# Patient Record
Sex: MALE | Race: WHITE | NOT HISPANIC OR LATINO | Employment: OTHER | URBAN - METROPOLITAN AREA
[De-identification: names, ages, dates, MRNs, and addresses within clinical notes are randomized per-mention and may not be internally consistent; named-entity substitution may affect disease eponyms.]

---

## 2023-08-22 ENCOUNTER — TELEPHONE (OUTPATIENT)
Dept: FAMILY MEDICINE CLINIC | Facility: CLINIC | Age: 68
End: 2023-08-22
Payer: MEDICARE

## 2023-08-22 NOTE — TELEPHONE ENCOUNTER
Spoke to patient's wife. She was calling to see if this patient could be seen sooner. I did not see any New Patient availability sooner than their already scheduled appointment, 8-29-23. His wife said that they just moved here from [place]. He had recent heart surgery and has lots of fluid in his body building up and a wound, I believe on his leg that she said looks infected. I told her that he needs to go to the ER because he may need that fluid drained or even an IV antibiotic. She agreed but I heard patient in the background refuse the ER.

## 2023-08-25 ENCOUNTER — APPOINTMENT (OUTPATIENT)
Dept: GENERAL RADIOLOGY | Facility: HOSPITAL | Age: 68
End: 2023-08-25
Payer: MEDICARE

## 2023-08-25 ENCOUNTER — HOSPITAL ENCOUNTER (EMERGENCY)
Facility: HOSPITAL | Age: 68
Discharge: HOME | End: 2023-08-25
Attending: EMERGENCY MEDICINE
Payer: MEDICARE

## 2023-08-25 VITALS
SYSTOLIC BLOOD PRESSURE: 109 MMHG | RESPIRATION RATE: 24 BRPM | BODY MASS INDEX: 26.36 KG/M2 | WEIGHT: 198.85 LBS | HEART RATE: 92 BPM | OXYGEN SATURATION: 95 % | HEIGHT: 73 IN | TEMPERATURE: 97.8 F | DIASTOLIC BLOOD PRESSURE: 91 MMHG

## 2023-08-25 DIAGNOSIS — I50.9 ACUTE ON CHRONIC CONGESTIVE HEART FAILURE, UNSPECIFIED HEART FAILURE TYPE: Primary | ICD-10-CM

## 2023-08-25 DIAGNOSIS — S81.802A OPEN WOUND OF LEFT LOWER EXTREMITY, INITIAL ENCOUNTER: ICD-10-CM

## 2023-08-25 LAB
ALBUMIN SERPL-MCNC: 3.8 G/DL (ref 3.5–5.2)
ALBUMIN/GLOB SERPL: 1.5 G/DL
ALP SERPL-CCNC: 117 U/L (ref 39–117)
ALT SERPL W P-5'-P-CCNC: 8 U/L (ref 1–41)
ANION GAP SERPL CALCULATED.3IONS-SCNC: 13.9 MMOL/L (ref 5–15)
AST SERPL-CCNC: 22 U/L (ref 1–40)
BASOPHILS # BLD AUTO: 0.04 10*3/MM3 (ref 0–0.2)
BASOPHILS NFR BLD AUTO: 0.5 % (ref 0–1.5)
BILIRUB SERPL-MCNC: 1.6 MG/DL (ref 0–1.2)
BUN SERPL-MCNC: 10 MG/DL (ref 8–23)
BUN/CREAT SERPL: 16.4 (ref 7–25)
CALCIUM SPEC-SCNC: 9 MG/DL (ref 8.6–10.5)
CHLORIDE SERPL-SCNC: 93 MMOL/L (ref 98–107)
CO2 SERPL-SCNC: 23.1 MMOL/L (ref 22–29)
CREAT SERPL-MCNC: 0.61 MG/DL (ref 0.76–1.27)
DEPRECATED RDW RBC AUTO: 53.5 FL (ref 37–54)
EGFRCR SERPLBLD CKD-EPI 2021: 105.9 ML/MIN/1.73
EOSINOPHIL # BLD AUTO: 0.01 10*3/MM3 (ref 0–0.4)
EOSINOPHIL NFR BLD AUTO: 0.1 % (ref 0.3–6.2)
ERYTHROCYTE [DISTWIDTH] IN BLOOD BY AUTOMATED COUNT: 15.1 % (ref 12.3–15.4)
GLOBULIN UR ELPH-MCNC: 2.5 GM/DL
GLUCOSE SERPL-MCNC: 116 MG/DL (ref 65–99)
HCT VFR BLD AUTO: 33 % (ref 37.5–51)
HGB BLD-MCNC: 11.7 G/DL (ref 13–17.7)
HOLD SPECIMEN: 11
HOLD SPECIMEN: 11
IMM GRANULOCYTES # BLD AUTO: 0.04 10*3/MM3 (ref 0–0.05)
IMM GRANULOCYTES NFR BLD AUTO: 0.5 % (ref 0–0.5)
LYMPHOCYTES # BLD AUTO: 1.5 10*3/MM3 (ref 0.7–3.1)
LYMPHOCYTES NFR BLD AUTO: 18.9 % (ref 19.6–45.3)
MCH RBC QN AUTO: 34.5 PG (ref 26.6–33)
MCHC RBC AUTO-ENTMCNC: 35.5 G/DL (ref 31.5–35.7)
MCV RBC AUTO: 97.3 FL (ref 79–97)
MONOCYTES # BLD AUTO: 1.21 10*3/MM3 (ref 0.1–0.9)
MONOCYTES NFR BLD AUTO: 15.3 % (ref 5–12)
NEUTROPHILS NFR BLD AUTO: 5.12 10*3/MM3 (ref 1.7–7)
NEUTROPHILS NFR BLD AUTO: 64.7 % (ref 42.7–76)
NRBC BLD AUTO-RTO: 0 /100 WBC (ref 0–0.2)
NT-PROBNP SERPL-MCNC: ABNORMAL PG/ML (ref 0–900)
PLATELET # BLD AUTO: 242 10*3/MM3 (ref 140–450)
PMV BLD AUTO: 9.4 FL (ref 6–12)
POTASSIUM SERPL-SCNC: 3.6 MMOL/L (ref 3.5–5.2)
PROT SERPL-MCNC: 6.3 G/DL (ref 6–8.5)
QT INTERVAL: 400 MS
QTC INTERVAL: 487 MS
RBC # BLD AUTO: 3.39 10*6/MM3 (ref 4.14–5.8)
SODIUM SERPL-SCNC: 130 MMOL/L (ref 136–145)
TROPONIN T SERPL HS-MCNC: 44 NG/L
WBC NRBC COR # BLD: 7.92 10*3/MM3 (ref 3.4–10.8)
WHOLE BLOOD HOLD COAG: 11
WHOLE BLOOD HOLD SPECIMEN: 11

## 2023-08-25 PROCEDURE — 93005 ELECTROCARDIOGRAM TRACING: CPT | Performed by: EMERGENCY MEDICINE

## 2023-08-25 PROCEDURE — 96374 THER/PROPH/DIAG INJ IV PUSH: CPT

## 2023-08-25 PROCEDURE — 83880 ASSAY OF NATRIURETIC PEPTIDE: CPT | Performed by: EMERGENCY MEDICINE

## 2023-08-25 PROCEDURE — 80053 COMPREHEN METABOLIC PANEL: CPT | Performed by: EMERGENCY MEDICINE

## 2023-08-25 PROCEDURE — 99284 EMERGENCY DEPT VISIT MOD MDM: CPT

## 2023-08-25 PROCEDURE — 93010 ELECTROCARDIOGRAM REPORT: CPT | Performed by: INTERNAL MEDICINE

## 2023-08-25 PROCEDURE — 25010000002 FUROSEMIDE PER 20 MG: Performed by: EMERGENCY MEDICINE

## 2023-08-25 PROCEDURE — 85025 COMPLETE CBC W/AUTO DIFF WBC: CPT | Performed by: EMERGENCY MEDICINE

## 2023-08-25 PROCEDURE — 71045 X-RAY EXAM CHEST 1 VIEW: CPT

## 2023-08-25 PROCEDURE — 84484 ASSAY OF TROPONIN QUANT: CPT | Performed by: EMERGENCY MEDICINE

## 2023-08-25 RX ORDER — SODIUM CHLORIDE 0.9 % (FLUSH) 0.9 %
10 SYRINGE (ML) INJECTION AS NEEDED
Status: DISCONTINUED | OUTPATIENT
Start: 2023-08-25 | End: 2023-08-25 | Stop reason: HOSPADM

## 2023-08-25 RX ORDER — ASPIRIN 81 MG/1
1 TABLET, COATED ORAL DAILY
Status: ON HOLD | COMMUNITY
Start: 2023-08-07

## 2023-08-25 RX ORDER — CEPHALEXIN 500 MG/1
500 CAPSULE ORAL 3 TIMES DAILY
Qty: 15 CAPSULE | Refills: 0 | Status: ON HOLD | OUTPATIENT
Start: 2023-08-25

## 2023-08-25 RX ORDER — HYDROCODONE BITARTRATE AND ACETAMINOPHEN 5; 325 MG/1; MG/1
2 TABLET ORAL ONCE
Status: COMPLETED | OUTPATIENT
Start: 2023-08-25 | End: 2023-08-25

## 2023-08-25 RX ORDER — ONDANSETRON 4 MG/1
1 TABLET, FILM COATED ORAL EVERY 8 HOURS PRN
Status: ON HOLD | COMMUNITY
Start: 2023-06-17

## 2023-08-25 RX ORDER — FUROSEMIDE 10 MG/ML
80 INJECTION INTRAMUSCULAR; INTRAVENOUS ONCE
Status: COMPLETED | OUTPATIENT
Start: 2023-08-25 | End: 2023-08-25

## 2023-08-25 RX ORDER — POTASSIUM CHLORIDE 1500 MG/1
40 TABLET, EXTENDED RELEASE ORAL 2 TIMES DAILY
Status: ON HOLD | COMMUNITY
Start: 2023-08-24

## 2023-08-25 RX ORDER — FUROSEMIDE 40 MG/1
40 TABLET ORAL DAILY
Qty: 30 TABLET | Refills: 1 | Status: ON HOLD | OUTPATIENT
Start: 2023-08-25

## 2023-08-25 RX ORDER — CARVEDILOL 6.25 MG/1
1 TABLET ORAL EVERY 12 HOURS SCHEDULED
Status: ON HOLD | COMMUNITY
Start: 2023-08-05

## 2023-08-25 RX ORDER — HYDROCODONE BITARTRATE AND ACETAMINOPHEN 10; 325 MG/1; MG/1
1 TABLET ORAL EVERY 8 HOURS PRN
Status: ON HOLD | COMMUNITY
Start: 2023-07-28

## 2023-08-25 RX ORDER — ATORVASTATIN CALCIUM 20 MG/1
1 TABLET, FILM COATED ORAL DAILY
Status: ON HOLD | COMMUNITY
Start: 2023-08-05

## 2023-08-25 RX ORDER — FUROSEMIDE 40 MG/1
TABLET ORAL
COMMUNITY
Start: 2023-08-24 | End: 2023-08-25

## 2023-08-25 RX ADMIN — HYDROCODONE BITARTRATE AND ACETAMINOPHEN 2 TABLET: 5; 325 TABLET ORAL at 10:01

## 2023-08-25 RX ADMIN — FUROSEMIDE 80 MG: 10 INJECTION, SOLUTION INTRAMUSCULAR; INTRAVENOUS at 10:01

## 2023-08-25 NOTE — DISCHARGE INSTRUCTIONS
The low sodium and low salt diet for now to prevent swelling and make sure you are taking the Lasix (furosemide) water pill 40 mg twice daily for the first 3 days to get some excess fluid off, then switch over to just taking it once daily from then on.    We will also need to call the heart failure clinic today for an urgent follow-up appointment with Dr. Zuniga for sometime next week.

## 2023-08-25 NOTE — ED PROVIDER NOTES
Time: 9:39 AM EDT  Date of encounter:  8/25/2023  Independent Historian/Clinical History and Information was obtained by:   Patient and Family    History is limited by: N/A    Chief Complaint: Dyspnea on exertion and shortness of breath      History of Present Illness:  Patient is a 68 y.o. year old male with history of heart failure on Lasix and Coreg and status post pacemaker and defibrillator placement, who presents to the emergency department for evaluation of worsening shortness of breath with minimal exertion and also some shortness of breath trying to lay supine at night.    He denies any fevers or productive cough or sick contacts or congestion.    No chest pain.    He does have some lower extremity edema which is essentially chronic.    He just recently moved here from [place] and now seeking to get plugged into medical care and with a cardiologist.    He does have some lower extremity edema that is unchanged from baseline but also has an open ulcerative wound on the lateral aspect of the left lower leg that he has been bandaging.    HPI    Patient Care Team  Primary Care Provider: Provider, No Known    Past Medical History:     No Known Allergies  Past Medical History:   Diagnosis Date    CHF (congestive heart failure)     Hypertension      Past Surgical History:   Procedure Laterality Date    CARDIAC SURGERY       History reviewed. No pertinent family history.    Home Medications:  Prior to Admission medications    Medication Sig Start Date End Date Taking? Authorizing Provider   Aspirin Low Dose 81 MG EC tablet Take 1 tablet by mouth Daily. 8/7/23  Yes Joshua Connors MD   atorvastatin (LIPITOR) 20 MG tablet Take 1 tablet by mouth Daily. 8/5/23  Yes Joshua Connors MD   carvedilol (COREG) 6.25 MG tablet Take 1 tablet by mouth Every 12 (Twelve) Hours. 8/5/23  Yes Joshua Connors MD   HYDROcodone-acetaminophen (NORCO)  MG per tablet Take 1 tablet by mouth 3 (Three) Times a Day.  "7/28/23  Yes ProviderJoshua MD   ondansetron (ZOFRAN) 4 MG tablet Take 1 tablet by mouth 3 (Three) Times a Day. 6/17/23  Yes Joshua Connors MD   potassium chloride ER (K-TAB) 20 MEQ tablet controlled-release ER tablet  8/24/23  Yes Joshua Connors MD   furosemide (LASIX) 40 MG tablet  8/24/23 8/25/23 Yes Joshua Connors MD   cephalexin (KEFLEX) 500 MG capsule Take 1 capsule by mouth 3 (Three) Times a Day. 8/25/23   Jameel Branham MD   furosemide (LASIX) 40 MG tablet Take 1 tablet by mouth Daily. 8/25/23   Jameel Branham MD        Social History:   Social History     Tobacco Use    Smoking status: Every Day     Packs/day: 1.50     Types: Cigarettes         Review of Systems:  Review of Systems   I performed a 10 point review of systems which was all negative, except for the positives found in the HPI above.  Physical Exam:  /91 (BP Location: Right arm, Patient Position: Sitting)   Pulse 92   Temp 97.8 øF (36.6 øC) (Oral)   Resp 24   Ht 185.4 cm (73\")   Wt 90.2 kg (198 lb 13.7 oz)   SpO2 95%   BMI 26.24 kg/mý     Physical Exam   General: Awake alert and in no obvious distress    HEENT: Head normocephalic atraumatic, eyes PERRLA EOMI, nose normal, oropharynx normal.    Neck: Supple full range of motion, no meningismus, no lymphadenopathy    Heart: Regular rate and rhythm, no murmurs or rubs, 2+ radial pulses bilaterally    Lungs: Mostly clear to auscultation bilaterally without wheezes or crackles, but there is some diminished breath sounds over both lung bases posteriorly, no respiratory distress    Abdomen: Soft, nontender, nondistended, no rebound or guarding    Skin: Warm, dry, no rash    Musculoskeletal: Normal range of motion, 2+ bilateral lower extremity edema without calf tenderness, and there is a 3 x 4 cm ulcerative healing wound that is still open on the lateral aspect of the left lower extremity without any surrounding infection but some skin irritation from adhesive " tape usage.    Neurologic: Oriented x3, no motor deficits no sensory deficits    Psychiatric: Mood appears stable, no psychosis          Procedures:  Procedures      Medical Decision Making:      Comorbidities that affect care:    Congestive Heart Failure, Coronary Artery Disease, Smoking    External Notes reviewed:    None      The following orders were placed and all results were independently analyzed by me:  Orders Placed This Encounter   Procedures    XR Chest 1 View    [place] Draw    Comprehensive Metabolic Panel    BNP    Single High Sensitivity Troponin T    CBC Auto Differential    Ambulatory Referral to Heart Failure Clinic    Undress & Gown    Continuous Pulse Oximetry    Vital Signs    ECG 12 Lead ED Triage Standing Order; SOA    CBC & Differential    Green Top (Gel)    Lavender Top    Gold Top - SST    Light Blue Top       Medications Given in the Emergency Department:  Medications   furosemide (LASIX) injection 80 mg (80 mg Intravenous Given 8/25/23 1001)   HYDROcodone-acetaminophen (NORCO) 5-325 MG per tablet 2 tablet (2 tablets Oral Given 8/25/23 1001)        ED Course:    ED Course as of 08/25/23 1551   Fri Aug 25, 2023   0852 EKG: I interpreted his twelve-lead EKG as normal sinus rhythm at 89 bpm, normal P waves, low voltage noted, septal and inferior Q waves present, no ST elevations but some nonspecific T wave abnormalities throughout the lateral leads noted.    No old EKG available for comparison. [VS]      ED Course User Index  [VS] Jameel Branham MD       Labs:    Lab Results (last 24 hours)       Procedure Component Value Units Date/Time    CBC & Differential [[id number]]  (Abnormal) Collected: 08/25/23 0813    Specimen: Blood Updated: 08/25/23 0825    Narrative:      The following orders were created for panel order CBC & Differential.  Procedure                               Abnormality         Status                     ---------                               -----------         ------                      CBC Auto Differential[[id number]]        Abnormal            Final result                 Please view results for these tests on the individual orders.    Comprehensive Metabolic Panel [[id number]]  (Abnormal) Collected: 08/25/23 0813    Specimen: Blood Updated: 08/25/23 0844     Glucose 116 mg/dL      BUN 10 mg/dL      Creatinine 0.61 mg/dL      Sodium 130 mmol/L      Potassium 3.6 mmol/L      Chloride 93 mmol/L      CO2 23.1 mmol/L      Calcium 9.0 mg/dL      Total Protein 6.3 g/dL      Albumin 3.8 g/dL      ALT (SGPT) 8 U/L      AST (SGOT) 22 U/L      Alkaline Phosphatase 117 U/L      Total Bilirubin 1.6 mg/dL      Globulin 2.5 gm/dL      A/G Ratio 1.5 g/dL      BUN/Creatinine Ratio 16.4     Anion Gap 13.9 mmol/L      eGFR 105.9 mL/min/1.73     Narrative:      GFR Normal >60  Chronic Kidney Disease <60  Kidney Failure <15      BNP [[id number]]  (Abnormal) Collected: 08/25/23 0813    Specimen: Blood Updated: 08/25/23 0838     proBNP 17,167.0 pg/mL     Narrative:      Among patients with dyspnea, NT-proBNP is highly sensitive for the detection of acute congestive heart failure. In addition NT-proBNP of <300 pg/ml effectively rules out acute congestive heart failure with 99% negative predictive value.      Single High Sensitivity Troponin T [[id number]]  (Abnormal) Collected: 08/25/23 0813    Specimen: Blood Updated: 08/25/23 0844     HS Troponin T 44 ng/L     Narrative:      High Sensitive Troponin T Reference Range:  <10.0 ng/L- Negative Female for AMI  <15.0 ng/L- Negative Male for AMI  >=10 - Abnormal Female indicating possible myocardial injury.  >=15 - Abnormal Male indicating possible myocardial injury.   Clinicians would have to utilize clinical acumen, EKG, Troponin, and serial changes to determine if it is an Acute Myocardial Infarction or myocardial injury due to an underlying chronic condition.         CBC Auto Differential [[id number]]  (Abnormal) Collected: 08/25/23 0813    Specimen: Blood  Updated: 08/25/23 0825     WBC 7.92 10*3/mm3      RBC 3.39 10*6/mm3      Hemoglobin 11.7 g/dL      Hematocrit 33.0 %      MCV 97.3 fL      MCH 34.5 pg      MCHC 35.5 g/dL      RDW 15.1 %      RDW-SD 53.5 fl      MPV 9.4 fL      Platelets 242 10*3/mm3      Neutrophil % 64.7 %      Lymphocyte % 18.9 %      Monocyte % 15.3 %      Eosinophil % 0.1 %      Basophil % 0.5 %      Immature Grans % 0.5 %      Neutrophils, Absolute 5.12 10*3/mm3      Lymphocytes, Absolute 1.50 10*3/mm3      Monocytes, Absolute 1.21 10*3/mm3      Eosinophils, Absolute 0.01 10*3/mm3      Basophils, Absolute 0.04 10*3/mm3      Immature Grans, Absolute 0.04 10*3/mm3      nRBC 0.0 /100 WBC              Imaging:    XR Chest 1 View    Result Date: 8/25/2023  PROCEDURE: XR CHEST 1 VW  COMPARISON: None  INDICATIONS: SOA Triage Protocol/SHORTNESS OF BREATH AND DIZZINESS  FINDINGS:  Left subclavian transvenous pacemaker defibrillator is in place.  There is moderate cardiomegaly.  Upper lobe pulmonary vessels are prominent compatible with pulmonary vascular congestion.  There is bibasilar airspace disease with small bilateral pleural effusions.  Is no evidence pneumothorax.  Bony structures are unremarkable.        1. Cardiomegaly and pulmonary vascular congestion 2. Bibasilar airspace disease and small bilateral pleural effusions all of which is most likely related to congestive heart failure and pulmonary edema       RJ MONTAGUE MD       Electronically Signed and Approved By: RJ MONTAGUE MD on 8/25/2023 at 8:42                Differential Diagnosis and Discussion:    Dyspnea: Differential diagnosis includes but is not limited to metabolic acidosis, neurological disorders, psychogenic, asthma, pneumothorax, upper airway obstruction, COPD, pneumonia, noncardiogenic pulmonary edema, interstitial lung disease, anemia, congestive heart failure, and pulmonary embolism    All labs were reviewed and interpreted by me.  All X-rays impressions were  independently interpreted by me.  EKG was interpreted by me.    MDM     Amount and/or Complexity of Data Reviewed  Clinical lab tests: reviewed  Tests in the radiology section of CP[facility]: reviewed  Tests in the medicine section of [facility]: reviewed         This patient is a pleasant 68-year-old male who recently moved from [place] and has a history of heart failure now presenting with worsening dyspnea with minimal exertion and orthopnea and leg swelling.    He is oxygenating 97% on room air and in no respiratory distress at rest, and I do not think he warrants hospitalization.    Lab work notable for proBNP which is significantly elevated at 17,000 today, consistent with CHF exacerbation.    Chest x-ray also shows signs of volume overload or CHF.    I will start him on increased Lasix dosage of 40 mg twice a day for the first few days and then transition back to once daily.    Low sodium and low salt intake were discussed.    I will arrange for urgent outpatient ambulatory referral to see a cardiologist over at our heart clinic as an outpatient but do not think he warrants hospitalization at this time.              Patient Care Considerations:          Consultants/Shared Management Plan:        Social Determinants of Health:    Patient has presented with family members who are responsible, reliable and will ensure follow up care.      Disposition and Care Coordination:    Discharged: I considered escalation of care by admitting this patient for observation, however the patient has improved and is suitable and  stable for discharge.    I have explained the patient's condition, diagnoses and treatment plan based on the information available to me at this time. I have answered questions and addressed any concerns. The patient has a good  understanding of the patient's diagnosis, condition, and treatment plan as can be expected at this point. The vital signs have been stable. The patient's condition is stable and appropriate  for discharge from the emergency department.      The patient will pursue further outpatient evaluation with the primary care physician or other designated or consulting physician as outlined in the discharge instructions. They are agreeable to this plan of care and follow-up instructions have been explained in detail. The patient has received these instructions in written format and have expressed an understanding of the discharge instructions. The patient is aware that any significant change in condition or worsening of symptoms should prompt an immediate return to this or the closest emergency department or call to 911.  I have explained discharge medications and the need for follow up with the patient/caretakers. This was also printed in the discharge instructions. Patient was discharged with the following medications and follow up:      Medication List        New Prescriptions      cephalexin 500 MG capsule  Commonly known as: KEFLEX  Take 1 capsule by mouth 3 (Three) Times a Day.            Changed      furosemide 40 MG tablet  Commonly known as: LASIX  Take 1 tablet by mouth Daily.  What changed: See the new instructions.               Where to Get Your Medications        These medications were sent to [id number] - [place], [place] - [address]  AT [facility] [address] - [phone]  - [phone]   [address] [facility] [address]      Phone: [phone]   cephalexin 500 MG capsule  furosemide 40 MG tablet      Christy Zuniga MD  [address]  [facility] [id number] [phone]    Call today  for a follow-up appointment    [facility]  [address]  [facility] [address] [phone]           Final diagnoses:   Acute on chronic congestive heart failure, unspecified heart failure type   Open wound of left lower extremity, initial encounter        ED Disposition       ED Disposition   Discharge    Condition   --     Comment   --               This medical record created using voice recognition software.             Jameel Branham MD  08/25/23 5116

## 2023-08-29 ENCOUNTER — HOSPITAL ENCOUNTER (INPATIENT)
Facility: HOSPITAL | Age: 68
LOS: 8 days | Discharge: OTHER INSTITUTION | DRG: 286 | End: 2023-09-06
Attending: EMERGENCY MEDICINE | Admitting: HOSPITALIST
Payer: MEDICARE

## 2023-08-29 ENCOUNTER — OFFICE VISIT (OUTPATIENT)
Dept: FAMILY MEDICINE CLINIC | Facility: CLINIC | Age: 68
End: 2023-08-29
Payer: MEDICARE

## 2023-08-29 ENCOUNTER — APPOINTMENT (OUTPATIENT)
Dept: GENERAL RADIOLOGY | Facility: HOSPITAL | Age: 68
DRG: 286 | End: 2023-08-29
Payer: MEDICARE

## 2023-08-29 VITALS
HEIGHT: 73 IN | BODY MASS INDEX: 26 KG/M2 | HEART RATE: 92 BPM | WEIGHT: 196.2 LBS | TEMPERATURE: 98 F | OXYGEN SATURATION: 99 % | DIASTOLIC BLOOD PRESSURE: 82 MMHG | SYSTOLIC BLOOD PRESSURE: 117 MMHG

## 2023-08-29 DIAGNOSIS — R07.89 CHEST PAIN, ATYPICAL: ICD-10-CM

## 2023-08-29 DIAGNOSIS — Z78.9 DECREASED ACTIVITIES OF DAILY LIVING (ADL): ICD-10-CM

## 2023-08-29 DIAGNOSIS — Z78.9 FAILURE OF OUTPATIENT TREATMENT: ICD-10-CM

## 2023-08-29 DIAGNOSIS — R17 JAUNDICE: ICD-10-CM

## 2023-08-29 DIAGNOSIS — R26.2 DIFFICULTY WALKING: ICD-10-CM

## 2023-08-29 DIAGNOSIS — J81.0 ACUTE PULMONARY EDEMA: ICD-10-CM

## 2023-08-29 DIAGNOSIS — I50.21 ACUTE HFREF (HEART FAILURE WITH REDUCED EJECTION FRACTION): ICD-10-CM

## 2023-08-29 DIAGNOSIS — I50.9 ACUTE ON CHRONIC CONGESTIVE HEART FAILURE, UNSPECIFIED HEART FAILURE TYPE: Primary | ICD-10-CM

## 2023-08-29 LAB
ALBUMIN SERPL-MCNC: 4.4 G/DL (ref 3.5–5.2)
ALBUMIN/GLOB SERPL: 1.6 G/DL
ALP SERPL-CCNC: 137 U/L (ref 39–117)
ALT SERPL W P-5'-P-CCNC: 10 U/L (ref 1–41)
ANION GAP SERPL CALCULATED.3IONS-SCNC: 14.1 MMOL/L (ref 5–15)
AST SERPL-CCNC: 22 U/L (ref 1–40)
BASOPHILS # BLD AUTO: 0.05 10*3/MM3 (ref 0–0.2)
BASOPHILS NFR BLD AUTO: 0.7 % (ref 0–1.5)
BILIRUB SERPL-MCNC: 2.1 MG/DL (ref 0–1.2)
BUN SERPL-MCNC: 16 MG/DL (ref 8–23)
BUN/CREAT SERPL: 19 (ref 7–25)
CALCIUM SPEC-SCNC: 9.3 MG/DL (ref 8.6–10.5)
CHLORIDE SERPL-SCNC: 92 MMOL/L (ref 98–107)
CO2 SERPL-SCNC: 23.9 MMOL/L (ref 22–29)
CREAT SERPL-MCNC: 0.84 MG/DL (ref 0.76–1.27)
DEPRECATED RDW RBC AUTO: 52.1 FL (ref 37–54)
EGFRCR SERPLBLD CKD-EPI 2021: 96.2 ML/MIN/1.73
EOSINOPHIL # BLD AUTO: 0.02 10*3/MM3 (ref 0–0.4)
EOSINOPHIL NFR BLD AUTO: 0.3 % (ref 0.3–6.2)
ERYTHROCYTE [DISTWIDTH] IN BLOOD BY AUTOMATED COUNT: 14.7 % (ref 12.3–15.4)
FERRITIN SERPL-MCNC: 235.3 NG/ML (ref 30–400)
FOLATE SERPL-MCNC: 13.9 NG/ML (ref 4.78–24.2)
GEN 5 2HR TROPONIN T REFLEX: 37 NG/L
GLOBULIN UR ELPH-MCNC: 2.8 GM/DL
GLUCOSE SERPL-MCNC: 111 MG/DL (ref 65–99)
HCT VFR BLD AUTO: 33.7 % (ref 37.5–51)
HGB BLD-MCNC: 12.3 G/DL (ref 13–17.7)
HOLD SPECIMEN: NORMAL
HOLD SPECIMEN: NORMAL
IMM GRANULOCYTES # BLD AUTO: 0.02 10*3/MM3 (ref 0–0.05)
IMM GRANULOCYTES NFR BLD AUTO: 0.3 % (ref 0–0.5)
IRON 24H UR-MRATE: 59 MCG/DL (ref 59–158)
IRON SATN MFR SERPL: 24 % (ref 20–50)
LYMPHOCYTES # BLD AUTO: 1.38 10*3/MM3 (ref 0.7–3.1)
LYMPHOCYTES NFR BLD AUTO: 18.6 % (ref 19.6–45.3)
MAGNESIUM SERPL-MCNC: 1.2 MG/DL (ref 1.6–2.4)
MCH RBC QN AUTO: 35.7 PG (ref 26.6–33)
MCHC RBC AUTO-ENTMCNC: 36.5 G/DL (ref 31.5–35.7)
MCV RBC AUTO: 97.7 FL (ref 79–97)
MONOCYTES # BLD AUTO: 0.89 10*3/MM3 (ref 0.1–0.9)
MONOCYTES NFR BLD AUTO: 12 % (ref 5–12)
NEUTROPHILS NFR BLD AUTO: 5.06 10*3/MM3 (ref 1.7–7)
NEUTROPHILS NFR BLD AUTO: 68.1 % (ref 42.7–76)
NRBC BLD AUTO-RTO: 0 /100 WBC (ref 0–0.2)
NT-PROBNP SERPL-MCNC: ABNORMAL PG/ML (ref 0–900)
PHOSPHATE SERPL-MCNC: 2.2 MG/DL (ref 2.5–4.5)
PLATELET # BLD AUTO: 262 10*3/MM3 (ref 140–450)
PMV BLD AUTO: 9.1 FL (ref 6–12)
POTASSIUM SERPL-SCNC: 3.4 MMOL/L (ref 3.5–5.2)
PROT SERPL-MCNC: 7.2 G/DL (ref 6–8.5)
RBC # BLD AUTO: 3.45 10*6/MM3 (ref 4.14–5.8)
RETICS # AUTO: 0.06 10*6/MM3 (ref 0.02–0.13)
RETICS/RBC NFR AUTO: 1.83 % (ref 0.7–1.9)
SODIUM SERPL-SCNC: 130 MMOL/L (ref 136–145)
TIBC SERPL-MCNC: 241 MCG/DL (ref 298–536)
TRANSFERRIN SERPL-MCNC: 162 MG/DL (ref 200–360)
TROPONIN T DELTA: -17 NG/L
TROPONIN T SERPL HS-MCNC: 54 NG/L
TSH SERPL DL<=0.05 MIU/L-ACNC: 2.04 UIU/ML (ref 0.27–4.2)
VIT B12 BLD-MCNC: >2000 PG/ML (ref 211–946)
WBC NRBC COR # BLD: 7.42 10*3/MM3 (ref 3.4–10.8)
WHOLE BLOOD HOLD COAG: NORMAL
WHOLE BLOOD HOLD SPECIMEN: NORMAL

## 2023-08-29 PROCEDURE — 82746 ASSAY OF FOLIC ACID SERUM: CPT | Performed by: HOSPITALIST

## 2023-08-29 PROCEDURE — 99223 1ST HOSP IP/OBS HIGH 75: CPT | Performed by: HOSPITALIST

## 2023-08-29 PROCEDURE — 84466 ASSAY OF TRANSFERRIN: CPT | Performed by: HOSPITALIST

## 2023-08-29 PROCEDURE — 36415 COLL VENOUS BLD VENIPUNCTURE: CPT

## 2023-08-29 PROCEDURE — 85045 AUTOMATED RETICULOCYTE COUNT: CPT | Performed by: HOSPITALIST

## 2023-08-29 PROCEDURE — 25010000002 ACETAZOLAMIDE PER 500 MG: Performed by: HOSPITALIST

## 2023-08-29 PROCEDURE — 85025 COMPLETE CBC W/AUTO DIFF WBC: CPT

## 2023-08-29 PROCEDURE — 83540 ASSAY OF IRON: CPT | Performed by: HOSPITALIST

## 2023-08-29 PROCEDURE — 83880 ASSAY OF NATRIURETIC PEPTIDE: CPT

## 2023-08-29 PROCEDURE — 93005 ELECTROCARDIOGRAM TRACING: CPT

## 2023-08-29 PROCEDURE — 83735 ASSAY OF MAGNESIUM: CPT | Performed by: HOSPITALIST

## 2023-08-29 PROCEDURE — 84100 ASSAY OF PHOSPHORUS: CPT | Performed by: HOSPITALIST

## 2023-08-29 PROCEDURE — 84484 ASSAY OF TROPONIN QUANT: CPT

## 2023-08-29 PROCEDURE — 71045 X-RAY EXAM CHEST 1 VIEW: CPT

## 2023-08-29 PROCEDURE — 94640 AIRWAY INHALATION TREATMENT: CPT

## 2023-08-29 PROCEDURE — 84484 ASSAY OF TROPONIN QUANT: CPT | Performed by: EMERGENCY MEDICINE

## 2023-08-29 PROCEDURE — 80053 COMPREHEN METABOLIC PANEL: CPT

## 2023-08-29 PROCEDURE — 93005 ELECTROCARDIOGRAM TRACING: CPT | Performed by: EMERGENCY MEDICINE

## 2023-08-29 PROCEDURE — 99285 EMERGENCY DEPT VISIT HI MDM: CPT

## 2023-08-29 PROCEDURE — 94799 UNLISTED PULMONARY SVC/PX: CPT

## 2023-08-29 PROCEDURE — 82728 ASSAY OF FERRITIN: CPT | Performed by: HOSPITALIST

## 2023-08-29 PROCEDURE — 84443 ASSAY THYROID STIM HORMONE: CPT | Performed by: HOSPITALIST

## 2023-08-29 PROCEDURE — 82607 VITAMIN B-12: CPT | Performed by: HOSPITALIST

## 2023-08-29 PROCEDURE — 25010000002 MAGNESIUM SULFATE 2 GM/50ML SOLUTION: Performed by: HOSPITALIST

## 2023-08-29 PROCEDURE — 93010 ELECTROCARDIOGRAM REPORT: CPT | Performed by: INTERNAL MEDICINE

## 2023-08-29 PROCEDURE — 25010000002 ENOXAPARIN PER 10 MG: Performed by: HOSPITALIST

## 2023-08-29 RX ORDER — AMOXICILLIN 250 MG
2 CAPSULE ORAL 2 TIMES DAILY
Status: DISCONTINUED | OUTPATIENT
Start: 2023-08-29 | End: 2023-09-07 | Stop reason: HOSPADM

## 2023-08-29 RX ORDER — SODIUM CHLORIDE 0.9 % (FLUSH) 0.9 %
10 SYRINGE (ML) INJECTION EVERY 12 HOURS SCHEDULED
Status: DISCONTINUED | OUTPATIENT
Start: 2023-08-29 | End: 2023-09-07 | Stop reason: HOSPADM

## 2023-08-29 RX ORDER — NICOTINE 21 MG/24HR
1 PATCH, TRANSDERMAL 24 HOURS TRANSDERMAL
Status: DISCONTINUED | OUTPATIENT
Start: 2023-08-29 | End: 2023-09-07 | Stop reason: HOSPADM

## 2023-08-29 RX ORDER — SODIUM CHLORIDE 0.9 % (FLUSH) 0.9 %
10 SYRINGE (ML) INJECTION AS NEEDED
Status: DISCONTINUED | OUTPATIENT
Start: 2023-08-29 | End: 2023-08-30

## 2023-08-29 RX ORDER — HYDROCODONE BITARTRATE AND ACETAMINOPHEN 5; 325 MG/1; MG/1
1 TABLET ORAL EVERY 4 HOURS PRN
Status: DISCONTINUED | OUTPATIENT
Start: 2023-08-29 | End: 2023-08-30

## 2023-08-29 RX ORDER — POLYETHYLENE GLYCOL 3350 17 G/17G
17 POWDER, FOR SOLUTION ORAL DAILY PRN
Status: DISCONTINUED | OUTPATIENT
Start: 2023-08-29 | End: 2023-09-07 | Stop reason: HOSPADM

## 2023-08-29 RX ORDER — ACETAMINOPHEN 650 MG/1
650 SUPPOSITORY RECTAL EVERY 4 HOURS PRN
Status: DISCONTINUED | OUTPATIENT
Start: 2023-08-29 | End: 2023-09-07 | Stop reason: HOSPADM

## 2023-08-29 RX ORDER — BISACODYL 5 MG/1
5 TABLET, DELAYED RELEASE ORAL DAILY PRN
Status: DISCONTINUED | OUTPATIENT
Start: 2023-08-29 | End: 2023-09-07 | Stop reason: HOSPADM

## 2023-08-29 RX ORDER — HYDROCODONE BITARTRATE AND ACETAMINOPHEN 7.5; 325 MG/1; MG/1
2 TABLET ORAL EVERY 4 HOURS PRN
Status: DISCONTINUED | OUTPATIENT
Start: 2023-08-29 | End: 2023-08-30

## 2023-08-29 RX ORDER — SPIRONOLACTONE 25 MG/1
25 TABLET ORAL DAILY
Status: ON HOLD | COMMUNITY

## 2023-08-29 RX ORDER — ASPIRIN 81 MG/1
81 TABLET ORAL DAILY
Status: DISCONTINUED | OUTPATIENT
Start: 2023-08-29 | End: 2023-09-07 | Stop reason: HOSPADM

## 2023-08-29 RX ORDER — MAGNESIUM SULFATE HEPTAHYDRATE 40 MG/ML
2 INJECTION, SOLUTION INTRAVENOUS
Status: DISPENSED | OUTPATIENT
Start: 2023-08-29 | End: 2023-08-30

## 2023-08-29 RX ORDER — CHOLECALCIFEROL (VITAMIN D3) 125 MCG
5 CAPSULE ORAL NIGHTLY PRN
Status: DISCONTINUED | OUTPATIENT
Start: 2023-08-29 | End: 2023-08-29

## 2023-08-29 RX ORDER — ACETAMINOPHEN 325 MG/1
650 TABLET ORAL EVERY 4 HOURS PRN
Status: DISCONTINUED | OUTPATIENT
Start: 2023-08-29 | End: 2023-09-07 | Stop reason: HOSPADM

## 2023-08-29 RX ORDER — CHOLECALCIFEROL (VITAMIN D3) 125 MCG
10 CAPSULE ORAL NIGHTLY PRN
Status: DISCONTINUED | OUTPATIENT
Start: 2023-08-29 | End: 2023-09-07 | Stop reason: HOSPADM

## 2023-08-29 RX ORDER — MIDODRINE HYDROCHLORIDE 2.5 MG/1
1 TABLET ORAL 3 TIMES DAILY
COMMUNITY
Start: 2023-08-07

## 2023-08-29 RX ORDER — MIDODRINE HYDROCHLORIDE 5 MG/1
5 TABLET ORAL
Status: DISCONTINUED | OUTPATIENT
Start: 2023-08-29 | End: 2023-08-31

## 2023-08-29 RX ORDER — BISACODYL 10 MG
10 SUPPOSITORY, RECTAL RECTAL DAILY PRN
Status: DISCONTINUED | OUTPATIENT
Start: 2023-08-29 | End: 2023-09-07 | Stop reason: HOSPADM

## 2023-08-29 RX ORDER — BUMETANIDE 0.25 MG/ML
2 INJECTION INTRAMUSCULAR; INTRAVENOUS ONCE
Status: COMPLETED | OUTPATIENT
Start: 2023-08-29 | End: 2023-08-29

## 2023-08-29 RX ORDER — ACETAZOLAMIDE 500 MG/5ML
500 INJECTION, POWDER, LYOPHILIZED, FOR SOLUTION INTRAVENOUS ONCE
Status: COMPLETED | OUTPATIENT
Start: 2023-08-29 | End: 2023-08-29

## 2023-08-29 RX ORDER — ACETAMINOPHEN 160 MG/5ML
650 SOLUTION ORAL EVERY 4 HOURS PRN
Status: DISCONTINUED | OUTPATIENT
Start: 2023-08-29 | End: 2023-09-07 | Stop reason: HOSPADM

## 2023-08-29 RX ORDER — CARVEDILOL 6.25 MG/1
6.25 TABLET ORAL 2 TIMES DAILY WITH MEALS
Status: DISCONTINUED | OUTPATIENT
Start: 2023-08-29 | End: 2023-08-30

## 2023-08-29 RX ORDER — BUMETANIDE 0.25 MG/ML
1 INJECTION INTRAMUSCULAR; INTRAVENOUS EVERY 12 HOURS
Status: DISCONTINUED | OUTPATIENT
Start: 2023-08-30 | End: 2023-08-30

## 2023-08-29 RX ORDER — POTASSIUM CHLORIDE 1500 MG/1
TABLET, EXTENDED RELEASE ORAL EVERY 12 HOURS SCHEDULED
COMMUNITY
End: 2023-08-29

## 2023-08-29 RX ORDER — NALOXONE HCL 0.4 MG/ML
0.4 VIAL (ML) INJECTION
Status: DISCONTINUED | OUTPATIENT
Start: 2023-08-29 | End: 2023-09-07 | Stop reason: HOSPADM

## 2023-08-29 RX ORDER — HYDRALAZINE HYDROCHLORIDE 25 MG/1
25 TABLET, FILM COATED ORAL 3 TIMES DAILY
COMMUNITY
End: 2023-08-29

## 2023-08-29 RX ORDER — BUMETANIDE 0.25 MG/ML
1 INJECTION INTRAMUSCULAR; INTRAVENOUS EVERY 12 HOURS
Status: DISCONTINUED | OUTPATIENT
Start: 2023-08-29 | End: 2023-08-29

## 2023-08-29 RX ORDER — SODIUM CHLORIDE 9 MG/ML
40 INJECTION, SOLUTION INTRAVENOUS AS NEEDED
Status: DISCONTINUED | OUTPATIENT
Start: 2023-08-29 | End: 2023-08-30

## 2023-08-29 RX ORDER — PHENOL 1.4 %
10 AEROSOL, SPRAY (ML) MUCOUS MEMBRANE NIGHTLY PRN
Status: ON HOLD | COMMUNITY

## 2023-08-29 RX ORDER — ATORVASTATIN CALCIUM 20 MG/1
20 TABLET, FILM COATED ORAL NIGHTLY
Status: DISCONTINUED | OUTPATIENT
Start: 2023-08-29 | End: 2023-09-05

## 2023-08-29 RX ORDER — METOLAZONE 5 MG/1
5 TABLET ORAL DAILY
COMMUNITY
Start: 2023-08-04

## 2023-08-29 RX ORDER — SODIUM CHLORIDE 0.9 % (FLUSH) 0.9 %
10 SYRINGE (ML) INJECTION AS NEEDED
Status: DISCONTINUED | OUTPATIENT
Start: 2023-08-29 | End: 2023-09-07 | Stop reason: HOSPADM

## 2023-08-29 RX ORDER — ENOXAPARIN SODIUM 100 MG/ML
40 INJECTION SUBCUTANEOUS DAILY
Status: DISCONTINUED | OUTPATIENT
Start: 2023-08-29 | End: 2023-08-30

## 2023-08-29 RX ORDER — FENTANYL/ROPIVACAINE/NS/PF 2-625MCG/1
15 PLASTIC BAG, INJECTION (ML) EPIDURAL ONCE
Status: COMPLETED | OUTPATIENT
Start: 2023-08-29 | End: 2023-08-29

## 2023-08-29 RX ORDER — BUDESONIDE AND FORMOTEROL FUMARATE DIHYDRATE 160; 4.5 UG/1; UG/1
2 AEROSOL RESPIRATORY (INHALATION)
Status: DISCONTINUED | OUTPATIENT
Start: 2023-08-29 | End: 2023-08-31

## 2023-08-29 RX ADMIN — SENNOSIDES AND DOCUSATE SODIUM 2 TABLET: 50; 8.6 TABLET ORAL at 20:06

## 2023-08-29 RX ADMIN — TIOTROPIUM BROMIDE INHALATION SPRAY 2 PUFF: 3.12 SPRAY, METERED RESPIRATORY (INHALATION) at 19:04

## 2023-08-29 RX ADMIN — POTASSIUM PHOSPHATE, MONOBASIC POTASSIUM PHOSPHATE, DIBASIC 15 MMOL: 224; 236 INJECTION, SOLUTION, CONCENTRATE INTRAVENOUS at 20:06

## 2023-08-29 RX ADMIN — MIDODRINE HYDROCHLORIDE 5 MG: 5 TABLET ORAL at 18:16

## 2023-08-29 RX ADMIN — CARVEDILOL 6.25 MG: 6.25 TABLET, FILM COATED ORAL at 18:16

## 2023-08-29 RX ADMIN — HYDROCODONE BITARTRATE AND ACETAMINOPHEN 1 TABLET: 5; 325 TABLET ORAL at 18:16

## 2023-08-29 RX ADMIN — BUDESONIDE AND FORMOTEROL FUMARATE DIHYDRATE 2 PUFF: 160; 4.5 AEROSOL RESPIRATORY (INHALATION) at 19:02

## 2023-08-29 RX ADMIN — HYDROCODONE BITARTRATE AND ACETAMINOPHEN 2 TABLET: 7.5; 325 TABLET ORAL at 22:54

## 2023-08-29 RX ADMIN — Medication 10 MG: at 23:59

## 2023-08-29 RX ADMIN — BUMETANIDE 2 MG: 0.25 INJECTION, SOLUTION INTRAMUSCULAR; INTRAVENOUS at 14:42

## 2023-08-29 RX ADMIN — ACETAZOLAMIDE 500 MG: 500 INJECTION, POWDER, LYOPHILIZED, FOR SOLUTION INTRAVENOUS at 18:15

## 2023-08-29 RX ADMIN — MAGNESIUM SULFATE HEPTAHYDRATE 2 G: 40 INJECTION, SOLUTION INTRAVENOUS at 20:06

## 2023-08-29 RX ADMIN — NICOTINE 1 PATCH: 21 PATCH, EXTENDED RELEASE TRANSDERMAL at 18:16

## 2023-08-29 RX ADMIN — ATORVASTATIN CALCIUM 20 MG: 20 TABLET, FILM COATED ORAL at 20:06

## 2023-08-29 RX ADMIN — ASPIRIN 81 MG: 81 TABLET, COATED ORAL at 18:16

## 2023-08-29 RX ADMIN — Medication 10 ML: at 20:04

## 2023-08-29 NOTE — PLAN OF CARE
Problem: Adult Inpatient Plan of Care  Goal: Plan of Care Review  Outcome: Ongoing, Progressing  Flowsheets (Taken 8/29/2023 1724)  Progress: no change  Plan of Care Reviewed With: patient  Outcome Evaluation: patient admitted to unit.  Goal: Patient-Specific Goal (Individualized)  Outcome: Ongoing, Progressing  Goal: Absence of Hospital-Acquired Illness or Injury  Outcome: Ongoing, Progressing  Goal: Optimal Comfort and Wellbeing  Outcome: Ongoing, Progressing  Goal: Readiness for Transition of Care  Outcome: Ongoing, Progressing  Intervention: Mutually Develop Transition Plan  Recent Flowsheet Documentation  Taken 8/29/2023 1722 by Syl Swanson RN  Transportation Anticipated: family or friend will provide  Patient/Family Anticipated Services at Transition:   Patient/Family Anticipates Transition to: home with family  Taken 8/29/2023 1716 by Syl Swanson RN  Equipment Currently Used at Home: cane, straight     Problem: Adjustment to Illness (Heart Failure)  Goal: Optimal Coping  Outcome: Ongoing, Progressing     Problem: Cardiac Output Decreased (Heart Failure)  Goal: Optimal Cardiac Output  Outcome: Ongoing, Progressing     Problem: Dysrhythmia (Heart Failure)  Goal: Stable Heart Rate and Rhythm  Outcome: Ongoing, Progressing     Problem: Fluid Imbalance (Heart Failure)  Goal: Fluid Balance  Outcome: Ongoing, Progressing     Problem: Functional Ability Impaired (Heart Failure)  Goal: Optimal Functional Ability  Outcome: Ongoing, Progressing     Problem: Oral Intake Inadequate (Heart Failure)  Goal: Optimal Nutrition Intake  Outcome: Ongoing, Progressing     Problem: Respiratory Compromise (Heart Failure)  Goal: Effective Oxygenation and Ventilation  Outcome: Ongoing, Progressing     Problem: Sleep Disordered Breathing (Heart Failure)  Goal: Effective Breathing Pattern During Sleep  Outcome: Ongoing, Progressing     Problem: Fall Injury Risk  Goal: Absence of Fall and Fall-Related Injury  Outcome:  Ongoing, Progressing     Problem: Heart Failure Comorbidity  Goal: Maintenance of Heart Failure Symptom Control  Outcome: Ongoing, Progressing   Goal Outcome Evaluation:  Plan of Care Reviewed With: patient        Progress: no change  Outcome Evaluation: patient admitted to unit.

## 2023-08-29 NOTE — ED PROVIDER NOTES
Time: 2:34 PM EDT  Date of encounter:  8/29/2023  Independent Historian/Clinical History and Information was obtained by:   Patient and Family    History is limited by: N/A    Chief Complaint: Shortness of breath, CHF      History of Present Illness:  Patient is a 68 y.o. year old male who presents to the emergency department for evaluation of worsening swelling in the legs and also mostly in the abdomen, but also worsening dyspnea with minimal exertion and orthopnea.    He was just seen in our ED about a week ago for CHF exacerbation and started on some increased dose of Lasix but still not diuresing much at home and was sent back into the ED today by his PCP to be admitted.    He is oxygenating well at rest but gets very dyspneic with minimal exertion.    Family member has also some concerns about him possibly looking yellow or having liver problems but no diagnoses reported.    HPI    Patient Care Team  Primary Care Provider: Rosa Isela Kim APRN    Past Medical History:     No Known Allergies  Past Medical History:   Diagnosis Date    CHF (congestive heart failure)     Hypertension      Past Surgical History:   Procedure Laterality Date    CARDIAC SURGERY       History reviewed. No pertinent family history.    Home Medications:  Prior to Admission medications    Medication Sig Start Date End Date Taking? Authorizing Provider   Aspirin Low Dose 81 MG EC tablet Take 1 tablet by mouth Daily. 8/7/23   Joshua Connors MD   atorvastatin (LIPITOR) 20 MG tablet Take 1 tablet by mouth Daily. 8/5/23   Joshua Connors MD   carvedilol (COREG) 6.25 MG tablet Take 1 tablet by mouth Every 12 (Twelve) Hours. 8/5/23   Joshua Connors MD   cephalexin (KEFLEX) 500 MG capsule Take 1 capsule by mouth 3 (Three) Times a Day. 8/25/23   Jameel Branham MD   furosemide (LASIX) 40 MG tablet Take 1 tablet by mouth Daily. 8/25/23   Jameel Branham MD   hydrALAZINE (APRESOLINE) 25 MG tablet Take 1 tablet by mouth 3  "(Three) Times a Day.    Joshua Connors MD   HYDROcodone-acetaminophen (NORCO)  MG per tablet Take 1 tablet by mouth 3 (Three) Times a Day. 7/28/23   Joshua Connors MD   Melatonin 10 MG tablet Take  by mouth.    Joshua Connors MD   ondansetron (ZOFRAN) 4 MG tablet Take 1 tablet by mouth 3 (Three) Times a Day. 6/17/23   Joshua Connors MD   potassium chloride ER (K-TAB) 20 MEQ tablet controlled-release ER tablet  8/24/23   Joshua Connors MD   potassium chloride ER (K-TAB) 20 MEQ tablet controlled-release ER tablet Every 12 (Twelve) Hours.  9/23/23  Joshua Connors MD   sacubitril-valsartan (ENTRESTO) 24-26 MG tablet Take 1 tablet by mouth 2 (Two) Times a Day.    Joshua Connors MD   spironolactone (ALDACTONE) 25 MG tablet Take 1 tablet by mouth Daily.    Joshua Connors MD        Social History:   Social History     Tobacco Use    Smoking status: Every Day     Packs/day: 1.50     Types: Cigarettes   Vaping Use    Vaping Use: Never used   Substance Use Topics    Alcohol use: Yes     Alcohol/week: 3.0 standard drinks     Types: 3 Cans of beer per week     Comment: 3-4 beer daily    Drug use: Never         Review of Systems:  Review of Systems   I performed a 10 point review of systems which was all negative, except for the positives found in the HPI above.        Physical Exam:  /86   Pulse 86   Temp 97.2 °F (36.2 °C)   Resp 13   Ht 185.4 cm (73\")   Wt 91.5 kg (201 lb 11.5 oz)   SpO2 100%   BMI 26.61 kg/m²       Physical Exam   General: Awake alert and in no obvious distress    HEENT: Head normocephalic atraumatic, eyes PERRLA EOMI, nose normal, oropharynx normal.    Neck: Supple full range of motion, no meningismus, no lymphadenopathy    Heart: Regular rate and rhythm, no murmurs or rubs, 2+ radial pulses bilaterally    Lungs: Crackles and diminished breath sounds at lung bases posteriorly but no wheezing or respiratory distress noted    Abdomen: " Soft, nontender, nondistended, no rebound or guarding    Skin: Warm, dry, no rash    Musculoskeletal: Normal range of motion, 2+ bilateral lower extremity edema without calf tenderness    Neurologic: Oriented x3, no motor deficits no sensory deficits    Psychiatric: Mood appears stable, no psychosis          Procedures:  Procedures      Medical Decision Making:      Comorbidities that affect care:    Congestive Heart Failure, Hypertension, Smoking    External Notes reviewed:    Previous Labs: I reviewed his previous lab work, comparing it to today's elevated proBNP and it looks like it is much worse today than last week.      The following orders were placed and all results were independently analyzed by me:  Orders Placed This Encounter   Procedures    XR Chest 1 View    [place] Draw    Comprehensive Metabolic Panel    BNP    Single High Sensitivity Troponin T    CBC Auto Differential    High Sensitivity Troponin T 2Hr    NPO Diet NPO Type: Strict NPO    Undress & Gown    Continuous Pulse Oximetry    Vital Signs    Hospitalist (on-call MD unless specified)    Oxygen Therapy- Nasal Cannula; Titrate 1-6 LPM Per SpO2; 90 - 95%    ECG 12 Lead ED Triage Standing Order; SOA    Insert Peripheral IV    Inpatient Admission    CBC & Differential    Green Top (Gel)    Lavender Top    Gold Top - SST    Light Blue Top       Medications Given in the Emergency Department:  Medications   sodium chloride 0.9 % flush 10 mL (has no administration in time range)   bumetanide (BUMEX) injection 2 mg (2 mg Intravenous Given 8/29/23 1442)        ED Course:         Labs:    Lab Results (last 24 hours)       Procedure Component Value Units Date/Time    CBC & Differential [[id number]]  (Abnormal) Collected: 08/29/23 1229    Specimen: Blood Updated: 08/29/23 1235    Narrative:      The following orders were created for panel order CBC & Differential.  Procedure                               Abnormality         Status                      ---------                               -----------         ------                     CBC Auto Differential[[id number]]        Abnormal            Final result                 Please view results for these tests on the individual orders.    Comprehensive Metabolic Panel [[id number]]  (Abnormal) Collected: 08/29/23 1229    Specimen: Blood Updated: 08/29/23 1258     Glucose 111 mg/dL      BUN 16 mg/dL      Creatinine 0.84 mg/dL      Sodium 130 mmol/L      Potassium 3.4 mmol/L      Chloride 92 mmol/L      CO2 23.9 mmol/L      Calcium 9.3 mg/dL      Total Protein 7.2 g/dL      Albumin 4.4 g/dL      ALT (SGPT) 10 U/L      AST (SGOT) 22 U/L      Alkaline Phosphatase 137 U/L      Total Bilirubin 2.1 mg/dL      Globulin 2.8 gm/dL      A/G Ratio 1.6 g/dL      BUN/Creatinine Ratio 19.0     Anion Gap 14.1 mmol/L      eGFR 96.2 mL/min/1.73     Narrative:      GFR Normal >60  Chronic Kidney Disease <60  Kidney Failure <15      BNP [[id number]]  (Abnormal) Collected: 08/29/23 1229    Specimen: Blood Updated: 08/29/23 1253     proBNP 26,219.0 pg/mL     Narrative:      Among patients with dyspnea, NT-proBNP is highly sensitive for the detection of acute congestive heart failure. In addition NT-proBNP of <300 pg/ml effectively rules out acute congestive heart failure with 99% negative predictive value.      Single High Sensitivity Troponin T [[id number]]  (Abnormal) Collected: 08/29/23 1229    Specimen: Blood Updated: 08/29/23 1306     HS Troponin T 54 ng/L     Narrative:      High Sensitive Troponin T Reference Range:  <10.0 ng/L- Negative Female for AMI  <15.0 ng/L- Negative Male for AMI  >=10 - Abnormal Female indicating possible myocardial injury.  >=15 - Abnormal Male indicating possible myocardial injury.   Clinicians would have to utilize clinical acumen, EKG, Troponin, and serial changes to determine if it is an Acute Myocardial Infarction or myocardial injury due to an underlying chronic condition.         CBC Auto  Differential [[id number]]  (Abnormal) Collected: 08/29/23 1229    Specimen: Blood Updated: 08/29/23 1235     WBC 7.42 10*3/mm3      RBC 3.45 10*6/mm3      Hemoglobin 12.3 g/dL      Hematocrit 33.7 %      MCV 97.7 fL      MCH 35.7 pg      MCHC 36.5 g/dL      RDW 14.7 %      RDW-SD 52.1 fl      MPV 9.1 fL      Platelets 262 10*3/mm3      Neutrophil % 68.1 %      Lymphocyte % 18.6 %      Monocyte % 12.0 %      Eosinophil % 0.3 %      Basophil % 0.7 %      Immature Grans % 0.3 %      Neutrophils, Absolute 5.06 10*3/mm3      Lymphocytes, Absolute 1.38 10*3/mm3      Monocytes, Absolute 0.89 10*3/mm3      Eosinophils, Absolute 0.02 10*3/mm3      Basophils, Absolute 0.05 10*3/mm3      Immature Grans, Absolute 0.02 10*3/mm3      nRBC 0.0 /100 WBC     High Sensitivity Troponin T 2Hr [[id number]] Collected: 08/29/23 1442    Specimen: Blood Updated: 08/29/23 1447             Imaging:    XR Chest 1 View    Result Date: 8/29/2023  PROCEDURE: XR CHEST 1 VW  COMPARISON: [facility], CR, XR CHEST 1 VW, 8/25/2023, 8:32.  INDICATIONS: SHORTNESS OF BREATH TODAY  FINDINGS:  The heart remains slightly enlarged.  The pulmonary vascularity is congested.  Bibasilar pulmonary infiltrate or edema is unchanged.  The costophrenic angles remain slightly blunted.  Dual lead AICD remains in place.        Findings consistent with moderate congestive heart failure, not significantly changed in comparison to 8/25/2023.  Dual lead AICD remains in place.       LORENA GARNICA MD       Electronically Signed and Approved By: LORENA GARNICA MD on 8/29/2023 at 13:02                Differential Diagnosis and Discussion:    Dyspnea: Differential diagnosis includes but is not limited to metabolic acidosis, neurological disorders, psychogenic, asthma, pneumothorax, upper airway obstruction, COPD, pneumonia, noncardiogenic pulmonary edema, interstitial lung disease, anemia, congestive heart failure, and pulmonary embolism    All labs were reviewed and  interpreted by me.  All X-rays impressions were independently interpreted by me.  EKG was interpreted by me.    MDM     Amount and/or Complexity of Data Reviewed  Tests in the medicine section of CPT®: reviewed  Decide to obtain previous medical records or to obtain history from someone other than the patient: yes               This patient is a 68-year-old male who recently relocated from [place] now presenting with worsening swelling and dyspnea on exertion and abdominal swelling.    I actually just saw him in the ED this past week and we doubled up his Lasix for treatment of CHF but as he was not hypoxic at the time we thought he can be managed as an outpatient.    However I guess he has been getting clinically worse over the past few days at home and unable to ambulate much at home due to dyspnea.    He was seen by his PCP today and sent directly to the ED for admission for CHF exacerbation.    I do note his proBNP is significantly more elevated than earlier this week now measuring 26,000, up from 17,000.      I am diuresing him with IV Bumex 2 mg in the ED and we will consider getting him admitted for failure of outpatient diuresis and worsening heart failure.            Patient Care Considerations:          Consultants/Shared Management Plan:    Hospitalist: I have discussed the case with the admitting hospitalist, who agrees to accept the patient for admission.    Social Determinants of Health:    Patient has presented with family members who are responsible, reliable and will ensure follow up care.      Disposition and Care Coordination:    Admit:   Through independent evaluation of the patient's history, physical, and imperical data, the patient meets criteria for observation/admission to the hospital.        Final diagnoses:   Acute on chronic congestive heart failure, unspecified heart failure type   Failure of outpatient treatment        ED Disposition       ED Disposition   Decision to Admit     Condition   --    Comment   Level of Care: Telemetry [5]   Diagnosis: CHF exacerbation [926078]   Admitting Physician: CLAU HANKINS [[id number]]   Attending Physician: CLAU HANKINS [[id number]]   Certification: I Certify That Inpatient Hospital Services Are Medically Necessary For Greater Than 2 Midnights                 This medical record created using voice recognition software.             Jameel Branham MD  08/29/23 1325

## 2023-08-29 NOTE — H&P
[facility]  Date: 2023   Patient Name: Bradley Crane  : 1955  MRN: [id number]  Primary Care Physician:  Rosa Isela Kim APRN  Date of admission: 2023    Subjective SOB  Subjective     Chief Complaint: SOB    HPI:  Bradley Crane is a 68 y.o. male who oresents with worsening lower extremity swelling and dyspnea despite increased dose of lasix.  Patient was to follow up with Dr. Zuniga at the CHF clinic however he was not able to make it to the appointment because he ended up back in the hospital.  Patient has come here from [place] and needs to establish care with a cardiologist. He was also seen in the ER last week.      On arrival to the ED, patient was dyspneic with minimal exertion.  His BP was 112/86.  Pulse was 86.      On labs, patient's sodium is 130, potassium is 3.4, total bilirubin was 2.1.  BNP is markedly elevated.  His delta troponin is -17.  Chest xray shows moderate CHF and ICD.    Personal History     Past Medical History:  Past Medical History:   Diagnosis Date    CHF (congestive heart failure)     Hypertension         Past Surgical History:  Past Surgical History:   Procedure Laterality Date    CARDIAC SURGERY          Family History:   History reviewed. No pertinent family history.     Social History:   Social History     Tobacco Use    Smoking status: Every Day     Packs/day: 1.50     Types: Cigarettes   Vaping Use    Vaping Use: Never used   Substance Use Topics    Alcohol use: Yes     Alcohol/week: 3.0 standard drinks     Types: 3 Cans of beer per week     Comment: 3-4 beer daily    Drug use: Never        Home Medications:  HYDROcodone-acetaminophen, Melatonin, aspirin, atorvastatin, carvedilol, cephalexin, furosemide, metOLazone, midodrine, ondansetron, potassium chloride ER, sacubitril-valsartan, and spironolactone    Allergies:  No Known Allergies    Review of Systems   All systems were reviewed and negative except for:  sob    Objective   Objective     Vitals:   Temp:  [97.2 °F (36.2 °C)-98 °F (36.7 °C)] 97.2 °F (36.2 °C)  Heart Rate:  [58-92] 86  Resp:  [13] 13  BP: (112-117)/(82-90) 112/86  Flow (L/min):  [2] 2    Physical Exam    Constitutional: Awake, alert, no acute distress   Eyes: Pupils equal, sclerae anicteric, no conjunctival injection   HENT: NCAT, mucous membranes moist   Neck: Supple, no thyromegaly, no lymphadenopathy, trachea midline   Respiratory: Crackles   Cardiovascular: RRR, no murmurs, rubs   Gastrointestinal: Positive bowel sounds, soft, nontender, nondistended   Musculoskeletal: edema   Psychiatric: Appropriate affect, cooperative   Neurologic: Oriented x 3, strength symmetric in all extremities, Cranial Nerves grossly intact to confrontation, speech clear   Skin: No rashes     Result Review    Result Review:  I have personally reviewed the results from the time of this admission to 8/29/2023 16:15 EDT and agree with these findings:  [x]  Laboratory  []  Microbiology  [x]  Radiology  []  EKG/Telemetry   []  Cardiology/Vascular   []  Pathology  [x]  Old records  []  Other:      Assessment & Plan   Assessment / Plan   Assessment/Plan:   #1 CHF exacerbation  #2 HTN  #3 hypokalemia  #4 anemia  #5 Drinks 3-4 beers a day  #6 Tobacco abuse  #7 CAD with HLP      Plan:  #1 CHF exacerbation:   -echo ordered  -bumex IV 1 mg BID, IV acetazolamide for decongestion. Continue coreg.  -will need to have entresto, spironolactone tomorrow if BP tolerates.  Will need to add jardiance.  For goal based care.  -consult cardiology. Will need to go to cardiac rehab and CHF clinic.    #2 HTN     #3 hypokalemia  -electrolyte replacement panel ordered    #4 anemia  -complete panel ordered.  If iron is low, may have improvement in functional capacity with iron boost.    #5 will monitor for any withdrawals    #6 tobacco abuse: Nicotine patch; symbicort and spirva.  Will need outpatient PFTs.      #7 continue statin and aspirin    #8  Hyponatremia-will monitor          DVT prophylaxis:  lovenox    CODE STATUS:    Level Of Support Discussed With: Patient  Code Status (Patient has no pulse and is not breathing): CPR (Attempt to Resuscitate)  Medical Interventions (Patient has pulse or is breathing): Full Support  Full code    Admission Status:  I believe this patient meets inpatient status.    Electronically signed by Guillermo Haas DO, 08/29/23, 3:42 PM EDT.

## 2023-08-29 NOTE — PAYOR COMM NOTE
"Jose Crane (68 y.o. Male)       Date of Birth   1955    Social Security Number       Address   [address] [address]    Home Phone   [phone]    MRN   [id number]       Episcopal   None    Marital Status                               Admission Date   23    Admission Type   Emergency    Admitting Provider   Guillermo Haas DO    Attending Provider   Guillermo Haas DO    Department, Room/Bed   [facility], 216/       Discharge Date       Discharge Disposition       Discharge Destination                                 Attending Provider: Guillermo Haas DO    Allergies: No Known Allergies    Isolation: None   Infection: None   Code Status: CPR    Ht: 185.4 cm (73\")   Wt: 91.5 kg (201 lb 11.5 oz)    Admission Cmt: None   Principal Problem: CHF exacerbation [I50.9]                   Active Insurance as of 2023       Primary Coverage       Payor Plan Insurance Group Employer/Plan Group    HUMANA MEDICARE REPLACEMENT HUMANA MEDICARE REPLACEMENT [id number]       Payor Plan Address Payor Plan Phone Number Payor Plan Fax Number Effective Dates    [address] [address] [phone]  2023 - None Entered    [facility] [id number]         Subscriber Name Subscriber Birth Date Member ID       JOSE CRANE 1955 [id number]                     Emergency Contacts        (Rel.) Home Phone Work Phone Mobile Phone    LOUIS CRANE (Daughter) -- -- [phone]    LOUIS CRANE (Spouse) [phone] -- --                 History & Physical        Guillermo Haas DO at 23 [address] [place], [place] [address]IST HISTORY AND PHYSICAL  Date: 2023   Patient Name: Jose Crane  : 1955  MRN: [id number]  Primary Care Physician:  Rosa Isela Kim APRN  Date of admission: 2023    Subjective SOB  Subjective     Chief Complaint: SOB    HPI:  Jose Crane is a 68 y.o. male who oresents with worsening lower " extremity swelling and dyspnea despite increased dose of lasix.  Patient was to follow up with Dr. Zuniga at the CHF clinic however he was not able to make it to the appointment because he ended up back in the hospital.  Patient has come here from [place] and needs to establish care with a cardiologist. He was also seen in the ER last week.      On arrival to the ED, patient was dyspneic with minimal exertion.  His BP was 112/86.  Pulse was 86.      On labs, patient's sodium is 130, potassium is 3.4, total bilirubin was 2.1.  BNP is markedly elevated.  His delta troponin is -17.  Chest xray shows moderate CHF and ICD.    Personal History     Past Medical History:  Past Medical History:   Diagnosis Date    CHF (congestive heart failure)     Hypertension         Past Surgical History:  Past Surgical History:   Procedure Laterality Date    CARDIAC SURGERY          Family History:   History reviewed. No pertinent family history.     Social History:   Social History     Tobacco Use    Smoking status: Every Day     Packs/day: 1.50     Types: Cigarettes   Vaping Use    Vaping Use: Never used   Substance Use Topics    Alcohol use: Yes     Alcohol/week: 3.0 standard drinks     Types: 3 Cans of beer per week     Comment: 3-4 beer daily    Drug use: Never        Home Medications:  HYDROcodone-acetaminophen, Melatonin, aspirin, atorvastatin, carvedilol, cephalexin, furosemide, metOLazone, midodrine, ondansetron, potassium chloride ER, sacubitril-valsartan, and spironolactone    Allergies:  No Known Allergies    Review of Systems   All systems were reviewed and negative except for: sob    Objective   Objective     Vitals:   Temp:  [97.2 °F (36.2 °C)-98 °F (36.7 °C)] 97.2 °F (36.2 °C)  Heart Rate:  [58-92] 86  Resp:  [13] 13  BP: (112-117)/(82-90) 112/86  Flow (L/min):  [2] 2    Physical Exam    Constitutional: Awake, alert, no acute distress   Eyes: Pupils equal, sclerae anicteric, no conjunctival injection   HENT: NCAT, mucous  membranes moist   Neck: Supple, no thyromegaly, no lymphadenopathy, trachea midline   Respiratory: Crackles   Cardiovascular: RRR, no murmurs, rubs   Gastrointestinal: Positive bowel sounds, soft, nontender, nondistended   Musculoskeletal: edema   Psychiatric: Appropriate affect, cooperative   Neurologic: Oriented x 3, strength symmetric in all extremities, Cranial Nerves grossly intact to confrontation, speech clear   Skin: No rashes     Result Review    Result Review:  I have personally reviewed the results from the time of this admission to 8/29/2023 16:15 EDT and agree with these findings:  [x]  Laboratory  []  Microbiology  [x]  Radiology  []  EKG/Telemetry   []  Cardiology/Vascular   []  Pathology  [x]  Old records  []  Other:      Assessment & Plan   Assessment / Plan   Assessment/Plan:   #1 CHF exacerbation  #2 HTN  #3 hypokalemia  #4 anemia  #5 Drinks 3-4 beers a day  #6 Tobacco abuse  #7 CAD with HLP      Plan:  #1 CHF exacerbation:   -echo ordered  -bumex IV 1 mg BID, IV acetazolamide for decongestion. Continue coreg.  -will need to have entresto, spironolactone tomorrow if BP tolerates.  Will need to add jardiance.  For goal based care.  -consult cardiology. Will need to go to cardiac rehab and CHF clinic.    #2 HTN     #3 hypokalemia  -electrolyte replacement panel ordered    #4 anemia  -complete panel ordered.  If iron is low, may have improvement in functional capacity with iron boost.    #5 will monitor for any withdrawals    #6 tobacco abuse: Nicotine patch; symbicort and spirva.  Will need outpatient PFTs.      #7 continue statin and aspirin    #8 Hyponatremia-will monitor          DVT prophylaxis:  lovenox    CODE STATUS:    Level Of Support Discussed With: Patient  Code Status (Patient has no pulse and is not breathing): CPR (Attempt to Resuscitate)  Medical Interventions (Patient has pulse or is breathing): Full Support  Full code    Admission Status:  I believe this patient meets inpatient  status.    Electronically signed by Guillermo Haas DO, 08/29/23, 3:42 PM EDT.             Electronically signed by Guillermo Haas DO at 08/29/23 1616          Emergency Department Notes        Jameel Branham MD at 08/29/23 1434          Time: 2:34 PM EDT  Date of encounter:  8/29/2023  Independent Historian/Clinical History and Information was obtained by:   Patient and Family    History is limited by: N/A    Chief Complaint: Shortness of breath, CHF      History of Present Illness:  Patient is a 68 y.o. year old male who presents to the emergency department for evaluation of worsening swelling in the legs and also mostly in the abdomen, but also worsening dyspnea with minimal exertion and orthopnea.    He was just seen in our ED about a week ago for CHF exacerbation and started on some increased dose of Lasix but still not diuresing much at home and was sent back into the ED today by his PCP to be admitted.    He is oxygenating well at rest but gets very dyspneic with minimal exertion.    Family member has also some concerns about him possibly looking yellow or having liver problems but no diagnoses reported.    HPI    Patient Care Team  Primary Care Provider: Rosa Isela Kim APRN    Past Medical History:     No Known Allergies  Past Medical History:   Diagnosis Date    CHF (congestive heart failure)     Hypertension      Past Surgical History:   Procedure Laterality Date    CARDIAC SURGERY       History reviewed. No pertinent family history.    Home Medications:  Prior to Admission medications    Medication Sig Start Date End Date Taking? Authorizing Provider   Aspirin Low Dose 81 MG EC tablet Take 1 tablet by mouth Daily. 8/7/23   Joshua Connors MD   atorvastatin (LIPITOR) 20 MG tablet Take 1 tablet by mouth Daily. 8/5/23   Joshua Connors MD   carvedilol (COREG) 6.25 MG tablet Take 1 tablet by mouth Every 12 (Twelve) Hours. 8/5/23   Joshua Connors MD   cephalexin (KEFLEX) 500 MG capsule  "Take 1 capsule by mouth 3 (Three) Times a Day. 8/25/23   Jameel Branham MD   furosemide (LASIX) 40 MG tablet Take 1 tablet by mouth Daily. 8/25/23   Jameel Branham MD   hydrALAZINE (APRESOLINE) 25 MG tablet Take 1 tablet by mouth 3 (Three) Times a Day.    Joshua Connors MD   HYDROcodone-acetaminophen (NORCO)  MG per tablet Take 1 tablet by mouth 3 (Three) Times a Day. 7/28/23   Joshua Connors MD   Melatonin 10 MG tablet Take  by mouth.    Joshua Connors MD   ondansetron (ZOFRAN) 4 MG tablet Take 1 tablet by mouth 3 (Three) Times a Day. 6/17/23   Joshua Connors MD   potassium chloride ER (K-TAB) 20 MEQ tablet controlled-release ER tablet  8/24/23   Joshua Connors MD   potassium chloride ER (K-TAB) 20 MEQ tablet controlled-release ER tablet Every 12 (Twelve) Hours.  9/23/23  Joshua Connors MD   sacubitril-valsartan (ENTRESTO) 24-26 MG tablet Take 1 tablet by mouth 2 (Two) Times a Day.    Joshua Connors MD   spironolactone (ALDACTONE) 25 MG tablet Take 1 tablet by mouth Daily.    Joshua Connors MD        Social History:   Social History     Tobacco Use    Smoking status: Every Day     Packs/day: 1.50     Types: Cigarettes   Vaping Use    Vaping Use: Never used   Substance Use Topics    Alcohol use: Yes     Alcohol/week: 3.0 standard drinks     Types: 3 Cans of beer per week     Comment: 3-4 beer daily    Drug use: Never         Review of Systems:  Review of Systems   I performed a 10 point review of systems which was all negative, except for the positives found in the HPI above.        Physical Exam:  /86   Pulse 86   Temp 97.2 °F (36.2 °C)   Resp 13   Ht 185.4 cm (73\")   Wt 91.5 kg (201 lb 11.5 oz)   SpO2 100%   BMI 26.61 kg/m²       Physical Exam   General: Awake alert and in no obvious distress    HEENT: Head normocephalic atraumatic, eyes PERRLA EOMI, nose normal, oropharynx normal.    Neck: Supple full range of motion, no meningismus, no " lymphadenopathy    Heart: Regular rate and rhythm, no murmurs or rubs, 2+ radial pulses bilaterally    Lungs: Crackles and diminished breath sounds at lung bases posteriorly but no wheezing or respiratory distress noted    Abdomen: Soft, nontender, nondistended, no rebound or guarding    Skin: Warm, dry, no rash    Musculoskeletal: Normal range of motion, 2+ bilateral lower extremity edema without calf tenderness    Neurologic: Oriented x3, no motor deficits no sensory deficits    Psychiatric: Mood appears stable, no psychosis          Procedures:  Procedures      Medical Decision Making:      Comorbidities that affect care:    Congestive Heart Failure, Hypertension, Smoking    External Notes reviewed:    Previous Labs: I reviewed his previous lab work, comparing it to today's elevated proBNP and it looks like it is much worse today than last week.      The following orders were placed and all results were independently analyzed by me:  Orders Placed This Encounter   Procedures    XR Chest 1 View    [place] Draw    Comprehensive Metabolic Panel    BNP    Single High Sensitivity Troponin T    CBC Auto Differential    High Sensitivity Troponin T 2Hr    NPO Diet NPO Type: Strict NPO    Undress & Gown    Continuous Pulse Oximetry    Vital Signs    Hospitalist (on-call MD unless specified)    Oxygen Therapy- Nasal Cannula; Titrate 1-6 LPM Per SpO2; 90 - 95%    ECG 12 Lead ED Triage Standing Order; SOA    Insert Peripheral IV    Inpatient Admission    CBC & Differential    Green Top (Gel)    Lavender Top    Gold Top - SST    Light Blue Top       Medications Given in the Emergency Department:  Medications   sodium chloride 0.9 % flush 10 mL (has no administration in time range)   bumetanide (BUMEX) injection 2 mg (2 mg Intravenous Given 8/29/23 1442)        ED Course:         Labs:    Lab Results (last 24 hours)       Procedure Component Value Units Date/Time    CBC & Differential [[id number]]  (Abnormal) Collected:  08/29/23 1229    Specimen: Blood Updated: 08/29/23 1235    Narrative:      The following orders were created for panel order CBC & Differential.  Procedure                               Abnormality         Status                     ---------                               -----------         ------                     CBC Auto Differential[[id number]]        Abnormal            Final result                 Please view results for these tests on the individual orders.    Comprehensive Metabolic Panel [[id number]]  (Abnormal) Collected: 08/29/23 1229    Specimen: Blood Updated: 08/29/23 1258     Glucose 111 mg/dL      BUN 16 mg/dL      Creatinine 0.84 mg/dL      Sodium 130 mmol/L      Potassium 3.4 mmol/L      Chloride 92 mmol/L      CO2 23.9 mmol/L      Calcium 9.3 mg/dL      Total Protein 7.2 g/dL      Albumin 4.4 g/dL      ALT (SGPT) 10 U/L      AST (SGOT) 22 U/L      Alkaline Phosphatase 137 U/L      Total Bilirubin 2.1 mg/dL      Globulin 2.8 gm/dL      A/G Ratio 1.6 g/dL      BUN/Creatinine Ratio 19.0     Anion Gap 14.1 mmol/L      eGFR 96.2 mL/min/1.73     Narrative:      GFR Normal >60  Chronic Kidney Disease <60  Kidney Failure <15      BNP [[id number]]  (Abnormal) Collected: 08/29/23 1229    Specimen: Blood Updated: 08/29/23 1253     proBNP 26,219.0 pg/mL     Narrative:      Among patients with dyspnea, NT-proBNP is highly sensitive for the detection of acute congestive heart failure. In addition NT-proBNP of <300 pg/ml effectively rules out acute congestive heart failure with 99% negative predictive value.      Single High Sensitivity Troponin T [[id number]]  (Abnormal) Collected: 08/29/23 1229    Specimen: Blood Updated: 08/29/23 1306     HS Troponin T 54 ng/L     Narrative:      High Sensitive Troponin T Reference Range:  <10.0 ng/L- Negative Female for AMI  <15.0 ng/L- Negative Male for AMI  >=10 - Abnormal Female indicating possible myocardial injury.  >=15 - Abnormal Male indicating possible myocardial  injury.   Clinicians would have to utilize clinical acumen, EKG, Troponin, and serial changes to determine if it is an Acute Myocardial Infarction or myocardial injury due to an underlying chronic condition.         CBC Auto Differential [[id number]]  (Abnormal) Collected: 08/29/23 1229    Specimen: Blood Updated: 08/29/23 1235     WBC 7.42 10*3/mm3      RBC 3.45 10*6/mm3      Hemoglobin 12.3 g/dL      Hematocrit 33.7 %      MCV 97.7 fL      MCH 35.7 pg      MCHC 36.5 g/dL      RDW 14.7 %      RDW-SD 52.1 fl      MPV 9.1 fL      Platelets 262 10*3/mm3      Neutrophil % 68.1 %      Lymphocyte % 18.6 %      Monocyte % 12.0 %      Eosinophil % 0.3 %      Basophil % 0.7 %      Immature Grans % 0.3 %      Neutrophils, Absolute 5.06 10*3/mm3      Lymphocytes, Absolute 1.38 10*3/mm3      Monocytes, Absolute 0.89 10*3/mm3      Eosinophils, Absolute 0.02 10*3/mm3      Basophils, Absolute 0.05 10*3/mm3      Immature Grans, Absolute 0.02 10*3/mm3      nRBC 0.0 /100 WBC     High Sensitivity Troponin T 2Hr [[id number]] Collected: 08/29/23 1442    Specimen: Blood Updated: 08/29/23 1447             Imaging:    XR Chest 1 View    Result Date: 8/29/2023  PROCEDURE: XR CHEST 1 VW  COMPARISON: [facility], CR, XR CHEST 1 VW, 8/25/2023, 8:32.  INDICATIONS: SHORTNESS OF BREATH TODAY  FINDINGS:  The heart remains slightly enlarged.  The pulmonary vascularity is congested.  Bibasilar pulmonary infiltrate or edema is unchanged.  The costophrenic angles remain slightly blunted.  Dual lead AICD remains in place.        Findings consistent with moderate congestive heart failure, not significantly changed in comparison to 8/25/2023.  Dual lead AICD remains in place.       LORENA GARNICA MD       Electronically Signed and Approved By: LORENA GARNICA MD on 8/29/2023 at 13:02                Differential Diagnosis and Discussion:    Dyspnea: Differential diagnosis includes but is not limited to metabolic acidosis, neurological  disorders, psychogenic, asthma, pneumothorax, upper airway obstruction, COPD, pneumonia, noncardiogenic pulmonary edema, interstitial lung disease, anemia, congestive heart failure, and pulmonary embolism    All labs were reviewed and interpreted by me.  All X-rays impressions were independently interpreted by me.  EKG was interpreted by me.    MDM     Amount and/or Complexity of Data Reviewed  Tests in the medicine section of CPT®: reviewed  Decide to obtain previous medical records or to obtain history from someone other than the patient: yes               This patient is a 68-year-old male who recently relocated from [place] now presenting with worsening swelling and dyspnea on exertion and abdominal swelling.    I actually just saw him in the ED this past week and we doubled up his Lasix for treatment of CHF but as he was not hypoxic at the time we thought he can be managed as an outpatient.    However I guess he has been getting clinically worse over the past few days at home and unable to ambulate much at home due to dyspnea.    He was seen by his PCP today and sent directly to the ED for admission for CHF exacerbation.    I do note his proBNP is significantly more elevated than earlier this week now measuring 26,000, up from 17,000.      I am diuresing him with IV Bumex 2 mg in the ED and we will consider getting him admitted for failure of outpatient diuresis and worsening heart failure.            Patient Care Considerations:          Consultants/Shared Management Plan:    Hospitalist: I have discussed the case with the admitting hospitalist, who agrees to accept the patient for admission.    Social Determinants of Health:    Patient has presented with family members who are responsible, reliable and will ensure follow up care.      Disposition and Care Coordination:    Admit:   Through independent evaluation of the patient's history, physical, and imperical data, the patient meets criteria for  observation/admission to the hospital.        Final diagnoses:   Acute on chronic congestive heart failure, unspecified heart failure type   Failure of outpatient treatment        ED Disposition       ED Disposition   Decision to Admit    Condition   --    Comment   Level of Care: Telemetry [5]   Diagnosis: CHF exacerbation [509638]   Admitting Physician: CLAU HANKINS [[id number]]   Attending Physician: CLAU HANKINS [[id number]]   Certification: I Certify That Inpatient Hospital Services Are Medically Necessary For Greater Than 2 Midnights                 This medical record created using voice recognition software.             Jameel Branham MD  08/29/23 1447      Electronically signed by Jameel Branham MD at 08/29/23 1447       Vital Signs (last day)       Date/Time Temp Temp src Pulse Resp BP Patient Position SpO2    08/29/23 1640 97.7 (36.5) Oral 87 16 103/73 Lying 100    08/29/23 1400 -- -- 86 -- 112/86 -- 100    08/29/23 1300 -- -- 88 -- 115/89 -- 99    08/29/23 1229 -- -- -- -- -- -- 99    08/29/23 1216 97.2 (36.2) -- -- -- -- -- --    08/29/23 1213 -- -- -- -- 117/90 -- --    08/29/23 1210 -- -- 58 13 -- -- --          Facility-Administered Medications as of 8/29/2023   Medication Dose Route Frequency Provider Last Rate Last Admin    acetaminophen (TYLENOL) tablet 650 mg  650 mg Oral Q4H PRN Hankins, Dimpi, DO        Or    acetaminophen (TYLENOL) 160 MG/5ML solution 650 mg  650 mg Oral Q4H PRN Hankins, Dimpi, DO        Or    acetaminophen (TYLENOL) suppository 650 mg  650 mg Rectal Q4H PRN Hankins, Dimpi, DO        [COMPLETED] acetaZOLAMIDE (DIAMOX) injection 500 mg  500 mg Intravenous Once Hankins, Dimpi, DO   500 mg at 08/29/23 1815    aspirin EC tablet 81 mg  81 mg Oral Daily Hankins, Dimpi, DO   81 mg at 08/29/23 1816    atorvastatin (LIPITOR) tablet 20 mg  20 mg Oral Nightly Hankins, Dimpi, DO        sennosides-docusate (PERICOLACE) 8.6-50 MG per tablet 2 tablet  2 tablet Oral BID Clau Hankins DO        And     polyethylene glycol (MIRALAX) packet 17 g  17 g Oral Daily PRN Haas, Dimpi, DO        And    bisacodyl (DULCOLAX) EC tablet 5 mg  5 mg Oral Daily PRN Haas, Dimpi, DO        And    bisacodyl (DULCOLAX) suppository 10 mg  10 mg Rectal Daily PRN Haas, Dimpi, DO        budesonide-formoterol (SYMBICORT) 160-4.5 MCG/ACT inhaler 2 puff  2 puff Inhalation BID - RT Haas, Dimpi, DO        [START ON 8/30/2023] bumetanide (BUMEX) injection 1 mg  1 mg Intravenous Q12H Haas, Dimpi, DO        [COMPLETED] bumetanide (BUMEX) injection 2 mg  2 mg Intravenous Once Jameel Branham MD   2 mg at 08/29/23 1442    Calcium Replacement - Follow Nurse / BPA Driven Protocol   Does not apply PRN Haas, Dimpi, DO        carvedilol (COREG) tablet 6.25 mg  6.25 mg Oral BID With Meals Haas, Dimpi, DO   6.25 mg at 08/29/23 1816    Enoxaparin Sodium (LOVENOX) syringe 40 mg  40 mg Subcutaneous Daily Haas, Dimpi, DO        HYDROcodone-acetaminophen (NORCO) 5-325 MG per tablet 1 tablet  1 tablet Oral Q4H PRN Haas, Dimpi, DO   1 tablet at 08/29/23 1816    HYDROcodone-acetaminophen (NORCO) 7.5-325 MG per tablet 2 tablet  2 tablet Oral Q4H PRN Haas, Dimpi, DO        HYDROmorphone (DILAUDID) injection 0.5 mg  0.5 mg Intravenous Q2H PRN Haas, Dimpi, DO        And    naloxone (NARCAN) injection 0.4 mg  0.4 mg Intravenous Q5 Min PRN Haas, Dimpi, DO        Magnesium Standard Dose Replacement - Follow Nurse / BPA Driven Protocol   Does not apply PRN Haas, Dimpi, DO        magnesium sulfate 2g/50 mL (PREMIX) infusion  2 g Intravenous Q2H Haas, Dimpi, DO        melatonin tablet 5 mg  5 mg Oral Nightly PRN Haas, Dimpi, DO        midodrine (PROAMATINE) tablet 5 mg  5 mg Oral TID AC Haas, Dimpi, DO   5 mg at 08/29/23 1816    nicotine (NICODERM CQ) 21 MG/24HR patch 1 patch  1 patch Transdermal Q24H Guillermo Hasa DO   1 patch at 08/29/23 1816    Phosphorus Replacement - Follow Nurse / BPA Driven Protocol   Does not apply PRN Guillermo Haas DO         potassium phosphate 15 mmol in 0.9% normal saline 250 mL IVPB  15 mmol Intravenous Once Haas, Dimpi, DO        Potassium Replacement - Follow Nurse / BPA Driven Protocol   Does not apply PRN Haas, Dimpi, DO        sodium chloride 0.9 % flush 10 mL  10 mL Intravenous PRN Jameel Branham MD        sodium chloride 0.9 % flush 10 mL  10 mL Intravenous Q12H Haas, Dimpi, DO        sodium chloride 0.9 % flush 10 mL  10 mL Intravenous PRN Haas, Dimpi, DO        sodium chloride 0.9 % infusion 40 mL  40 mL Intravenous PRN Haas, Dimpi, DO        tiotropium (SPIRIVA RESPIMAT) 2.5 mcg/act aerosol solution inhaler  2 puff Inhalation Daily - RT Haas, Dimpi, DO         Lab Results (last 24 hours)       Procedure Component Value Units Date/Time    Phosphorus [[id number]]  (Abnormal) Collected: 08/29/23 1442    Specimen: Blood Updated: 08/29/23 1819     Phosphorus 2.2 mg/dL     Magnesium [[id number]]  (Abnormal) Collected: 08/29/23 1442    Specimen: Blood Updated: 08/29/23 1819     Magnesium 1.2 mg/dL     Reticulocytes [[id number]]  (Normal) Collected: 08/29/23 1229    Specimen: Blood Updated: 08/29/23 1812     Reticulocyte % 1.83 %      Reticulocyte Absolute 0.0635 10*6/mm3     Vitamin B12 [[id number]] Collected: 08/29/23 1229    Specimen: Blood Updated: 08/29/23 1807    Folate [[id number]] Collected: 08/29/23 1229    Specimen: Blood Updated: 08/29/23 1807    TSH Rfx On Abnormal To Free T4 [[id number]]  (Normal) Collected: 08/29/23 1442    Specimen: Blood Updated: 08/29/23 1715     TSH 2.040 uIU/mL     Ferritin [[id number]]  (Normal) Collected: 08/29/23 1442    Specimen: Blood Updated: 08/29/23 1715     Ferritin 235.30 ng/mL     Narrative:      <12 ng/mL usually associated with Iron Deficiency Anemia. Above normal range levels may be due to Hepatic and/or Chronic Inflammatory Disease.  Results may be falsely decreased if patient taking Biotin.      Iron Profile [[id number]]  (Abnormal) Collected: 08/29/23 1444    Specimen: Blood  Updated: 08/29/23 1711     Iron 59 mcg/dL      Iron Saturation (TSAT) 24 %      Transferrin 162 mg/dL      TIBC 241 mcg/dL     High Sensitivity Troponin T 2Hr [[id number]]  (Abnormal) Collected: 08/29/23 1442    Specimen: Blood Updated: 08/29/23 1506     HS Troponin T 37 ng/L      Troponin T Delta -17 ng/L     Narrative:      High Sensitive Troponin T Reference Range:  <10.0 ng/L- Negative Female for AMI  <15.0 ng/L- Negative Male for AMI  >=10 - Abnormal Female indicating possible myocardial injury.  >=15 - Abnormal Male indicating possible myocardial injury.   Clinicians would have to utilize clinical acumen, EKG, Troponin, and serial changes to determine if it is an Acute Myocardial Infarction or myocardial injury due to an underlying chronic condition.         Single High Sensitivity Troponin T [[id number]]  (Abnormal) Collected: 08/29/23 1229    Specimen: Blood Updated: 08/29/23 1306     HS Troponin T 54 ng/L     Narrative:      High Sensitive Troponin T Reference Range:  <10.0 ng/L- Negative Female for AMI  <15.0 ng/L- Negative Male for AMI  >=10 - Abnormal Female indicating possible myocardial injury.  >=15 - Abnormal Male indicating possible myocardial injury.   Clinicians would have to utilize clinical acumen, EKG, Troponin, and serial changes to determine if it is an Acute Myocardial Infarction or myocardial injury due to an underlying chronic condition.         Comprehensive Metabolic Panel [[id number]]  (Abnormal) Collected: 08/29/23 1229    Specimen: Blood Updated: 08/29/23 1258     Glucose 111 mg/dL      BUN 16 mg/dL      Creatinine 0.84 mg/dL      Sodium 130 mmol/L      Potassium 3.4 mmol/L      Chloride 92 mmol/L      CO2 23.9 mmol/L      Calcium 9.3 mg/dL      Total Protein 7.2 g/dL      Albumin 4.4 g/dL      ALT (SGPT) 10 U/L      AST (SGOT) 22 U/L      Alkaline Phosphatase 137 U/L      Total Bilirubin 2.1 mg/dL      Globulin 2.8 gm/dL      A/G Ratio 1.6 g/dL      BUN/Creatinine Ratio 19.0     Anion  Gap 14.1 mmol/L      eGFR 96.2 mL/min/1.73     Narrative:      GFR Normal >60  Chronic Kidney Disease <60  Kidney Failure <15      BNP [[id number]]  (Abnormal) Collected: 08/29/23 1229    Specimen: Blood Updated: 08/29/23 1253     proBNP 26,219.0 pg/mL     Narrative:      Among patients with dyspnea, NT-proBNP is highly sensitive for the detection of acute congestive heart failure. In addition NT-proBNP of <300 pg/ml effectively rules out acute congestive heart failure with 99% negative predictive value.      [place] Draw [[id number]] Collected: 08/29/23 1229    Specimen: Blood Updated: 08/29/23 1238    Narrative:      The following orders were created for panel order [place] Draw.  Procedure                               Abnormality         Status                     ---------                               -----------         ------                     Green Top (Gel)[[id number]]                                  Final result               Lavender Top[[id number]]                                     Final result               Gold Top - SST[[id number]]                                   Final result               Light Blue Top[[id number]]                                   Final result                 Please view results for these tests on the individual orders.    Gold Top - SST [[id number]] Collected: 08/29/23 1229    Specimen: Blood Updated: 08/29/23 1238     Extra Tube Hold for add-ons.     Comment: Auto resulted.       Light Blue Top [[id number]] Collected: 08/29/23 1229    Specimen: Blood Updated: 08/29/23 1238     Extra Tube Hold for add-ons.     Comment: Auto resulted       Green Top (Gel) [[id number]] Collected: 08/29/23 1229    Specimen: Blood Updated: 08/29/23 1238     Extra Tube Hold for add-ons.     Comment: Auto resulted.       Lavender Top [[id number]] Collected: 08/29/23 1229    Specimen: Blood Updated: 08/29/23 1238     Extra Tube hold for add-on     Comment: Auto resulted       CBC & Differential [[id number]]   (Abnormal) Collected: 08/29/23 1229    Specimen: Blood Updated: 08/29/23 1235    Narrative:      The following orders were created for panel order CBC & Differential.  Procedure                               Abnormality         Status                     ---------                               -----------         ------                     CBC Auto Differential[[id number]]        Abnormal            Final result                 Please view results for these tests on the individual orders.    CBC Auto Differential [[id number]]  (Abnormal) Collected: 08/29/23 1229    Specimen: Blood Updated: 08/29/23 1235     WBC 7.42 10*3/mm3      RBC 3.45 10*6/mm3      Hemoglobin 12.3 g/dL      Hematocrit 33.7 %      MCV 97.7 fL      MCH 35.7 pg      MCHC 36.5 g/dL      RDW 14.7 %      RDW-SD 52.1 fl      MPV 9.1 fL      Platelets 262 10*3/mm3      Neutrophil % 68.1 %      Lymphocyte % 18.6 %      Monocyte % 12.0 %      Eosinophil % 0.3 %      Basophil % 0.7 %      Immature Grans % 0.3 %      Neutrophils, Absolute 5.06 10*3/mm3      Lymphocytes, Absolute 1.38 10*3/mm3      Monocytes, Absolute 0.89 10*3/mm3      Eosinophils, Absolute 0.02 10*3/mm3      Basophils, Absolute 0.05 10*3/mm3      Immature Grans, Absolute 0.02 10*3/mm3      nRBC 0.0 /100 WBC           Imaging Results (Last 24 Hours)       Procedure Component Value Units Date/Time    XR Chest 1 View [[id number]] Collected: 08/29/23 1302     Updated: 08/29/23 1305    Narrative:      PROCEDURE: XR CHEST 1 VW     COMPARISON: [facility], , XR CHEST 1 VW, 8/25/2023, 8:32.     INDICATIONS: SHORTNESS OF BREATH TODAY     FINDINGS:   The heart remains slightly enlarged.  The pulmonary vascularity is congested.     Bibasilar pulmonary infiltrate or edema is unchanged.  The costophrenic angles remain slightly   blunted.     Dual lead AICD remains in place.       Impression:         Findings consistent with moderate congestive heart failure, not significantly changed in  comparison   to 8/25/2023.     Dual lead AICD remains in place.                  LORENA GARNICA MD         Electronically Signed and Approved By: LORENA GARNICA MD on 8/29/2023 at 13:02                           Orders (active)        Start     Ordered    08/30/23 0600  Basic Metabolic Panel  Daily       08/29/23 1645    08/30/23 0600  CBC & Differential  Daily       08/29/23 1645 08/30/23 0600  Folate  Morning Draw         08/29/23 1645 08/30/23 0600  bumetanide (BUMEX) injection 1 mg  Every 12 Hours         08/29/23 1727    08/30/23 0441  Phosphorus  Timed         08/29/23 1840    08/30/23 0000  Magnesium  Morning Draw         08/29/23 1840 08/29/23 2130  budesonide-formoterol (SYMBICORT) 160-4.5 MCG/ACT inhaler 2 puff  2 Times Daily - RT         08/29/23 1645    08/29/23 2100  sodium chloride 0.9 % flush 10 mL  Every 12 Hours Scheduled         08/29/23 1645 08/29/23 2100  sennosides-docusate (PERICOLACE) 8.6-50 MG per tablet 2 tablet  2 Times Daily        See Hyperspace for full Linked Orders Report.    08/29/23 1645 08/29/23 2100  atorvastatin (LIPITOR) tablet 20 mg  Nightly         08/29/23 1645 08/29/23 2000  Vital Signs  Every 4 Hours       08/29/23 1645 08/29/23 1930  magnesium sulfate 2g/50 mL (PREMIX) infusion  Every 2 Hours         08/29/23 1840 08/29/23 1930  potassium phosphate 15 mmol in 0.9% normal saline 250 mL IVPB  Once         08/29/23 1840 08/29/23 1800  Oral Care  2 Times Daily       08/29/23 1645    08/29/23 1800  carvedilol (COREG) tablet 6.25 mg  2 Times Daily With Meals         08/29/23 1645 08/29/23 1745  Enoxaparin Sodium (LOVENOX) syringe 40 mg  Daily         08/29/23 1645 08/29/23 1745  aspirin EC tablet 81 mg  Daily         08/29/23 1645 08/29/23 1745  midodrine (PROAMATINE) tablet 5 mg  3 Times Daily Before Meals         08/29/23 1645    08/29/23 1747  tiotropium (SPIRIVA RESPIMAT) 2.5 mcg/act aerosol solution inhaler  Daily - RT         08/29/23  1645    08/29/23 1724  Consult to Wound / Ostomy Care  Once         08/29/23 1723    08/29/23 1646  Magnesium  Daily       08/29/23 1645    08/29/23 1646  Phosphorus  Daily       08/29/23 1645    08/29/23 1645  Intake & Output  Every Shift       08/29/23 1645    08/29/23 1645  Weigh Patient  Once         08/29/23 1645    08/29/23 1645  Insert Peripheral IV  Once         08/29/23 1645    08/29/23 1645  Saline Lock & Maintain IV Access  Continuous         08/29/23 1645    08/29/23 1645  OT Consult: Eval & Treat  Once         08/29/23 1645    08/29/23 1645  PT Consult: Eval & Treat As Tolerated; Discharge Placement Assessment  Once         08/29/23 1645    08/29/23 1645  Inpatient Case Management  Consult  Once        Provider:  (Not yet assigned)    08/29/23 1645    08/29/23 1645  Vitamin B12  Once         08/29/23 1645    08/29/23 1645  Folate  Once         08/29/23 1645    08/29/23 1645  Adult Transthoracic Echo Complete w/ Color, Spectral and Contrast if necessary per protocol  Once         08/29/23 1645    08/29/23 1645  Occult Blood, Fecal By Immunoassay - Stool, Per Rectum  Once         08/29/23 1645    08/29/23 1645  RT to Initiate Bronchopulmonary Hygiene Protocol  Once         08/29/23 1645    08/29/23 1645  Vitamin B1, Whole Blood  Once         08/29/23 1645    08/29/23 1645  nicotine (NICODERM CQ) 21 MG/24HR patch 1 patch  Every 24 Hours Scheduled         08/29/23 1617    08/29/23 1644  polyethylene glycol (MIRALAX) packet 17 g  Daily PRN        See Hyperspace for full Linked Orders Report.    08/29/23 1645    08/29/23 1644  bisacodyl (DULCOLAX) EC tablet 5 mg  Daily PRN        See Hyperspace for full Linked Orders Report.    08/29/23 1645    08/29/23 1644  bisacodyl (DULCOLAX) suppository 10 mg  Daily PRN        See Hyperspace for full Linked Orders Report.    08/29/23 1645    08/29/23 1644  acetaminophen (TYLENOL) tablet 650 mg  Every 4 Hours PRN        See Hyperspace for full Linked Orders  Report.    08/29/23 1645    08/29/23 1644  acetaminophen (TYLENOL) 160 MG/5ML solution 650 mg  Every 4 Hours PRN        See Hyperspace for full Linked Orders Report.    08/29/23 1645    08/29/23 1644  acetaminophen (TYLENOL) suppository 650 mg  Every 4 Hours PRN        See Hyperspace for full Linked Orders Report.    08/29/23 1645    08/29/23 1644  HYDROmorphone (DILAUDID) injection 0.5 mg  Every 2 Hours PRN        See Hyperspace for full Linked Orders Report.    08/29/23 1645    08/29/23 1644  naloxone (NARCAN) injection 0.4 mg  Every 5 Minutes PRN        See Hyperspace for full Linked Orders Report.    08/29/23 1645    08/29/23 1644  Potassium Replacement - Follow Nurse / BPA Driven Protocol  As Needed         08/29/23 1645    08/29/23 1644  Magnesium Standard Dose Replacement - Follow Nurse / BPA Driven Protocol  As Needed         08/29/23 1645    08/29/23 1644  Phosphorus Replacement - Follow Nurse / BPA Driven Protocol  As Needed         08/29/23 1645    08/29/23 1644  Calcium Replacement - Follow Nurse / BPA Driven Protocol  As Needed         08/29/23 1645    08/29/23 1644  HYDROcodone-acetaminophen (NORCO) 5-325 MG per tablet 1 tablet  Every 4 Hours PRN         08/29/23 1645    08/29/23 1644  melatonin tablet 5 mg  Nightly PRN         08/29/23 1645    08/29/23 1644  HYDROcodone-acetaminophen (NORCO) 7.5-325 MG per tablet 2 tablet  Every 4 Hours PRN         08/29/23 1645    08/29/23 1644  sodium chloride 0.9 % flush 10 mL  As Needed         08/29/23 1645    08/29/23 1644  sodium chloride 0.9 % infusion 40 mL  As Needed         08/29/23 1645    08/29/23 1619  Inpatient Cardiology Consult  Once        Specialty:  Cardiology  Provider:  Merly Ji MD    08/29/23 1619    08/29/23 1544  Code Status and Medical Interventions:  Continuous         08/29/23 1549    08/29/23 1448  Diet: Cardiac Diets; Healthy Heart (2-3 Na+); Texture: Regular Texture (IDDSI 7); Fluid Consistency: Thin (IDDSI 0)  Diet Effective  Now         08/29/23 1447    08/29/23 1435  Hospitalist (on-call MD unless specified)  Once        Specialty:  Hospitalist  Provider:  Guillerom Haas DO    08/29/23 1434    08/29/23 1156  Cardiac Monitoring  Continuous        Comments: Follow Standing Orders As Outlined in Process Instructions (Open Order Report to View Full Instructions)    08/29/23 1155    08/29/23 1156  Insert Peripheral IV  Once         08/29/23 1155    08/29/23 1155  sodium chloride 0.9 % flush 10 mL  As Needed         08/29/23 1155    Unscheduled  Oxygen Therapy- Nasal Cannula; Titrate 1-6 LPM Per SpO2; 90 - 95%  Continuous PRN       08/29/23 1155

## 2023-08-29 NOTE — PROGRESS NOTES
Chief Complaint  Establish Care (Establish Care with Physical Exam ), Shortness of Breath (Tires out after walking ), and Urinary Retention (Double lasix and still having issues )    Subjective          Bradley Crane is a 68 y.o. male who presents to [facility]    History of Present Illness    Complains of shortness of breath, went to ER on 8/25/23, Was placed on Lasix 40 mg but not really urinating. Legs are so swollen he's weeping, Abdomen so swollen he's unable to eat.  History of CHF.  Legs are weeping more.  2 months ago has pacemaker and defibrillator placement in [place].  Usual weight is 165-185,   Troponin 44 on 8/25/23   PNB 17,167 on 8/25/23  XR Chest 8/25/23  Cardiomegaly and pulmonary vascular congestion  Bibasilar airspace disease and small bilateral pleural effusions all of which is most likely related to congestive heart failure and pulmonary edema    PHQ-2 Total Score:     PHQ-9 Total Score:          Review of Systems   Constitutional:  Positive for fatigue. Negative for chills and fever.   Respiratory:  Positive for shortness of breath. Negative for cough.    Cardiovascular:  Positive for leg swelling. Negative for chest pain and palpitations.   Gastrointestinal:  Positive for abdominal distention. Negative for constipation, diarrhea, nausea and vomiting.   Musculoskeletal:  Positive for gait problem. Negative for back pain and neck pain.   Skin:  Negative for rash.   Neurological:  Negative for dizziness and headaches.        Medical History: has a past medical history of CHF (congestive heart failure) and Hypertension.     Surgical History: has a past surgical history that includes Cardiac surgery.     Family History: family history is not on file.     Social History: reports that he has been smoking cigarettes. He has been smoking an average of 1.5 packs per day. He does not have any smokeless tobacco history on file. He reports current alcohol use of about 3.0  standard drinks per week. He reports that he does not use drugs.    Allergies: Patient has no known allergies.      Health Maintenance Due   Topic Date Due    BMI FOLLOWUP  Never done    COLORECTAL CANCER SCREENING  Never done    COVID-19 Vaccine (1) Never done    Pneumococcal Vaccine 65+ (1 - PCV) Never done    TDAP/TD VACCINES (1 - Tdap) Never done    ZOSTER VACCINE (1 of 2) Never done    HEPATITIS C SCREENING  Never done    ANNUAL WELLNESS VISIT  Never done    AAA SCREEN (ONE-TIME)  Never done          No current facility-administered medications for this visit.    Current Outpatient Medications:     Aspirin Low Dose 81 MG EC tablet, Take 1 tablet by mouth Daily., Disp: , Rfl:     atorvastatin (LIPITOR) 20 MG tablet, Take 1 tablet by mouth Daily., Disp: , Rfl:     carvedilol (COREG) 6.25 MG tablet, Take 1 tablet by mouth Every 12 (Twelve) Hours., Disp: , Rfl:     cephalexin (KEFLEX) 500 MG capsule, Take 1 capsule by mouth 3 (Three) Times a Day., Disp: 15 capsule, Rfl: 0    furosemide (LASIX) 40 MG tablet, Take 1 tablet by mouth Daily., Disp: 30 tablet, Rfl: 1    hydrALAZINE (APRESOLINE) 25 MG tablet, Take 1 tablet by mouth 3 (Three) Times a Day., Disp: , Rfl:     HYDROcodone-acetaminophen (NORCO)  MG per tablet, Take 1 tablet by mouth 3 (Three) Times a Day., Disp: , Rfl:     Melatonin 10 MG tablet, Take  by mouth., Disp: , Rfl:     ondansetron (ZOFRAN) 4 MG tablet, Take 1 tablet by mouth 3 (Three) Times a Day., Disp: , Rfl:     potassium chloride ER (K-TAB) 20 MEQ tablet controlled-release ER tablet, , Disp: , Rfl:     sacubitril-valsartan (ENTRESTO) 24-26 MG tablet, Take 1 tablet by mouth 2 (Two) Times a Day., Disp: , Rfl:     spironolactone (ALDACTONE) 25 MG tablet, Take 1 tablet by mouth Daily., Disp: , Rfl:     potassium chloride ER (K-TAB) 20 MEQ tablet controlled-release ER tablet, Every 12 (Twelve) Hours., Disp: , Rfl:     Facility-Administered Medications Ordered in Other Visits:     sodium chloride  "0.9 % flush 10 mL, 10 mL, Intravenous, PRN, Emergency, Triage Protocol, MD        There is no immunization history on file for this patient.      Objective       Vitals:    23 0950   BP: 117/82   BP Location: Left arm   Patient Position: Sitting   Cuff Size: Adult   Pulse: 92   Temp: 98 øF (36.7 øC)   TempSrc: Temporal   SpO2: 99%   Weight: 89 kg (196 lb 3.2 oz)   Height: 185.4 cm (73\")   PainSc: 10-Worst pain ever   PainLoc: Generalized      Body mass index is 25.89 kg/mý.   Wt Readings from Last 3 Encounters:   23 91.5 kg (201 lb 11.5 oz)   23 89 kg (196 lb 3.2 oz)   23 90.2 kg (198 lb 13.7 oz)      BP Readings from Last 3 Encounters:   23 117/90   23 117/82   23 109/91        BMI is >= 25 and <30. (Overweight) The following options were offered after discussion;: exercise counseling/recommendations and nutrition counseling/recommendations       Physical Exam  Vitals reviewed.   Constitutional:       Appearance: Normal appearance.   HENT:      Head: Normocephalic and atraumatic.   Eyes:      General: Scleral icterus present.      Conjunctiva/sclera: Conjunctivae normal.      Pupils: Pupils are equal, round, and reactive to light.   Cardiovascular:      Rate and Rhythm: Normal rate and regular rhythm.      Pulses: Normal pulses.      Heart sounds: Normal heart sounds.   Pulmonary:      Effort: Pulmonary effort is normal.      Breath sounds: Rales present.   Abdominal:      General: Bowel sounds are decreased. There is distension.      Palpations: Abdomen is soft.      Tenderness: There is generalized abdominal tenderness.      Comments: Abdomen firm, pitting edema noted.   Musculoskeletal:      Right lower leg: 3+ Edema present.      Left lower le+   Skin:     General: Skin is warm and dry.      Coloration: Skin is jaundiced.   Neurological:      Mental Status: He is alert and oriented to person, place, and time.   Psychiatric:         Mood and Affect: Mood normal.    "      Behavior: Behavior normal.         Thought Content: Thought content normal.         Judgment: Judgment normal.           Result Review :       Common labs          8/25/2023    08:13 8/29/2023    12:29   Common Labs   Glucose 116     BUN 10     Creatinine 0.61     Sodium 130     Potassium 3.6     Chloride 93     Calcium 9.0     Albumin 3.8     Total Bilirubin 1.6     Alkaline Phosphatase 117     AST (SGOT) 22     ALT (SGPT) 8     WBC 7.92  7.42    Hemoglobin 11.7  12.3    Hematocrit 33.0  33.7    Platelets 242  262                       Assessment and Plan        Diagnoses and all orders for this visit:    1. Acute on chronic congestive heart failure, unspecified heart failure type (Primary)    2. Acute pulmonary edema    3. Jaundice      Sent patient to the ER to have a workup for CHF, pulmonary edema and jaundice.    Follow Up     Return if symptoms worsen or fail to improve.    Patient was given instructions and counseling regarding his condition or for health maintenance advice. Please see specific information pulled into the AVS if appropriate.     SRIKANTH Myers

## 2023-08-29 NOTE — Clinical Note
Manual pressure applied to vessel. Manual pressure was held by tejinder. Manual pressure was held for 15 min. Hemostasis achieved successfully.

## 2023-08-29 NOTE — Clinical Note
The DP pulses are detected w/ doppler bilaterally. The PT pulses are detected w/ doppler bilaterally. The right radial pulse is +2. The right femoral pulse is +2.

## 2023-08-30 ENCOUNTER — APPOINTMENT (OUTPATIENT)
Dept: CARDIOLOGY | Facility: HOSPITAL | Age: 68
DRG: 286 | End: 2023-08-30
Payer: MEDICARE

## 2023-08-30 ENCOUNTER — APPOINTMENT (OUTPATIENT)
Dept: INTERVENTIONAL RADIOLOGY/VASCULAR | Facility: HOSPITAL | Age: 68
DRG: 286 | End: 2023-08-30
Payer: MEDICARE

## 2023-08-30 LAB
ANION GAP SERPL CALCULATED.3IONS-SCNC: 10.9 MMOL/L (ref 5–15)
APTT PPP: 32.5 SECONDS (ref 24.2–34.2)
BASOPHILS # BLD AUTO: 0.06 10*3/MM3 (ref 0–0.2)
BASOPHILS NFR BLD AUTO: 0.9 % (ref 0–1.5)
BH CV ECHO MEAS - ACS: 1.2 CM
BH CV ECHO MEAS - AO MEAN PG: 11 MMHG
BH CV ECHO MEAS - AO ROOT DIAM: 3 CM
BH CV ECHO MEAS - AO V2 VTI: 43.3 CM
BH CV ECHO MEAS - AVA(I,D): 0.93 CM2
BH CV ECHO MEAS - EDV(CUBED): 175.6 ML
BH CV ECHO MEAS - EDV(MOD-SP2): 126 ML
BH CV ECHO MEAS - EDV(MOD-SP4): 148 ML
BH CV ECHO MEAS - EF(MOD-BP): 40.2 %
BH CV ECHO MEAS - EF(MOD-SP2): 29 %
BH CV ECHO MEAS - EF(MOD-SP4): 47.6 %
BH CV ECHO MEAS - ESV(CUBED): 74.1 ML
BH CV ECHO MEAS - ESV(MOD-SP2): 89.5 ML
BH CV ECHO MEAS - ESV(MOD-SP4): 77.6 ML
BH CV ECHO MEAS - FS: 25 %
BH CV ECHO MEAS - IVS/LVPW: 0.77 CM
BH CV ECHO MEAS - IVSD: 1 CM
BH CV ECHO MEAS - LA DIMENSION: 4.9 CM
BH CV ECHO MEAS - LAT PEAK E' VEL: 9.9 CM/SEC
BH CV ECHO MEAS - LV DIASTOLIC VOL/BSA (35-75): 68.6 CM2
BH CV ECHO MEAS - LV MASS(C)D: 264.7 GRAMS
BH CV ECHO MEAS - LV MAX PG: 1.84 MMHG
BH CV ECHO MEAS - LV MEAN PG: 1 MMHG
BH CV ECHO MEAS - LV SYSTOLIC VOL/BSA (12-30): 36 CM2
BH CV ECHO MEAS - LV V1 MAX: 67.8 CM/SEC
BH CV ECHO MEAS - LV V1 VTI: 12.8 CM
BH CV ECHO MEAS - LVIDD: 5.6 CM
BH CV ECHO MEAS - LVIDS: 4.2 CM
BH CV ECHO MEAS - LVOT AREA: 3.1 CM2
BH CV ECHO MEAS - LVOT DIAM: 2 CM
BH CV ECHO MEAS - LVPWD: 1.3 CM
BH CV ECHO MEAS - MED PEAK E' VEL: 3.4 CM/SEC
BH CV ECHO MEAS - MV A MAX VEL: 28.3 CM/SEC
BH CV ECHO MEAS - MV DEC TIME: 0.17 MSEC
BH CV ECHO MEAS - MV E MAX VEL: 86.9 CM/SEC
BH CV ECHO MEAS - MV E/A: 3.1
BH CV ECHO MEAS - RAP SYSTOLE: 3 MMHG
BH CV ECHO MEAS - RVDD: 3.1 CM
BH CV ECHO MEAS - RVSP: 23.4 MMHG
BH CV ECHO MEAS - SI(MOD-SP2): 16.9 ML/M2
BH CV ECHO MEAS - SI(MOD-SP4): 32.6 ML/M2
BH CV ECHO MEAS - SV(LVOT): 40.2 ML
BH CV ECHO MEAS - SV(MOD-SP2): 36.5 ML
BH CV ECHO MEAS - SV(MOD-SP4): 70.4 ML
BH CV ECHO MEAS - TAPSE (>1.6): 1.18 CM
BH CV ECHO MEAS - TR MAX PG: 20.4 MMHG
BH CV ECHO MEAS - TR MAX VEL: 226 CM/SEC
BH CV ECHO MEASUREMENTS AVERAGE E/E' RATIO: 13.07
BH CV XLRA - RV BASE: 5.8 CM
BH CV XLRA - RV MID: 5.2 CM
BUN SERPL-MCNC: 20 MG/DL (ref 8–23)
BUN/CREAT SERPL: 22.2 (ref 7–25)
CALCIUM SPEC-SCNC: 8.8 MG/DL (ref 8.6–10.5)
CHLORIDE SERPL-SCNC: 94 MMOL/L (ref 98–107)
CO2 SERPL-SCNC: 24.1 MMOL/L (ref 22–29)
CREAT SERPL-MCNC: 0.9 MG/DL (ref 0.76–1.27)
DEPRECATED RDW RBC AUTO: 52.1 FL (ref 37–54)
EGFRCR SERPLBLD CKD-EPI 2021: 94.2 ML/MIN/1.73
EOSINOPHIL # BLD AUTO: 0.06 10*3/MM3 (ref 0–0.4)
EOSINOPHIL NFR BLD AUTO: 0.9 % (ref 0.3–6.2)
ERYTHROCYTE [DISTWIDTH] IN BLOOD BY AUTOMATED COUNT: 14.6 % (ref 12.3–15.4)
FOLATE SERPL-MCNC: 11.5 NG/ML (ref 4.78–24.2)
GLUCOSE SERPL-MCNC: 124 MG/DL (ref 65–99)
HCT VFR BLD AUTO: 28.1 % (ref 37.5–51)
HGB BLD-MCNC: 9.9 G/DL (ref 13–17.7)
IMM GRANULOCYTES # BLD AUTO: 0.03 10*3/MM3 (ref 0–0.05)
IMM GRANULOCYTES NFR BLD AUTO: 0.4 % (ref 0–0.5)
INR PPP: 1.2 (ref 0.86–1.15)
LEFT ATRIUM VOLUME INDEX: 49.1 ML/M2
LYMPHOCYTES # BLD AUTO: 1.51 10*3/MM3 (ref 0.7–3.1)
LYMPHOCYTES NFR BLD AUTO: 21.9 % (ref 19.6–45.3)
MAGNESIUM SERPL-MCNC: 2.1 MG/DL (ref 1.6–2.4)
MCH RBC QN AUTO: 34.3 PG (ref 26.6–33)
MCHC RBC AUTO-ENTMCNC: 35.2 G/DL (ref 31.5–35.7)
MCV RBC AUTO: 97.2 FL (ref 79–97)
MONOCYTES # BLD AUTO: 0.94 10*3/MM3 (ref 0.1–0.9)
MONOCYTES NFR BLD AUTO: 13.6 % (ref 5–12)
NEUTROPHILS NFR BLD AUTO: 4.3 10*3/MM3 (ref 1.7–7)
NEUTROPHILS NFR BLD AUTO: 62.3 % (ref 42.7–76)
NRBC BLD AUTO-RTO: 0 /100 WBC (ref 0–0.2)
PHOSPHATE SERPL-MCNC: 4.5 MG/DL (ref 2.5–4.5)
PLATELET # BLD AUTO: 242 10*3/MM3 (ref 140–450)
PMV BLD AUTO: 9.8 FL (ref 6–12)
POTASSIUM SERPL-SCNC: 3.5 MMOL/L (ref 3.5–5.2)
PROTHROMBIN TIME: 15.2 SECONDS (ref 11.8–14.9)
RBC # BLD AUTO: 2.89 10*6/MM3 (ref 4.14–5.8)
SODIUM SERPL-SCNC: 129 MMOL/L (ref 136–145)
WBC NRBC COR # BLD: 6.9 10*3/MM3 (ref 3.4–10.8)

## 2023-08-30 PROCEDURE — 94799 UNLISTED PULMONARY SVC/PX: CPT

## 2023-08-30 PROCEDURE — 84425 ASSAY OF VITAMIN B-1: CPT | Performed by: HOSPITALIST

## 2023-08-30 PROCEDURE — 85610 PROTHROMBIN TIME: CPT | Performed by: INTERNAL MEDICINE

## 2023-08-30 PROCEDURE — 84100 ASSAY OF PHOSPHORUS: CPT | Performed by: HOSPITALIST

## 2023-08-30 PROCEDURE — 82746 ASSAY OF FOLIC ACID SERUM: CPT | Performed by: HOSPITALIST

## 2023-08-30 PROCEDURE — 85025 COMPLETE CBC W/AUTO DIFF WBC: CPT | Performed by: HOSPITALIST

## 2023-08-30 PROCEDURE — 99233 SBSQ HOSP IP/OBS HIGH 50: CPT | Performed by: INTERNAL MEDICINE

## 2023-08-30 PROCEDURE — 25010000002 MAGNESIUM SULFATE 2 GM/50ML SOLUTION: Performed by: HOSPITALIST

## 2023-08-30 PROCEDURE — 99222 1ST HOSP IP/OBS MODERATE 55: CPT | Performed by: INTERNAL MEDICINE

## 2023-08-30 PROCEDURE — 83735 ASSAY OF MAGNESIUM: CPT | Performed by: HOSPITALIST

## 2023-08-30 PROCEDURE — 93306 TTE W/DOPPLER COMPLETE: CPT | Performed by: INTERNAL MEDICINE

## 2023-08-30 PROCEDURE — 25010000002 FUROSEMIDE PER 20 MG: Performed by: INTERNAL MEDICINE

## 2023-08-30 PROCEDURE — 80048 BASIC METABOLIC PNL TOTAL CA: CPT | Performed by: HOSPITALIST

## 2023-08-30 PROCEDURE — 93306 TTE W/DOPPLER COMPLETE: CPT

## 2023-08-30 PROCEDURE — 97165 OT EVAL LOW COMPLEX 30 MIN: CPT

## 2023-08-30 PROCEDURE — 94664 DEMO&/EVAL PT USE INHALER: CPT

## 2023-08-30 PROCEDURE — 85730 THROMBOPLASTIN TIME PARTIAL: CPT | Performed by: INTERNAL MEDICINE

## 2023-08-30 RX ORDER — AMMONIUM LACTATE 12 G/100G
LOTION TOPICAL DAILY
Status: DISCONTINUED | OUTPATIENT
Start: 2023-08-31 | End: 2023-09-07 | Stop reason: HOSPADM

## 2023-08-30 RX ORDER — HYDROCODONE BITARTRATE AND ACETAMINOPHEN 10; 325 MG/1; MG/1
1 TABLET ORAL EVERY 8 HOURS PRN
Status: DISCONTINUED | OUTPATIENT
Start: 2023-08-30 | End: 2023-08-31

## 2023-08-30 RX ORDER — FUROSEMIDE 10 MG/ML
60 INJECTION INTRAMUSCULAR; INTRAVENOUS EVERY 12 HOURS
Status: DISCONTINUED | OUTPATIENT
Start: 2023-08-30 | End: 2023-09-01

## 2023-08-30 RX ORDER — LIDOCAINE HYDROCHLORIDE 20 MG/ML
10 INJECTION, SOLUTION INFILTRATION; PERINEURAL ONCE
Status: DISCONTINUED | OUTPATIENT
Start: 2023-08-30 | End: 2023-08-30

## 2023-08-30 RX ORDER — CHOLESTYRAMINE 4 G/9G
1 POWDER, FOR SUSPENSION ORAL 2 TIMES DAILY PRN
Status: DISCONTINUED | OUTPATIENT
Start: 2023-08-30 | End: 2023-09-07 | Stop reason: HOSPADM

## 2023-08-30 RX ORDER — POTASSIUM CHLORIDE 750 MG/1
40 CAPSULE, EXTENDED RELEASE ORAL 2 TIMES DAILY WITH MEALS
Status: COMPLETED | OUTPATIENT
Start: 2023-08-30 | End: 2023-08-30

## 2023-08-30 RX ORDER — MAGNESIUM SULFATE HEPTAHYDRATE 40 MG/ML
2 INJECTION, SOLUTION INTRAVENOUS
Status: COMPLETED | OUTPATIENT
Start: 2023-08-30 | End: 2023-08-30

## 2023-08-30 RX ADMIN — Medication 10 ML: at 09:18

## 2023-08-30 RX ADMIN — Medication 10 ML: at 20:09

## 2023-08-30 RX ADMIN — TIOTROPIUM BROMIDE INHALATION SPRAY 2 PUFF: 3.12 SPRAY, METERED RESPIRATORY (INHALATION) at 07:05

## 2023-08-30 RX ADMIN — MIDODRINE HYDROCHLORIDE 5 MG: 5 TABLET ORAL at 09:14

## 2023-08-30 RX ADMIN — FUROSEMIDE 60 MG: 10 INJECTION, SOLUTION INTRAMUSCULAR; INTRAVENOUS at 09:15

## 2023-08-30 RX ADMIN — MAGNESIUM SULFATE HEPTAHYDRATE 2 G: 40 INJECTION, SOLUTION INTRAVENOUS at 09:19

## 2023-08-30 RX ADMIN — ATORVASTATIN CALCIUM 20 MG: 20 TABLET, FILM COATED ORAL at 20:09

## 2023-08-30 RX ADMIN — POTASSIUM CHLORIDE 40 MEQ: 10 CAPSULE, COATED, EXTENDED RELEASE ORAL at 18:05

## 2023-08-30 RX ADMIN — NICOTINE 1 PATCH: 21 PATCH, EXTENDED RELEASE TRANSDERMAL at 09:18

## 2023-08-30 RX ADMIN — FUROSEMIDE 60 MG: 10 INJECTION, SOLUTION INTRAMUSCULAR; INTRAVENOUS at 20:09

## 2023-08-30 RX ADMIN — BUDESONIDE AND FORMOTEROL FUMARATE DIHYDRATE 2 PUFF: 160; 4.5 AEROSOL RESPIRATORY (INHALATION) at 18:29

## 2023-08-30 RX ADMIN — HYDROCODONE BITARTRATE AND ACETAMINOPHEN 1 TABLET: 10; 325 TABLET ORAL at 21:50

## 2023-08-30 RX ADMIN — BUDESONIDE AND FORMOTEROL FUMARATE DIHYDRATE 2 PUFF: 160; 4.5 AEROSOL RESPIRATORY (INHALATION) at 07:05

## 2023-08-30 RX ADMIN — HYDROCODONE BITARTRATE AND ACETAMINOPHEN 1 TABLET: 10; 325 TABLET ORAL at 09:51

## 2023-08-30 RX ADMIN — ASPIRIN 81 MG: 81 TABLET, COATED ORAL at 09:13

## 2023-08-30 RX ADMIN — POTASSIUM CHLORIDE 40 MEQ: 10 CAPSULE, COATED, EXTENDED RELEASE ORAL at 09:14

## 2023-08-30 RX ADMIN — MIDODRINE HYDROCHLORIDE 5 MG: 5 TABLET ORAL at 18:05

## 2023-08-30 RX ADMIN — MAGNESIUM SULFATE HEPTAHYDRATE 2 G: 40 INJECTION, SOLUTION INTRAVENOUS at 04:52

## 2023-08-30 RX ADMIN — SENNOSIDES AND DOCUSATE SODIUM 2 TABLET: 50; 8.6 TABLET ORAL at 20:09

## 2023-08-30 NOTE — PLAN OF CARE
Goal Outcome Evaluation:              Outcome Evaluation: pt up with assistance and cane. medicated for pain per prn orders. pt having a  paracentesis today.

## 2023-08-30 NOTE — SIGNIFICANT NOTE
08/30/23 1215   Coping/Psychosocial   Observed Emotional State calm;cooperative   Verbalized Emotional State relief;hopefulness   Trust Relationship/Rapport empathic listening provided   Involvement in Care interacting with patient   Additional Documentation Spiritual Care (Group)   Spiritual Care   Use of Spiritual Resources non-Zoroastrianism use of spiritual care   Spiritual Care Source  initiative   Spiritual Care Follow-Up follow-up, none required as presently assessed   Response to Spiritual Care receptive of support;engaged in conversation;thanks expressed   Spiritual Care Interventions supportive conversation provided   Spiritual Care Visit Type initial   Receptivity to Spiritual Care visit welcomed

## 2023-08-30 NOTE — PLAN OF CARE
Goal Outcome Evaluation:  Plan of Care Reviewed With: patient, other (see comments) (ex spouse)        Progress: no change  Outcome Evaluation: Patient has experienced decline in function from baseline status, presenting w/ deficits related to task tolerance, safety awareness, balance, transfers and mobility that impede patient independence with activities of daily living.  Patient would benefit from skilled Occupational Therapy intervention to maxamize patient safety, and promote return to baseline independence.      Anticipated Discharge Disposition (OT): inpatient rehabilitation facility

## 2023-08-30 NOTE — PLAN OF CARE
Goal Outcome Evaluation:           Progress: no change  Outcome Evaluation: A&Ox4. VSS. BPs soft. Patient refused placement of second IV. No acute changes to condition. Administered pain medication x1 and sleep aid x1. No current signs or symptoms of distress. Resting in bed with eyes closed and visible respirations.

## 2023-08-30 NOTE — CONSULTS
[facility]   Cardiology Consult Note    Patient Name: Bradley Crane  : 1955  MRN: [id number]  Primary Care Physician:  Rosa Isela Kim APRN  Referring Physician: No ref. provider found  Date of admission: 2023    Subjective   Subjective     Reason for Consult/ Chief Complaint: CHF    HPI:  Bradley Crane is a 68 y.o. male with past medical history as below, presented to hospital with progressive shortness of breath.  He reports dyspnea with mild effort.  He cannot walk more than a few steps without getting short winded.  He has bilateral lower extremity swelling.  He has no chest discomfort, palpitations, dizziness, presyncope or syncope.  He drinks heavily and has been doing so for many years.  Patient has no other complaints.  He denies previous cardiac history.  He denies history of CAD.    Review of Systems   All systems were reviewed and negative except as above    Personal History     Past Medical History:   Diagnosis Date    CHF (congestive heart failure)     Hypertension             Family History: family history is not on file. Otherwise pertinent FHx was reviewed and not pertinent to current issue.    Social History:  reports that he has been smoking cigarettes. He has been smoking an average of 1.5 packs per day. He does not have any smokeless tobacco history on file. He reports current alcohol use of about 3.0 standard drinks per week. He reports that he does not use drugs.    Home Medications:  HYDROcodone-acetaminophen, Melatonin, aspirin, atorvastatin, carvedilol, cephalexin, furosemide, metOLazone, midodrine, ondansetron, potassium chloride ER, sacubitril-valsartan, and spironolactone    Allergies:  No Known Allergies    Objective    Objective     Vitals:   Temp:  [97.2 °F (36.2 °C)-98.1 °F (36.7 °C)] 97.3 °F (36.3 °C)  Heart Rate:  [73-99] 84  Resp:  [16-20] 20  BP: ()/(54-86) 102/66      Physical Exam:   Constitutional: Awake, alert, No acute distress    Eyes: PERRLA,  sclerae anicteric, no conjunctival injection   HENT: NCAT, mucous membranes moist   Neck: Supple, no thyromegaly, no lymphadenopathy, trachea midline   Respiratory: Clear to auscultation bilaterally, nonlabored respirations    Cardiovascular: RRR, no murmurs, rubs, or gallops, palpable pedal pulses bilaterally   Gastrointestinal: Positive bowel sounds, soft, nontender, nondistended   Musculoskeletal: No bilateral ankle edema, no clubbing or cyanosis to extremities   Psychiatric: Appropriate affect, cooperative   Neurologic: Oriented x 3, strength symmetric in all extremities, Cranial Nerves grossly intact to confrontation, speech clear   Skin: No rashes     Result Review    Result Review:  I have personally reviewed the results from the time of this admission to 8/30/2023 13:54 EDT and agree with these findings:  [x]  Laboratory  []  Microbiology  [x]  Radiology  [x]  EKG/Telemetry   [x]  Cardiology/Vascular   []  Pathology  [x]  Old records  []  Other:  Most notable findings include:     CMP          8/25/2023    08:13 8/29/2023    12:29 8/30/2023    04:17   CMP   Glucose 116  111  124    BUN 10  16  20    Creatinine 0.61  0.84  0.90    EGFR 105.9  96.2  94.2    Sodium 130  130  129    Potassium 3.6  3.4  3.5    Chloride 93  92  94    Calcium 9.0  9.3  8.8    Total Protein 6.3  7.2     Albumin 3.8  4.4     Globulin 2.5  2.8     Total Bilirubin 1.6  2.1     Alkaline Phosphatase 117  137     AST (SGOT) 22  22     ALT (SGPT) 8  10     Albumin/Globulin Ratio 1.5  1.6     BUN/Creatinine Ratio 16.4  19.0  22.2    Anion Gap 13.9  14.1  10.9       CBC          8/25/2023    08:13 8/29/2023    12:29 8/30/2023    04:17   CBC   WBC 7.92  7.42  6.90    RBC 3.39  3.45  2.89    Hemoglobin 11.7  12.3  9.9    Hematocrit 33.0  33.7  28.1    MCV 97.3  97.7  97.2    MCH 34.5  35.7  34.3    MCHC 35.5  36.5  35.2    RDW 15.1  14.7  14.6    Platelets 242  262  242       Lab Results   Component Value Date    TROPONINT 37 (H) 08/29/2023          Assessment & Plan   Assessment / Plan     Brief Patient Summary:  Bradley Crane is a 68 y.o. male with:    Acute systolic congestive heart failure exacerbation, LVEF 25 to 30%, on IV Lasix  hyponatremia  Hypertension  Mixed dyslipidemia  Anemia  Alcohol abuse  Active smoking    Plan:   Continue IV diuresis  IV Lasix dose will be adjusted to aim for -2 to 3 L/day  Maintain potassium around 4 magnesium around 2  Monitor renal function  Blood pressure currently soft and does not allow to initiate guideline directed medical therapy for HFrEF.  Continue to evaluate that.  Evaluation for possible liver disease  He will need cardiac stress test down the line.  That can be done as an outpatient.  Other issues as per primary team.    Thank you for the consultation.  We will follow along with you.  Please call with any questions.    Electronically signed by Merly Ji MD, 08/30/23, 1:54 PM EDT.

## 2023-08-30 NOTE — CASE MANAGEMENT/SOCIAL WORK
Discharge Planning Assessment   Clif     Patient Name: Bradley Crane  MRN: [id number]  Today's Date: 8/30/2023    Admit Date: 8/29/2023    Plan: patient with joanna exacerbation. patient move [place] back in with his ex wife and children for his care. he is generally independent but family does help him. he is in the procress of trying to set up pain mangement here in the local area with pcp. patient has a cane at bedisde. denies additional needs. cm will follow, pt and ot following as well   Discharge Needs Assessment       Row Name 08/30/23 1611       Living Environment    People in Home other (see comments)    Current Living Arrangements home    Potentially Unsafe Housing Conditions none    Primary Care Provided by self    Family Caregiver if Needed other (see comments);child(anna), adult    Quality of Family Relationships helpful;involved;supportive    Able to Return to Prior Arrangements yes       Resource/Environmental Concerns    Resource/Environmental Concerns none    Transportation Concerns none       Transition Planning    Patient/Family Anticipates Transition to home with family    Patient/Family Anticipated Services at Transition none    Transportation Anticipated family or friend will provide       Discharge Needs Assessment    Readmission Within the Last 30 Days no previous admission in last 30 days    Equipment Currently Used at Home cane, straight    Concerns to be Addressed no discharge needs identified    Anticipated Changes Related to Illness none    Equipment Needed After Discharge none                   Discharge Plan       Row Name 08/30/23 1612       Plan    Plan patient with joanna exacerbation. [place] move [place] in with his ex wife and children for his care. he is generally independent but family does help him. he is in the procress of trying to set up pain mangement here in the local area with pcp. patient has a cane at bedisde. denies additional needs. cm will follow, pt and ot  following as well                  Continued Care and Services - Admitted Since 8/29/2023    Coordination has not been started for this encounter.          Demographic Summary       Row Name 08/30/23 155       General Information    Admission Type inpatient    Arrived From home;emergency department    Referral Source admission list    Reason for Consult discharge planning    Preferred Language English       Contact Information    Permission Granted to Share Info With family/designee    Contact Information Obtained for                    Functional Status       Row Name 08/30/23 1604       Functional Status    Usual Activity Tolerance fair    Current Activity Tolerance fair       Assessment of Health Literacy    How often do you have someone help you read hospital materials? Occasionally    How often do you have problems learning about your medical condition because of difficulty understanding written information? Never    How often do you have a problem understanding what is told to you about your medical condition? Never    How confident are you filling out medical forms by yourself? Quite a bit    Health Literacy Fair       Functional Status, IADL    Medications independent;assistive person    Meal Preparation independent;assistive person    Housekeeping assistive person;independent    Laundry assistive person;independent    Shopping completely dependent       Mental Status    General Appearance WDL WDL       Mental Status Summary    Recent Changes in Mental Status/Cognitive Functioning no changes                   Psychosocial    No documentation.                  Abuse/Neglect    No documentation.                  Legal    No documentation.                  Substance Abuse    No documentation.                  Patient Forms    No documentation.                     Miriam Lott RN

## 2023-08-30 NOTE — PROGRESS NOTES
" [facility]   Hospitalist Progress Note    Date of admission: 8/29/2023  Patient Name: Bradley Crane  1955  Date: 8/30/2023      Subjective     Chief Complaint   Patient presents with    Shortness of Breath    SENT BY PCP    RETAINING FLUID       Summary: 68 y.o. with history of systolic CHF/AICD (previous care in [place] recently moved here), alcohol abuse/dependence, tobacco abuse proximately 1.5 packs/day, who presented with shortness of air/dyspnea on exertion and signs of gross fluid overload.  No reported history of cirrhosis but apparently has had prior paracentesis/thoracentesis for fluid overload.    Interval Followup: Still short of air.  Notes some itching in his extremities.  Discussed possible paracentesis for his ascites states swelling but is extremely anxious IV needles and will need to be \"knocked out \"to have anything done.  Has not established with GI or cardiology yet.  Was pending outpatient heart failure clinic with Dr. Zuniga      Objective     Vitals:   Temp:  [97.2 °F (36.2 °C)-98.1 °F (36.7 °C)] 97.5 °F (36.4 °C)  Heart Rate:  [73-99] 79  Resp:  [16-20] 19  BP: ()/(54-78) 113/60    Physical Exam  Awake in bed appears older than stated age temporal wasting,    Result Review:  Vital signs, labs and recent relevant imaging reviewed.        acetaminophen **OR** acetaminophen **OR** acetaminophen    senna-docusate sodium **AND** polyethylene glycol **AND** bisacodyl **AND** bisacodyl    HYDROcodone-acetaminophen    melatonin    [DISCONTINUED] HYDROmorphone **AND** naloxone    sodium chloride    [START ON 8/31/2023] ammonium lactate, , Topical, Daily  aspirin, 81 mg, Oral, Daily  atorvastatin, 20 mg, Oral, Nightly  budesonide-formoterol, 2 puff, Inhalation, BID - RT  furosemide, 60 mg, Intravenous, Q12H  midodrine, 5 mg, Oral, TID AC  nicotine, 1 patch, Transdermal, Q24H  senna-docusate sodium, 2 tablet, Oral, BID  sodium chloride, 10 mL, Intravenous, Q12H  tiotropium bromide " monohydrate, 2 puff, Inhalation, Daily - RT        Assessment / Plan     Assessment/Plan (clinically significant if listed here)  Systolic CHF exacerbation  Hypervolemic hyponatremia  History of CAD and AICD placement  Small bilateral pleural effusion/pulmonary edema in setting of volume overload  Hypomagnesemia  Hypokalemia  Normocytic anemia/anemia of chronic disease in setting of alcohol abuse  Chronic tobacco abuse/1.5 pack/day smoking  History of hypertension  Alcohol abuse/dependence  pruritus, question secondary to hyperbilirubinemia    Increase IV diuresis, continue aggressive diuresis for volume overload, replace potassium  Hold Coreg for now given softer blood pressure likely can resume once blood pressure improving  Continue wound care to left lower extremity, suspect volume overload component, may also have PVD, check ABIs  Appreciate cardiology assistance and further medication changes/titration, follow-up 2D echo, needs to establish care here as well  Prior paracentesis, suspect has underlying cirrhosis, needs obtain additional records, check paracentesis diagnostic/therapeutic,.  Needs to establish with GI as outpatient as well  add as needed colestyramine for pruritus associated with his elevated bilirubin, repeat LFTs in a.m.  Continue current inhalers, does not appear to be on any outpatient no COPD history noted, chest x-ray with volume overload no acute pneumonia on my review  Cardiology assistance  Check a.m. CBC, BMP, magnesium, phosphorus  Continue hospital monitoring and treatment at current level of care - does not need upgraded at this time.  Discuss with IR     2 g sodium diet.  Outpatient stress test as per cardiology    Patient will be several days before stable for discharge still with gross volume overload      DVT prophylaxis:  No DVT prophylaxis order currently exists.    Level Of Support Discussed With: Patient  Code Status (Patient has no pulse and is not breathing): CPR (Attempt  to Resuscitate)  Medical Interventions (Patient has pulse or is breathing): Full Support        CBC          8/25/2023    08:13 8/29/2023    12:29 8/30/2023    04:17   CBC   WBC 7.92  7.42  6.90    RBC 3.39  3.45  2.89    Hemoglobin 11.7  12.3  9.9    Hematocrit 33.0  33.7  28.1    MCV 97.3  97.7  97.2    MCH 34.5  35.7  34.3    MCHC 35.5  36.5  35.2    RDW 15.1  14.7  14.6    Platelets 242  262  242        CMP          8/25/2023    08:13 8/29/2023    12:29 8/30/2023    04:17   CMP   Glucose 116  111  124    BUN 10  16  20    Creatinine 0.61  0.84  0.90    EGFR 105.9  96.2  94.2    Sodium 130  130  129    Potassium 3.6  3.4  3.5    Chloride 93  92  94    Calcium 9.0  9.3  8.8    Total Protein 6.3  7.2     Albumin 3.8  4.4     Globulin 2.5  2.8     Total Bilirubin 1.6  2.1     Alkaline Phosphatase 117  137     AST (SGOT) 22  22     ALT (SGPT) 8  10     Albumin/Globulin Ratio 1.5  1.6     BUN/Creatinine Ratio 16.4  19.0  22.2    Anion Gap 13.9  14.1  10.9

## 2023-08-30 NOTE — THERAPY EVALUATION
Respiratory Therapist Broncho-Pulmonary Hygiene Progress Note      Patient Name:  Bradley Crane  YOB: 1955    Bradley Crane meets the qualification for Level 1 of the Bronco-Pulmonary Hygiene Protocol. This was based on my daily patient assessment and includes review of chest x-ray results, cough ability and quality, oxygenation, secretions or risk for secretion development and patient mobility.     Broncho-Pulmonary Hygiene Assessment:    Level of Movement: Actively changing positions without assistance  Alert/ oriented/ cooperative    Breath Sounds: Clear to slightly diminished    Cough: Strong, effective    Chest X-Ray: No CXR available    Sputum Productions: None or small amount of thin or watery secretions with effective cough    History and Physical: None    SpO2 to Oxygen Need: greater than 92% on room air or  less than 3L nasal canula    Current SpO2 is: 95% on Room Air    Based on this information, I have completed the following interventions: Teach/Instruct patient on cough and deep breathe      Electronically signed by Tasha Rodriges RRT, 08/30/23, 7:54 AM EDT.

## 2023-08-30 NOTE — THERAPY EVALUATION
Patient Name: Bradley Crane  : 1955    MRN: [id number]                              Today's Date: 2023       Admit Date: 2023    Visit Dx:     ICD-10-CM ICD-9-CM   1. Acute on chronic congestive heart failure, unspecified heart failure type  I50.9 428.0   2. Failure of outpatient treatment  Z78.9 V49.89   3. Decreased activities of daily living (ADL)  Z78.9 V49.89     Patient Active Problem List   Diagnosis    CHF exacerbation     Past Medical History:   Diagnosis Date    CHF (congestive heart failure)     Hypertension      Past Surgical History:   Procedure Laterality Date    CARDIAC SURGERY        General Information       Row Name 23 1505          OT Time and Intention    Document Type evaluation  -ES     Mode of Treatment individual therapy;occupational therapy  -ES       Row Name 23 1505          General Information    Patient Profile Reviewed yes  -ES     Prior Level of Function independent:;ADL's;all household mobility;community mobility  Patient independent with ADLs at baseline. Cane for functional mobility. Tub/shower combo, standing shower completion. [place] in stance. No home O2. x3 falls in last three months.  -ES     Existing Precautions/Restrictions fall  -ES     Barriers to Rehab none identified  -ES       Row Name 23 1505          Occupational Profile    Reason for Services/Referral (Occupational Profile) Patient is 68 yr old male admitted to [facility] on 2023 with reports of lower extremity swelling and shortness of breath with ambulation. OT evaluation and treatment ordered d/t recent decline in ADLs/transfer ability and discharge planning recommendations. No previous OT services for current condition.  -ES       Row Name 23 1505          Living Environment    People in Home other (see comments)  ex wife  -ES       Row Name 23 1505          Cognition    Orientation Status (Cognition) oriented x 3  patient pleasant and cooperative,  agreeable to therapy evaluation. patient ex spouse present.  -ES       Row Name 08/30/23 1505          Safety Issues, Functional Mobility    Impairments Affecting Function (Mobility) balance;strength;endurance/activity tolerance;shortness of breath  -ES               User Key  (r) = Recorded By, (t) = Taken By, (c) = Cosigned By      Initials Name Provider Type    Jazmyn Jade, OTR/L, CSRS Occupational Therapist                     Mobility/ADL's       Row Name 08/30/23 1508          Bed Mobility    Bed Mobility supine-sit;sit-supine  -ES     Supine-Sit [place] (Bed Mobility) standby assist  -ES     Sit-Supine [place] (Bed Mobility) standby assist  -ES       Row Name 08/30/23 1508          Transfers    Transfers sit-stand transfer;stand-sit transfer  -ES       Row Name 08/30/23 1508          Sit-Stand Transfer    Sit-Stand [place] (Transfers) contact guard;1 person assist  -ES     Assistive Device (Sit-Stand Transfers) cane, straight  -ES       Row Name 08/30/23 1508          Stand-Sit Transfer    Stand-Sit [place] (Transfers) contact guard;1 person assist  -ES     Assistive Device (Stand-Sit Transfers) cane, straight  -ES       Row Name 08/30/23 1508          Functional Mobility    Functional Mobility- Ind. Level minimum assist (75% patient effort);1 person  -ES     Functional Mobility- Device cane, straight  -ES     Functional Mobility- Comment patient performs functional mobility to/from bathroom, min A with use of SPC. Patient is holding on to furniture with LUE, holding cane with RUE. Patient provided education and training on need for rolling walker to increase patient balance and safety with mobility, patient declined use of rolling walker.  -ES       Row Name 08/30/23 1508          Activities of Daily Living    BADL Assessment/Intervention bathing;upper body dressing;lower body dressing;feeding;grooming;toileting  -ES       Row Name 08/30/23 150          Bathing  Assessment/Intervention    [place] Level (Bathing) bathing skills;contact guard assist  -ES       Row Name 08/30/23 1508          Upper Body Dressing Assessment/Training    [place] Level (Upper Body Dressing) upper body dressing skills;set up  -ES       Row Name 08/30/23 1508          Lower Body Dressing Assessment/Training    [place] Level (Lower Body Dressing) lower body dressing skills;don;pants/bottoms;contact guard assist  -ES     Position (Lower Body Dressing) unsupported sitting;unsupported standing  -ES       Row Name 08/30/23 1508          Self-Feeding Assessment/Training    [place] Level (Feeding) feeding skills;set up  -ES       Row Name 08/30/23 1508          Grooming Assessment/Training    [place] Level (Grooming) grooming skills;set up  -ES       Row Name 08/30/23 1508          Toileting Assessment/Training    [place] Level (Toileting) toileting skills;contact guard assist  -ES               User Key  (r) = Recorded By, (t) = Taken By, (c) = Cosigned By      Initials Name Provider Type    ES Jazmyn Isbell, OTR/L, CSRS Occupational Therapist                   Obj/Interventions       Row Name 08/30/23 1512          Vision Assessment/Intervention    Visual Impairment/Limitations WFL  -ES       Row Name 08/30/23 1512          Range of Motion Comprehensive    General Range of Motion no range of motion deficits identified  -ES       Row Name 08/30/23 1512          Strength Comprehensive (MMT)    Comment, General Manual Muscle Testing (MMT) Assessment BUEs assessed 4/5  -ES       Row Name 08/30/23 1512          Motor Skills    Motor Skills functional endurance  -ES     Functional Endurance fair minus. Patient demonstrates shortness of breath with functional mobility, on RA.  -ES       Row Name 08/30/23 1512          Balance    Balance Assessment sitting dynamic balance;standing dynamic balance  -ES     Dynamic Sitting Balance standby assist  -ES     Position, Sitting Balance  "unsupported;sitting edge of bed  -ES     Dynamic Standing Balance minimal assist  -ES     Position/Device Used, Standing Balance supported;cane, straight  -ES     Comment, Balance patient demonstrates decreased balance with functional mobility with use of SPC. Patient with forward flexion posture, holding on to furniture with use of cane. Patient provided education and training on use of rolling walker with functional mobility, declined use of walker secondary to \"making him feel more unsteady\"  -ES               User Key  (r) = Recorded By, (t) = Taken By, (c) = Cosigned By      Initials Name Provider Type    Jazmyn Jade, OTR/L, CSRS Occupational Therapist                   Goals/Plan       Row Name 08/30/23 1518          Transfer Goal 1 (OT)    Activity/Assistive Device (Transfer Goal 1, OT) transfers, all;walker, rolling  -ES     [place] Level/Cues Needed (Transfer Goal 1, OT) modified independence  -ES     Time Frame (Transfer Goal 1, OT) long term goal (LTG);10 days  -ES       Row Name 08/30/23 1518          Bathing Goal 1 (OT)    Activity/Device (Bathing Goal 1, OT) bathing skills, all  -ES     [place] Level/Cues Needed (Bathing Goal 1, OT) modified independence  -ES     Time Frame (Bathing Goal 1, OT) long term goal (LTG);10 days  -ES       Row Name 08/30/23 1518          Dressing Goal 1 (OT)    Activity/Device (Dressing Goal 1, OT) dressing skills, all  -ES     [place]/Cues Needed (Dressing Goal 1, OT) modified independence  -ES     Time Frame (Dressing Goal 1, OT) long term goal (LTG);10 days  -ES       Row Name 08/30/23 1518          Toileting Goal 1 (OT)    Activity/Device (Toileting Goal 1, OT) toileting skills, all  -ES     [place] Level/Cues Needed (Toileting Goal 1, OT) modified independence  -ES     Time Frame (Toileting Goal 1, OT) long term goal (LTG);10 days  -ES       Row Name 08/30/23 1518          Grooming Goal 1 (OT)    Activity/Device (Grooming Goal 1, OT) grooming " skills, all  -ES     [place] (Grooming Goal 1, OT) modified independence  -ES     Time Frame (Grooming Goal 1, OT) long term goal (LTG);10 days  -ES       Row Name 08/30/23 1518          Problem Specific Goal 1 (OT)    Problem Specific Goal 1 (OT) Patient will demonstrate good graded activity tolerance in preperation for independent ADL routine completion at time of discharge  -ES     Time Frame (Problem Specific Goal 1, OT) long term goal (LTG);10 days  -ES       Row Name 08/30/23 1518          Therapy Assessment/Plan (OT)    Planned Therapy Interventions (OT) activity tolerance training;BADL retraining;functional balance retraining;occupation/activity based interventions;patient/caregiver education/training;strengthening exercise;transfer/mobility retraining  -ES               User Key  (r) = Recorded By, (t) = Taken By, (c) = Cosigned By      Initials Name Provider Type    ES Jazmyn Isbell, OTR/L, CSRS Occupational Therapist                   Clinical Impression       Row Name 08/30/23 1523 08/30/23 1516       Plan of Care Review    Plan of Care Reviewed With patient;other (see comments)  ex spouse  -ES patient;other (see comments)  ex wife  -ES    Progress no change  -ES no change  -ES    Outcome Evaluation Patient has experienced decline in function from baseline status, presenting w/ deficits related to task tolerance, safety awareness, balance, transfers and mobility that impede patient independence with activities of daily living.  Patient would benefit from skilled Occupational Therapy intervention to maxamize patient safety, and promote return to baseline independence.  -ES --      Row Name 08/30/23 1523 08/30/23 1516       Therapy Assessment/Plan (OT)    Rehab Potential (OT) good, to achieve stated therapy goals  -ES good, to achieve stated therapy goals  -ES    Criteria for Skilled Therapeutic Interventions Met (OT) meets criteria;yes;skilled treatment is necessary  -ES meets criteria;yes;skilled  treatment is necessary  -ES    Therapy Frequency (OT) 5 times/wk  -ES 5 times/wk  -ES      Row Name 08/30/23 1523 08/30/23 1516       Therapy Plan Review/Discharge Plan (OT)    Anticipated Discharge Disposition (OT) inpatient rehabilitation facility  -ES inpatient rehabilitation facility  -ES      Row Name 08/30/23 1516          Vital Signs    O2 Delivery Pre Treatment room air  -ES     O2 Delivery Intra Treatment room air  -ES     O2 Delivery Post Treatment room air  -ES               User Key  (r) = Recorded By, (t) = Taken By, (c) = Cosigned By      Initials Name Provider Type    ES Jazmyn Isbell, OTR/L, CSRS Occupational Therapist                   Outcome Measures       Row Name 08/30/23 1519          How much help from another is currently needed...    Putting on and taking off regular lower body clothing? 3  -ES     Bathing (including washing, rinsing, and drying) 3  -ES     Toileting (which includes using toilet bed pan or urinal) 3  -ES     Putting on and taking off regular upper body clothing 4  -ES     Taking care of personal grooming (such as brushing teeth) 4  -ES     Eating meals 4  -ES     AM-PAC 6 Clicks Score (OT) 21  -ES       Row Name 08/30/23 0755          How much help from another person do you currently need...    Turning from your back to your side while in flat bed without using bedrails? 4  -BA     Moving from lying on back to sitting on the side of a flat bed without bedrails? 4  -BA     Moving to and from a bed to a chair (including a wheelchair)? 3  -BA     Standing up from a chair using your arms (e.g., wheelchair, bedside chair)? 4  -BA     Climbing 3-5 steps with a railing? 3  -BA     To walk in hospital room? 3  -BA     AM-PAC 6 Clicks Score (PT) 21  -BA     Highest level of mobility 6 --> Walked 10 steps or more  -BA       Row Name 08/30/23 1519          Functional Assessment    Outcome Measure Options AM-PAC 6 Clicks Daily Activity (OT);Optimal Instrument  -ES       Row Name  08/30/23 1519          Optimal Instrument    Optimal Instrument Optimal - 3  -ES     Bending/Stooping 3  -ES     Balancing 2  -ES     Standing 2  -ES     From the list, choose the 3 activities you would most like to be able to do without any difficulty Bending/stooping;Standing;Balancing  -ES     Total Score Optimal - 3 7  -ES               User Key  (r) = Recorded By, (t) = Taken By, (c) = Cosigned By      Initials Name Provider Type    Rebecca Laws, RN Registered Nurse    Jazmyn Jade, OTR/L, CSRS Occupational Therapist                      OT Recommendation and Plan  Planned Therapy Interventions (OT): activity tolerance training, BADL retraining, functional balance retraining, occupation/activity based interventions, patient/caregiver education/training, strengthening exercise, transfer/mobility retraining  Therapy Frequency (OT): 5 times/wk  Plan of Care Review  Plan of Care Reviewed With: patient, other (see comments) (ex spouse)  Progress: no change  Outcome Evaluation: Patient has experienced decline in function from baseline status, presenting w/ deficits related to task tolerance, safety awareness, balance, transfers and mobility that impede patient independence with activities of daily living.  Patient would benefit from skilled Occupational Therapy intervention to maxamize patient safety, and promote return to baseline independence.     Time Calculation:   Evaluation Complexity (OT)  Review Occupational Profile/Medical/Therapy History Complexity: brief/low complexity  Assessment, Occupational Performance/Identification of Deficit Complexity: 3-5 performance deficits  Clinical Decision Making Complexity (OT): problem focused assessment/low complexity  Overall Complexity of Evaluation (OT): low complexity     Time Calculation- OT       Row Name 08/30/23 1524             Time Calculation- OT    OT Received On 08/30/23  -ES      OT Goal Re-Cert Due Date 09/08/23  -ES         Untimed Charges    OT  Eval/Re-eval Minutes 34  -ES         Total Minutes    Untimed Charges Total Minutes 34  -ES       Total Minutes 34  -ES                User Key  (r) = Recorded By, (t) = Taken By, (c) = Cosigned By      Initials Name Provider Type    Jazmyn Jade OTR/L, CSRS Occupational Therapist                  Therapy Charges for Today       Code Description Service Date Service Provider Modifiers Qty    29729794244 HC OT EVAL LOW COMPLEXITY 3 8/30/2023 Jazmyn Isbell OTR/L, CSRS GO 1                 LORI Tamayo/L, CSRS  8/30/2023

## 2023-08-30 NOTE — SIGNIFICANT NOTE
Wound Eval / Progress Noted    KEVIN Menezes     Patient Name: Bradley Crane  : 1955  MRN: [id number]  Today's Date: 2023                 Admit Date: 2023    Visit Dx:    ICD-10-CM ICD-9-CM   1. Acute on chronic congestive heart failure, unspecified heart failure type  I50.9 428.0   2. Failure of outpatient treatment  Z78.9 V49.89         CHF exacerbation        Past Medical History:   Diagnosis Date    CHF (congestive heart failure)     Hypertension         Past Surgical History:   Procedure Laterality Date    CARDIAC SURGERY           Physical Assessment:  Wound 23 Left lower leg Other (comment) (Active)   Wound Image   23   Dressing Appearance moist drainage;intact 23   Closure None 23   Base moist;red;slough;yellow 23   Periwound edematous;intact 23   Periwound Temperature warm 23   Periwound Skin Turgor soft 23   Edges open 23   Wound Length (cm) 3 cm 23   Wound Width (cm) 1.9 cm 23   Wound Depth (cm) 0.2 cm 23   Wound Surface Area (cm^2) 5.7 cm^2 23   Wound Volume (cm^3) 1.14 cm^3 23   Drainage Characteristics/Odor yellow;serous 23   Drainage Amount small 23   Care, Wound cleansed with;irrigated with;sterile normal saline 23   Dressing Care dressing applied;foam;silver impregnated;abdominal pad;gauze, dry 23   Periwound Care absorptive dressing applied 23      Wound Check / Follow-up:  Patient seen today for wound consult. Patient with ulceration to left anterior lower leg. He states ulceration has been there for a couple of months. Wound base is moist, minimally red, and sloughing tissue is present. Cleansed and irrigated with normal saline. Two small superficial open areas noted to distal lower leg and are draining serous fluid. Edema noted to left lower leg. Recommending daily dressing  changes with silver impregnated foam, ABD pad, and gauze roll. Patient denies any other wounds/skin issues. Dry skin noted to BLE as well. Recommending daily skin care with application of [place] Hydrin lotion.      Impression: Ulceration to left lower leg. Dry skin.      Short term goals:  Regain skin integrity, skin protection, daily dressing changes, topical treatment, skin care.      Eleni Schmidt RN    8/30/2023    11:29 EDT

## 2023-08-31 ENCOUNTER — APPOINTMENT (OUTPATIENT)
Dept: CARDIOLOGY | Facility: HOSPITAL | Age: 68
DRG: 286 | End: 2023-08-31
Payer: MEDICARE

## 2023-08-31 ENCOUNTER — APPOINTMENT (OUTPATIENT)
Dept: ULTRASOUND IMAGING | Facility: HOSPITAL | Age: 68
DRG: 286 | End: 2023-08-31
Payer: MEDICARE

## 2023-08-31 ENCOUNTER — APPOINTMENT (OUTPATIENT)
Dept: GENERAL RADIOLOGY | Facility: HOSPITAL | Age: 68
DRG: 286 | End: 2023-08-31
Payer: MEDICARE

## 2023-08-31 ENCOUNTER — APPOINTMENT (OUTPATIENT)
Dept: CT IMAGING | Facility: HOSPITAL | Age: 68
DRG: 286 | End: 2023-08-31
Payer: MEDICARE

## 2023-08-31 ENCOUNTER — APPOINTMENT (OUTPATIENT)
Dept: INTERVENTIONAL RADIOLOGY/VASCULAR | Facility: HOSPITAL | Age: 68
DRG: 286 | End: 2023-08-31
Payer: MEDICARE

## 2023-08-31 LAB
ALBUMIN SERPL-MCNC: 3.8 G/DL (ref 3.5–5.2)
ALP SERPL-CCNC: 146 U/L (ref 39–117)
ALT SERPL W P-5'-P-CCNC: 8 U/L (ref 1–41)
ANION GAP SERPL CALCULATED.3IONS-SCNC: 13.7 MMOL/L (ref 5–15)
AST SERPL-CCNC: 19 U/L (ref 1–40)
BASOPHILS # BLD AUTO: 0.05 10*3/MM3 (ref 0–0.2)
BASOPHILS NFR BLD AUTO: 0.8 % (ref 0–1.5)
BH CV LOWER ARTERIAL LEFT ABI RATIO: 1.17
BH CV LOWER ARTERIAL LEFT CALF RATIO: 1.24
BH CV LOWER ARTERIAL LEFT DORSALIS PEDIS SYS MAX: 111
BH CV LOWER ARTERIAL LEFT GREAT TOE SYS MAX: 52
BH CV LOWER ARTERIAL LEFT LOW THIGH RATIO: 1.15
BH CV LOWER ARTERIAL LEFT LOW THIGH SYS MAX: 109
BH CV LOWER ARTERIAL LEFT POPLITEAL SYS MAX: 118
BH CV LOWER ARTERIAL LEFT POST TIBIAL SYS MAX: 105
BH CV LOWER ARTERIAL LEFT TBI RATIO: 0.55
BH CV LOWER ARTERIAL RIGHT ABI RATIO: 1.01
BH CV LOWER ARTERIAL RIGHT CALF RATIO: 1.26
BH CV LOWER ARTERIAL RIGHT DORSALIS PEDIS SYS MAX: 96
BH CV LOWER ARTERIAL RIGHT GREAT TOE SYS MAX: 45
BH CV LOWER ARTERIAL RIGHT LOW THIGH RATIO: 1.15
BH CV LOWER ARTERIAL RIGHT LOW THIGH SYS MAX: 109
BH CV LOWER ARTERIAL RIGHT POPLITEAL SYS MAX: 120
BH CV LOWER ARTERIAL RIGHT POST TIBIAL SYS MAX: 92
BH CV LOWER ARTERIAL RIGHT TBI RATIO: 0.47
BILIRUB CONJ SERPL-MCNC: 0.6 MG/DL (ref 0–0.3)
BILIRUB INDIRECT SERPL-MCNC: 0.5 MG/DL
BILIRUB SERPL-MCNC: 1.1 MG/DL (ref 0–1.2)
BUN SERPL-MCNC: 22 MG/DL (ref 8–23)
BUN/CREAT SERPL: 20.8 (ref 7–25)
CALCIUM SPEC-SCNC: 9.1 MG/DL (ref 8.6–10.5)
CHLORIDE SERPL-SCNC: 92 MMOL/L (ref 98–107)
CO2 SERPL-SCNC: 22.3 MMOL/L (ref 22–29)
CREAT SERPL-MCNC: 1.06 MG/DL (ref 0.76–1.27)
DEPRECATED RDW RBC AUTO: 51.4 FL (ref 37–54)
EGFRCR SERPLBLD CKD-EPI 2021: 77.4 ML/MIN/1.73
EOSINOPHIL # BLD AUTO: 0.06 10*3/MM3 (ref 0–0.4)
EOSINOPHIL NFR BLD AUTO: 0.9 % (ref 0.3–6.2)
ERYTHROCYTE [DISTWIDTH] IN BLOOD BY AUTOMATED COUNT: 14.6 % (ref 12.3–15.4)
GLUCOSE SERPL-MCNC: 122 MG/DL (ref 65–99)
HCT VFR BLD AUTO: 28.9 % (ref 37.5–51)
HGB BLD-MCNC: 10.4 G/DL (ref 13–17.7)
IMM GRANULOCYTES # BLD AUTO: 0.03 10*3/MM3 (ref 0–0.05)
IMM GRANULOCYTES NFR BLD AUTO: 0.5 % (ref 0–0.5)
LYMPHOCYTES # BLD AUTO: 1.41 10*3/MM3 (ref 0.7–3.1)
LYMPHOCYTES NFR BLD AUTO: 21.5 % (ref 19.6–45.3)
MAGNESIUM SERPL-MCNC: 2 MG/DL (ref 1.6–2.4)
MCH RBC QN AUTO: 34.9 PG (ref 26.6–33)
MCHC RBC AUTO-ENTMCNC: 36 G/DL (ref 31.5–35.7)
MCV RBC AUTO: 97 FL (ref 79–97)
MONOCYTES # BLD AUTO: 0.82 10*3/MM3 (ref 0.1–0.9)
MONOCYTES NFR BLD AUTO: 12.5 % (ref 5–12)
NEUTROPHILS NFR BLD AUTO: 4.18 10*3/MM3 (ref 1.7–7)
NEUTROPHILS NFR BLD AUTO: 63.8 % (ref 42.7–76)
NRBC BLD AUTO-RTO: 0 /100 WBC (ref 0–0.2)
PHOSPHATE SERPL-MCNC: 5.1 MG/DL (ref 2.5–4.5)
PLATELET # BLD AUTO: 243 10*3/MM3 (ref 140–450)
PMV BLD AUTO: 9.3 FL (ref 6–12)
POTASSIUM SERPL-SCNC: 3.8 MMOL/L (ref 3.5–5.2)
PROT SERPL-MCNC: 6.3 G/DL (ref 6–8.5)
QT INTERVAL: 414 MS
QTC INTERVAL: 495 MS
RBC # BLD AUTO: 2.98 10*6/MM3 (ref 4.14–5.8)
SODIUM SERPL-SCNC: 128 MMOL/L (ref 136–145)
UPPER ARTERIAL LEFT ARM BRACHIAL SYS MAX: 93 MMHG
UPPER ARTERIAL RIGHT ARM BRACHIAL SYS MAX: 95 MMHG
WBC NRBC COR # BLD: 6.55 10*3/MM3 (ref 3.4–10.8)

## 2023-08-31 PROCEDURE — 83735 ASSAY OF MAGNESIUM: CPT | Performed by: INTERNAL MEDICINE

## 2023-08-31 PROCEDURE — 70450 CT HEAD/BRAIN W/O DYE: CPT

## 2023-08-31 PROCEDURE — 73502 X-RAY EXAM HIP UNI 2-3 VIEWS: CPT

## 2023-08-31 PROCEDURE — 84100 ASSAY OF PHOSPHORUS: CPT | Performed by: INTERNAL MEDICINE

## 2023-08-31 PROCEDURE — 25010000002 THIAMINE PER 100 MG: Performed by: HOSPITALIST

## 2023-08-31 PROCEDURE — 80048 BASIC METABOLIC PNL TOTAL CA: CPT | Performed by: INTERNAL MEDICINE

## 2023-08-31 PROCEDURE — 99232 SBSQ HOSP IP/OBS MODERATE 35: CPT | Performed by: INTERNAL MEDICINE

## 2023-08-31 PROCEDURE — 85025 COMPLETE CBC W/AUTO DIFF WBC: CPT | Performed by: INTERNAL MEDICINE

## 2023-08-31 PROCEDURE — 76700 US EXAM ABDOM COMPLETE: CPT

## 2023-08-31 PROCEDURE — 80076 HEPATIC FUNCTION PANEL: CPT | Performed by: INTERNAL MEDICINE

## 2023-08-31 PROCEDURE — 25010000002 FUROSEMIDE PER 20 MG: Performed by: INTERNAL MEDICINE

## 2023-08-31 PROCEDURE — 94761 N-INVAS EAR/PLS OXIMETRY MLT: CPT

## 2023-08-31 PROCEDURE — 99233 SBSQ HOSP IP/OBS HIGH 50: CPT | Performed by: HOSPITALIST

## 2023-08-31 PROCEDURE — 93923 UPR/LXTR ART STDY 3+ LVLS: CPT

## 2023-08-31 PROCEDURE — 94799 UNLISTED PULMONARY SVC/PX: CPT

## 2023-08-31 PROCEDURE — 93923 UPR/LXTR ART STDY 3+ LVLS: CPT | Performed by: SURGERY

## 2023-08-31 PROCEDURE — 97161 PT EVAL LOW COMPLEX 20 MIN: CPT

## 2023-08-31 RX ORDER — LORAZEPAM 0.5 MG/1
1 TABLET ORAL EVERY 6 HOURS
Status: DISPENSED | OUTPATIENT
Start: 2023-09-01 | End: 2023-09-02

## 2023-08-31 RX ORDER — THIAMINE HYDROCHLORIDE 100 MG/ML
200 INJECTION, SOLUTION INTRAMUSCULAR; INTRAVENOUS EVERY 8 HOURS SCHEDULED
Status: DISPENSED | OUTPATIENT
Start: 2023-08-31 | End: 2023-09-05

## 2023-08-31 RX ORDER — HYDROCODONE BITARTRATE AND ACETAMINOPHEN 10; 325 MG/1; MG/1
1 TABLET ORAL EVERY 6 HOURS PRN
Status: DISCONTINUED | OUTPATIENT
Start: 2023-08-31 | End: 2023-09-07 | Stop reason: HOSPADM

## 2023-08-31 RX ORDER — LORAZEPAM 2 MG/ML
1 INJECTION INTRAMUSCULAR ONCE AS NEEDED
Status: COMPLETED | OUTPATIENT
Start: 2023-08-31 | End: 2023-09-01

## 2023-08-31 RX ORDER — METHION/INOS/CHOL BT/B COM/LIV 110MG-86MG
100 CAPSULE ORAL DAILY
Status: DISCONTINUED | OUTPATIENT
Start: 2023-09-05 | End: 2023-09-07 | Stop reason: HOSPADM

## 2023-08-31 RX ORDER — LORAZEPAM 2 MG/ML
2 INJECTION INTRAMUSCULAR
Status: DISCONTINUED | OUTPATIENT
Start: 2023-08-31 | End: 2023-09-06

## 2023-08-31 RX ORDER — LUBIPROSTONE 24 UG/1
24 CAPSULE ORAL 2 TIMES DAILY WITH MEALS
Status: DISCONTINUED | OUTPATIENT
Start: 2023-08-31 | End: 2023-09-07 | Stop reason: HOSPADM

## 2023-08-31 RX ORDER — FERROUS SULFATE 325(65) MG
325 TABLET ORAL
Status: DISCONTINUED | OUTPATIENT
Start: 2023-08-31 | End: 2023-09-07 | Stop reason: HOSPADM

## 2023-08-31 RX ORDER — POTASSIUM CHLORIDE 750 MG/1
40 CAPSULE, EXTENDED RELEASE ORAL ONCE
Status: COMPLETED | OUTPATIENT
Start: 2023-08-31 | End: 2023-08-31

## 2023-08-31 RX ORDER — MIDODRINE HYDROCHLORIDE 10 MG/1
10 TABLET ORAL
Status: DISCONTINUED | OUTPATIENT
Start: 2023-08-31 | End: 2023-09-03

## 2023-08-31 RX ORDER — LORAZEPAM 2 MG/1
2 TABLET ORAL EVERY 6 HOURS
Status: COMPLETED | OUTPATIENT
Start: 2023-08-31 | End: 2023-09-01

## 2023-08-31 RX ORDER — FOLIC ACID 1 MG/1
1 TABLET ORAL DAILY
Status: DISCONTINUED | OUTPATIENT
Start: 2023-08-31 | End: 2023-09-07 | Stop reason: HOSPADM

## 2023-08-31 RX ORDER — LORAZEPAM 2 MG/1
2 TABLET ORAL
Status: DISCONTINUED | OUTPATIENT
Start: 2023-08-31 | End: 2023-09-06

## 2023-08-31 RX ORDER — LORAZEPAM 0.5 MG/1
1 TABLET ORAL
Status: DISCONTINUED | OUTPATIENT
Start: 2023-08-31 | End: 2023-09-06

## 2023-08-31 RX ORDER — DIPHENHYDRAMINE HYDROCHLORIDE 50 MG/ML
25 INJECTION INTRAMUSCULAR; INTRAVENOUS EVERY 6 HOURS PRN
Status: DISCONTINUED | OUTPATIENT
Start: 2023-08-31 | End: 2023-09-07 | Stop reason: HOSPADM

## 2023-08-31 RX ORDER — ENOXAPARIN SODIUM 100 MG/ML
40 INJECTION SUBCUTANEOUS
Status: DISCONTINUED | OUTPATIENT
Start: 2023-08-31 | End: 2023-09-07 | Stop reason: HOSPADM

## 2023-08-31 RX ORDER — LORAZEPAM 2 MG/ML
1 INJECTION INTRAMUSCULAR
Status: DISCONTINUED | OUTPATIENT
Start: 2023-08-31 | End: 2023-09-06

## 2023-08-31 RX ADMIN — ASPIRIN 81 MG: 81 TABLET, COATED ORAL at 08:12

## 2023-08-31 RX ADMIN — MIDODRINE HYDROCHLORIDE 5 MG: 5 TABLET ORAL at 08:12

## 2023-08-31 RX ADMIN — FUROSEMIDE 60 MG: 10 INJECTION, SOLUTION INTRAMUSCULAR; INTRAVENOUS at 21:32

## 2023-08-31 RX ADMIN — LORAZEPAM 2 MG: 2 TABLET ORAL at 17:09

## 2023-08-31 RX ADMIN — THIAMINE HYDROCHLORIDE 200 MG: 100 INJECTION, SOLUTION INTRAMUSCULAR; INTRAVENOUS at 21:33

## 2023-08-31 RX ADMIN — LUBIPROSTONE 24 MCG: 24 CAPSULE, GELATIN COATED ORAL at 17:09

## 2023-08-31 RX ADMIN — MIDODRINE HYDROCHLORIDE 10 MG: 10 TABLET ORAL at 17:09

## 2023-08-31 RX ADMIN — FUROSEMIDE 60 MG: 10 INJECTION, SOLUTION INTRAMUSCULAR; INTRAVENOUS at 08:12

## 2023-08-31 RX ADMIN — Medication 10 ML: at 21:34

## 2023-08-31 RX ADMIN — HYDROCODONE BITARTRATE AND ACETAMINOPHEN 1 TABLET: 10; 325 TABLET ORAL at 21:37

## 2023-08-31 RX ADMIN — SENNOSIDES AND DOCUSATE SODIUM 2 TABLET: 50; 8.6 TABLET ORAL at 21:32

## 2023-08-31 RX ADMIN — LORAZEPAM 2 MG: 2 TABLET ORAL at 22:23

## 2023-08-31 RX ADMIN — POTASSIUM CHLORIDE 40 MEQ: 10 CAPSULE, COATED, EXTENDED RELEASE ORAL at 10:30

## 2023-08-31 RX ADMIN — LORAZEPAM 2 MG: 2 TABLET ORAL at 10:31

## 2023-08-31 RX ADMIN — Medication: at 08:12

## 2023-08-31 RX ADMIN — FOLIC ACID 1 MG: 1 TABLET ORAL at 10:30

## 2023-08-31 RX ADMIN — THIAMINE HYDROCHLORIDE 200 MG: 100 INJECTION, SOLUTION INTRAMUSCULAR; INTRAVENOUS at 15:08

## 2023-08-31 RX ADMIN — NICOTINE 1 PATCH: 21 PATCH, EXTENDED RELEASE TRANSDERMAL at 08:13

## 2023-08-31 RX ADMIN — FERROUS SULFATE TAB 325 MG (65 MG ELEMENTAL FE) 325 MG: 325 (65 FE) TAB at 10:30

## 2023-08-31 RX ADMIN — MIDODRINE HYDROCHLORIDE 5 MG: 5 TABLET ORAL at 10:30

## 2023-08-31 RX ADMIN — ATORVASTATIN CALCIUM 20 MG: 20 TABLET, FILM COATED ORAL at 21:32

## 2023-08-31 RX ADMIN — HYDROCODONE BITARTRATE AND ACETAMINOPHEN 1 TABLET: 10; 325 TABLET ORAL at 08:13

## 2023-08-31 RX ADMIN — Medication 10 ML: at 08:13

## 2023-08-31 NOTE — PLAN OF CARE
Goal Outcome Evaluation:  Plan of Care Reviewed With: patient        Progress: no change  Outcome Evaluation: No acute changes throughout shift. x1 c/o of pain relieved with PRN medication, see MAR. VSS.

## 2023-08-31 NOTE — THERAPY EVALUATION
Acute Care - Physical Therapy Initial Evaluation   Clif     Patient Name: Bradley Crane  : 1955  MRN: [id number]  Today's Date: 2023      Visit Dx:     ICD-10-CM ICD-9-CM   1. Acute on chronic congestive heart failure, unspecified heart failure type  I50.9 428.0   2. Failure of outpatient treatment  Z78.9 V49.89   3. Decreased activities of daily living (ADL)  Z78.9 V49.89   4. Difficulty walking  R26.2 719.7     Patient Active Problem List   Diagnosis    CHF exacerbation     Past Medical History:   Diagnosis Date    CHF (congestive heart failure)     Hypertension      Past Surgical History:   Procedure Laterality Date    CARDIAC SURGERY       PT Assessment (last 12 hours)       PT Evaluation and Treatment       Row Name 23 132          Physical Therapy Time and Intention    Document Type evaluation  -AV     Mode of Treatment individual therapy;physical therapy  -AV       Row Name 23 132          General Information    Patient Profile Reviewed yes  -AV     Prior Level of Function independent:;all household mobility;gait;transfer;ADL's  Ambulated with STC. No home O2.  -AV     Equipment Currently Used at Home cane, straight  -AV     Existing Precautions/Restrictions fall  -AV       Row Name 23 132          Living Environment    Current Living Arrangements home  -AV     People in Home other (see comments)  ex-wife  -AV       Row Name 23 132          Pain    Pretreatment Pain Rating 0/10 - no pain  -AV     Posttreatment Pain Rating 0/10 - no pain  -AV       Row Name 23 132          Cognition    Affect/Mental Status (Cognition) emotionally labile  -AV       Row Name 23 132          Range of Motion (ROM)    Range of Motion bilateral lower extremities;ROM is WFL  -AV       Row Name 23 132          Bed Mobility    Bed Mobility supine-sit;sit-supine  -AV     Supine-Sit [place] (Bed Mobility) standby assist  -AV     Sit-Supine [place] (Bed Mobility)  standby assist  -AV       Row Name 08/31/23 1327          Transfers    Transfers sit-stand transfer;stand-sit transfer  -AV       Row Name 08/31/23 1327          Sit-Stand Transfer    Sit-Stand Early (Transfers) contact guard  -AV     Assistive Device (Sit-Stand Transfers) cane, straight  -AV       Row Name 08/31/23 1327          Stand-Sit Transfer    Stand-Sit Early (Transfers) contact guard  -AV     Assistive Device (Stand-Sit Transfers) cane, straight  -AV       Row Name 08/31/23 1327          Gait/Stairs (Locomotion)    Gait/Stairs Locomotion gait/ambulation independence;gait/ambulation assistive device;distance ambulated  -AV     Early Level (Gait) minimum assist (75% patient effort)  -AV     Assistive Device (Gait) cane, straight  -AV     Distance in Feet (Gait) 40  -AV     Comment, (Gait/Stairs) Patient unsteady, reaching out for walls/objects in room while ambulating with STC. Patient not receptive to education regarding safety, ambulating with RW.  -AV       Row Name 08/31/23 1327          Safety Issues, Functional Mobility    Safety Issues Affecting Function (Mobility) awareness of need for assistance;insight into deficits/self-awareness;judgment;safety precaution awareness;problem-solving  -AV     Impairments Affecting Function (Mobility) balance;endurance/activity tolerance;shortness of breath;strength  -AV       Row Name 08/31/23 1327          Balance    Balance Assessment standing dynamic balance  -AV     Dynamic Standing Balance minimal assist  -AV     Position/Device Used, Standing Balance supported;cane, straight  -AV       Row Name             Wound 08/29/23 1837 Left lower leg Other (comment)    Wound - Properties Group Placement Date: 08/29/23  -AT Placement Time: 1837  -AT Present on Hospital Admission: Y  -AT Side: Left  -AT Orientation: lower  -AT Location: leg  -AT Primary Wound Type: Other  -AT, wound     Retired Wound - Properties Group Placement Date: 08/29/23  -AT  Placement Time: 1837  -AT Present on Hospital Admission: Y  -AT Side: Left  -AT Orientation: lower  -AT Location: leg  -AT Primary Wound Type: Other  -AT, wound     Retired Wound - Properties Group Date first assessed: 08/29/23  -AT Time first assessed: 1837  -AT Present on Hospital Admission: Y  -AT Side: Left  -AT Location: leg  -AT Primary Wound Type: Other  -AT, wound       Row Name             Wound 08/31/23 1326 Left lower arm Skin Tear    Wound - Properties Group Placement Date: 08/31/23  -LG Placement Time: 1326  -LG Side: Left  -LG Orientation: lower  -LG Location: arm  -LG Primary Wound Type: Skin tear  -LG    Retired Wound - Properties Group Placement Date: 08/31/23  -LG Placement Time: 1326  -LG Side: Left  -LG Orientation: lower  -LG Location: arm  -LG Primary Wound Type: Skin tear  -LG    Retired Wound - Properties Group Date first assessed: 08/31/23  -LG Time first assessed: 1326  -LG Side: Left  -LG Location: arm  -LG Primary Wound Type: Skin tear  -LG      Row Name 08/31/23 1327          Plan of Care Review    Plan of Care Reviewed With patient  -AV     Progress no change  -AV     Outcome Evaluation Patient presents with deficits in balance, endurance, transfers, and ambulation. Patient will benefit from skilled PT services to address these mobility deficits and decrease risk of falls.  -AV       Row Name 08/31/23 1327          Therapy Assessment/Plan (PT)    Rehab Potential (PT) good, to achieve stated therapy goals  -AV     Criteria for Skilled Interventions Met (PT) yes;meets criteria  -AV     Therapy Frequency (PT) daily  -AV     Predicted Duration of Therapy Intervention (PT) 10 days  -AV     Problem List (PT) problems related to;balance;mobility;strength  -AV     Activity Limitations Related to Problem List (PT) unable to ambulate safely;unable to transfer safely  -AV       Row Name 08/31/23 1327          PT Evaluation Complexity    History, PT Evaluation Complexity 1-2 personal factors and/or  comorbidities  -AV     Examination of Body Systems (PT Eval Complexity) total of 4 or more elements  -AV     Clinical Presentation (PT Evaluation Complexity) stable  -AV     Clinical Decision Making (PT Evaluation Complexity) low complexity  -AV     Overall Complexity (PT Evaluation Complexity) low complexity  -AV       Row Name 08/31/23 1327          Therapy Plan Review/Discharge Plan (PT)    Therapy Plan Review (PT) evaluation/treatment results reviewed;patient  -AV       Row Name 08/31/23 1327          Physical Therapy Goals    Bed Mobility Goal Selection (PT) bed mobility, PT goal 1  -AV     Transfer Goal Selection (PT) transfer, PT goal 1  -AV     Gait Training Goal Selection (PT) gait training, PT goal 1  -AV       Row Name 08/31/23 1327          Bed Mobility Goal 1 (PT)    Activity/Assistive Device (Bed Mobility Goal 1, PT) bed mobility activities, all  -AV     [place] Level/Cues Needed (Bed Mobility Goal 1, PT) independent  -AV     Time Frame (Bed Mobility Goal 1, PT) 10 days  -AV       Row Name 08/31/23 1327          Transfer Goal 1 (PT)    Activity/Assistive Device (Transfer Goal 1, PT) transfers, all;walker, rolling  -AV     [place] Level/Cues Needed (Transfer Goal 1, PT) modified independence  -AV     Time Frame (Transfer Goal 1, PT) 10 days  -AV       Row Name 08/31/23 1327          Gait Training Goal 1 (PT)    Activity/Assistive Device (Gait Training Goal 1, PT) gait (walking locomotion);assistive device use;walker, rolling  -AV     [place] Level (Gait Training Goal 1, PT) modified independence  -AV     Distance (Gait Training Goal 1, PT) 200  -AV     Time Frame (Gait Training Goal 1, PT) 10 days  -AV               User Key  (r) = Recorded By, (t) = Taken By, (c) = Cosigned By      Initials Name Provider Type    AT Syl Swanson RN Registered Nurse    Kary Delcid, RN Registered Nurse    Cirilo Starkey, PT Physical Therapist                    Physical Therapy Education        Title: PT OT SLP Therapies (In Progress)       Topic: Physical Therapy (In Progress)       Point: Mobility training (In Progress)       Learning Progress Summary             Patient Acceptance, E,TB, NR by AV at 8/31/2023 1405                         Point: Home exercise program (Not Started)       Learner Progress:  Not documented in this visit.              Point: Body mechanics (In Progress)       Learning Progress Summary             Patient Acceptance, E,TB, NR by AV at 8/31/2023 1405                         Point: Precautions (In Progress)       Learning Progress Summary             Patient Acceptance, E,TB, NR by AV at 8/31/2023 1405                                         User Key       Initials Effective Dates Name Provider Type Discipline    AV 06/11/21 -  Cirilo Schreiber, PT Physical Therapist PT                  PT Recommendation and Plan  Anticipated Discharge Disposition (PT): inpatient rehabilitation facility  Planned Therapy Interventions (PT): balance training, bed mobility training, gait training, home exercise program, neuromuscular re-education, strengthening, transfer training  Therapy Frequency (PT): daily  Plan of Care Reviewed With: patient  Progress: no change  Outcome Evaluation: Patient presents with deficits in balance, endurance, transfers, and ambulation. Patient will benefit from skilled PT services to address these mobility deficits and decrease risk of falls.   Outcome Measures       Row Name 08/31/23 1400             How much help from another person do you currently need...    Turning from your back to your side while in flat bed without using bedrails? 3  -AV      Moving from lying on back to sitting on the side of a flat bed without bedrails? 3  -AV      Moving to and from a bed to a chair (including a wheelchair)? 3  -AV      Standing up from a chair using your arms (e.g., wheelchair, bedside chair)? 3  -AV      Climbing 3-5 steps with a railing? 3  -AV      To walk in  hospital room? 3  -AV      AM-PAC 6 Clicks Score (PT) 18  -AV         Functional Assessment    Outcome Measure Options AM-PAC 6 Clicks Basic Mobility (PT)  -AV                User Key  (r) = Recorded By, (t) = Taken By, (c) = Cosigned By      Initials Name Provider Type    Cirilo Starkey, PT Physical Therapist                     Time Calculation:    PT Charges       Row Name 08/31/23 1404             Time Calculation    PT Received On 08/31/23  -AV      PT Goal Re-Cert Due Date 09/09/23  -AV         Untimed Charges    PT Eval/Re-eval Minutes 35  -AV         Total Minutes    Untimed Charges Total Minutes 35  -AV       Total Minutes 35  -AV                User Key  (r) = Recorded By, (t) = Taken By, (c) = Cosigned By      Initials Name Provider Type    Cirilo Starkey, CARLOS Physical Therapist                  Therapy Charges for Today       Code Description Service Date Service Provider Modifiers Qty    [id number] HC PT EVAL LOW COMPLEXITY 3 8/31/2023 Cirilo Schreiber, PT GP 1            PT G-Codes  Outcome Measure Options: AM-PAC 6 Clicks Basic Mobility (PT)  AM-PAC 6 Clicks Score (PT): 18  AM-PAC 6 Clicks Score (OT): 21    Cirilo Schreiber PT  8/31/2023

## 2023-08-31 NOTE — PROGRESS NOTES
[facility]   Hospitalist Progress Note  Date: 2023  Patient Name: Bradley Crane  : 1955  MRN: [id number]  Date of admission: 2023  Room/Bed: 216/1      Subjective shortness of breath   Subjective     Chief Complaint: sob    Summary:Bradley Crane is a 68 y.o. male who oresents with worsening lower extremity swelling and dyspnea despite increased dose of lasix.  Patient was to follow up with Dr. Zuniga at the CHF clinic however he was not able to make it to the appointment because he ended up back in the hospital.  Patient has come here from [place] and needs to establish care with a cardiologist. He was also seen in the ER last week.    On arrival to the ED, patient was dyspneic with minimal exertion.  His BP was 112/86. Pulse was 86.     On labs, patient's sodium is 130, potassium is 3.4, total bilirubin was 2.1.  BNP is markedly elevated.  His delta troponin is -17. Chest xray shows moderate CHF and ICD.       Interval Followup: Patient starting to withdraw from home alcohol and opiate use.  He is having paranoid thoughts.  Extreme mood lability.     Patient's sodium is low.  128.  Hemoglobin is low.    Patient diuresed 3.9 Liters yesterday.       Additionally, the patient has many complaints.  He takes 4 pain pills a day at home.  He is constipated.  He is itching severely. He agrees to do paracentesis.  He also agrees to condom catheter he is upset he has to use the urinal.        Review of Systems    All systems reviewed and negative except for what is outlined above.    Objective   Objective     Vitals:   Temp:  [97.2 °F (36.2 °C)-98.1 °F (36.7 °C)] 97.2 °F (36.2 °C)  Heart Rate:  [74-88] 79  Resp:  [18-20] 18  BP: ()/(54-77) 95/54    Physical Exam   General: Awake, alert, NAD  HENT: NCAT, MMM  Eyes: pupils equal, no scleral icterus  Cardiovascular: RRR, no murmurs   Pulmonary: CTA bilaterally; no wheezes; no conversational dyspnea  Gastrointestinal: S/ND/NT, +BS  Musculoskeletal: No  gross deformities  Skin: No jaundice, no rash on exposed skin appreciated  Neuro: CN II through XII grossly intact; speech clear; no tremor  Psych: having mood lability   : No [place] catheter; no suprapubic tenderness    Result Review    Result Review:  I have personally reviewed these results:  [x]  Laboratory      Lab 08/31/23 0415 08/30/23  1414 08/30/23  0417 08/29/23  1229   WBC 6.55  --  6.90 7.42   HEMOGLOBIN 10.4*  --  9.9* 12.3*   HEMATOCRIT 28.9*  --  28.1* 33.7*   PLATELETS 243  --  242 262   NEUTROS ABS 4.18  --  4.30 5.06   IMMATURE GRANS (ABS) 0.03  --  0.03 0.02   LYMPHS ABS 1.41  --  1.51 1.38   MONOS ABS 0.82  --  0.94* 0.89   EOS ABS 0.06  --  0.06 0.02   MCV 97.0  --  97.2* 97.7*   PROTIME  --  15.2*  --   --    APTT  --  32.5  --   --          Lab 08/31/23  0415 08/30/23  0417 08/29/23  1442 08/29/23  1229   SODIUM 128* 129*  --  130*   POTASSIUM 3.8 3.5  --  3.4*   CHLORIDE 92* 94*  --  92*   CO2 22.3 24.1  --  23.9   ANION GAP 13.7 10.9  --  14.1   BUN 22 20  --  16   CREATININE 1.06 0.90  --  0.84   EGFR 77.4 94.2  --  96.2   GLUCOSE 122* 124*  --  111*   CALCIUM 9.1 8.8  --  9.3   MAGNESIUM 2.0 2.1 1.2*  --    PHOSPHORUS 5.1* 4.5 2.2*  --    TSH  --   --  2.040  --          Lab 08/31/23  0415 08/29/23  1229 08/25/23  0813   TOTAL PROTEIN 6.3 7.2 6.3   ALBUMIN 3.8 4.4 3.8   GLOBULIN  --  2.8 2.5   ALT (SGPT) 8 10 8   AST (SGOT) 19 22 22   BILIRUBIN 1.1 2.1* 1.6*   INDIRECT BILIRUBIN 0.5  --   --    BILIRUBIN DIRECT 0.6*  --   --    ALK PHOS 146* 137* 117         Lab 08/30/23  1414 08/29/23  1442 08/29/23  1229 08/25/23  0813   PROBNP  --   --  26,219.0* 17,167.0*   HSTROP T  --  37* 54* 44*   PROTIME 15.2*  --   --   --    INR 1.20*  --   --   --              Lab 08/30/23  0417 08/29/23  1442 08/29/23  1229   IRON  --  59  --    IRON SATURATION (TSAT)  --  24  --    TIBC  --  241*  --    TRANSFERRIN  --  162*  --    FERRITIN  --  235.30  --    FOLATE 11.50  --  13.90   VITAMIN B 12  --   --   >2,000*         Brief Urine Lab Results       None          [x]  Microbiology   Microbiology Results (last 10 days)       ** No results found for the last 240 hours. **          [x]  Radiology  XR Chest 1 View    Result Date: 8/29/2023    Findings consistent with moderate congestive heart failure, not significantly changed in comparison to 8/25/2023.  Dual lead AICD remains in place.       LORENA GARNICA MD       Electronically Signed and Approved By: LORENA GARNICA MD on 8/29/2023 at 13:02             XR Chest 1 View    Result Date: 8/25/2023    1. Cardiomegaly and pulmonary vascular congestion 2. Bibasilar airspace disease and small bilateral pleural effusions all of which is most likely related to congestive heart failure and pulmonary edema       RJ MONTAGUE MD       Electronically Signed and Approved By: RJ MONTAGUE MD on 8/25/2023 at 8:42            [x]  EKG/Telemetry   []  Cardiology/Vascular   [x]  Pathology  [x]  Old records  []  Other:    Assessment & Plan   Assessment / Plan   #1 CHF exacerbation  #2 HTN  #3 hypokalemia  #4 anemia  #5 Drinks 3-4 beers a day  #6 Tobacco abuse  #7 CAD with HLP  #8 pruritus, secondary to hyperbilirubinemia        Plan:  #1 CHF exacerbation:   -echo ordered. EF-25%.  -on lasix 40 mg IV bid.  3.9 L fluid off.   -will add goal based therapies as BP tolerates.      #2 HTN      #3 hypokalemia  -electrolyte replacement panel ordered. Will give him a one time dose of 40 mg.       #4 anemia  -will start iron supplementation.      #5 Start CIWA, thiamine, folic acid. Will keep him on his home dose of pain medication.      #6 tobacco abuse: Nicotine patch; Will need outpatient PFTs.       #7 continue statin and aspirin     #8 Hyponatremia-will monitor    #9 LFTs and total bili improved.      #10 will give PRN benadryl in the IV for itching.  Hopefully it doesn't cause more mood lability.      #11 Paracentesis ordered again. Patient says he will need ativan to tolerate it since  he is very fearful of needles.  Patient is on ativan currently for alcohol withdrawal.  Will give him 1 mg extra so that he can have the procedure.     #12 constipation secondary to daily opiate use-will give amitza.       Dispo plan:   -will need PFTs since he smokes 1.5 PPD; Low dose CT chest.   -GI outpatient appointment  Discussed with RN.    DVT prophylaxis:  Medical DVT prophylaxis orders are present.    CODE STATUS:   Level Of Support Discussed With: Patient  Code Status (Patient has no pulse and is not breathing): CPR (Attempt to Resuscitate)  Medical Interventions (Patient has pulse or is breathing): Full Support      Electronically signed by Guillermo Haas DO, 08/31/23, 9:58 AM EDT.

## 2023-08-31 NOTE — PROGRESS NOTES
[facility]     Cardiology Progress Note    Patient Name: Bradley Crane  : 1955  MRN: [id number]  Primary Care Physician:  Rosa Isela Kim APRN  Date of admission: 2023    Subjective   Subjective     No acute events overnight.  He is feeling good this morning but reports fatigue.  His shortness of breath improved.  Continues to have abdominal distention.    Review of Systems   All systems were reviewed and negative except as above    Objective   Objective     Vitals:   Temp:  [97.2 °F (36.2 °C)-98.1 °F (36.7 °C)] 97.3 °F (36.3 °C)  Heart Rate:  [74-88] 75  Resp:  [18-19] 18  BP: ()/(54-77) 85/66  Physical Exam                 Constitutional: Awake, alert, No acute distress               Eyes: PERRLA, sclerae anicteric, no conjunctival injection              HENT: NCAT, mucous membranes moist              Neck: Supple, no thyromegaly, no lymphadenopathy, trachea midline              Respiratory: Clear to auscultation bilaterally, nonlabored respirations               Cardiovascular: RRR, no murmurs, rubs, or gallops, palpable pedal pulses bilaterally              Gastrointestinal: Positive bowel sounds, soft, nontender, distended              Musculoskeletal: No bilateral ankle edema, no clubbing or cyanosis to extremities              Psychiatric: Appropriate affect, cooperative              Neurologic: Oriented x 3, strength symmetric in all extremities, Cranial Nerves grossly intact to confrontation, speech clear              Skin: No rashes     Scheduled Meds:ammonium lactate, , Topical, Daily  aspirin, 81 mg, Oral, Daily  atorvastatin, 20 mg, Oral, Nightly  enoxaparin, 40 mg, Subcutaneous, Q24H  ferrous sulfate, 325 mg, Oral, Daily With Breakfast  folic acid, 1 mg, Oral, Daily  furosemide, 60 mg, Intravenous, Q12H  LORazepam, 2 mg, Oral, Q6H   Followed by  [START ON 2023] LORazepam, 1 mg, Oral, Q6H  lubiprostone, 24 mcg, Oral, BID With Meals  midodrine, 10 mg, Oral, TID  AC  nicotine, 1 patch, Transdermal, Q24H  senna-docusate sodium, 2 tablet, Oral, BID  sodium chloride, 10 mL, Intravenous, Q12H  thiamine (B-1) IV, 200 mg, Intravenous, Q8H   Followed by  [START ON 9/5/2023] thiamine, 100 mg, Oral, Daily      Continuous Infusions:        Result Review    Result Review:  I have personally reviewed the results from the time of this admission to 8/31/2023 14:03 EDT and agree with these findings:  [x]  Laboratory  []  Microbiology  [x]  Radiology  [x]  EKG/Telemetry   [x]  Cardiology/Vascular   []  Pathology  []  Old records  []  Other:  Most notable findings include:     CBC          8/29/2023    12:29 8/30/2023    04:17 8/31/2023    04:15   CBC   WBC 7.42  6.90  6.55    RBC 3.45  2.89  2.98    Hemoglobin 12.3  9.9  10.4    Hematocrit 33.7  28.1  28.9    MCV 97.7  97.2  97.0    MCH 35.7  34.3  34.9    MCHC 36.5  35.2  36.0    RDW 14.7  14.6  14.6    Platelets 262  242  243      CMP          8/29/2023    12:29 8/30/2023    04:17 8/31/2023    04:15   CMP   Glucose 111  124  122    BUN 16  20  22    Creatinine 0.84  0.90  1.06    EGFR 96.2  94.2  77.4    Sodium 130  129  128    Potassium 3.4  3.5  3.8    Chloride 92  94  92    Calcium 9.3  8.8  9.1    Total Protein 7.2   6.3    Albumin 4.4   3.8    Globulin 2.8      Total Bilirubin 2.1   1.1    Alkaline Phosphatase 137   146    AST (SGOT) 22   19    ALT (SGPT) 10   8    Albumin/Globulin Ratio 1.6      BUN/Creatinine Ratio 19.0  22.2  20.8    Anion Gap 14.1  10.9  13.7       CARDIAC LABS:      Lab 08/30/23  1414 08/29/23  1442 08/29/23  1229 08/25/23  0813   PROBNP  --   --  26,219.0* 17,167.0*   HSTROP T  --  37* 54* 44*   PROTIME 15.2*  --   --   --    INR 1.20*  --   --   --         Assessment & Plan   Assessment / Plan     Brief Patient Summary:  Bradley Crane is a 68 y.o. male with:     Acute systolic congestive heart failure exacerbation, LVEF 25 to 30%, on IV Lasix  hyponatremia  Hypertension  Mixed dyslipidemia  Anemia  Alcohol  abuse  Active smoking     Plan:   Continue IV diuresis  IV Lasix dose will be adjusted to aim for -2 to 3 L/day  Maintain potassium around 4 magnesium around 2  Monitor renal function  Blood pressure currently soft and does not allow to initiate guideline directed medical therapy for HFrEF.   If needed, midodrine may be started support blood pressure.  Patient seems to have ascites by exam.  Consider ultrasound and paracentesis.  He will need cardiac stress test down the line.  That can be done as an outpatient.  Other issues as per primary team.     Thank you for the consultation.  We will follow along with you.  Please call with any questions.    CODE STATUS:   Level Of Support Discussed With: Patient  Code Status (Patient has no pulse and is not breathing): CPR (Attempt to Resuscitate)  Medical Interventions (Patient has pulse or is breathing): Full Support      Electronically signed by Merly Ji MD, 08/31/23, 2:03 PM EDT.

## 2023-09-01 ENCOUNTER — APPOINTMENT (OUTPATIENT)
Dept: INTERVENTIONAL RADIOLOGY/VASCULAR | Facility: HOSPITAL | Age: 68
DRG: 286 | End: 2023-09-01
Payer: MEDICARE

## 2023-09-01 PROBLEM — I50.21 ACUTE HFREF (HEART FAILURE WITH REDUCED EJECTION FRACTION): Status: ACTIVE | Noted: 2023-09-01

## 2023-09-01 LAB
ALBUMIN FLD-MCNC: 2.2 G/DL
AMMONIA BLD-SCNC: 25 UMOL/L (ref 16–60)
AMYLASE FLD-CCNC: 55 U/L
APPEARANCE FLD: ABNORMAL
COLOR FLD: YELLOW
GLUCOSE FLD-MCNC: 110 MG/DL
LDH FLD-CCNC: 111 U/L
LYMPHOCYTES NFR FLD MANUAL: 27 %
MACROPHAGE FLUID: 46 %
MESOTHL CELL NFR FLD MANUAL: 1 %
MONOCYTES NFR FLD: 24 %
NEUTROPHILS NFR FLD MANUAL: 2 %
NUC CELL # FLD: 736 /MM3
PROT FLD-MCNC: 3.4 G/DL
RBC # FLD AUTO: 4000 /MM3

## 2023-09-01 PROCEDURE — 82150 ASSAY OF AMYLASE: CPT | Performed by: HOSPITALIST

## 2023-09-01 PROCEDURE — 25010000002 THIAMINE PER 100 MG: Performed by: HOSPITALIST

## 2023-09-01 PROCEDURE — 99233 SBSQ HOSP IP/OBS HIGH 50: CPT | Performed by: FAMILY MEDICINE

## 2023-09-01 PROCEDURE — 84157 ASSAY OF PROTEIN OTHER: CPT | Performed by: HOSPITALIST

## 2023-09-01 PROCEDURE — 89051 BODY FLUID CELL COUNT: CPT | Performed by: INTERNAL MEDICINE

## 2023-09-01 PROCEDURE — 82042 OTHER SOURCE ALBUMIN QUAN EA: CPT | Performed by: INTERNAL MEDICINE

## 2023-09-01 PROCEDURE — 87102 FUNGUS ISOLATION CULTURE: CPT | Performed by: HOSPITALIST

## 2023-09-01 PROCEDURE — 0W9G3ZZ DRAINAGE OF PERITONEAL CAVITY, PERCUTANEOUS APPROACH: ICD-10-PCS | Performed by: RADIOLOGY

## 2023-09-01 PROCEDURE — 88108 CYTOPATH CONCENTRATE TECH: CPT | Performed by: HOSPITALIST

## 2023-09-01 PROCEDURE — 82247 BILIRUBIN TOTAL: CPT | Performed by: HOSPITALIST

## 2023-09-01 PROCEDURE — C1729 CATH, DRAINAGE: HCPCS

## 2023-09-01 PROCEDURE — 82140 ASSAY OF AMMONIA: CPT | Performed by: INTERNAL MEDICINE

## 2023-09-01 PROCEDURE — 25010000002 LORAZEPAM PER 2 MG: Performed by: HOSPITALIST

## 2023-09-01 PROCEDURE — 94799 UNLISTED PULMONARY SVC/PX: CPT

## 2023-09-01 PROCEDURE — P9047 ALBUMIN (HUMAN), 25%, 50ML: HCPCS | Performed by: FAMILY MEDICINE

## 2023-09-01 PROCEDURE — 82945 GLUCOSE OTHER FLUID: CPT | Performed by: INTERNAL MEDICINE

## 2023-09-01 PROCEDURE — 99232 SBSQ HOSP IP/OBS MODERATE 35: CPT | Performed by: INTERNAL MEDICINE

## 2023-09-01 PROCEDURE — 76942 ECHO GUIDE FOR BIOPSY: CPT

## 2023-09-01 PROCEDURE — 87205 SMEAR GRAM STAIN: CPT | Performed by: INTERNAL MEDICINE

## 2023-09-01 PROCEDURE — 25010000002 ALBUMIN HUMAN 25% PER 50 ML: Performed by: FAMILY MEDICINE

## 2023-09-01 PROCEDURE — 83615 LACTATE (LD) (LDH) ENZYME: CPT | Performed by: HOSPITALIST

## 2023-09-01 PROCEDURE — 94761 N-INVAS EAR/PLS OXIMETRY MLT: CPT

## 2023-09-01 PROCEDURE — 87070 CULTURE OTHR SPECIMN AEROBIC: CPT | Performed by: INTERNAL MEDICINE

## 2023-09-01 PROCEDURE — 87075 CULTR BACTERIA EXCEPT BLOOD: CPT | Performed by: INTERNAL MEDICINE

## 2023-09-01 RX ORDER — TORSEMIDE 20 MG/1
20 TABLET ORAL DAILY
Status: DISCONTINUED | OUTPATIENT
Start: 2023-09-01 | End: 2023-09-03

## 2023-09-01 RX ORDER — LACTULOSE 10 G/15ML
20 SOLUTION ORAL 3 TIMES DAILY
Status: DISCONTINUED | OUTPATIENT
Start: 2023-09-01 | End: 2023-09-07 | Stop reason: HOSPADM

## 2023-09-01 RX ORDER — LIDOCAINE HYDROCHLORIDE 20 MG/ML
20 INJECTION, SOLUTION INFILTRATION; PERINEURAL ONCE
Status: COMPLETED | OUTPATIENT
Start: 2023-09-01 | End: 2023-09-01

## 2023-09-01 RX ORDER — ALBUMIN (HUMAN) 12.5 G/50ML
25 SOLUTION INTRAVENOUS ONCE
Status: COMPLETED | OUTPATIENT
Start: 2023-09-01 | End: 2023-09-01

## 2023-09-01 RX ORDER — SPIRONOLACTONE 25 MG/1
25 TABLET ORAL DAILY
Status: DISCONTINUED | OUTPATIENT
Start: 2023-09-01 | End: 2023-09-03

## 2023-09-01 RX ADMIN — Medication 10 ML: at 14:05

## 2023-09-01 RX ADMIN — LORAZEPAM 2 MG: 2 TABLET ORAL at 04:20

## 2023-09-01 RX ADMIN — Medication: at 13:48

## 2023-09-01 RX ADMIN — HYDROCODONE BITARTRATE AND ACETAMINOPHEN 1 TABLET: 10; 325 TABLET ORAL at 04:14

## 2023-09-01 RX ADMIN — TORSEMIDE 20 MG: 20 TABLET ORAL at 13:44

## 2023-09-01 RX ADMIN — LORAZEPAM 1 MG: 2 INJECTION INTRAMUSCULAR; INTRAVENOUS at 10:01

## 2023-09-01 RX ADMIN — LACTULOSE 20 G: 20 SOLUTION ORAL at 13:47

## 2023-09-01 RX ADMIN — SODIUM BICARBONATE 1 ML: 84 INJECTION INTRAVENOUS at 10:50

## 2023-09-01 RX ADMIN — LORAZEPAM 1 MG: 0.5 TABLET ORAL at 22:40

## 2023-09-01 RX ADMIN — NICOTINE 1 PATCH: 21 PATCH, EXTENDED RELEASE TRANSDERMAL at 13:52

## 2023-09-01 RX ADMIN — LORAZEPAM 1 MG: 0.5 TABLET ORAL at 17:47

## 2023-09-01 RX ADMIN — LACTULOSE 20 G: 20 SOLUTION ORAL at 22:41

## 2023-09-01 RX ADMIN — MIDODRINE HYDROCHLORIDE 10 MG: 10 TABLET ORAL at 08:49

## 2023-09-01 RX ADMIN — LIDOCAINE HYDROCHLORIDE 9 ML: 20 INJECTION, SOLUTION INFILTRATION; PERINEURAL at 10:50

## 2023-09-01 RX ADMIN — ASPIRIN 81 MG: 81 TABLET, COATED ORAL at 13:43

## 2023-09-01 RX ADMIN — MIDODRINE HYDROCHLORIDE 10 MG: 10 TABLET ORAL at 14:00

## 2023-09-01 RX ADMIN — Medication 10 ML: at 22:42

## 2023-09-01 RX ADMIN — SPIRONOLACTONE 25 MG: 25 TABLET ORAL at 13:46

## 2023-09-01 RX ADMIN — FOLIC ACID 1 MG: 1 TABLET ORAL at 13:46

## 2023-09-01 RX ADMIN — LUBIPROSTONE 24 MCG: 24 CAPSULE, GELATIN COATED ORAL at 13:46

## 2023-09-01 RX ADMIN — ACETAMINOPHEN 650 MG: 325 TABLET ORAL at 18:32

## 2023-09-01 RX ADMIN — LACTULOSE 20 G: 20 SOLUTION ORAL at 17:46

## 2023-09-01 RX ADMIN — THIAMINE HYDROCHLORIDE 200 MG: 100 INJECTION, SOLUTION INTRAMUSCULAR; INTRAVENOUS at 06:32

## 2023-09-01 RX ADMIN — ATORVASTATIN CALCIUM 20 MG: 20 TABLET, FILM COATED ORAL at 22:40

## 2023-09-01 RX ADMIN — THIAMINE HYDROCHLORIDE 200 MG: 100 INJECTION, SOLUTION INTRAMUSCULAR; INTRAVENOUS at 22:41

## 2023-09-01 RX ADMIN — LORAZEPAM 1 MG: 2 INJECTION INTRAMUSCULAR; INTRAVENOUS at 08:50

## 2023-09-01 RX ADMIN — SENNOSIDES AND DOCUSATE SODIUM 2 TABLET: 50; 8.6 TABLET ORAL at 22:40

## 2023-09-01 RX ADMIN — ALBUMIN HUMAN 25 G: 0.25 SOLUTION INTRAVENOUS at 17:45

## 2023-09-01 RX ADMIN — THIAMINE HYDROCHLORIDE 200 MG: 100 INJECTION, SOLUTION INTRAMUSCULAR; INTRAVENOUS at 13:46

## 2023-09-01 RX ADMIN — MIDODRINE HYDROCHLORIDE 10 MG: 10 TABLET ORAL at 17:48

## 2023-09-01 RX ADMIN — FERROUS SULFATE TAB 325 MG (65 MG ELEMENTAL FE) 325 MG: 325 (65 FE) TAB at 13:46

## 2023-09-01 RX ADMIN — LUBIPROSTONE 24 MCG: 24 CAPSULE, GELATIN COATED ORAL at 17:47

## 2023-09-01 RX ADMIN — SENNOSIDES AND DOCUSATE SODIUM 2 TABLET: 50; 8.6 TABLET ORAL at 13:43

## 2023-09-01 NOTE — PROGRESS NOTES
[facility]   Hospitalist Progress Note  Date: 2023  Patient Name: Bradley Crane  : 1955  MRN: [id number]  Date of admission: 2023      Subjective   Subjective     Chief complaint: Shortness of breath    Summary:  68-year-old male with history of chronic systolic CHF, CAD, dyslipidemia, current alcohol consumer, concern for liver cirrhosis secondary to alcoholism, current tobacco smoker, chronic anemia, was hospitalized on 2023 with chief complaint of shortness of breath, hospitalized with acute decompensation of chronic systolic CHF, cardiology consulted, with concern for acute hepatic encephalopathy, status post paracentesis 2023, 1.7 L of fluid drained.  On diuretics for CHF exacerbation.  Electrolytes being replaced.  Started lactulose to help promote bowel movements.    Interval follow-up: Seen and examined this morning, no acute distress, no acute major night events, this morning he is confused, talking gibberish, repeating sentences, he had a CT head initially on admission, no acute findings.  His ammonia level is 25, he is not encephalopathic from that perspective.  Pursuing MRI brain to evaluate further given his underlying history of alcoholism.  He was not wearing his oxygen this morning but his sats were okay.  He has significant abdominal distention, paracentesis being pursued today.  I was told after rounds he did have 1.7 L of fluid drained.  His white blood cell count 6000, his hemoglobin is 10.4, his creatinine to 1.06, sodium 128.  Telemetry reviewed, few PVCs, intermittently wide QRS, first-degree AV block    Review of systems:  Unable to obtain review of systems due to altered mental status    Objective   Objective     Vitals:   Temp:  [97.3 °F (36.3 °C)-98.4 °F (36.9 °C)] 97.3 °F (36.3 °C)  Heart Rate:  [] 103  Resp:  [18-22] 22  BP: ()/(62-88) 115/88  Physical Exam     General: Awake, alert, NAD, confused, sitting upright in the bed  HENT: NCAT,  MMM  Eyes: pupils equal, no scleral icterus  Cardiovascular: RRR, no murmurs   Pulmonary: CTA bilaterally; no wheezes; no conversational dyspnea  Gastrointestinal: S/ND/NT, +BS  Musculoskeletal: No gross deformities  Skin: No jaundice, no rash on exposed skin appreciated  Neuro: CN II through XII grossly intact; speech garbled; no tremor  Psych: having mood lability   : No Dos Santos catheter; no suprapubic tenderness    Result Review    Result Review:  I have personally reviewed the pertinent results from the past 24 hours to 9/1/2023 15:40 EDT and agree with these findings:  [x]  Laboratory   CBC          8/29/2023    12:29 8/30/2023    04:17 8/31/2023    04:15   CBC   WBC 7.42  6.90  6.55    RBC 3.45  2.89  2.98    Hemoglobin 12.3  9.9  10.4    Hematocrit 33.7  28.1  28.9    MCV 97.7  97.2  97.0    MCH 35.7  34.3  34.9    MCHC 36.5  35.2  36.0    RDW 14.7  14.6  14.6    Platelets 262  242  243      BMP          8/29/2023    12:29 8/30/2023    04:17 8/31/2023    04:15   BMP   BUN 16  20  22    Creatinine 0.84  0.90  1.06    Sodium 130  129  128    Potassium 3.4  3.5  3.8    Chloride 92  94  92    CO2 23.9  24.1  22.3    Calcium 9.3  8.8  9.1      LIVER FUNCTION TESTS:      Lab 08/31/23  0415 08/29/23  1229   TOTAL PROTEIN 6.3 7.2   ALBUMIN 3.8 4.4   GLOBULIN  --  2.8   ALT (SGPT) 8 10   AST (SGOT) 19 22   BILIRUBIN 1.1 2.1*   INDIRECT BILIRUBIN 0.5  --    BILIRUBIN DIRECT 0.6*  --    ALK PHOS 146* 137*       [x]  Microbiology No results found for: ACANTHNAEG, AFBCX, BPERTUSSISCX, BLOODCX  No results found for: BCIDPCR, CXREFLEX, CSFCX, CULTURETIS  No results found for: CULTURES, HSVCX, URCX  No results found for: EYECULTURE, GCCX, HSVCULTURE, LABHSV  No results found for: LEGIONELLA, MRSACX, MUMPSCX, MYCOPLASCX  No results found for: NOCARDIACX, STOOLCX  No results found for: THROATCX, UNSTIMCULT, URINECX, CULTURE, VZVCULTUR  No results found for: VIRALCULTU, WOUNDCX    [x]  Radiology Adult Transthoracic Echo  Complete w/ Color, Spectral and Contrast if necessary per protocol    Result Date: 8/30/2023    Left ventricular ejection fraction appears to be 26 - 30%.   The left ventricular cavity is mildly dilated.   Left ventricular diastolic function was indeterminate.   The right ventricular cavity is mild to moderately dilated.   The left atrial cavity is moderately dilated.   The right atrial cavity is moderately  dilated.   Moderate tricuspid valve regurgitation is present.   Estimated right ventricular systolic pressure from tricuspid regurgitation is normal (<35 mmHg).   Aortic valve appears sclerotic with reduced excursion.  Max/mean pressure gradient was 20/11 mmHg.  Aortic valve area was 0.93 cm².  Could be consistent with low-flow, low gradient AAS.     CT Head Without Contrast    Result Date: 8/31/2023  PROCEDURE: CT HEAD WO CONTRAST  COMPARISON:  None INDICATIONS: fell and hit back of head  PROTOCOL:   Standard imaging protocol performed    RADIATION:   DLP: 861mGy*cm   MA and/or KV was adjusted to minimize radiation dose.     TECHNIQUE: Axial images of the head without intravenous contrast.  FINDINGS:  There is generalized atrophy.  There are chronic appearing changes in the white matter.  The  The ventricles have a normal size and configuration. There is no evidence of acute intracranial hemorrhage, mass or midline shift. No extra-axial fluid collections are identified. There are no skull fractures.  There is chronic left maxillary sinusitis an air-fluid level suggesting superimposed acute sinusitis.  There is minimal polypoid mucosal thickening in the right maxillary sinus.  The visualized paranasal sinuses and mastoid air cells are otherwise grossly clear.  IMPRESSION:  1. No acute intracranial abnormalities are identified.  2. Findings suggest acute on chronic left maxillary sinusitis.  CAROLYN LARKIN MD       Electronically Signed and Approved By: CAROLYN LARKIN MD on 8/31/2023 at 15:11             US  Abdomen Complete    Result Date: 8/31/2023  PROCEDURE: US ABDOMEN COMPLETE  COMPARISON: None  INDICATIONS: abdominal distension, asses for ascites  TECHNIQUE: Focused abdominal ultrasound to evaluate for ascites.   FINDINGS:   There is a moderate to large amount of abdominal ascites in all 4 quadrants.  The visualized liver has a subtle nodular contour suggesting cirrhosis.  IMPRESSION: Moderate to large amount of abdominal ascites.   CAROLYN LARKIN MD       Electronically Signed and Approved By: CAROLYN LARKIN MD on 8/31/2023 at 20:01             XR Chest 1 View    Result Date: 8/29/2023  PROCEDURE: XR CHEST 1 VW  COMPARISON: [facility], CR, XR CHEST 1 VW, 8/25/2023, 8:32.  INDICATIONS: SHORTNESS OF BREATH TODAY  FINDINGS:  The heart remains slightly enlarged.  The pulmonary vascularity is congested.  Bibasilar pulmonary infiltrate or edema is unchanged.  The costophrenic angles remain slightly blunted.  Dual lead AICD remains in place.        Findings consistent with moderate congestive heart failure, not significantly changed in comparison to 8/25/2023.  Dual lead AICD remains in place.       LORENA GARNICA MD       Electronically Signed and Approved By: LORENA GARNICA MD on 8/29/2023 at 13:02             XR Chest 1 View    Result Date: 8/25/2023  PROCEDURE: XR CHEST 1 VW  COMPARISON: None  INDICATIONS: SOA Triage Protocol/SHORTNESS OF BREATH AND DIZZINESS  FINDINGS:  Left subclavian transvenous pacemaker defibrillator is in place.  There is moderate cardiomegaly.  Upper lobe pulmonary vessels are prominent compatible with pulmonary vascular congestion.  There is bibasilar airspace disease with small bilateral pleural effusions.  Is no evidence pneumothorax.  Bony structures are unremarkable.        1. Cardiomegaly and pulmonary vascular congestion 2. Bibasilar airspace disease and small bilateral pleural effusions all of which is most likely related to congestive heart failure and pulmonary  edema       RJ MONTAGUE MD       Electronically Signed and Approved By: RJ MONTAGUE MD on 8/25/2023 at 8:42             US Paracentesis    Result Date: 9/1/2023  PROCEDURE: US PARACENTESIS  COMPARISON: None  INDICATIONS: ASCITES. 5 Uzbek 7CM YUEH. 1.7 LITERS REMOVED. FLUID DUNN BROWN; CLEAR. DIAGNOSTIC.  CONSENT:   Prior to the procedure, the risks, benefits and alternatives were thoroughly explained.  This included the risk of bleeding, infection, injury to associated structures, and risk of bowel injury.  The patient expressed understanding and wished to continue.  Informed written consent was obtained.  FINDINGS: Preliminary ultrasound demonstrated appropriate fluid in the right mid abdomen.  The overlying skin was prepped and draped in normal sterile fashion.  Lidocaine was injected along the anticipated tract of the needle.   A 5 Divehi 7 cm Yueh catheter was advanced into the peritoneal space.  1.7 liters dunn clear fluid was aspirated.   The catheter was removed without complication.  A sample of fluid was sent to the lab for analysis.        Successful ultrasound-guided diagnostic and therapeutic paracentesis without evidence of complication.   REGINE TOMPKINS MD       Electronically Signed and Approved By: REGINE TOMPKINS MD on 9/01/2023 at 11:31             Doppler Arterial Multi Level Lower Extremity - Bilateral CAR    Result Date: 8/31/2023    Right Conclusion: The right STEFANIA is normal. Mild digital ischemia.   Left Conclusion: The left STEFANIA is normal. Mild digital ischemia.     XR Hip With or Without Pelvis 2 - 3 View Left    Result Date: 8/31/2023  PROCEDURE: XR HIP W OR WO PELVIS 2-3 VIEW LEFT  COMPARISON: None  INDICATIONS: fell and hit hip  FINDINGS:   No evidence of acute fracture or dislocation.  Atherosclerotic vascular calcification is present.  There is a surgical screwplate in the pubic symphysis.  The midportion of the screwplate is fractured.  There is a fracture of the 2nd  screw from the left.  There appear to be 2 soft tissue anchors in the region of the left ischium.  IMPRESSION: No evidence of acute fracture or dislocation.  Hardware fractures in the pubic symphysis may be acute or chronic in nature.  Recommend a follow-up examination if symptoms persist.   CAROLYN LARKIN MD       Electronically Signed and Approved By: CAROLYN LARKIN MD on 8/31/2023 at 17:00                 EKG/Telemetry   []  Cardiology/Vascular   []  Pathology  [x]  Old records  []  Other:    Assessment & Plan   Assessment / Plan     Assessment/Plan:      Assessment:  Chronic systolic CHF with acute exacerbation EF 25 to 30%  Aortic valve disease  Abdominal ascites  Hyponatremia of clinical significance  Acute metabolic encephalopathy likely secondary to hypoxemia  Essential hypertension  Mixed hyperlipidemia  Chronic anemia  Current tobacco abuser    Plan:  Labs and imaging reviewed  25 g of albumin after paracentesis  Continue lorazepam for alcohol withdrawal symptoms  Start lactulose 20 g 3 times a day  Cardiology recommendations appreciated  Prophylactic thiamine and folate replacement  Iron replacement  Midodrine 10 mg 3 times daily continue  Telemetry monitoring  Continue diuresis with torsemide 20 mg daily  Neurochecks  MRI brain pending  Supplemental oxygen to maintain sats greater 90%  A.m. labs  Full code  DVT prophylaxis with Lovenox  Clinical course dictate further management  Discussed with nurse at the bedside    DVT prophylaxis:  Medical DVT prophylaxis orders are present.    CODE STATUS:   Level Of Support Discussed With: Patient  Code Status (Patient has no pulse and is not breathing): CPR (Attempt to Resuscitate)  Medical Interventions (Patient has pulse or is breathing): Full Support        Electronically signed by Gaurav Espinosa MD, 09/01/23, 3:40 PM EDT.    Portions of this documentation were transcribed electronically from a voice recognition software.  I confirm all data accurately  represents the service(s) I performed at today's visit.

## 2023-09-01 NOTE — PROGRESS NOTES
[facility]     Cardiology Progress Note    Patient Name: Bradley Crane  : 1955  MRN: [id number]  Primary Care Physician:  Rosa Isela Kim APRN  Date of admission: 2023    Subjective   Subjective     Patient confused and agitated this morning.  He could not provide meaningful history.  No acute events were reported overnight.    Review of Systems   Could not be obtained due to confusion    Objective   Objective     Vitals:   Temp:  [97.3 °F (36.3 °C)-98.4 °F (36.9 °C)] 97.8 °F (36.6 °C)  Heart Rate:  [] 100  Resp:  [18-20] 20  BP: ()/(62-87) 107/84  Physical Exam      General: Chronically ill-appearing, in no apparent distress  Neck: Supple  Lungs: Clear to auscultation bilaterally  Heart: Regular rate rhythm, no murmurs rubs or gallops  Abdomen: obese, distended, soft  Extremities: Trace pedal edema bilaterally  Neuro: Nonfocal      Scheduled Meds:ammonium lactate, , Topical, Daily  aspirin, 81 mg, Oral, Daily  atorvastatin, 20 mg, Oral, Nightly  enoxaparin, 40 mg, Subcutaneous, Q24H  ferrous sulfate, 325 mg, Oral, Daily With Breakfast  folic acid, 1 mg, Oral, Daily  furosemide, 60 mg, Intravenous, Q12H  LORazepam, 1 mg, Oral, Q6H  lubiprostone, 24 mcg, Oral, BID With Meals  midodrine, 10 mg, Oral, TID AC  nicotine, 1 patch, Transdermal, Q24H  senna-docusate sodium, 2 tablet, Oral, BID  sodium chloride, 10 mL, Intravenous, Q12H  thiamine (B-1) IV, 200 mg, Intravenous, Q8H   Followed by  [START ON 2023] thiamine, 100 mg, Oral, Daily      Continuous Infusions:        Result Review    Result Review:  I have personally reviewed the results from the time of this admission to 2023 08:00 EDT and agree with these findings:  [x]  Laboratory  []  Microbiology  [x]  Radiology  [x]  EKG/Telemetry   [x]  Cardiology/Vascular   []  Pathology  []  Old records  []  Other:  Most notable findings include:     CBC          2023    12:29 2023    04:17 2023    04:15   CBC   WBC 7.42   6.90  6.55    RBC 3.45  2.89  2.98    Hemoglobin 12.3  9.9  10.4    Hematocrit 33.7  28.1  28.9    MCV 97.7  97.2  97.0    MCH 35.7  34.3  34.9    MCHC 36.5  35.2  36.0    RDW 14.7  14.6  14.6    Platelets 262  242  243      CMP          8/29/2023    12:29 8/30/2023    04:17 8/31/2023    04:15   CMP   Glucose 111  124  122    BUN 16  20  22    Creatinine 0.84  0.90  1.06    EGFR 96.2  94.2  77.4    Sodium 130  129  128    Potassium 3.4  3.5  3.8    Chloride 92  94  92    Calcium 9.3  8.8  9.1    Total Protein 7.2   6.3    Albumin 4.4   3.8    Globulin 2.8      Total Bilirubin 2.1   1.1    Alkaline Phosphatase 137   146    AST (SGOT) 22   19    ALT (SGPT) 10   8    Albumin/Globulin Ratio 1.6      BUN/Creatinine Ratio 19.0  22.2  20.8    Anion Gap 14.1  10.9  13.7       CARDIAC LABS:      Lab 08/30/23  1414 08/29/23  1442 08/29/23  1229 08/25/23  0813   PROBNP  --   --  26,219.0* 17,167.0*   HSTROP T  --  37* 54* 44*   PROTIME 15.2*  --   --   --    INR 1.20*  --   --   --         Assessment & Plan   Assessment / Plan     Brief Patient Summary:  Bradlye Crane is a 68 y.o. male with:     Acute systolic congestive heart failure exacerbation, LVEF 25 to 30%, on IV Lasix  Ascites  Altered mental status  Hyponatremia, 128  Hypertension, stable  Mixed dyslipidemia  Anemia  Alcohol abuse  Active smoking     Plan:   Patient approaching euvolemic status  We will switch IV Lasix to torsemide 20 mg daily plus spironolactone 25 mg daily  Maintain potassium around 4 magnesium around 2  Monitor renal function  Blood pressure currently soft and does not allow to initiate guideline directed medical therapy for HFrEF.  These medications can be started on an outpatient basis after blood pressure stabilizes.  Wean off midodrine as able. Currently on 10 mg TID.   Abdominal ultrasound was noted.  He has moderate to large diffuse ascites.  Recommend paracentesis.  Patient is alert but confused and agitated this morning.  Ammonia level  will be checked.  Will defer further management to primary team.  Outpatient cardiac stress test.      Thank you for the consultation.  Dr. Doshi will be covering this weekend.  Please call with any questions or concerns.    CODE STATUS:   Level Of Support Discussed With: Patient  Code Status (Patient has no pulse and is not breathing): CPR (Attempt to Resuscitate)  Medical Interventions (Patient has pulse or is breathing): Full Support      Electronically signed by Merly Ji MD, 09/01/23, 8:00 AM EDT.

## 2023-09-02 LAB — Lab: NORMAL

## 2023-09-02 PROCEDURE — 99232 SBSQ HOSP IP/OBS MODERATE 35: CPT | Performed by: FAMILY MEDICINE

## 2023-09-02 PROCEDURE — 25010000002 THIAMINE PER 100 MG: Performed by: HOSPITALIST

## 2023-09-02 PROCEDURE — 94761 N-INVAS EAR/PLS OXIMETRY MLT: CPT

## 2023-09-02 PROCEDURE — P9047 ALBUMIN (HUMAN), 25%, 50ML: HCPCS | Performed by: FAMILY MEDICINE

## 2023-09-02 PROCEDURE — 25010000002 ALBUMIN HUMAN 25% PER 50 ML: Performed by: FAMILY MEDICINE

## 2023-09-02 PROCEDURE — 94799 UNLISTED PULMONARY SVC/PX: CPT

## 2023-09-02 RX ORDER — ALBUMIN (HUMAN) 12.5 G/50ML
25 SOLUTION INTRAVENOUS ONCE
Status: COMPLETED | OUTPATIENT
Start: 2023-09-02 | End: 2023-09-02

## 2023-09-02 RX ADMIN — ATORVASTATIN CALCIUM 20 MG: 20 TABLET, FILM COATED ORAL at 22:05

## 2023-09-02 RX ADMIN — Medication: at 09:01

## 2023-09-02 RX ADMIN — HYDROCODONE BITARTRATE AND ACETAMINOPHEN 1 TABLET: 10; 325 TABLET ORAL at 11:56

## 2023-09-02 RX ADMIN — LACTULOSE 20 G: 20 SOLUTION ORAL at 22:06

## 2023-09-02 RX ADMIN — HYDROCODONE BITARTRATE AND ACETAMINOPHEN 1 TABLET: 10; 325 TABLET ORAL at 18:23

## 2023-09-02 RX ADMIN — ALBUMIN HUMAN 25 G: 0.25 SOLUTION INTRAVENOUS at 11:56

## 2023-09-02 RX ADMIN — FERROUS SULFATE TAB 325 MG (65 MG ELEMENTAL FE) 325 MG: 325 (65 FE) TAB at 09:00

## 2023-09-02 RX ADMIN — FOLIC ACID 1 MG: 1 TABLET ORAL at 09:00

## 2023-09-02 RX ADMIN — MIDODRINE HYDROCHLORIDE 10 MG: 10 TABLET ORAL at 18:17

## 2023-09-02 RX ADMIN — SENNOSIDES AND DOCUSATE SODIUM 2 TABLET: 50; 8.6 TABLET ORAL at 22:05

## 2023-09-02 RX ADMIN — NICOTINE 1 PATCH: 21 PATCH, EXTENDED RELEASE TRANSDERMAL at 09:00

## 2023-09-02 RX ADMIN — SENNOSIDES AND DOCUSATE SODIUM 2 TABLET: 50; 8.6 TABLET ORAL at 09:00

## 2023-09-02 RX ADMIN — MIDODRINE HYDROCHLORIDE 10 MG: 10 TABLET ORAL at 09:00

## 2023-09-02 RX ADMIN — ASPIRIN 81 MG: 81 TABLET, COATED ORAL at 09:00

## 2023-09-02 RX ADMIN — HYDROCODONE BITARTRATE AND ACETAMINOPHEN 1 TABLET: 10; 325 TABLET ORAL at 23:48

## 2023-09-02 RX ADMIN — LUBIPROSTONE 24 MCG: 24 CAPSULE, GELATIN COATED ORAL at 09:00

## 2023-09-02 RX ADMIN — Medication 10 ML: at 22:06

## 2023-09-02 RX ADMIN — THIAMINE HYDROCHLORIDE 200 MG: 100 INJECTION, SOLUTION INTRAMUSCULAR; INTRAVENOUS at 06:11

## 2023-09-02 RX ADMIN — Medication 10 ML: at 09:01

## 2023-09-02 RX ADMIN — THIAMINE HYDROCHLORIDE 200 MG: 100 INJECTION, SOLUTION INTRAMUSCULAR; INTRAVENOUS at 15:11

## 2023-09-02 RX ADMIN — LORAZEPAM 1 MG: 0.5 TABLET ORAL at 04:53

## 2023-09-02 RX ADMIN — THIAMINE HYDROCHLORIDE 200 MG: 100 INJECTION, SOLUTION INTRAMUSCULAR; INTRAVENOUS at 22:06

## 2023-09-02 RX ADMIN — MIDODRINE HYDROCHLORIDE 10 MG: 10 TABLET ORAL at 11:56

## 2023-09-02 RX ADMIN — LACTULOSE 20 G: 20 SOLUTION ORAL at 09:01

## 2023-09-02 NOTE — PLAN OF CARE
Goal Outcome Evaluation:    Pt cooperative with care, CIWA score of 6 this shift, takes meds as prescribed.

## 2023-09-02 NOTE — PROGRESS NOTES
[facility]   Hospitalist Progress Note  Date: 2023  Patient Name: Bradley Crane  : 1955  MRN: [id number]  Date of admission: 2023      Subjective   Subjective   Chief complaint: Shortness of breath    Summary:  68-year-old male with history of chronic systolic CHF, CAD, dyslipidemia, current alcohol consumer, concern for liver cirrhosis secondary to alcoholism, current tobacco smoker, chronic anemia, was hospitalized on 2023 with chief complaint of shortness of breath, hospitalized with acute decompensation of chronic systolic CHF, cardiology consulted, with concern for acute hepatic encephalopathy, status post paracentesis 2023, 1.7 L of fluid drained.  On diuretics for CHF exacerbation.  Electrolytes being replaced.  Started lactulose to help promote bowel movements.    Interval follow-up: Seen and examined this morning, no acute distress, no acute major night events, mentation better this morning, cooperative, still scoring 6 on CIWA protocol, less tremulous.  Is breathing better.  Denies chest pain or palpitations.  Remains weak and debilitated.  Heart rate still variable, soft blood pressures 100s over 80s, heart rate in the 100s, short bursts of V. tach.    Review of systems:  All systems reviewed and negative except for generalized fatigue, generalized weakness, cough, shortness of breath with exertion      Objective   Objective     Vitals:   Temp:  [97.3 °F (36.3 °C)-98.1 °F (36.7 °C)] 97.3 °F (36.3 °C)  Heart Rate:  [] 101  Resp:  [18-22] 18  BP: ()/(68-88) 99/68  Physical Exam   General: Awake, alert, NAD, sitting upright  HENT: NCAT, MMM  Eyes: pupils equal, no scleral icterus  Cardiovascular: RRR, no murmurs   Pulmonary: CTA bilaterally; no wheezes; no conversational dyspnea  Gastrointestinal: S/ND/NT, +BS  Musculoskeletal: No gross deformities  Skin: No jaundice, no rash on exposed skin appreciated  Neuro: CN II through XII grossly intact; speech garbled; no  tremor  Psych: having mood lability   : No Dos Santos catheter; no suprapubic tenderness      Result Review    Result Review:  I have personally reviewed the pertinent results from the past 24 hours to 9/2/2023 10:19 EDT and agree with these findings:  [x]  Laboratory   CBC          8/29/2023    12:29 8/30/2023    04:17 8/31/2023    04:15   CBC   WBC 7.42  6.90  6.55    RBC 3.45  2.89  2.98    Hemoglobin 12.3  9.9  10.4    Hematocrit 33.7  28.1  28.9    MCV 97.7  97.2  97.0    MCH 35.7  34.3  34.9    MCHC 36.5  35.2  36.0    RDW 14.7  14.6  14.6    Platelets 262  242  243      BMP          8/29/2023    12:29 8/30/2023    04:17 8/31/2023    04:15   BMP   BUN 16  20  22    Creatinine 0.84  0.90  1.06    Sodium 130  129  128    Potassium 3.4  3.5  3.8    Chloride 92  94  92    CO2 23.9  24.1  22.3    Calcium 9.3  8.8  9.1    LIVER FUNCTION TESTS:      Lab 08/31/23  0415 08/29/23  1229   TOTAL PROTEIN 6.3 7.2   ALBUMIN 3.8 4.4   GLOBULIN  --  2.8   ALT (SGPT) 8 10   AST (SGOT) 19 22   BILIRUBIN 1.1 2.1*   INDIRECT BILIRUBIN 0.5  --    BILIRUBIN DIRECT 0.6*  --    ALK PHOS 146* 137*         [x]  Microbiology No results found for: ACANTHNAEG, AFBCX, BPERTUSSISCX, BLOODCX  No results found for: BCIDPCR, CXREFLEX, CSFCX, CULTURETIS  No results found for: CULTURES, HSVCX, URCX  No results found for: EYECULTURE, GCCX, HSVCULTURE, LABHSV  No results found for: LEGIONELLA, MRSACX, MUMPSCX, MYCOPLASCX  No results found for: NOCARDIACX, STOOLCX  No results found for: THROATCX, UNSTIMCULT, URINECX, CULTURE, VZVCULTUR  No results found for: VIRALCULTU, WOUNDCX    [x]  Radiology Adult Transthoracic Echo Complete w/ Color, Spectral and Contrast if necessary per protocol    Result Date: 8/30/2023    Left ventricular ejection fraction appears to be 26 - 30%.   The left ventricular cavity is mildly dilated.   Left ventricular diastolic function was indeterminate.   The right ventricular cavity is mild to moderately dilated.   The left  atrial cavity is moderately dilated.   The right atrial cavity is moderately  dilated.   Moderate tricuspid valve regurgitation is present.   Estimated right ventricular systolic pressure from tricuspid regurgitation is normal (<35 mmHg).   Aortic valve appears sclerotic with reduced excursion.  Max/mean pressure gradient was 20/11 mmHg.  Aortic valve area was 0.93 cm².  Could be consistent with low-flow, low gradient AAS.     CT Head Without Contrast    Result Date: 8/31/2023  PROCEDURE: CT HEAD WO CONTRAST  COMPARISON:  None INDICATIONS: fell and hit back of head  PROTOCOL:   Standard imaging protocol performed    RADIATION:   DLP: 861mGy*cm   MA and/or KV was adjusted to minimize radiation dose.     TECHNIQUE: Axial images of the head without intravenous contrast.  FINDINGS:  There is generalized atrophy.  There are chronic appearing changes in the white matter.  The  The ventricles have a normal size and configuration. There is no evidence of acute intracranial hemorrhage, mass or midline shift. No extra-axial fluid collections are identified. There are no skull fractures.  There is chronic left maxillary sinusitis an air-fluid level suggesting superimposed acute sinusitis.  There is minimal polypoid mucosal thickening in the right maxillary sinus.  The visualized paranasal sinuses and mastoid air cells are otherwise grossly clear.  IMPRESSION:  1. No acute intracranial abnormalities are identified.  2. Findings suggest acute on chronic left maxillary sinusitis.  CAROLYN LARKIN MD       Electronically Signed and Approved By: CAROLYN LARKIN MD on 8/31/2023 at 15:11             US Abdomen Complete    Result Date: 8/31/2023  PROCEDURE: US ABDOMEN COMPLETE  COMPARISON: None  INDICATIONS: abdominal distension, asses for ascites  TECHNIQUE: Focused abdominal ultrasound to evaluate for ascites.   FINDINGS:   There is a moderate to large amount of abdominal ascites in all 4 quadrants.  The visualized liver has a  subtle nodular contour suggesting cirrhosis.  IMPRESSION: Moderate to large amount of abdominal ascites.   CAROLYN LARKIN MD       Electronically Signed and Approved By: CAROLYN LARKIN MD on 8/31/2023 at 20:01             XR Chest 1 View    Result Date: 8/29/2023  PROCEDURE: XR CHEST 1 VW  COMPARISON: [facility], CR, XR CHEST 1 VW, 8/25/2023, 8:32.  INDICATIONS: SHORTNESS OF BREATH TODAY  FINDINGS:  The heart remains slightly enlarged.  The pulmonary vascularity is congested.  Bibasilar pulmonary infiltrate or edema is unchanged.  The costophrenic angles remain slightly blunted.  Dual lead AICD remains in place.        Findings consistent with moderate congestive heart failure, not significantly changed in comparison to 8/25/2023.  Dual lead AICD remains in place.       LORENA GARNICA MD       Electronically Signed and Approved By: LORENA GARNICA MD on 8/29/2023 at 13:02             XR Chest 1 View    Result Date: 8/25/2023  PROCEDURE: XR CHEST 1 VW  COMPARISON: None  INDICATIONS: SOA Triage Protocol/SHORTNESS OF BREATH AND DIZZINESS  FINDINGS:  Left subclavian transvenous pacemaker defibrillator is in place.  There is moderate cardiomegaly.  Upper lobe pulmonary vessels are prominent compatible with pulmonary vascular congestion.  There is bibasilar airspace disease with small bilateral pleural effusions.  Is no evidence pneumothorax.  Bony structures are unremarkable.        1. Cardiomegaly and pulmonary vascular congestion 2. Bibasilar airspace disease and small bilateral pleural effusions all of which is most likely related to congestive heart failure and pulmonary edema       RJ MONTAGUE MD       Electronically Signed and Approved By: RJ MONTAGUE MD on 8/25/2023 at 8:42             US Paracentesis    Result Date: 9/1/2023  PROCEDURE: US PARACENTESIS  COMPARISON: None  INDICATIONS: ASCITES. 5 Thai 7CM YUEH. 1.7 LITERS REMOVED. FLUID WHEELER BROWN; CLEAR. DIAGNOSTIC.  CONSENT:   Prior to  the procedure, the risks, benefits and alternatives were thoroughly explained.  This included the risk of bleeding, infection, injury to associated structures, and risk of bowel injury.  The patient expressed understanding and wished to continue.  Informed written consent was obtained.  FINDINGS: Preliminary ultrasound demonstrated appropriate fluid in the right mid abdomen.  The overlying skin was prepped and draped in normal sterile fashion.  Lidocaine was injected along the anticipated tract of the needle.   A 5 Polish 7 cm Yueh catheter was advanced into the peritoneal space.  1.7 liters dunn clear fluid was aspirated.   The catheter was removed without complication.  A sample of fluid was sent to the lab for analysis.        Successful ultrasound-guided diagnostic and therapeutic paracentesis without evidence of complication.   REGINE TOMPKINS MD       Electronically Signed and Approved By: REGINE TOMPKINS MD on 9/01/2023 at 11:31             Doppler Arterial Multi Level Lower Extremity - Bilateral CAR    Result Date: 8/31/2023    Right Conclusion: The right STEFANIA is normal. Mild digital ischemia.   Left Conclusion: The left STEFANIA is normal. Mild digital ischemia.     XR Hip With or Without Pelvis 2 - 3 View Left    Result Date: 8/31/2023  PROCEDURE: XR HIP W OR WO PELVIS 2-3 VIEW LEFT  COMPARISON: None  INDICATIONS: fell and hit hip  FINDINGS:   No evidence of acute fracture or dislocation.  Atherosclerotic vascular calcification is present.  There is a surgical screwplate in the pubic symphysis.  The midportion of the screwplate is fractured.  There is a fracture of the 2nd screw from the left.  There appear to be 2 soft tissue anchors in the region of the left ischium.  IMPRESSION: No evidence of acute fracture or dislocation.  Hardware fractures in the pubic symphysis may be acute or chronic in nature.  Recommend a follow-up examination if symptoms persist.   CAROLYN LARKIN MD       Electronically  Signed and Approved By: CAROLYN LARKIN MD on 8/31/2023 at 17:00             [x]    EKG/Telemetry   []  Cardiology/Vascular   []  Pathology  [x]  Old records  []  Other:    Assessment & Plan   Assessment / Plan     Assessment/Plan:      Assessment:  Chronic systolic CHF with acute exacerbation EF 25 to 30%  Aortic valve disease  Abdominal ascites  Hyponatremia of clinical significance  Acute metabolic encephalopathy likely secondary to hypoxemia  Essential hypertension  Mixed hyperlipidemia  Chronic anemia  Current tobacco abuser    Plan:  Labs and imaging reviewed  Blood pressures are still soft, maintaining minimally adequate pressures with midodrine 10 mg 3 times daily  We will repeat albumin 25 g infusion today  Continue lorazepam for alcohol withdrawal symptoms  Continue lactulose 20 g 3 times a day  Cardiology recommendations appreciated  Prophylactic thiamine and folate replacement  Iron replacement  Midodrine 10 mg 3 times daily continue  Telemetry monitoring  Continue diuresis with torsemide 20 mg daily  Neurochecks  MRI brain still pending  Supplemental oxygen to maintain sats greater 90%  A.m. labs  Full code  DVT prophylaxis with Lovenox  Clinical course dictate further management  Discussed with nurse at the bedside    DVT prophylaxis:  Medical DVT prophylaxis orders are present.    CODE STATUS:   Level Of Support Discussed With: Patient  Code Status (Patient has no pulse and is not breathing): CPR (Attempt to Resuscitate)  Medical Interventions (Patient has pulse or is breathing): Full Support    Electronically signed by Gaurav Espinosa MD, 09/02/23, 10:22 AM EDT.    Portions of this documentation were transcribed electronically from a voice recognition software.  I confirm all data accurately represents the service(s) I performed at today's visit.

## 2023-09-03 ENCOUNTER — APPOINTMENT (OUTPATIENT)
Dept: MRI IMAGING | Facility: HOSPITAL | Age: 68
DRG: 286 | End: 2023-09-03
Payer: MEDICARE

## 2023-09-03 LAB
ALBUMIN SERPL-MCNC: 4 G/DL (ref 3.5–5.2)
ALP SERPL-CCNC: 130 U/L (ref 39–117)
ALT SERPL W P-5'-P-CCNC: 8 U/L (ref 1–41)
ANION GAP SERPL CALCULATED.3IONS-SCNC: 13.1 MMOL/L (ref 5–15)
AST SERPL-CCNC: 19 U/L (ref 1–40)
BASOPHILS # BLD AUTO: 0.06 10*3/MM3 (ref 0–0.2)
BASOPHILS NFR BLD AUTO: 1.1 % (ref 0–1.5)
BILIRUB CONJ SERPL-MCNC: 0.6 MG/DL (ref 0–0.3)
BILIRUB INDIRECT SERPL-MCNC: 0.8 MG/DL
BILIRUB SERPL-MCNC: 1.4 MG/DL (ref 0–1.2)
BUN SERPL-MCNC: 21 MG/DL (ref 8–23)
BUN/CREAT SERPL: 28.4 (ref 7–25)
CALCIUM SPEC-SCNC: 9.2 MG/DL (ref 8.6–10.5)
CHLORIDE SERPL-SCNC: 95 MMOL/L (ref 98–107)
CO2 SERPL-SCNC: 23.9 MMOL/L (ref 22–29)
CREAT SERPL-MCNC: 0.74 MG/DL (ref 0.76–1.27)
DEPRECATED RDW RBC AUTO: 51.9 FL (ref 37–54)
EGFRCR SERPLBLD CKD-EPI 2021: 99.9 ML/MIN/1.73
EOSINOPHIL # BLD AUTO: 0.05 10*3/MM3 (ref 0–0.4)
EOSINOPHIL NFR BLD AUTO: 0.9 % (ref 0.3–6.2)
ERYTHROCYTE [DISTWIDTH] IN BLOOD BY AUTOMATED COUNT: 14.6 % (ref 12.3–15.4)
GLUCOSE SERPL-MCNC: 111 MG/DL (ref 65–99)
HCT VFR BLD AUTO: 28.6 % (ref 37.5–51)
HGB BLD-MCNC: 10.4 G/DL (ref 13–17.7)
IMM GRANULOCYTES # BLD AUTO: 0.02 10*3/MM3 (ref 0–0.05)
IMM GRANULOCYTES NFR BLD AUTO: 0.4 % (ref 0–0.5)
LYMPHOCYTES # BLD AUTO: 1.58 10*3/MM3 (ref 0.7–3.1)
LYMPHOCYTES NFR BLD AUTO: 28.7 % (ref 19.6–45.3)
MAGNESIUM SERPL-MCNC: 2.7 MG/DL (ref 1.6–2.4)
MCH RBC QN AUTO: 35.5 PG (ref 26.6–33)
MCHC RBC AUTO-ENTMCNC: 36.4 G/DL (ref 31.5–35.7)
MCV RBC AUTO: 97.6 FL (ref 79–97)
MONOCYTES # BLD AUTO: 0.81 10*3/MM3 (ref 0.1–0.9)
MONOCYTES NFR BLD AUTO: 14.7 % (ref 5–12)
NEUTROPHILS NFR BLD AUTO: 2.98 10*3/MM3 (ref 1.7–7)
NEUTROPHILS NFR BLD AUTO: 54.2 % (ref 42.7–76)
NRBC BLD AUTO-RTO: 0 /100 WBC (ref 0–0.2)
PHOSPHATE SERPL-MCNC: 3.6 MG/DL (ref 2.5–4.5)
PLATELET # BLD AUTO: 232 10*3/MM3 (ref 140–450)
PMV BLD AUTO: 9.3 FL (ref 6–12)
POTASSIUM SERPL-SCNC: 2.9 MMOL/L (ref 3.5–5.2)
POTASSIUM SERPL-SCNC: 4.1 MMOL/L (ref 3.5–5.2)
PROT SERPL-MCNC: 6.5 G/DL (ref 6–8.5)
RBC # BLD AUTO: 2.93 10*6/MM3 (ref 4.14–5.8)
SODIUM SERPL-SCNC: 132 MMOL/L (ref 136–145)
VIT B1 BLD-SCNC: 72.5 NMOL/L (ref 66.5–200)
WBC NRBC COR # BLD: 5.5 10*3/MM3 (ref 3.4–10.8)

## 2023-09-03 PROCEDURE — 83735 ASSAY OF MAGNESIUM: CPT | Performed by: FAMILY MEDICINE

## 2023-09-03 PROCEDURE — 99232 SBSQ HOSP IP/OBS MODERATE 35: CPT | Performed by: FAMILY MEDICINE

## 2023-09-03 PROCEDURE — 0 POTASSIUM CHLORIDE 10 MEQ/100ML SOLUTION: Performed by: FAMILY MEDICINE

## 2023-09-03 PROCEDURE — 25010000002 FUROSEMIDE PER 20 MG: Performed by: STUDENT IN AN ORGANIZED HEALTH CARE EDUCATION/TRAINING PROGRAM

## 2023-09-03 PROCEDURE — 84100 ASSAY OF PHOSPHORUS: CPT | Performed by: FAMILY MEDICINE

## 2023-09-03 PROCEDURE — 4A023N8 MEASUREMENT OF CARDIAC SAMPLING AND PRESSURE, BILATERAL, PERCUTANEOUS APPROACH: ICD-10-PCS | Performed by: INTERNAL MEDICINE

## 2023-09-03 PROCEDURE — 80076 HEPATIC FUNCTION PANEL: CPT | Performed by: FAMILY MEDICINE

## 2023-09-03 PROCEDURE — 99232 SBSQ HOSP IP/OBS MODERATE 35: CPT | Performed by: STUDENT IN AN ORGANIZED HEALTH CARE EDUCATION/TRAINING PROGRAM

## 2023-09-03 PROCEDURE — 85025 COMPLETE CBC W/AUTO DIFF WBC: CPT | Performed by: FAMILY MEDICINE

## 2023-09-03 PROCEDURE — 25010000002 THIAMINE PER 100 MG: Performed by: HOSPITALIST

## 2023-09-03 PROCEDURE — 80048 BASIC METABOLIC PNL TOTAL CA: CPT | Performed by: FAMILY MEDICINE

## 2023-09-03 PROCEDURE — 70551 MRI BRAIN STEM W/O DYE: CPT

## 2023-09-03 PROCEDURE — 84132 ASSAY OF SERUM POTASSIUM: CPT | Performed by: STUDENT IN AN ORGANIZED HEALTH CARE EDUCATION/TRAINING PROGRAM

## 2023-09-03 PROCEDURE — B2111ZZ FLUOROSCOPY OF MULTIPLE CORONARY ARTERIES USING LOW OSMOLAR CONTRAST: ICD-10-PCS | Performed by: INTERNAL MEDICINE

## 2023-09-03 RX ORDER — POTASSIUM CHLORIDE 750 MG/1
30 CAPSULE, EXTENDED RELEASE ORAL
Status: COMPLETED | OUTPATIENT
Start: 2023-09-03 | End: 2023-09-03

## 2023-09-03 RX ORDER — FUROSEMIDE 10 MG/ML
40 INJECTION INTRAMUSCULAR; INTRAVENOUS ONCE
Status: COMPLETED | OUTPATIENT
Start: 2023-09-03 | End: 2023-09-03

## 2023-09-03 RX ORDER — POTASSIUM CHLORIDE 7.45 MG/ML
10 INJECTION INTRAVENOUS
Status: COMPLETED | OUTPATIENT
Start: 2023-09-03 | End: 2023-09-03

## 2023-09-03 RX ORDER — MIDODRINE HYDROCHLORIDE 5 MG/1
5 TABLET ORAL
Status: DISCONTINUED | OUTPATIENT
Start: 2023-09-03 | End: 2023-09-07 | Stop reason: HOSPADM

## 2023-09-03 RX ADMIN — FUROSEMIDE 40 MG: 10 INJECTION, SOLUTION INTRAMUSCULAR; INTRAVENOUS at 20:48

## 2023-09-03 RX ADMIN — THIAMINE HYDROCHLORIDE 200 MG: 100 INJECTION, SOLUTION INTRAMUSCULAR; INTRAVENOUS at 14:00

## 2023-09-03 RX ADMIN — NICOTINE 1 PATCH: 21 PATCH, EXTENDED RELEASE TRANSDERMAL at 09:07

## 2023-09-03 RX ADMIN — LUBIPROSTONE 24 MCG: 24 CAPSULE, GELATIN COATED ORAL at 17:58

## 2023-09-03 RX ADMIN — Medication 10 ML: at 20:48

## 2023-09-03 RX ADMIN — POTASSIUM CHLORIDE 30 MEQ: 10 CAPSULE, COATED, EXTENDED RELEASE ORAL at 17:58

## 2023-09-03 RX ADMIN — HYDROCODONE BITARTRATE AND ACETAMINOPHEN 1 TABLET: 10; 325 TABLET ORAL at 18:00

## 2023-09-03 RX ADMIN — THIAMINE HYDROCHLORIDE 200 MG: 100 INJECTION, SOLUTION INTRAMUSCULAR; INTRAVENOUS at 05:27

## 2023-09-03 RX ADMIN — POTASSIUM CHLORIDE 10 MEQ: 7.46 INJECTION, SOLUTION INTRAVENOUS at 09:06

## 2023-09-03 RX ADMIN — LORAZEPAM 1 MG: 0.5 TABLET ORAL at 12:46

## 2023-09-03 RX ADMIN — MIDODRINE HYDROCHLORIDE 5 MG: 5 TABLET ORAL at 12:10

## 2023-09-03 RX ADMIN — MIDODRINE HYDROCHLORIDE 10 MG: 10 TABLET ORAL at 09:04

## 2023-09-03 RX ADMIN — Medication: at 09:05

## 2023-09-03 RX ADMIN — MIDODRINE HYDROCHLORIDE 5 MG: 5 TABLET ORAL at 17:58

## 2023-09-03 RX ADMIN — LORAZEPAM 1 MG: 0.5 TABLET ORAL at 15:49

## 2023-09-03 RX ADMIN — Medication 10 ML: at 09:07

## 2023-09-03 RX ADMIN — POTASSIUM CHLORIDE 30 MEQ: 10 CAPSULE, COATED, EXTENDED RELEASE ORAL at 09:06

## 2023-09-03 RX ADMIN — ATORVASTATIN CALCIUM 20 MG: 20 TABLET, FILM COATED ORAL at 20:47

## 2023-09-03 RX ADMIN — LORAZEPAM 2 MG: 2 TABLET ORAL at 20:49

## 2023-09-03 RX ADMIN — FOLIC ACID 1 MG: 1 TABLET ORAL at 09:04

## 2023-09-03 RX ADMIN — FERROUS SULFATE TAB 325 MG (65 MG ELEMENTAL FE) 325 MG: 325 (65 FE) TAB at 09:04

## 2023-09-03 RX ADMIN — ASPIRIN 81 MG: 81 TABLET, COATED ORAL at 09:04

## 2023-09-03 RX ADMIN — HYDROCODONE BITARTRATE AND ACETAMINOPHEN 1 TABLET: 10; 325 TABLET ORAL at 11:14

## 2023-09-03 RX ADMIN — POTASSIUM CHLORIDE 30 MEQ: 10 CAPSULE, COATED, EXTENDED RELEASE ORAL at 12:10

## 2023-09-03 RX ADMIN — SENNOSIDES AND DOCUSATE SODIUM 2 TABLET: 50; 8.6 TABLET ORAL at 20:47

## 2023-09-03 RX ADMIN — POTASSIUM CHLORIDE 10 MEQ: 7.46 INJECTION, SOLUTION INTRAVENOUS at 13:49

## 2023-09-03 NOTE — PROGRESS NOTES
[facility]   Hospitalist Progress Note  Date: 9/3/2023  Patient Name: Bradley Crane  : 1955  MRN: [id number]  Date of admission: 2023      Subjective   Subjective    Chief complaint: Shortness of breath    Summary:  68-year-old male with history of chronic systolic CHF, CAD, dyslipidemia, current alcohol consumer, concern for liver cirrhosis secondary to alcoholism, current tobacco smoker, chronic anemia, was hospitalized on 2023 with chief complaint of shortness of breath, hospitalized with acute decompensation of chronic systolic CHF, cardiology consulted, with concern for acute hepatic encephalopathy, status post paracentesis 2023, 1.7 L of fluid drained.  On diuretics for CHF exacerbation.  Electrolytes being replaced.  Started lactulose to help promote bowel movements.    Interval follow-up: Seen and examined this morning, no acute distress, no acute major night events, ex-wife at the bedside, mentation is baseline, no dizziness or lightheadedness.  Breathing improving.  No nausea or vomiting.  Blood pressures are still soft 90s over 60s.  Wants to start ambulating.  Still is a two-person assist.  Remains weak and debilitated.  Patient potassium low at 2.9, creatinine 0.74, sodium 132, total bilirubin 1.4, direct bilirubin 0.6, white blood cell count 5000, hemoglobin 10.4.  Telemetry reviewed, no acute major rhythmic events.  Still cannot get information on defibrillator to pursue MRI, await family to send information.    Review of systems:  All systems reviewed and negative except for generalized fatigue, generalized weakness, intermittent cough, shortness of breath primarily with exertional activity    Objective   Objective     Vitals:   Temp:  [97.2 °F (36.2 °C)-98.2 °F (36.8 °C)] 98.2 °F (36.8 °C)  Heart Rate:  [] 94  Resp:  [16-18] 16  BP: ()/(67-86) 99/67  Physical Exam   General: Awake, alert, NAD, sitting upright eating breakfast  HENT: NCAT, MMM  Eyes: pupils  equal, no scleral icterus  Cardiovascular: RRR, no murmurs   Pulmonary: CTA bilaterally; no wheezes; no conversational dyspnea  Gastrointestinal: S/ND/NT, +BS  Musculoskeletal: No gross deformities  Skin: No jaundice, no rash on exposed skin appreciated  Neuro: CN II through XII grossly intact; speech garbled; no tremor  Psych: having mood lability   : No Dos Santos catheter; no suprapubic tenderness      Result Review    Result Review:  I have personally reviewed the pertinent results from the past 24 hours to 9/3/2023 10:36 EDT and agree with these findings:  [x]  Laboratory   CBC          8/30/2023    04:17 8/31/2023    04:15 9/3/2023    04:03   CBC   WBC 6.90  6.55  5.50    RBC 2.89  2.98  2.93    Hemoglobin 9.9  10.4  10.4    Hematocrit 28.1  28.9  28.6    MCV 97.2  97.0  97.6    MCH 34.3  34.9  35.5    MCHC 35.2  36.0  36.4    RDW 14.6  14.6  14.6    Platelets 242  243  232      BMP          8/30/2023    04:17 8/31/2023    04:15 9/3/2023    04:03   BMP   BUN 20  22  21    Creatinine 0.90  1.06  0.74    Sodium 129  128  132    Potassium 3.5  3.8  2.9    Chloride 94  92  95    CO2 24.1  22.3  23.9    Calcium 8.8  9.1  9.2    LIVER FUNCTION TESTS:      Lab 09/03/23  0403 08/31/23  0415 08/29/23  1229   TOTAL PROTEIN 6.5 6.3 7.2   ALBUMIN 4.0 3.8 4.4   GLOBULIN  --   --  2.8   ALT (SGPT) 8 8 10   AST (SGOT) 19 19 22   BILIRUBIN 1.4* 1.1 2.1*   INDIRECT BILIRUBIN 0.8 0.5  --    BILIRUBIN DIRECT 0.6* 0.6*  --    ALK PHOS 130* 146* 137*         [x]  Microbiology No results found for: ACANTHNAEG, AFBCX, BPERTUSSISCX, BLOODCX  No results found for: BCIDPCR, CXREFLEX, CSFCX, CULTURETIS  No results found for: CULTURES, HSVCX, URCX  No results found for: EYECULTURE, GCCX, HSVCULTURE, LABHSV  No results found for: LEGIONELLA, MRSACX, MUMPSCX, MYCOPLASCX  No results found for: NOCARDIACX, STOOLCX  No results found for: THROATCX, UNSTIMCULT, URINECX, CULTURE, VZVCULTUR  No results found for: VIRALCULTU, WOUNDCX    [x]   Radiology Adult Transthoracic Echo Complete w/ Color, Spectral and Contrast if necessary per protocol    Result Date: 8/30/2023    Left ventricular ejection fraction appears to be 26 - 30%.   The left ventricular cavity is mildly dilated.   Left ventricular diastolic function was indeterminate.   The right ventricular cavity is mild to moderately dilated.   The left atrial cavity is moderately dilated.   The right atrial cavity is moderately  dilated.   Moderate tricuspid valve regurgitation is present.   Estimated right ventricular systolic pressure from tricuspid regurgitation is normal (<35 mmHg).   Aortic valve appears sclerotic with reduced excursion.  Max/mean pressure gradient was 20/11 mmHg.  Aortic valve area was 0.93 cm².  Could be consistent with low-flow, low gradient AAS.     CT Head Without Contrast    Result Date: 8/31/2023  PROCEDURE: CT HEAD WO CONTRAST  COMPARISON:  None INDICATIONS: fell and hit back of head  PROTOCOL:   Standard imaging protocol performed    RADIATION:   DLP: 861mGy*cm   MA and/or KV was adjusted to minimize radiation dose.     TECHNIQUE: Axial images of the head without intravenous contrast.  FINDINGS:  There is generalized atrophy.  There are chronic appearing changes in the white matter.  The  The ventricles have a normal size and configuration. There is no evidence of acute intracranial hemorrhage, mass or midline shift. No extra-axial fluid collections are identified. There are no skull fractures.  There is chronic left maxillary sinusitis an air-fluid level suggesting superimposed acute sinusitis.  There is minimal polypoid mucosal thickening in the right maxillary sinus.  The visualized paranasal sinuses and mastoid air cells are otherwise grossly clear.  IMPRESSION:  1. No acute intracranial abnormalities are identified.  2. Findings suggest acute on chronic left maxillary sinusitis.  CAROLYN LARKIN MD       Electronically Signed and Approved By: CAROLYN LARKIN MD on  8/31/2023 at 15:11             US Abdomen Complete    Result Date: 8/31/2023  PROCEDURE: US ABDOMEN COMPLETE  COMPARISON: None  INDICATIONS: abdominal distension, asses for ascites  TECHNIQUE: Focused abdominal ultrasound to evaluate for ascites.   FINDINGS:   There is a moderate to large amount of abdominal ascites in all 4 quadrants.  The visualized liver has a subtle nodular contour suggesting cirrhosis.  IMPRESSION: Moderate to large amount of abdominal ascites.   CAROLYN LARKIN MD       Electronically Signed and Approved By: CAROLYN LARKIN MD on 8/31/2023 at 20:01             XR Chest 1 View    Result Date: 8/29/2023  PROCEDURE: XR CHEST 1 VW  COMPARISON: [facility], , XR CHEST 1 VW, 8/25/2023, 8:32.  INDICATIONS: SHORTNESS OF BREATH TODAY  FINDINGS:  The heart remains slightly enlarged.  The pulmonary vascularity is congested.  Bibasilar pulmonary infiltrate or edema is unchanged.  The costophrenic angles remain slightly blunted.  Dual lead AICD remains in place.        Findings consistent with moderate congestive heart failure, not significantly changed in comparison to 8/25/2023.  Dual lead AICD remains in place.       LORENA GARNICA MD       Electronically Signed and Approved By: LORENA GARNICA MD on 8/29/2023 at 13:02             XR Chest 1 View    Result Date: 8/25/2023  PROCEDURE: XR CHEST 1 VW  COMPARISON: None  INDICATIONS: SOA Triage Protocol/SHORTNESS OF BREATH AND DIZZINESS  FINDINGS:  Left subclavian transvenous pacemaker defibrillator is in place.  There is moderate cardiomegaly.  Upper lobe pulmonary vessels are prominent compatible with pulmonary vascular congestion.  There is bibasilar airspace disease with small bilateral pleural effusions.  Is no evidence pneumothorax.  Bony structures are unremarkable.        1. Cardiomegaly and pulmonary vascular congestion 2. Bibasilar airspace disease and small bilateral pleural effusions all of which is most likely related to  congestive heart failure and pulmonary edema       RJ MONTAGUE MD       Electronically Signed and Approved By: RJ MONTAGUE MD on 8/25/2023 at 8:42             US Paracentesis    Result Date: 9/1/2023  PROCEDURE: US PARACENTESIS  COMPARISON: None  INDICATIONS: ASCITES. 5 Dominican 7CM YUEH. 1.7 LITERS REMOVED. FLUID DUNN BROWN; CLEAR. DIAGNOSTIC.  CONSENT:   Prior to the procedure, the risks, benefits and alternatives were thoroughly explained.  This included the risk of bleeding, infection, injury to associated structures, and risk of bowel injury.  The patient expressed understanding and wished to continue.  Informed written consent was obtained.  FINDINGS: Preliminary ultrasound demonstrated appropriate fluid in the right mid abdomen.  The overlying skin was prepped and draped in normal sterile fashion.  Lidocaine was injected along the anticipated tract of the needle.   A 5 [place] 7 cm Yueh catheter was advanced into the peritoneal space.  1.7 liters dunn clear fluid was aspirated.   The catheter was removed without complication.  A sample of fluid was sent to the lab for analysis.        Successful ultrasound-guided diagnostic and therapeutic paracentesis without evidence of complication.   REGINE TOMPKINS MD       Electronically Signed and Approved By: REGINE TOMPKINS MD on 9/01/2023 at 11:31             Doppler Arterial Multi Level Lower Extremity - Bilateral CAR    Result Date: 8/31/2023    Right Conclusion: The right STEFANIA is normal. Mild digital ischemia.   Left Conclusion: The left STEFANIA is normal. Mild digital ischemia.     XR Hip With or Without Pelvis 2 - 3 View Left    Result Date: 8/31/2023  PROCEDURE: XR HIP W OR WO PELVIS 2-3 VIEW LEFT  COMPARISON: None  INDICATIONS: fell and hit hip  FINDINGS:   No evidence of acute fracture or dislocation.  Atherosclerotic vascular calcification is present.  There is a surgical screwplate in the pubic symphysis.  The midportion of the screwplate is  fractured.  There is a fracture of the 2nd screw from the left.  There appear to be 2 soft tissue anchors in the region of the left ischium.  IMPRESSION: No evidence of acute fracture or dislocation.  Hardware fractures in the pubic symphysis may be acute or chronic in nature.  Recommend a follow-up examination if symptoms persist.   CAROLYN LARKIN MD       Electronically Signed and Approved By: CAROLYN LARKIN MD on 8/31/2023 at 17:00             [x]    EKG/Telemetry   []  Cardiology/Vascular   []  Pathology  [x]  Old records  []  Other:    Assessment & Plan   Assessment / Plan     Assessment/Plan:        Assessment:  Chronic systolic CHF with acute exacerbation EF 25 to 30%  Aortic valve disease  Abdominal ascites  Hyponatremia of clinical significance  Acute metabolic encephalopathy likely secondary to hypoxemia  Essential hypertension  Mixed hyperlipidemia  Hypokalemia  Chronic anemia  Current tobacco abuser    Plan:  Labs and imaging reviewed  Replacement potassium 30 mEq x 3 rounds with additional 10 mill equivalent IV rounds x2  When replacing potassium, monitor on telemetry  Out of bed to chair  Start PT/OT  Reduce midodrine to 5 mg 3 times daily and monitor blood pressures  Continue lorazepam for alcohol withdrawal symptoms  Continue lactulose 20 g 3 times a day  Cardiology recommendations appreciated  Prophylactic thiamine and folate replacement  Iron replacement  Telemetry monitoring  Continue diuresis with torsemide 20 mg daily  Neurochecks  MRI brain still pending  Supplemental oxygen to maintain sats greater 90%  A.m. labs  Full code  DVT prophylaxis with Lovenox  Clinical course dictate further management  Discussed with nurse at the bedside    DVT prophylaxis:  Medical DVT prophylaxis orders are present.    CODE STATUS:   Level Of Support Discussed With: Patient  Code Status (Patient has no pulse and is not breathing): CPR (Attempt to Resuscitate)  Medical Interventions (Patient has pulse or is  breathing): Full Support    Electronically signed by Gaurav Espinosa MD, 09/03/23, 10:40 AM EDT.  Portions of this documentation were transcribed electronically from a voice recognition software.  I confirm all data accurately represents the service(s) I performed at today's visit.

## 2023-09-03 NOTE — PLAN OF CARE
Goal Outcome Evaluation:    Pt cooperative with care, took meds with out difficulty, no apparent signs of distress noted this shift.

## 2023-09-03 NOTE — PROGRESS NOTES
[facility]     Cardiology Progress Note    Patient Name: Bradley Crane  : 1955  MRN: [id number]  Primary Care Physician:  Rosa Isela Kim APRN  Date of admission: 2023    Subjective   Subjective     Chief Complaint: CHF exacerbation    Interval HPI:    Bradley Crane is a 68 y.o. male with past medical history as below, presented to hospital with progressive shortness of breath.  Today, patient states that he does not feel well and he is short of breath and gets chest pain that last for a minute at a time and comes and goes.  He is also having a lot of pain in his lower back.    Over the last couple days, patient has had minimal urine output recorded.    Review of Systems   All systems were reviewed and negative except as stated above    Objective   Objective     Vitals:   Temp:  [97.2 °F (36.2 °C)-98.2 °F (36.8 °C)] 98.2 °F (36.8 °C)  Heart Rate:  [] 94  Resp:  [16-18] 16  BP: ()/(67-82) 99/67  Physical Exam              Constitutional: Awake, alert, No acute distress               Eyes: PERRLA, sclerae anicteric, no conjunctival injection              HENT: NCAT, mucous membranes moist              Neck: Supple, no thyromegaly, no lymphadenopathy, trachea midline              Respiratory: Clear to auscultation bilaterally, nonlabored respirations               Cardiovascular: RRR, no murmurs, rubs, or gallops, palpable pedal pulses bilaterally              Gastrointestinal: Positive bowel sounds, soft, nontender, nondistended              Musculoskeletal: No bilateral ankle edema, no clubbing or cyanosis to extremities              Psychiatric: Appropriate affect, cooperative              Neurologic: Oriented x 3, strength symmetric in all extremities, Cranial Nerves grossly intact to confrontation, speech clear              Skin: No rashes        Scheduled Meds:ammonium lactate, , Topical, Daily  aspirin, 81 mg, Oral, Daily  atorvastatin, 20 mg, Oral, Nightly  enoxaparin, 40 mg,  Subcutaneous, Q24H  ferrous sulfate, 325 mg, Oral, Daily With Breakfast  folic acid, 1 mg, Oral, Daily  lactulose, 20 g, Oral, TID  lubiprostone, 24 mcg, Oral, BID With Meals  midodrine, 5 mg, Oral, TID AC  nicotine, 1 patch, Transdermal, Q24H  potassium chloride, 30 mEq, Oral, TID With Meals  senna-docusate sodium, 2 tablet, Oral, BID  sodium chloride, 10 mL, Intravenous, Q12H  spironolactone, 25 mg, Oral, Daily  thiamine (B-1) IV, 200 mg, Intravenous, Q8H   Followed by  [START ON 9/5/2023] thiamine, 100 mg, Oral, Daily  torsemide, 20 mg, Oral, Daily      Continuous Infusions:        Result Review    Result Review:  I have personally reviewed the results from the time of this admission to 9/3/2023 12:20 EDT and agree with these findings:  [x]  Laboratory  []  Microbiology  [x]  Radiology  [x]  EKG/Telemetry   [x]  Cardiology/Vascular   []  Pathology  []  Old records  []  Other:  Most notable findings include:     CBC          8/30/2023    04:17 8/31/2023    04:15 9/3/2023    04:03   CBC   WBC 6.90  6.55  5.50    RBC 2.89  2.98  2.93    Hemoglobin 9.9  10.4  10.4    Hematocrit 28.1  28.9  28.6    MCV 97.2  97.0  97.6    MCH 34.3  34.9  35.5    MCHC 35.2  36.0  36.4    RDW 14.6  14.6  14.6    Platelets 242  243  232      CMP          8/30/2023    04:17 8/31/2023    04:15 9/3/2023    04:03   CMP   Glucose 124  122  111    BUN 20  22  21    Creatinine 0.90  1.06  0.74    EGFR 94.2  77.4  99.9    Sodium 129  128  132    Potassium 3.5  3.8  2.9    Chloride 94  92  95    Calcium 8.8  9.1  9.2    Total Protein  6.3  6.5    Albumin  3.8  4.0    Total Bilirubin  1.1  1.4    Alkaline Phosphatase  146  130    AST (SGOT)  19  19    ALT (SGPT)  8  8    BUN/Creatinine Ratio 22.2  20.8  28.4    Anion Gap 10.9  13.7  13.1       CARDIAC LABS:      Lab 08/30/23  1414 08/29/23  1442 08/29/23  1229   PROBNP  --   --  26,219.0*   HSTROP T  --  37* 54*   PROTIME 15.2*  --   --    INR 1.20*  --   --         Assessment & Plan   Assessment  / Plan     Brief Patient Summary:  Bradley Crane is a 68 y.o. male with past medical history as below, presented to hospital with progressive shortness of breath.      Active Hospital Problems:  Active Hospital Problems    Diagnosis     **CHF exacerbation     Acute HFrEF (heart failure with reduced ejection fraction)      Acute systolic congestive heart failure exacerbation, LVEF 25 to 30%, on IV Lasix  hyponatremia  Hypertension  Mixed dyslipidemia  Anemia  Alcohol abuse  Active smoking     Plan:   IV Lasix today. Stopped aldactone and torsemide to allow for IV diuresis as he is not getting good urine output.   Maintain potassium around 4 magnesium around 2  Monitor renal function  Continue midodrine  Blood pressure currently soft and does not allow to initiate guideline directed medical therapy for HFrEF.   Evaluation for possible liver disease  He will need cardiac stress test down the line.  That can be done as an outpatient.  Other issues as per primary team.    Patient is having a lot of lower back pain and would like more medication for this. I will defer to primary team.     Thank you for the consult. We will continue to follow     CODE STATUS:   Level Of Support Discussed With: Patient  Code Status (Patient has no pulse and is not breathing): CPR (Attempt to Resuscitate)  Medical Interventions (Patient has pulse or is breathing): Full Support      Electronically signed by Jan Doshi MD, 09/03/23, 12:20 PM EDT.

## 2023-09-03 NOTE — PLAN OF CARE
Goal Outcome Evaluation:  Plan of Care Reviewed With: patient        Progress: no change  Outcome Evaluation: Patient mentation better today. Agitation, anxiety and restlessness waxed/waned throughout the day; treater per MAR per [facility] protocol. Patient treated per MAR for lower back/hip/pelvis pain; refusing to shift positioning in bed to sides. Patient ambulated in room; c/o SOA and weakness. Patient gait unsteady, heavily relying on assitance. MRI obtained today.

## 2023-09-04 LAB
ALBUMIN SERPL-MCNC: 4 G/DL (ref 3.5–5.2)
ALP SERPL-CCNC: 138 U/L (ref 39–117)
ALT SERPL W P-5'-P-CCNC: 9 U/L (ref 1–41)
ANION GAP SERPL CALCULATED.3IONS-SCNC: 10.2 MMOL/L (ref 5–15)
AST SERPL-CCNC: 23 U/L (ref 1–40)
BACTERIA FLD CULT: NORMAL
BASOPHILS # BLD AUTO: 0.07 10*3/MM3 (ref 0–0.2)
BASOPHILS NFR BLD AUTO: 1 % (ref 0–1.5)
BILIRUB CONJ SERPL-MCNC: 0.5 MG/DL (ref 0–0.3)
BILIRUB INDIRECT SERPL-MCNC: 0.8 MG/DL
BILIRUB SERPL-MCNC: 1.3 MG/DL (ref 0–1.2)
BUN SERPL-MCNC: 20 MG/DL (ref 8–23)
BUN/CREAT SERPL: 24.4 (ref 7–25)
CALCIUM SPEC-SCNC: 9.1 MG/DL (ref 8.6–10.5)
CHLORIDE SERPL-SCNC: 96 MMOL/L (ref 98–107)
CO2 SERPL-SCNC: 22.8 MMOL/L (ref 22–29)
CREAT SERPL-MCNC: 0.82 MG/DL (ref 0.76–1.27)
DEPRECATED RDW RBC AUTO: 54.9 FL (ref 37–54)
EGFRCR SERPLBLD CKD-EPI 2021: 96.9 ML/MIN/1.73
EOSINOPHIL # BLD AUTO: 0.07 10*3/MM3 (ref 0–0.4)
EOSINOPHIL NFR BLD AUTO: 1 % (ref 0.3–6.2)
ERYTHROCYTE [DISTWIDTH] IN BLOOD BY AUTOMATED COUNT: 14.9 % (ref 12.3–15.4)
GLUCOSE SERPL-MCNC: 101 MG/DL (ref 65–99)
GRAM STN SPEC: NORMAL
GRAM STN SPEC: NORMAL
HCT VFR BLD AUTO: 30.8 % (ref 37.5–51)
HGB BLD-MCNC: 10.5 G/DL (ref 13–17.7)
IMM GRANULOCYTES # BLD AUTO: 0.02 10*3/MM3 (ref 0–0.05)
IMM GRANULOCYTES NFR BLD AUTO: 0.3 % (ref 0–0.5)
LYMPHOCYTES # BLD AUTO: 1.64 10*3/MM3 (ref 0.7–3.1)
LYMPHOCYTES NFR BLD AUTO: 23.8 % (ref 19.6–45.3)
MAGNESIUM SERPL-MCNC: 2 MG/DL (ref 1.6–2.4)
MCH RBC QN AUTO: 34.4 PG (ref 26.6–33)
MCHC RBC AUTO-ENTMCNC: 34.1 G/DL (ref 31.5–35.7)
MCV RBC AUTO: 101 FL (ref 79–97)
MONOCYTES # BLD AUTO: 1.12 10*3/MM3 (ref 0.1–0.9)
MONOCYTES NFR BLD AUTO: 16.3 % (ref 5–12)
NEUTROPHILS NFR BLD AUTO: 3.97 10*3/MM3 (ref 1.7–7)
NEUTROPHILS NFR BLD AUTO: 57.6 % (ref 42.7–76)
NRBC BLD AUTO-RTO: 0 /100 WBC (ref 0–0.2)
PHOSPHATE SERPL-MCNC: 3.6 MG/DL (ref 2.5–4.5)
PLATELET # BLD AUTO: 234 10*3/MM3 (ref 140–450)
PMV BLD AUTO: 9.8 FL (ref 6–12)
POTASSIUM SERPL-SCNC: 4.8 MMOL/L (ref 3.5–5.2)
PROT SERPL-MCNC: 6.6 G/DL (ref 6–8.5)
RBC # BLD AUTO: 3.05 10*6/MM3 (ref 4.14–5.8)
SODIUM SERPL-SCNC: 129 MMOL/L (ref 136–145)
WBC NRBC COR # BLD: 6.89 10*3/MM3 (ref 3.4–10.8)

## 2023-09-04 PROCEDURE — 84100 ASSAY OF PHOSPHORUS: CPT | Performed by: FAMILY MEDICINE

## 2023-09-04 PROCEDURE — 83735 ASSAY OF MAGNESIUM: CPT | Performed by: FAMILY MEDICINE

## 2023-09-04 PROCEDURE — 25010000002 FUROSEMIDE PER 20 MG: Performed by: STUDENT IN AN ORGANIZED HEALTH CARE EDUCATION/TRAINING PROGRAM

## 2023-09-04 PROCEDURE — 85025 COMPLETE CBC W/AUTO DIFF WBC: CPT | Performed by: FAMILY MEDICINE

## 2023-09-04 PROCEDURE — 25010000002 FUROSEMIDE PER 20 MG: Performed by: FAMILY MEDICINE

## 2023-09-04 PROCEDURE — 99232 SBSQ HOSP IP/OBS MODERATE 35: CPT | Performed by: STUDENT IN AN ORGANIZED HEALTH CARE EDUCATION/TRAINING PROGRAM

## 2023-09-04 PROCEDURE — 80076 HEPATIC FUNCTION PANEL: CPT | Performed by: FAMILY MEDICINE

## 2023-09-04 PROCEDURE — 99232 SBSQ HOSP IP/OBS MODERATE 35: CPT | Performed by: FAMILY MEDICINE

## 2023-09-04 PROCEDURE — 25010000002 LORAZEPAM PER 2 MG: Performed by: HOSPITALIST

## 2023-09-04 PROCEDURE — 80048 BASIC METABOLIC PNL TOTAL CA: CPT | Performed by: FAMILY MEDICINE

## 2023-09-04 PROCEDURE — 25010000002 THIAMINE PER 100 MG: Performed by: HOSPITALIST

## 2023-09-04 RX ORDER — FUROSEMIDE 10 MG/ML
40 INJECTION INTRAMUSCULAR; INTRAVENOUS EVERY 12 HOURS
Status: DISCONTINUED | OUTPATIENT
Start: 2023-09-04 | End: 2023-09-04

## 2023-09-04 RX ORDER — FUROSEMIDE 10 MG/ML
60 INJECTION INTRAMUSCULAR; INTRAVENOUS
Status: DISCONTINUED | OUTPATIENT
Start: 2023-09-04 | End: 2023-09-07 | Stop reason: HOSPADM

## 2023-09-04 RX ADMIN — SENNOSIDES AND DOCUSATE SODIUM 2 TABLET: 50; 8.6 TABLET ORAL at 20:18

## 2023-09-04 RX ADMIN — SENNOSIDES AND DOCUSATE SODIUM 2 TABLET: 50; 8.6 TABLET ORAL at 09:48

## 2023-09-04 RX ADMIN — Medication: at 09:53

## 2023-09-04 RX ADMIN — FUROSEMIDE 40 MG: 10 INJECTION, SOLUTION INTRAMUSCULAR; INTRAVENOUS at 13:16

## 2023-09-04 RX ADMIN — ASPIRIN 81 MG: 81 TABLET, COATED ORAL at 09:48

## 2023-09-04 RX ADMIN — HYDROCODONE BITARTRATE AND ACETAMINOPHEN 1 TABLET: 10; 325 TABLET ORAL at 10:53

## 2023-09-04 RX ADMIN — FUROSEMIDE 60 MG: 10 INJECTION, SOLUTION INTRAMUSCULAR; INTRAVENOUS at 17:23

## 2023-09-04 RX ADMIN — Medication 10 ML: at 09:57

## 2023-09-04 RX ADMIN — FOLIC ACID 1 MG: 1 TABLET ORAL at 09:56

## 2023-09-04 RX ADMIN — ACETAMINOPHEN 650 MG: 325 TABLET ORAL at 20:18

## 2023-09-04 RX ADMIN — Medication 10 ML: at 20:18

## 2023-09-04 RX ADMIN — HYDROCODONE BITARTRATE AND ACETAMINOPHEN 1 TABLET: 10; 325 TABLET ORAL at 23:04

## 2023-09-04 RX ADMIN — LACTULOSE 20 G: 20 SOLUTION ORAL at 09:48

## 2023-09-04 RX ADMIN — LUBIPROSTONE 24 MCG: 24 CAPSULE, GELATIN COATED ORAL at 09:48

## 2023-09-04 RX ADMIN — ATORVASTATIN CALCIUM 20 MG: 20 TABLET, FILM COATED ORAL at 20:18

## 2023-09-04 RX ADMIN — LACTULOSE 20 G: 20 SOLUTION ORAL at 17:23

## 2023-09-04 RX ADMIN — Medication 10 MG: at 23:09

## 2023-09-04 RX ADMIN — HYDROCODONE BITARTRATE AND ACETAMINOPHEN 1 TABLET: 10; 325 TABLET ORAL at 17:23

## 2023-09-04 RX ADMIN — LORAZEPAM 2 MG: 2 INJECTION INTRAMUSCULAR; INTRAVENOUS at 20:18

## 2023-09-04 RX ADMIN — LUBIPROSTONE 24 MCG: 24 CAPSULE, GELATIN COATED ORAL at 17:23

## 2023-09-04 RX ADMIN — THIAMINE HYDROCHLORIDE 200 MG: 100 INJECTION, SOLUTION INTRAMUSCULAR; INTRAVENOUS at 13:16

## 2023-09-04 RX ADMIN — MIDODRINE HYDROCHLORIDE 5 MG: 5 TABLET ORAL at 10:53

## 2023-09-04 RX ADMIN — MIDODRINE HYDROCHLORIDE 5 MG: 5 TABLET ORAL at 09:48

## 2023-09-04 RX ADMIN — MIDODRINE HYDROCHLORIDE 5 MG: 5 TABLET ORAL at 17:23

## 2023-09-04 RX ADMIN — LACTULOSE 20 G: 20 SOLUTION ORAL at 20:18

## 2023-09-04 RX ADMIN — FERROUS SULFATE TAB 325 MG (65 MG ELEMENTAL FE) 325 MG: 325 (65 FE) TAB at 09:48

## 2023-09-04 RX ADMIN — LORAZEPAM 1 MG: 0.5 TABLET ORAL at 09:56

## 2023-09-04 RX ADMIN — THIAMINE HYDROCHLORIDE 200 MG: 100 INJECTION, SOLUTION INTRAMUSCULAR; INTRAVENOUS at 22:59

## 2023-09-04 RX ADMIN — THIAMINE HYDROCHLORIDE 200 MG: 100 INJECTION, SOLUTION INTRAMUSCULAR; INTRAVENOUS at 06:35

## 2023-09-04 RX ADMIN — NICOTINE 1 PATCH: 21 PATCH, EXTENDED RELEASE TRANSDERMAL at 09:48

## 2023-09-04 NOTE — PLAN OF CARE
Goal Outcome Evaluation:  Pt slept the majority of the night after medication given per CIWA.  Potassium came back at 4.1 MEQ, no further replacement on evening.  Care plan is ongoing.

## 2023-09-04 NOTE — PROGRESS NOTES
[facility]   Hospitalist Progress Note  Date: 2023  Patient Name: Bradley Crane  : 1955  MRN: [id number]  Date of admission: 2023      Subjective   Subjective    Chief complaint: Shortness of breath    Summary:  68-year-old male with history of chronic systolic CHF, CAD, dyslipidemia, current alcohol consumer, concern for liver cirrhosis secondary to alcoholism, current tobacco smoker, chronic anemia, was hospitalized on 2023 with chief complaint of shortness of breath, hospitalized with acute decompensation of chronic systolic CHF, cardiology consulted, with concern for acute hepatic encephalopathy, status post paracentesis 2023, 1.7 L of fluid drained.  On diuretics for CHF exacerbation.  Electrolytes being replaced.  Started lactulose to help promote bowel movements.    Interval follow-up: Seen and examined this morning, no acute distress, no acute major night events, no family at the bedside, his mentation is still variable, sometimes he is cooperative, sometimes he is not.  He says he thinks he is dying,.  Consulted palliative care given his heart dysfunction, he is not sure if he wants to proceed with treatment.  He wants to get better and return home to his family.  He was very weak, requiring assistance with ambulation.  He is still very agitated, anxiety is acute factor, he remains restless and waxes and wanes throughout the day.  Continues to complain of diffuse body aches.  Still shortness of breath with exertion activity.  His appetite is improving.  His gait is unsteady.  He probably would require rehab placement but I am not sure he will go.  He has been scoring higher on CIWA scores up to 9 over the past 24 hours.  Potassium 4.8, sodium 129, bicarb 22, total bilirubin 1.3, white blood cell count 6000, hemoglobin 10.5.  Has not had much urine output over the past 24 hours, 475 mL.  Diuretics have been revised.  Telemetry reviewed, heart rate in the 100s sinus  rhythm.    Review of systems:  All systems reviewed and negative except for generalized fatigue, generalized weakness, intermittent cough, shortness of breath primarily with exertional activity, intermittent confusion, unsteady gait    Objective   Objective     Vitals:   Temp:  [97.1 °F (36.2 °C)-98.8 °F (37.1 °C)] 98.1 °F (36.7 °C)  Heart Rate:  [] 104  Resp:  [16-18] 18  BP: ()/(57-81) 103/78  Flow (L/min):  [2] 2  Physical Exam   General: Awake, alert, NAD, picking at breakfast  HENT: NCAT, MMM  Eyes: pupils equal, no scleral icterus  Cardiovascular: RRR, no murmurs   Pulmonary: Crackles at the bases bilaterally; no wheezes; no conversational dyspnea  Gastrointestinal: S/ND/NT, +BS  Musculoskeletal: No gross deformities  Skin: No jaundice, no rash on exposed skin appreciated  Neuro: CN II through XII grossly intact; speech clear; no tremor  Psych: Labile mood  : No Dos Santos catheter; no suprapubic tenderness      Result Review    Result Review:  I have personally reviewed the pertinent results from the past 24 hours to 9/4/2023 11:41 EDT and agree with these findings:  [x]  Laboratory   CBC          8/31/2023    04:15 9/3/2023    04:03 9/4/2023    04:35   CBC   WBC 6.55  5.50  6.89    RBC 2.98  2.93  3.05    Hemoglobin 10.4  10.4  10.5    Hematocrit 28.9  28.6  30.8    MCV 97.0  97.6  101.0    MCH 34.9  35.5  34.4    MCHC 36.0  36.4  34.1    RDW 14.6  14.6  14.9    Platelets 243  232  234      BMP          8/31/2023    04:15 9/3/2023    04:03 9/3/2023    22:06 9/4/2023    04:35   BMP   BUN 22  21   20    Creatinine 1.06  0.74   0.82    Sodium 128  132   129    Potassium 3.8  2.9  4.1  4.8    Chloride 92  95   96    CO2 22.3  23.9   22.8    Calcium 9.1  9.2   9.1    LIVER FUNCTION TESTS:      Lab 09/04/23  0435 09/03/23  0403 08/31/23  0415 08/29/23  1229   TOTAL PROTEIN 6.6 6.5 6.3 7.2   ALBUMIN 4.0 4.0 3.8 4.4   GLOBULIN  --   --   --  2.8   ALT (SGPT) 9 8 8 10   AST (SGOT) 23 19 19 22   BILIRUBIN  1.3* 1.4* 1.1 2.1*   INDIRECT BILIRUBIN 0.8 0.8 0.5  --    BILIRUBIN DIRECT 0.5* 0.6* 0.6*  --    ALK PHOS 138* 130* 146* 137*         [x]  Microbiology No results found for: ACANTHNAEG, AFBCX, BPERTUSSISCX, BLOODCX  No results found for: BCIDPCR, CXREFLEX, CSFCX, CULTURETIS  No results found for: CULTURES, HSVCX, URCX  No results found for: EYECULTURE, GCCX, HSVCULTURE, LABHSV  No results found for: LEGIONELLA, MRSACX, MUMPSCX, MYCOPLASCX  No results found for: NOCARDIACX, STOOLCX  No results found for: THROATCX, UNSTIMCULT, URINECX, CULTURE, VZVCULTUR  No results found for: VIRALCULTU, WOUNDCX    [x]  Radiology Adult Transthoracic Echo Complete w/ Color, Spectral and Contrast if necessary per protocol    Result Date: 8/30/2023    Left ventricular ejection fraction appears to be 26 - 30%.   The left ventricular cavity is mildly dilated.   Left ventricular diastolic function was indeterminate.   The right ventricular cavity is mild to moderately dilated.   The left atrial cavity is moderately dilated.   The right atrial cavity is moderately  dilated.   Moderate tricuspid valve regurgitation is present.   Estimated right ventricular systolic pressure from tricuspid regurgitation is normal (<35 mmHg).   Aortic valve appears sclerotic with reduced excursion.  Max/mean pressure gradient was 20/11 mmHg.  Aortic valve area was 0.93 cm².  Could be consistent with low-flow, low gradient AAS.     CT Head Without Contrast    Result Date: 8/31/2023  PROCEDURE: CT HEAD WO CONTRAST  COMPARISON:  None INDICATIONS: fell and hit back of head  PROTOCOL:   Standard imaging protocol performed    RADIATION:   DLP: 861mGy*cm   MA and/or KV was adjusted to minimize radiation dose.     TECHNIQUE: Axial images of the head without intravenous contrast.  FINDINGS:  There is generalized atrophy.  There are chronic appearing changes in the white matter.  The  The ventricles have a normal size and configuration. There is no evidence of acute  intracranial hemorrhage, mass or midline shift. No extra-axial fluid collections are identified. There are no skull fractures.  There is chronic left maxillary sinusitis an air-fluid level suggesting superimposed acute sinusitis.  There is minimal polypoid mucosal thickening in the right maxillary sinus.  The visualized paranasal sinuses and mastoid air cells are otherwise grossly clear.  IMPRESSION:  1. No acute intracranial abnormalities are identified.  2. Findings suggest acute on chronic left maxillary sinusitis.  CAROLYN LARKIN MD       Electronically Signed and Approved By: CAROLYN LARKIN MD on 8/31/2023 at 15:11             US Abdomen Complete    Result Date: 8/31/2023  PROCEDURE: US ABDOMEN COMPLETE  COMPARISON: None  INDICATIONS: abdominal distension, asses for ascites  TECHNIQUE: Focused abdominal ultrasound to evaluate for ascites.   FINDINGS:   There is a moderate to large amount of abdominal ascites in all 4 quadrants.  The visualized liver has a subtle nodular contour suggesting cirrhosis.  IMPRESSION: Moderate to large amount of abdominal ascites.   CAROLYN LARKIN MD       Electronically Signed and Approved By: CAROLYN LARKIN MD on 8/31/2023 at 20:01             XR Chest 1 View    Result Date: 8/29/2023  PROCEDURE: XR CHEST 1 VW  COMPARISON: [facility], , XR CHEST 1 VW, 8/25/2023, 8:32.  INDICATIONS: SHORTNESS OF BREATH TODAY  FINDINGS:  The heart remains slightly enlarged.  The pulmonary vascularity is congested.  Bibasilar pulmonary infiltrate or edema is unchanged.  The costophrenic angles remain slightly blunted.  Dual lead AICD remains in place.        Findings consistent with moderate congestive heart failure, not significantly changed in comparison to 8/25/2023.  Dual lead AICD remains in place.       LORENA GARNICA MD       Electronically Signed and Approved By: LORENA GARNICA MD on 8/29/2023 at 13:02             XR Chest 1 View    Result Date: 8/25/2023  PROCEDURE: XR  CHEST 1 VW  COMPARISON: None  INDICATIONS: SOA Triage Protocol/SHORTNESS OF BREATH AND DIZZINESS  FINDINGS:  Left subclavian transvenous pacemaker defibrillator is in place.  There is moderate cardiomegaly.  Upper lobe pulmonary vessels are prominent compatible with pulmonary vascular congestion.  There is bibasilar airspace disease with small bilateral pleural effusions.  Is no evidence pneumothorax.  Bony structures are unremarkable.        1. Cardiomegaly and pulmonary vascular congestion 2. Bibasilar airspace disease and small bilateral pleural effusions all of which is most likely related to congestive heart failure and pulmonary edema       RJ MONTAGUE MD       Electronically Signed and Approved By: RJ MONTAGUE MD on 8/25/2023 at 8:42             US Paracentesis    Result Date: 9/1/2023  PROCEDURE: US PARACENTESIS  COMPARISON: None  INDICATIONS: ASCITES. 5 Croatian 7CM YUEH. 1.7 LITERS REMOVED. FLUID DUNN BROWN; CLEAR. DIAGNOSTIC.  CONSENT:   Prior to the procedure, the risks, benefits and alternatives were thoroughly explained.  This included the risk of bleeding, infection, injury to associated structures, and risk of bowel injury.  The patient expressed understanding and wished to continue.  Informed written consent was obtained.  FINDINGS: Preliminary ultrasound demonstrated appropriate fluid in the right mid abdomen.  The overlying skin was prepped and draped in normal sterile fashion.  Lidocaine was injected along the anticipated tract of the needle.   A 5 Pashto 7 cm Yueh catheter was advanced into the peritoneal space.  1.7 liters dunn clear fluid was aspirated.   The catheter was removed without complication.  A sample of fluid was sent to the lab for analysis.        Successful ultrasound-guided diagnostic and therapeutic paracentesis without evidence of complication.   REGINE TOMPKINS MD       Electronically Signed and Approved By: REGINE TOMPKINS MD on 9/01/2023 at 11:31              Doppler Arterial Multi Level Lower Extremity - Bilateral CAR    Result Date: 8/31/2023    Right Conclusion: The right STEFANIA is normal. Mild digital ischemia.   Left Conclusion: The left STEFANIA is normal. Mild digital ischemia.     XR Hip With or Without Pelvis 2 - 3 View Left    Result Date: 8/31/2023  PROCEDURE: XR HIP W OR WO PELVIS 2-3 VIEW LEFT  COMPARISON: None  INDICATIONS: fell and hit hip  FINDINGS:   No evidence of acute fracture or dislocation.  Atherosclerotic vascular calcification is present.  There is a surgical screwplate in the pubic symphysis.  The midportion of the screwplate is fractured.  There is a fracture of the 2nd screw from the left.  There appear to be 2 soft tissue anchors in the region of the left ischium.  IMPRESSION: No evidence of acute fracture or dislocation.  Hardware fractures in the pubic symphysis may be acute or chronic in nature.  Recommend a follow-up examination if symptoms persist.   CAROLYN LARKIN MD       Electronically Signed and Approved By: CAROLYN LARKIN MD on 8/31/2023 at 17:00             [x]    EKG/Telemetry   []  Cardiology/Vascular   []  Pathology  [x]  Old records  []  Other:    Assessment & Plan   Assessment / Plan     Assessment/Plan:        Assessment:  Chronic systolic CHF with acute exacerbation EF 25 to 30%  Aortic valve disease  Abdominal ascites  Hyponatremia of clinical significance  Acute metabolic encephalopathy likely secondary to hypoxemia  Essential hypertension  Mixed hyperlipidemia  Hypokalemia  Chronic anemia  Current tobacco abuser    Plan:  Labs and imaging reviewed  Lasix reordered 40 mg IV twice daily  Watching serum potassium with aggressive diuresis  Out of bed to chair  Start PT/OT  Continuing midodrine 5 mg 3 times daily and monitor blood pressures  Continue lorazepam for alcohol withdrawal symptoms  Continue lactulose 20 g 3 times a day  Cardiology recommendations appreciated  Palliative care consult to establish goals of  care  Prophylactic thiamine and folate replacement  Iron replacement  Telemetry monitoring  Neurochecks  MRI brain reviewed, global cerebral and cerebellar volume loss, no evidence of infarction  Supplemental oxygen to maintain sats greater 90% wean per tolerance  A.m. labs  Full code  DVT prophylaxis with Lovenox  Clinical course dictate further management  Discussed with nurse at the bedside    DVT prophylaxis:  Medical DVT prophylaxis orders are present.    CODE STATUS:   Level Of Support Discussed With: Patient  Code Status (Patient has no pulse and is not breathing): CPR (Attempt to Resuscitate)  Medical Interventions (Patient has pulse or is breathing): Full Support    Electronically signed by Gaurav Espinosa MD, 09/04/23, 11:46 AM EDT.    Portions of this documentation were transcribed electronically from a voice recognition software.  I confirm all data accurately represents the service(s) I performed at today's visit.

## 2023-09-04 NOTE — PLAN OF CARE
Goal Outcome Evaluation:   VSS. No acute changes during shift. Family came to visit. Patient sat in chair most of day. Patient more accepting and cooperative with care today.

## 2023-09-04 NOTE — PROGRESS NOTES
[facility]     Cardiology Progress Note    Patient Name: Bradley Crane  : 1955  MRN: [id number]  Primary Care Physician:  Rosa Isela Kim APRN  Date of admission: 2023    Subjective   Subjective     Chief Complaint: CHF exacerbation    Interval HPI:    Bradley Crane is a 68 y.o. male with past medical history as below, presented to hospital with progressive shortness of breath.      Today, he feels a little better but is still short of breath.  He says that he is not peeing a lot this morning.  He also still endorses lower back pain and wants to go outside.      Review of Systems   All systems were reviewed and negative except as stated above    Objective   Objective     Vitals:   Temp:  [97.1 °F (36.2 °C)-98.8 °F (37.1 °C)] 98.1 °F (36.7 °C)  Heart Rate:  [] 104  Resp:  [16-18] 18  BP: ()/(57-81) 103/78  Flow (L/min):  [2] 2  Physical Exam              Constitutional: Awake, alert, No acute distress               Eyes: PERRLA, sclerae anicteric, no conjunctival injection              HENT: NCAT, mucous membranes moist              Neck: Supple, no thyromegaly, no lymphadenopathy, trachea midline              Respiratory: Clear to auscultation bilaterally, nonlabored respirations               Cardiovascular: RRR, no murmurs, rubs, or gallops, palpable pedal pulses bilaterally              Gastrointestinal: Positive bowel sounds, soft, nontender, nondistended              Musculoskeletal: No bilateral ankle edema, no clubbing or cyanosis to extremities              Psychiatric: Appropriate affect, cooperative              Neurologic: Oriented x 3, strength symmetric in all extremities, Cranial Nerves grossly intact to confrontation, speech clear              Skin: No rashes        Scheduled Meds:ammonium lactate, , Topical, Daily  aspirin, 81 mg, Oral, Daily  atorvastatin, 20 mg, Oral, Nightly  enoxaparin, 40 mg, Subcutaneous, Q24H  ferrous sulfate, 325 mg, Oral, Daily With  Breakfast  folic acid, 1 mg, Oral, Daily  furosemide, 40 mg, Intravenous, Q12H  lactulose, 20 g, Oral, TID  lubiprostone, 24 mcg, Oral, BID With Meals  midodrine, 5 mg, Oral, TID AC  nicotine, 1 patch, Transdermal, Q24H  senna-docusate sodium, 2 tablet, Oral, BID  sodium chloride, 10 mL, Intravenous, Q12H  thiamine (B-1) IV, 200 mg, Intravenous, Q8H   Followed by  [START ON 9/5/2023] thiamine, 100 mg, Oral, Daily      Continuous Infusions:        Result Review    Result Review:  I have personally reviewed the results from the time of this admission to 9/4/2023 12:29 EDT and agree with these findings:  [x]  Laboratory  []  Microbiology  [x]  Radiology  [x]  EKG/Telemetry   [x]  Cardiology/Vascular   []  Pathology  []  Old records  []  Other:  Most notable findings include:     CBC          8/31/2023    04:15 9/3/2023    04:03 9/4/2023    04:35   CBC   WBC 6.55  5.50  6.89    RBC 2.98  2.93  3.05    Hemoglobin 10.4  10.4  10.5    Hematocrit 28.9  28.6  30.8    MCV 97.0  97.6  101.0    MCH 34.9  35.5  34.4    MCHC 36.0  36.4  34.1    RDW 14.6  14.6  14.9    Platelets 243  232  234      CMP          8/31/2023    04:15 9/3/2023    04:03 9/3/2023    22:06 9/4/2023    04:35   CMP   Glucose 122  111   101    BUN 22  21   20    Creatinine 1.06  0.74   0.82    EGFR 77.4  99.9   96.9    Sodium 128  132   129    Potassium 3.8  2.9  4.1  4.8    Chloride 92  95   96    Calcium 9.1  9.2   9.1    Total Protein 6.3  6.5   6.6    Albumin 3.8  4.0   4.0    Total Bilirubin 1.1  1.4   1.3    Alkaline Phosphatase 146  130   138    AST (SGOT) 19  19   23    ALT (SGPT) 8  8   9    BUN/Creatinine Ratio 20.8  28.4   24.4    Anion Gap 13.7  13.1   10.2       CARDIAC LABS:      Lab 08/30/23  1414 08/29/23  1442 08/29/23  1229   PROBNP  --   --  26,219.0*   HSTROP T  --  37* 54*   PROTIME 15.2*  --   --    INR 1.20*  --   --         Assessment & Plan   Assessment / Plan     Brief Patient Summary:  Bradley Crane is a 68 y.o. male with past  medical history as below, presented to hospital with progressive shortness of breath.      Active Hospital Problems:  Active Hospital Problems    Diagnosis     **CHF exacerbation     Acute HFrEF (heart failure with reduced ejection fraction)      Acute systolic congestive heart failure exacerbation, LVEF 25 to 30%, on IV Lasix  hyponatremia  Hypertension  Mixed dyslipidemia  Anemia  Alcohol abuse  Active smoking     Plan:   Increase Lasix to 60 mg IV twice daily as patient is not urinating a lot on current dose.  He is still short of breath though less so than yesterday.  Maintain potassium around 4 magnesium around 2  Monitor renal function  Continue midodrine  Blood pressure currently soft and does not allow to initiate guideline directed medical therapy for HFrEF.   Evaluation for possible liver disease  He will need cardiac stress test down the line.  That can be done as an outpatient.  Other issues as per primary team.      Thank you for the consult. We will continue to follow     CODE STATUS:   Level Of Support Discussed With: Patient  Code Status (Patient has no pulse and is not breathing): CPR (Attempt to Resuscitate)  Medical Interventions (Patient has pulse or is breathing): Full Support      Electronically signed by Jan Doshi MD, 09/03/23, 12:20 PM EDT.

## 2023-09-05 PROBLEM — G89.29 CHRONIC BACK PAIN: Status: ACTIVE | Noted: 2018-10-12

## 2023-09-05 PROBLEM — R18.8 ABDOMINAL ASCITES: Status: ACTIVE | Noted: 2023-03-01

## 2023-09-05 PROBLEM — J90 BILATERAL PLEURAL EFFUSION: Status: ACTIVE | Noted: 2023-03-01

## 2023-09-05 PROBLEM — S32.020A COMPRESSION FRACTURE OF L2 LUMBAR VERTEBRA: Status: ACTIVE | Noted: 2023-09-05

## 2023-09-05 PROBLEM — R07.89 CHEST PAIN, ATYPICAL: Status: ACTIVE | Noted: 2023-08-29

## 2023-09-05 PROBLEM — I27.20 PULMONARY HTN: Status: ACTIVE | Noted: 2023-09-05

## 2023-09-05 PROBLEM — M54.9 CHRONIC BACK PAIN: Status: ACTIVE | Noted: 2018-10-12

## 2023-09-05 PROBLEM — I25.10 CAD, MULTIPLE VESSEL: Status: ACTIVE | Noted: 2023-09-05

## 2023-09-05 LAB
ALBUMIN SERPL-MCNC: 3.9 G/DL (ref 3.5–5.2)
ALP SERPL-CCNC: 169 U/L (ref 39–117)
ALT SERPL W P-5'-P-CCNC: 9 U/L (ref 1–41)
ANION GAP SERPL CALCULATED.3IONS-SCNC: 13.1 MMOL/L (ref 5–15)
AST SERPL-CCNC: 20 U/L (ref 1–40)
BASE EXCESS BLDA CALC-SCNC: -2.7 MMOL/L (ref -2–2)
BASE EXCESS BLDV CALC-SCNC: -0.6 MMOL/L (ref -2–2)
BASE EXCESS BLDV CALC-SCNC: -0.8 MMOL/L (ref -2–2)
BASOPHILS # BLD AUTO: 0.07 10*3/MM3 (ref 0–0.2)
BASOPHILS NFR BLD AUTO: 1.1 % (ref 0–1.5)
BDY SITE: ABNORMAL
BILIRUB CONJ SERPL-MCNC: 0.6 MG/DL (ref 0–0.3)
BILIRUB INDIRECT SERPL-MCNC: 0.5 MG/DL
BILIRUB SERPL-MCNC: 1.1 MG/DL (ref 0–1.2)
BUN SERPL-MCNC: 18 MG/DL (ref 8–23)
BUN/CREAT SERPL: 24.7 (ref 7–25)
CALCIUM SPEC-SCNC: 9 MG/DL (ref 8.6–10.5)
CHLORIDE SERPL-SCNC: 97 MMOL/L (ref 98–107)
CO2 SERPL-SCNC: 22.9 MMOL/L (ref 22–29)
COHGB MFR BLD: 0.2 % (ref 0–1.5)
COHGB MFR BLD: 0.3 % (ref 0–1.5)
COHGB MFR BLD: 0.3 % (ref 0–1.5)
CREAT SERPL-MCNC: 0.73 MG/DL (ref 0.76–1.27)
DEPRECATED RDW RBC AUTO: 51.4 FL (ref 37–54)
EGFRCR SERPLBLD CKD-EPI 2021: 100.3 ML/MIN/1.73
EOSINOPHIL # BLD AUTO: 0.08 10*3/MM3 (ref 0–0.4)
EOSINOPHIL NFR BLD AUTO: 1.3 % (ref 0.3–6.2)
ERYTHROCYTE [DISTWIDTH] IN BLOOD BY AUTOMATED COUNT: 14.4 % (ref 12.3–15.4)
FHHB: 2.4 % (ref 0–5)
FHHB: 39.4 % (ref 0–5)
FHHB: 50.1 % (ref 0–5)
GAS FLOW AIRWAY: 3 LPM
GLUCOSE SERPL-MCNC: 101 MG/DL (ref 65–99)
HCO3 BLDA-SCNC: 20.3 MMOL/L (ref 22–26)
HCO3 BLDV-SCNC: 24.2 MMOL/L (ref 22–26)
HCO3 BLDV-SCNC: 24.2 MMOL/L (ref 22–26)
HCT VFR BLD AUTO: 27.7 % (ref 37.5–51)
HCV AB SER DONR QL: NORMAL
HGB BLD-MCNC: 9.7 G/DL (ref 13–17.7)
HGB BLDA-MCNC: 10.3 G/DL (ref 13.8–16.4)
HGB BLDA-MCNC: 10.7 G/DL (ref 13.8–16.4)
HGB BLDA-MCNC: 11 G/DL (ref 13.8–16.4)
HIV1+2 AB SER QL: NORMAL
IMM GRANULOCYTES # BLD AUTO: 0.01 10*3/MM3 (ref 0–0.05)
IMM GRANULOCYTES NFR BLD AUTO: 0.2 % (ref 0–0.5)
INHALED O2 CONCENTRATION: 32 %
LACTATE BLDA-SCNC: ABNORMAL MMOL/L
LYMPHOCYTES # BLD AUTO: 1.53 10*3/MM3 (ref 0.7–3.1)
LYMPHOCYTES NFR BLD AUTO: 24.6 % (ref 19.6–45.3)
MAGNESIUM SERPL-MCNC: 1.8 MG/DL (ref 1.6–2.4)
MCH RBC QN AUTO: 34.4 PG (ref 26.6–33)
MCHC RBC AUTO-ENTMCNC: 35 G/DL (ref 31.5–35.7)
MCV RBC AUTO: 98.2 FL (ref 79–97)
METHGB BLD QL: 0 % (ref 0–1.5)
METHGB BLD QL: 0.1 % (ref 0–1.5)
METHGB BLD QL: 0.2 % (ref 0–1.5)
MODALITY: ABNORMAL
MONOCYTES # BLD AUTO: 0.94 10*3/MM3 (ref 0.1–0.9)
MONOCYTES NFR BLD AUTO: 15.1 % (ref 5–12)
NEUTROPHILS NFR BLD AUTO: 3.58 10*3/MM3 (ref 1.7–7)
NEUTROPHILS NFR BLD AUTO: 57.7 % (ref 42.7–76)
NOTE: ABNORMAL
NOTE: ABNORMAL
NRBC BLD AUTO-RTO: 0 /100 WBC (ref 0–0.2)
OXYHGB MFR BLDV: 49.7 % (ref 94–99)
OXYHGB MFR BLDV: 60.2 % (ref 94–99)
OXYHGB MFR BLDV: 97.1 % (ref 94–99)
PCO2 BLDA: 29.5 MM HG (ref 35–45)
PCO2 BLDV: 40.5 MM HG (ref 41–51)
PCO2 BLDV: 41 MM HG (ref 41–51)
PH BLDA: 7.46 PH UNITS (ref 7.35–7.45)
PH BLDV: 7.39 PH UNITS (ref 7.31–7.41)
PH BLDV: 7.39 PH UNITS (ref 7.31–7.41)
PHOSPHATE SERPL-MCNC: 4.3 MG/DL (ref 2.5–4.5)
PLATELET # BLD AUTO: 219 10*3/MM3 (ref 140–450)
PMV BLD AUTO: 9.5 FL (ref 6–12)
PO2 BLD: 367 MM[HG] (ref 0–500)
PO2 BLDA: 117.3 MM HG (ref 80–100)
PO2 BLDV: <40.5 MM HG (ref 35–42)
PO2 BLDV: <40.5 MM HG (ref 35–42)
POTASSIUM SERPL-SCNC: 3.3 MMOL/L (ref 3.5–5.2)
POTASSIUM SERPL-SCNC: 3.7 MMOL/L (ref 3.5–5.2)
PROT SERPL-MCNC: 6.4 G/DL (ref 6–8.5)
RBC # BLD AUTO: 2.82 10*6/MM3 (ref 4.14–5.8)
SAO2 % BLDCOA: 97.6 % (ref 95–99)
SAO2 % BLDCOV: 49.8 % (ref 45–75)
SAO2 % BLDCOV: 60.4 % (ref 45–75)
SODIUM SERPL-SCNC: 133 MMOL/L (ref 136–145)
WBC NRBC COR # BLD: 6.21 10*3/MM3 (ref 3.4–10.8)

## 2023-09-05 PROCEDURE — 25010000002 THIAMINE PER 100 MG: Performed by: HOSPITALIST

## 2023-09-05 PROCEDURE — C1769 GUIDE WIRE: HCPCS | Performed by: INTERNAL MEDICINE

## 2023-09-05 PROCEDURE — 97116 GAIT TRAINING THERAPY: CPT

## 2023-09-05 PROCEDURE — 25010000002 HEPARIN (PORCINE) PER 1000 UNITS: Performed by: INTERNAL MEDICINE

## 2023-09-05 PROCEDURE — 88184 FLOWCYTOMETRY/ TC 1 MARKER: CPT

## 2023-09-05 PROCEDURE — 82820 HEMOGLOBIN-OXYGEN AFFINITY: CPT | Performed by: INTERNAL MEDICINE

## 2023-09-05 PROCEDURE — 83735 ASSAY OF MAGNESIUM: CPT | Performed by: FAMILY MEDICINE

## 2023-09-05 PROCEDURE — 85025 COMPLETE CBC W/AUTO DIFF WBC: CPT | Performed by: FAMILY MEDICINE

## 2023-09-05 PROCEDURE — 83050 HGB METHEMOGLOBIN QUAN: CPT | Performed by: INTERNAL MEDICINE

## 2023-09-05 PROCEDURE — G0432 EIA HIV-1/HIV-2 SCREEN: HCPCS | Performed by: FAMILY MEDICINE

## 2023-09-05 PROCEDURE — 93460 R&L HRT ART/VENTRICLE ANGIO: CPT | Performed by: INTERNAL MEDICINE

## 2023-09-05 PROCEDURE — 80076 HEPATIC FUNCTION PANEL: CPT | Performed by: FAMILY MEDICINE

## 2023-09-05 PROCEDURE — 82805 BLOOD GASES W/O2 SATURATION: CPT | Performed by: INTERNAL MEDICINE

## 2023-09-05 PROCEDURE — 84132 ASSAY OF SERUM POTASSIUM: CPT | Performed by: FAMILY MEDICINE

## 2023-09-05 PROCEDURE — 25510000001 IOPAMIDOL PER 1 ML: Performed by: INTERNAL MEDICINE

## 2023-09-05 PROCEDURE — 84100 ASSAY OF PHOSPHORUS: CPT | Performed by: FAMILY MEDICINE

## 2023-09-05 PROCEDURE — 82105 ALPHA-FETOPROTEIN SERUM: CPT | Performed by: INTERNAL MEDICINE

## 2023-09-05 PROCEDURE — 97530 THERAPEUTIC ACTIVITIES: CPT

## 2023-09-05 PROCEDURE — 88185 FLOWCYTOMETRY/TC ADD-ON: CPT | Performed by: INTERNAL MEDICINE

## 2023-09-05 PROCEDURE — 86803 HEPATITIS C AB TEST: CPT | Performed by: FAMILY MEDICINE

## 2023-09-05 PROCEDURE — 99239 HOSP IP/OBS DSCHRG MGMT >30: CPT | Performed by: FAMILY MEDICINE

## 2023-09-05 PROCEDURE — 80048 BASIC METABOLIC PNL TOTAL CA: CPT | Performed by: FAMILY MEDICINE

## 2023-09-05 PROCEDURE — C1894 INTRO/SHEATH, NON-LASER: HCPCS | Performed by: INTERNAL MEDICINE

## 2023-09-05 PROCEDURE — 99232 SBSQ HOSP IP/OBS MODERATE 35: CPT | Performed by: INTERNAL MEDICINE

## 2023-09-05 PROCEDURE — 82375 ASSAY CARBOXYHB QUANT: CPT | Performed by: INTERNAL MEDICINE

## 2023-09-05 PROCEDURE — 25010000002 FUROSEMIDE PER 20 MG: Performed by: STUDENT IN AN ORGANIZED HEALTH CARE EDUCATION/TRAINING PROGRAM

## 2023-09-05 RX ORDER — POTASSIUM CHLORIDE 20 MEQ/1
40 TABLET, EXTENDED RELEASE ORAL EVERY 4 HOURS
Status: DISCONTINUED | OUTPATIENT
Start: 2023-09-05 | End: 2023-09-05

## 2023-09-05 RX ORDER — DIAPER,BRIEF,INFANT-TODD,DISP
1 EACH MISCELLANEOUS EVERY 12 HOURS SCHEDULED
Status: DISCONTINUED | OUTPATIENT
Start: 2023-09-05 | End: 2023-09-07 | Stop reason: HOSPADM

## 2023-09-05 RX ORDER — ALBUMIN (HUMAN) 12.5 G/50ML
25 SOLUTION INTRAVENOUS
Status: DISCONTINUED | OUTPATIENT
Start: 2023-09-05 | End: 2023-09-06

## 2023-09-05 RX ORDER — NITROGLYCERIN 0.4 MG/1
0.4 TABLET SUBLINGUAL
Status: DISCONTINUED | OUTPATIENT
Start: 2023-09-05 | End: 2023-09-07 | Stop reason: HOSPADM

## 2023-09-05 RX ORDER — HEPARIN SODIUM 1000 [USP'U]/ML
INJECTION, SOLUTION INTRAVENOUS; SUBCUTANEOUS
Status: DISCONTINUED | OUTPATIENT
Start: 2023-09-05 | End: 2023-09-05 | Stop reason: HOSPADM

## 2023-09-05 RX ORDER — POTASSIUM CHLORIDE 20 MEQ/1
40 TABLET, EXTENDED RELEASE ORAL ONCE
Status: COMPLETED | OUTPATIENT
Start: 2023-09-05 | End: 2023-09-05

## 2023-09-05 RX ORDER — ACETAMINOPHEN 325 MG/1
650 TABLET ORAL EVERY 4 HOURS PRN
Status: DISCONTINUED | OUTPATIENT
Start: 2023-09-05 | End: 2023-09-07 | Stop reason: HOSPADM

## 2023-09-05 RX ADMIN — POTASSIUM CHLORIDE 40 MEQ: 1500 TABLET, EXTENDED RELEASE ORAL at 08:52

## 2023-09-05 RX ADMIN — Medication 100 MG: at 14:59

## 2023-09-05 RX ADMIN — SENNOSIDES AND DOCUSATE SODIUM 2 TABLET: 50; 8.6 TABLET ORAL at 23:25

## 2023-09-05 RX ADMIN — ASPIRIN 81 MG: 81 TABLET, COATED ORAL at 08:52

## 2023-09-05 RX ADMIN — Medication 10 ML: at 23:36

## 2023-09-05 RX ADMIN — Medication: at 08:56

## 2023-09-05 RX ADMIN — FOLIC ACID 1 MG: 1 TABLET ORAL at 08:52

## 2023-09-05 RX ADMIN — HYDROCODONE BITARTRATE AND ACETAMINOPHEN 1 TABLET: 10; 325 TABLET ORAL at 08:52

## 2023-09-05 RX ADMIN — HYDROCODONE BITARTRATE AND ACETAMINOPHEN 1 TABLET: 10; 325 TABLET ORAL at 19:47

## 2023-09-05 RX ADMIN — BACITRACIN 0.9 G: 500 OINTMENT TOPICAL at 22:25

## 2023-09-05 RX ADMIN — LACTULOSE 20 G: 20 SOLUTION ORAL at 08:46

## 2023-09-05 RX ADMIN — LORAZEPAM 1 MG: 0.5 TABLET ORAL at 09:20

## 2023-09-05 RX ADMIN — Medication 10 ML: at 08:57

## 2023-09-05 RX ADMIN — FUROSEMIDE 60 MG: 10 INJECTION, SOLUTION INTRAMUSCULAR; INTRAVENOUS at 08:46

## 2023-09-05 RX ADMIN — THIAMINE HYDROCHLORIDE 200 MG: 100 INJECTION, SOLUTION INTRAMUSCULAR; INTRAVENOUS at 08:45

## 2023-09-05 RX ADMIN — LUBIPROSTONE 24 MCG: 24 CAPSULE, GELATIN COATED ORAL at 08:52

## 2023-09-05 RX ADMIN — FERROUS SULFATE TAB 325 MG (65 MG ELEMENTAL FE) 325 MG: 325 (65 FE) TAB at 08:52

## 2023-09-05 RX ADMIN — NICOTINE 1 PATCH: 21 PATCH, EXTENDED RELEASE TRANSDERMAL at 08:52

## 2023-09-05 RX ADMIN — MIDODRINE HYDROCHLORIDE 5 MG: 5 TABLET ORAL at 08:52

## 2023-09-05 RX ADMIN — LACTULOSE 20 G: 20 SOLUTION ORAL at 23:26

## 2023-09-05 RX ADMIN — SENNOSIDES AND DOCUSATE SODIUM 2 TABLET: 50; 8.6 TABLET ORAL at 08:52

## 2023-09-05 RX ADMIN — MIDODRINE HYDROCHLORIDE 5 MG: 5 TABLET ORAL at 11:58

## 2023-09-05 NOTE — H&P (VIEW-ONLY)
[facility]     Cardiology Progress Note    Patient Name: Bradley Crane  : 1955  MRN: [id number]  Primary Care Physician:  Rosa Isela Kim APRN  Date of admission: 2023    Subjective   Subjective     No acute events overnight.  He continues to have waxing and waning mentation.  He seems to be alert and oriented this morning.  He reports chest tightness overnight.  He continues to have shortness of breath although it continues to improve.  He has no other complaints.    Review of Systems   All systems were reviewed and negative except as above    Objective   Objective     Vitals:   Temp:  [97.5 °F (36.4 °C)-98.1 °F (36.7 °C)] 97.5 °F (36.4 °C)  Heart Rate:  [] 101  Resp:  [17-18] 18  BP: ()/(60-83) 102/71  Physical Exam             Constitutional: Awake, alert, No acute distress               Eyes: PERRLA, sclerae anicteric, no conjunctival injection              HENT: NCAT, mucous membranes moist              Neck: Supple, no thyromegaly, no lymphadenopathy, trachea midline              Respiratory: Diffuse rhonchi,, nonlabored respirations               Cardiovascular: RRR, 2/6 systolic ejection murmur at the apex, no rubs, or gallops, palpable pedal pulses bilaterally              Gastrointestinal: Positive bowel sounds, soft, nontender, mildly distended.              Musculoskeletal: Trace bilateral ankle edema, no clubbing or cyanosis to extremities              Psychiatric: Appropriate affect, cooperative              Neurologic: Oriented x 3, nonfocal      Scheduled Meds:ammonium lactate, , Topical, Daily  aspirin, 81 mg, Oral, Daily  enoxaparin, 40 mg, Subcutaneous, Q24H  ferrous sulfate, 325 mg, Oral, Daily With Breakfast  folic acid, 1 mg, Oral, Daily  furosemide, 60 mg, Intravenous, BID  lactulose, 20 g, Oral, TID  lubiprostone, 24 mcg, Oral, BID With Meals  midodrine, 5 mg, Oral, TID AC  nicotine, 1 patch, Transdermal, Q24H  senna-docusate sodium, 2 tablet, Oral,  BID  sodium chloride, 10 mL, Intravenous, Q12H  thiamine (B-1) IV, 200 mg, Intravenous, Q8H   Followed by  thiamine, 100 mg, Oral, Daily      Continuous Infusions:        Result Review    Result Review:  I have personally reviewed the results from the time of this admission to 9/5/2023 09:36 EDT and agree with these findings:  [x]  Laboratory  []  Microbiology  [x]  Radiology  [x]  EKG/Telemetry   [x]  Cardiology/Vascular   []  Pathology  []  Old records  []  Other:  Most notable findings include:     CBC          9/3/2023    04:03 9/4/2023    04:35 9/5/2023    04:17   CBC   WBC 5.50  6.89  6.21    RBC 2.93  3.05  2.82    Hemoglobin 10.4  10.5  9.7    Hematocrit 28.6  30.8  27.7    MCV 97.6  101.0  98.2    MCH 35.5  34.4  34.4    MCHC 36.4  34.1  35.0    RDW 14.6  14.9  14.4    Platelets 232  234  219      CMP          9/3/2023    04:03 9/3/2023    22:06 9/4/2023    04:35 9/5/2023    04:17   CMP   Glucose 111   101  101    BUN 21   20  18    Creatinine 0.74   0.82  0.73    EGFR 99.9   96.9  100.3    Sodium 132   129  133    Potassium 2.9  4.1  4.8  3.3    Chloride 95   96  97    Calcium 9.2   9.1  9.0    Total Protein 6.5   6.6  6.4    Albumin 4.0   4.0  3.9    Total Bilirubin 1.4   1.3  1.1    Alkaline Phosphatase 130   138  169    AST (SGOT) 19   23  20    ALT (SGPT) 8   9  9    BUN/Creatinine Ratio 28.4   24.4  24.7    Anion Gap 13.1   10.2  13.1       CARDIAC LABS:      Lab 08/30/23  1414 08/29/23  1442 08/29/23  1229   PROBNP  --   --  26,219.0*   HSTROP T  --  37* 54*   PROTIME 15.2*  --   --    INR 1.20*  --   --         Assessment & Plan   Assessment / Plan     Brief Patient Summary:  Bradley Crane is a 68 y.o. male with:     Acute systolic congestive heart failure exacerbation, LVEF 25 to 30%, on IV Lasix  Chest tightness, resolved this morning  Ascites, status post paracentesis.  Altered mental status, improved  Hyponatremia, improving.  Hypertension, blood pressure soft, on midodrine 5 mg 3 times  daily  Mixed dyslipidemia  Anemia  Alcohol abuse  Active smoking     Plan:   Patient with good urine output overnight.  Continue IV Lasix at the current dose.  Maintain potassium around 4 magnesium around 2  Monitor renal function  Blood pressure currently soft and does not allow to initiate guideline directed medical therapy for HFrEF.  These medications can be started on an outpatient basis after blood pressure stabilizes.  Wean off midodrine as able. Currently on 5 mg TID.     We will proceed with right and left cardiac catheterization to evaluate pulmonary and intracardiac filling pressures and to rule out CAD.  Risks versus benefits of the procedure were discussed with the patient he was agreeable to proceed.  Please keep him n.p.o. after breakfast.    Discussed with Dr. Espinosa and RN at bedside.    Thank for the consultation.  Further recommendations to follow pending cardiac catheterization results.  Please call with any questions.      CODE STATUS:   Level Of Support Discussed With: Patient  Code Status (Patient has no pulse and is not breathing): CPR (Attempt to Resuscitate)  Medical Interventions (Patient has pulse or is breathing): Full Support      Electronically signed by Merly Ji MD, 09/05/23, 9:36 AM EDT.

## 2023-09-05 NOTE — DISCHARGE SUMMARY
[facility]         HOSPITALIST  DISCHARGE SUMMARY    Patient Name: Bradley Crane  : 1955  MRN: [id number]    Date of Admission: 2023  Date of Discharge:  2023 TRANSFER TO [facility] TO SERVICE OF DR KLEIN    Primary Care Physician: Rosa Isela Kim APRN    Consults       Date and Time Order Name Status Description    2023  8:34 AM Hematology & Oncology Inpatient Consult      2023  4:19 PM Inpatient Cardiology Consult      2023  2:34 PM Hospitalist (on-call MD unless specified)              Active and Resolved Hospital Problems:  Chronic systolic CHF with acute exacerbation EF 25 to 30%  Aortic valve disease  Abdominal ascites  Hyponatremia of clinical significance  Acute metabolic encephalopathy likely secondary to hypoxemia  Essential hypertension  Mixed hyperlipidemia  Hypokalemia  Chronic anemia  Current tobacco abuser  Abnormal monotypic B-cell population detected  Moderate to severe pulmonary hypertension  Multivessel CAD      Active Hospital Problems    Diagnosis POA   • **CHF exacerbation [I50.9] Yes   • Acute HFrEF (heart failure with reduced ejection fraction) [I50.21] Yes   • Chest pain, atypical [R07.89] Unknown      Resolved Hospital Problems   No resolved problems to display.       Hospital Course     Hospital Course:  68-year-old male with history of chronic systolic CHF, CAD, dyslipidemia, current alcohol consumer, concern for liver cirrhosis secondary to alcoholism, current tobacco smoker, chronic anemia, was hospitalized on 2023 with chief complaint of shortness of breath, hospitalized with acute decompensation of chronic systolic CHF, cardiology consulted, with concern for acute hepatic encephalopathy, status post paracentesis 2023, 1.7 L of fluid drained.  On diuretics for CHF exacerbation.  Electrolytes being replaced.  Started lactulose to help promote bowel movements. Consulted hematology after his paracentesis fluid cytology came back  abnormal describing abnormal monotypic B-cell population detected, HIV negative, will need to follow-up with hematology as outpatient.  Proceeded with a right and left heart cath to evaluate for pulmonary and intracardiac filling pressures and rule out CAD.  Patient underwent cardiac cath 9/5/2023, had multivessel CAD, left ventricular end-diastolic pressure 35, moderate to severe pulmonary hypertension, he will require CABG and multivessel PCI, cardiology arranged transfer to [facility] to the service of Dr. Garcia, completing discharge summary so patient can be transferred to [facility] to the service of Dr. Garcia for further care and management.        Day of Discharge     Vital Signs:  Temp:  [97.5 °F (36.4 °C)-98.3 °F (36.8 °C)] 98.3 °F (36.8 °C)  Heart Rate:  [] 100  Resp:  [18] 18  BP: ()/(60-77) 105/77  Flow (L/min):  [2] 2  Review of systems:  All systems reviewed and negative except for aggressive behavior towards staff, generalized fatigue, generalized weakness, intermittent cough, shortness of breath primarily with exertional activity, intermittent confusion, unsteady gait        Physical Exam                General: Awake, alert, NAD, eating breakfast  HENT: NCAT, MMM  Eyes: pupils equal, no scleral icterus  Cardiovascular: RRR, no murmurs   Pulmonary: Crackles at the bases bilaterally; no wheezes; no conversational dyspnea  Gastrointestinal: S/ND/NT, +BS  Musculoskeletal: No gross deformities  Skin: No jaundice, no rash on exposed skin appreciated  Neuro: CN II through XII grossly intact; speech clear; no tremor  Psych: Labile mood, aggressive and apologetic  : No Dos Santos catheter; no suprapubic tenderness        Discharge Details        Discharge Medications        ASK your doctor about these medications        Instructions Start Date   Aspirin Low Dose 81 MG EC tablet  Generic drug: aspirin   1 tablet, Oral, Daily      atorvastatin 20 MG tablet  Commonly known as: LIPITOR   1  tablet, Oral, Daily      carvedilol 6.25 MG tablet  Commonly known as: COREG   1 tablet, Oral, Every 12 Hours Scheduled      cephalexin 500 MG capsule  Commonly known as: KEFLEX   500 mg, Oral, 3 Times Daily      furosemide 40 MG tablet  Commonly known as: LASIX   40 mg, Oral, Daily      HYDROcodone-acetaminophen  MG per tablet  Commonly known as: NORCO   1 tablet, Oral, Every 8 Hours PRN      Melatonin 10 MG tablet   10 mg, Oral, Nightly PRN      metOLazone 5 MG tablet  Commonly known as: ZAROXOLYN   5 mg, Oral, Daily      midodrine 2.5 MG tablet  Commonly known as: PROAMATINE   1 tablet, Oral, 3 Times Daily      ondansetron 4 MG tablet  Commonly known as: ZOFRAN   1 tablet, Oral, Every 8 Hours PRN      potassium chloride ER 20 MEQ tablet controlled-release ER tablet  Commonly known as: K-TAB  Ask about: Which instructions should I use?   Take 2 tablets by mouth 2 (Two) Times a Day.      sacubitril-valsartan 24-26 MG tablet  Commonly known as: ENTRESTO   1 tablet, Oral, 2 Times Daily      spironolactone 25 MG tablet  Commonly known as: ALDACTONE   25 mg, Oral, Daily               No Known Allergies    Discharge Disposition: TRANSFER TO [facility] DR KLEIN      Diet:  Hospital:  Diet Order   Procedures   • NPO Diet NPO Type: Sips with Meds       Discharge Activity: AS DETERMINED BY ACCEPTING FACILITY      CODE STATUS:  Code Status and Medical Interventions:   Ordered at: 08/29/23 1549     Level Of Support Discussed With:    Patient     Code Status (Patient has no pulse and is not breathing):    CPR (Attempt to Resuscitate)     Medical Interventions (Patient has pulse or is breathing):    Full Support         No future appointments.        Pertinent  and/or Most Recent Results     PROCEDURES:   Adult Transthoracic Echo Complete w/ Color, Spectral and Contrast if necessary per protocol    Result Date: 8/30/2023  •  Left ventricular ejection fraction appears to be 26 - 30%. •  The left ventricular cavity is  mildly dilated. •  Left ventricular diastolic function was indeterminate. •  The right ventricular cavity is mild to moderately dilated. •  The left atrial cavity is moderately dilated. •  The right atrial cavity is moderately  dilated. •  Moderate tricuspid valve regurgitation is present. •  Estimated right ventricular systolic pressure from tricuspid regurgitation is normal (<35 mmHg). •  Aortic valve appears sclerotic with reduced excursion.  Max/mean pressure gradient was 20/11 mmHg.  Aortic valve area was 0.93 cm².  Could be consistent with low-flow, low gradient AAS.     CT Head Without Contrast    Result Date: 8/31/2023  PROCEDURE: CT HEAD WO CONTRAST  COMPARISON:  None INDICATIONS: fell and hit back of head  PROTOCOL:   Standard imaging protocol performed    RADIATION:   DLP: 861mGy*cm   MA and/or KV was adjusted to minimize radiation dose.     TECHNIQUE: Axial images of the head without intravenous contrast.  FINDINGS:  There is generalized atrophy.  There are chronic appearing changes in the white matter.  The  The ventricles have a normal size and configuration. There is no evidence of acute intracranial hemorrhage, mass or midline shift. No extra-axial fluid collections are identified. There are no skull fractures.  There is chronic left maxillary sinusitis an air-fluid level suggesting superimposed acute sinusitis.  There is minimal polypoid mucosal thickening in the right maxillary sinus.  The visualized paranasal sinuses and mastoid air cells are otherwise grossly clear.  IMPRESSION:  1. No acute intracranial abnormalities are identified.  2. Findings suggest acute on chronic left maxillary sinusitis.  CAROLYN LARKIN MD       Electronically Signed and Approved By: CAROLYN LARKIN MD on 8/31/2023 at 15:11             MRI Brain Without Contrast    Result Date: 9/3/2023  PROCEDURE: MRI BRAIN WO CONTRAST  COMPARISON: [facility], US, US ABDOMEN COMPLETE, 8/31/2023, 19:09.  [facility]  [facility], CT, CT HEAD WO CONTRAST, 8/31/2023, 14:49.  INDICATIONS: Mental status change, unknown cause   TECHNIQUE: A variety of imaging planes and parameters were utilized for visualization of suspected pathology.  Images were performed without contrast.  FINDINGS:  There are no areas of restricted diffusion to suggest acute infarct or recent ischemia.  The ventricles and sulci are proportionally prominent compatible with global volume loss.  There is no mass effect or midline shift.  There is no acute intracranial hemorrhage.  There is no abnormal extra-axial fluid collection.  There is no significant T2 or FLAIR hyperintense signal changes.  There are no abnormal areas of susceptibility.  There are no abnormal areas of intrinsic T1 hyperintense signal abnormality.  The mastoid air cells and middle ears are well aerated.  There is an air-fluid level and mucosal thickening within the left maxillary sinus.  The T2 weighted flow voids appear preserved.  Visualized orbits and globes appear symmetric.  Midline structures including the pituitary appear within normal limits.  The craniocervical junction appears normal without evidence of cerebellar tonsillar ectopia.        1. No acute intracranial abnormality.  Specifically, no evidence of acute infarct, mass, or hemorrhage. 2. Global cerebral and cerebellar volume loss. 3. Left maxillary paranasal sinus disease.       WALT HUMPHREY MD       Electronically Signed and Approved By: WALT HUMPHREY MD on 9/03/2023 at 18:34             US Abdomen Complete    Result Date: 8/31/2023  PROCEDURE: US ABDOMEN COMPLETE  COMPARISON: None  INDICATIONS: abdominal distension, asses for ascites  TECHNIQUE: Focused abdominal ultrasound to evaluate for ascites.   FINDINGS:   There is a moderate to large amount of abdominal ascites in all 4 quadrants.  The visualized liver has a subtle nodular contour suggesting cirrhosis.  IMPRESSION: Moderate to large amount of abdominal ascites.    CAROLYN LARKIN MD       Electronically Signed and Approved By: CAROLYN LARKIN MD on 8/31/2023 at 20:01             XR Chest 1 View    Result Date: 8/29/2023  PROCEDURE: XR CHEST 1 VW  COMPARISON: [facility], CR, XR CHEST 1 VW, 8/25/2023, 8:32.  INDICATIONS: SHORTNESS OF BREATH TODAY  FINDINGS:  The heart remains slightly enlarged.  The pulmonary vascularity is congested.  Bibasilar pulmonary infiltrate or edema is unchanged.  The costophrenic angles remain slightly blunted.  Dual lead AICD remains in place.        Findings consistent with moderate congestive heart failure, not significantly changed in comparison to 8/25/2023.  Dual lead AICD remains in place.       LORENA GARNICA MD       Electronically Signed and Approved By: LORENA GARNICA MD on 8/29/2023 at 13:02             XR Chest 1 View    Result Date: 8/25/2023  PROCEDURE: XR CHEST 1 VW  COMPARISON: None  INDICATIONS: SOA Triage Protocol/SHORTNESS OF BREATH AND DIZZINESS  FINDINGS:  Left subclavian transvenous pacemaker defibrillator is in place.  There is moderate cardiomegaly.  Upper lobe pulmonary vessels are prominent compatible with pulmonary vascular congestion.  There is bibasilar airspace disease with small bilateral pleural effusions.  Is no evidence pneumothorax.  Bony structures are unremarkable.        1. Cardiomegaly and pulmonary vascular congestion 2. Bibasilar airspace disease and small bilateral pleural effusions all of which is most likely related to congestive heart failure and pulmonary edema       RJ MONTAGUE MD       Electronically Signed and Approved By: RJ MONTAGUE MD on 8/25/2023 at 8:42             US Paracentesis    Result Date: 9/1/2023  PROCEDURE: US PARACENTESIS  COMPARISON: None  INDICATIONS: ASCITES. 5 Filipino 7CM YUEH. 1.7 LITERS REMOVED. FLUID WHEELER BROWN; CLEAR. DIAGNOSTIC.  CONSENT:   Prior to the procedure, the risks, benefits and alternatives were thoroughly explained.  This included the risk of  bleeding, infection, injury to associated structures, and risk of bowel injury.  The patient expressed understanding and wished to continue.  Informed written consent was obtained.  FINDINGS: Preliminary ultrasound demonstrated appropriate fluid in the right mid abdomen.  The overlying skin was prepped and draped in normal sterile fashion.  Lidocaine was injected along the anticipated tract of the needle.   A 5 [place] 7 cm [employer] catheter was advanced into the peritoneal space.  1.7 liters dunn clear fluid was aspirated.   The catheter was removed without complication.  A sample of fluid was sent to the lab for analysis.        Successful ultrasound-guided diagnostic and therapeutic paracentesis without evidence of complication.   REGINE TOMPKINS MD       Electronically Signed and Approved By: REGINE TOMPKINS MD on 9/01/2023 at 11:31             Doppler Arterial Multi Level Lower Extremity - Bilateral CAR    Result Date: 8/31/2023  •  Right Conclusion: The right STEFANIA is normal. Mild digital ischemia. •  Left Conclusion: The left STEFANIA is normal. Mild digital ischemia.     XR Hip With or Without Pelvis 2 - 3 View Left    Result Date: 8/31/2023  PROCEDURE: XR HIP W OR WO PELVIS 2-3 VIEW LEFT  COMPARISON: None  INDICATIONS: fell and hit hip  FINDINGS:   No evidence of acute fracture or dislocation.  Atherosclerotic vascular calcification is present.  There is a surgical screwplate in the pubic symphysis.  The midportion of the screwplate is fractured.  There is a fracture of the 2nd screw from the left.  There appear to be 2 soft tissue anchors in the region of the left ischium.  IMPRESSION: No evidence of acute fracture or dislocation.  Hardware fractures in the pubic symphysis may be acute or chronic in nature.  Recommend a follow-up examination if symptoms persist.   CAROLYN LARKIN MD       Electronically Signed and Approved By: CAROLYN LARKIN MD on 8/31/2023 at 17:00                    LAB RESULTS:      Lab  09/05/23 0417 09/04/23 0435 09/03/23 0403 08/31/23 0415 08/30/23  1414 08/30/23 0417   WBC 6.21 6.89 5.50 6.55  --  6.90   HEMOGLOBIN 9.7* 10.5* 10.4* 10.4*  --  9.9*   HEMATOCRIT 27.7* 30.8* 28.6* 28.9*  --  28.1*   PLATELETS 219 234 232 243  --  242   NEUTROS ABS 3.58 3.97 2.98 4.18  --  4.30   IMMATURE GRANS (ABS) 0.01 0.02 0.02 0.03  --  0.03   LYMPHS ABS 1.53 1.64 1.58 1.41  --  1.51   MONOS ABS 0.94* 1.12* 0.81 0.82  --  0.94*   EOS ABS 0.08 0.07 0.05 0.06  --  0.06   MCV 98.2* 101.0* 97.6* 97.0  --  97.2*   PROTIME  --   --   --   --  15.2*  --    APTT  --   --   --   --  32.5  --          Lab 09/05/23  1549 09/05/23 0417 09/04/23 0435 09/03/23 2206 09/03/23 0403 08/31/23 0415 08/30/23 0417   SODIUM  --  133* 129*  --  132* 128* 129*   POTASSIUM 3.7 3.3* 4.8 4.1 2.9* 3.8 3.5   CHLORIDE  --  97* 96*  --  95* 92* 94*   CO2  --  22.9 22.8  --  23.9 22.3 24.1   ANION GAP  --  13.1 10.2  --  13.1 13.7 10.9   BUN  --  18 20  --  21 22 20   CREATININE  --  0.73* 0.82  --  0.74* 1.06 0.90   EGFR  --  100.3 96.9  --  99.9 77.4 94.2   GLUCOSE  --  101* 101*  --  111* 122* 124*   CALCIUM  --  9.0 9.1  --  9.2 9.1 8.8   MAGNESIUM  --  1.8 2.0  --  2.7* 2.0 2.1   PHOSPHORUS  --  4.3 3.6  --  3.6 5.1* 4.5         Lab 09/05/23  0417 09/04/23  0435 09/03/23  0403 08/31/23  0415   TOTAL PROTEIN 6.4 6.6 6.5 6.3   ALBUMIN 3.9 4.0 4.0 3.8   ALT (SGPT) 9 9 8 8   AST (SGOT) 20 23 19 19   BILIRUBIN 1.1 1.3* 1.4* 1.1   INDIRECT BILIRUBIN 0.5 0.8 0.8 0.5   BILIRUBIN DIRECT 0.6* 0.5* 0.6* 0.6*   ALK PHOS 169* 138* 130* 146*         Lab 08/30/23  1414   PROTIME 15.2*   INR 1.20*             Lab 08/30/23  0417   FOLATE 11.50         Lab 09/05/23  1726   PH, ARTERIAL 7.456*   PCO2, ARTERIAL 29.5*   PO2 .3*   O2 SATURATION ART 97.6   FIO2 32  32  32   HCO3 ART 20.3*   BASE EXCESS ART -2.7*   CARBOXYHEMOGLOBIN 0.3  0.2  0.3     Brief Urine Lab Results       None          Microbiology Results (last 10 days)        Procedure Component Value - Date/Time    Body Fluid Culture - Body Fluid, Peritoneum [[id number]] Collected: 09/01/23 1100    Lab Status: Final result Specimen: Body Fluid from Peritoneum Updated: 09/04/23 0917     Body Fluid Culture No growth at 3 days     Gram Stain Rare (1+) WBCs seen      No organisms seen    Anaerobic Culture - Body Fluid, Peritoneum [[id number]]  (Normal) Collected: 09/01/23 1100    Lab Status: Preliminary result Specimen: Body Fluid from Peritoneum Updated: 09/04/23 0718     Anaerobic Culture No anaerobes isolated at 3 days            MRI Brain Without Contrast    Result Date: 9/3/2023  Impression:   1. No acute intracranial abnormality.  Specifically, no evidence of acute infarct, mass, or hemorrhage. 2. Global cerebral and cerebellar volume loss. 3. Left maxillary paranasal sinus disease.       WALT HUMPHREY MD       Electronically Signed and Approved By: WALT HUMPHREY MD on 9/03/2023 at 18:34             XR Chest 1 View    Result Date: 8/29/2023  Impression:   Findings consistent with moderate congestive heart failure, not significantly changed in comparison to 8/25/2023.  Dual lead AICD remains in place.       LORENA GARNICA MD       Electronically Signed and Approved By: LORENA GARNICA MD on 8/29/2023 at 13:02             XR Chest 1 View    Result Date: 8/25/2023  Impression:   1. Cardiomegaly and pulmonary vascular congestion 2. Bibasilar airspace disease and small bilateral pleural effusions all of which is most likely related to congestive heart failure and pulmonary edema       RJ MONTAGUE MD       Electronically Signed and Approved By: RJ MONTAGUE MD on 8/25/2023 at 8:42             US Paracentesis    Result Date: 9/1/2023  Impression:  Successful ultrasound-guided diagnostic and therapeutic paracentesis without evidence of complication.   REGINE TOMPKINS MD       Electronically Signed and Approved By: REGINE TOMPKINS MD on 9/01/2023 at 11:31              Results for  orders placed during the hospital encounter of 08/29/23    Doppler Arterial Multi Level Lower Extremity - Bilateral CAR    Interpretation Summary  •  Right Conclusion: The right STEFANIA is normal. Mild digital ischemia.  •  Left Conclusion: The left STEFANIA is normal. Mild digital ischemia.      Results for orders placed during the hospital encounter of 08/29/23    Doppler Arterial Multi Level Lower Extremity - Bilateral CAR    Interpretation Summary  •  Right Conclusion: The right STEFANIA is normal. Mild digital ischemia.  •  Left Conclusion: The left STEFANIA is normal. Mild digital ischemia.      Results for orders placed during the hospital encounter of 08/29/23    Adult Transthoracic Echo Complete w/ Color, Spectral and Contrast if necessary per protocol    Interpretation Summary  •  Left ventricular ejection fraction appears to be 26 - 30%.  •  The left ventricular cavity is mildly dilated.  •  Left ventricular diastolic function was indeterminate.  •  The right ventricular cavity is mild to moderately dilated.  •  The left atrial cavity is moderately dilated.  •  The right atrial cavity is moderately  dilated.  •  Moderate tricuspid valve regurgitation is present.  •  Estimated right ventricular systolic pressure from tricuspid regurgitation is normal (<35 mmHg).  •  Aortic valve appears sclerotic with reduced excursion.  Max/mean pressure gradient was 20/11 mmHg.  Aortic valve area was 0.93 cm².  Could be consistent with low-flow, low gradient AAS.      Labs Pending at Discharge:  Pending Labs       Order Current Status    Bilirubin, Body Fluid - Body Fluid, Peritoneum In process    Flow Cytometry In process    Fungus Culture - Body Fluid, Peritoneum In process    Non-gynecologic Cytology In process    Anaerobic Culture - Body Fluid, Peritoneum Preliminary result              Time spent on Discharge including face to face service:  45 minutes    Electronically signed by Gaurav Espinosa MD, 09/05/23, 6:31 PM  EDT.    Portions of this documentation were transcribed electronically from a voice recognition software.  I confirm all data accurately represents the service(s) I performed at today's visit.

## 2023-09-05 NOTE — PLAN OF CARE
Goal Outcome Evaluation:  VSS. No acute changes during shift. Family came to visit. Post cardiac cath recovery.

## 2023-09-05 NOTE — PROGRESS NOTES
[facility]   Hospitalist Progress Note  Date: 2023  Patient Name: Bradley Crane  : 1955  MRN: [id number]  Date of admission: 2023      Subjective   Subjective      Chief complaint: Shortness of breath    Summary:  68-year-old male with history of chronic systolic CHF, CAD, dyslipidemia, current alcohol consumer, concern for liver cirrhosis secondary to alcoholism, current tobacco smoker, chronic anemia, was hospitalized on 2023 with chief complaint of shortness of breath, hospitalized with acute decompensation of chronic systolic CHF, cardiology consulted, with concern for acute hepatic encephalopathy, status post paracentesis 2023, 1.7 L of fluid drained.  On diuretics for CHF exacerbation.  Electrolytes being replaced.  Started lactulose to help promote bowel movements. Consulted hematology after his paracentesis fluid cytology came back abnormal describing abnormal monotypic B-cell population detected    Interval follow-up: Seen and examined this morning, no acute distress, no acute major night events, no family at the bedside, very aggressive with how he is talking to the nursing staff making derogatory comments.  Continues to have waxing and waning mentation.  No chest pain reported to me but he did tell the cardiologist he has had chest pain.  He continues to have shortness of breath.  There plans to do cardiac cath.  I have consulted hematology after his paracentesis fluid cytology came back abnormal describing abnormal monotypic B-cell population detected.  He does not recall any history regarding his past medical status.  Looking into achieving records review from hospitals in [place].  Telemetry reviewed, ventricular trigeminy.  Potassium 3.3, sodium 133, creatinine 0.73, white blood cell count 6000, hemoglobin 9.7.  HIV 1/2 negative.  Remains weak and debilitated.    Review of systems:  All systems reviewed and negative except for aggressive behavior towards staff,  generalized fatigue, generalized weakness, intermittent cough, shortness of breath primarily with exertional activity, intermittent confusion, unsteady gait    Objective   Objective     Vitals:   Temp:  [97.5 °F (36.4 °C)-98 °F (36.7 °C)] 97.5 °F (36.4 °C)  Heart Rate:  [] 101  Resp:  [17-18] 18  BP: ()/(60-83) 102/71  Physical Exam     General: Awake, alert, NAD, eating breakfast  HENT: NCAT, MMM  Eyes: pupils equal, no scleral icterus  Cardiovascular: RRR, no murmurs   Pulmonary: Crackles at the bases bilaterally; no wheezes; no conversational dyspnea  Gastrointestinal: S/ND/NT, +BS  Musculoskeletal: No gross deformities  Skin: No jaundice, no rash on exposed skin appreciated  Neuro: CN II through XII grossly intact; speech clear; no tremor  Psych: Labile mood, aggressive and apologetic  : No Dos Santos catheter; no suprapubic tenderness    Result Review    Result Review:  I have personally reviewed the pertinent results from the past 24 hours to 9/5/2023 11:43 EDT and agree with these findings:  [x]  Laboratory   CBC          9/3/2023    04:03 9/4/2023    04:35 9/5/2023    04:17   CBC   WBC 5.50  6.89  6.21    RBC 2.93  3.05  2.82    Hemoglobin 10.4  10.5  9.7    Hematocrit 28.6  30.8  27.7    MCV 97.6  101.0  98.2    MCH 35.5  34.4  34.4    MCHC 36.4  34.1  35.0    RDW 14.6  14.9  14.4    Platelets 232  234  219      BMP          9/3/2023    04:03 9/3/2023    22:06 9/4/2023    04:35 9/5/2023    04:17   BMP   BUN 21   20  18    Creatinine 0.74   0.82  0.73    Sodium 132   129  133    Potassium 2.9  4.1  4.8  3.3    Chloride 95   96  97    CO2 23.9   22.8  22.9    Calcium 9.2   9.1  9.0    LIVER FUNCTION TESTS:      Lab 09/05/23  0417 09/04/23  0435 09/03/23  0403 08/31/23  0415 08/29/23  1229   TOTAL PROTEIN 6.4 6.6 6.5 6.3 7.2   ALBUMIN 3.9 4.0 4.0 3.8 4.4   GLOBULIN  --   --   --   --  2.8   ALT (SGPT) 9 9 8 8 10   AST (SGOT) 20 23 19 19 22   BILIRUBIN 1.1 1.3* 1.4* 1.1 2.1*   INDIRECT BILIRUBIN 0.5  0.8 0.8 0.5  --    BILIRUBIN DIRECT 0.6* 0.5* 0.6* 0.6*  --    ALK PHOS 169* 138* 130* 146* 137*         [x]  Microbiology No results found for: ACANTHNAEG, AFBCX, BPERTUSSISCX, BLOODCX  No results found for: BCIDPCR, CXREFLEX, CSFCX, CULTURETIS  No results found for: CULTURES, HSVCX, URCX  No results found for: EYECULTURE, GCCX, HSVCULTURE, LABHSV  No results found for: LEGIONELLA, MRSACX, MUMPSCX, MYCOPLASCX  No results found for: NOCARDIACX, STOOLCX  No results found for: THROATCX, UNSTIMCULT, URINECX, CULTURE, VZVCULTUR  No results found for: VIRALCULTU, WOUNDCX    [x]  Radiology Adult Transthoracic Echo Complete w/ Color, Spectral and Contrast if necessary per protocol    Result Date: 8/30/2023    Left ventricular ejection fraction appears to be 26 - 30%.   The left ventricular cavity is mildly dilated.   Left ventricular diastolic function was indeterminate.   The right ventricular cavity is mild to moderately dilated.   The left atrial cavity is moderately dilated.   The right atrial cavity is moderately  dilated.   Moderate tricuspid valve regurgitation is present.   Estimated right ventricular systolic pressure from tricuspid regurgitation is normal (<35 mmHg).   Aortic valve appears sclerotic with reduced excursion.  Max/mean pressure gradient was 20/11 mmHg.  Aortic valve area was 0.93 cm².  Could be consistent with low-flow, low gradient AAS.     CT Head Without Contrast    Result Date: 8/31/2023  PROCEDURE: CT HEAD WO CONTRAST  COMPARISON:  None INDICATIONS: fell and hit back of head  PROTOCOL:   Standard imaging protocol performed    RADIATION:   DLP: 861mGy*cm   MA and/or KV was adjusted to minimize radiation dose.     TECHNIQUE: Axial images of the head without intravenous contrast.  FINDINGS:  There is generalized atrophy.  There are chronic appearing changes in the white matter.  The  The ventricles have a normal size and configuration. There is no evidence of acute intracranial hemorrhage, mass or  midline shift. No extra-axial fluid collections are identified. There are no skull fractures.  There is chronic left maxillary sinusitis an air-fluid level suggesting superimposed acute sinusitis.  There is minimal polypoid mucosal thickening in the right maxillary sinus.  The visualized paranasal sinuses and mastoid air cells are otherwise grossly clear.  IMPRESSION:  1. No acute intracranial abnormalities are identified.  2. Findings suggest acute on chronic left maxillary sinusitis.  CAROLYN LARKIN MD       Electronically Signed and Approved By: CAROLYN LARKIN MD on 8/31/2023 at 15:11             US Abdomen Complete    Result Date: 8/31/2023  PROCEDURE: US ABDOMEN COMPLETE  COMPARISON: None  INDICATIONS: abdominal distension, asses for ascites  TECHNIQUE: Focused abdominal ultrasound to evaluate for ascites.   FINDINGS:   There is a moderate to large amount of abdominal ascites in all 4 quadrants.  The visualized liver has a subtle nodular contour suggesting cirrhosis.  IMPRESSION: Moderate to large amount of abdominal ascites.   CAROLYN LARKIN MD       Electronically Signed and Approved By: CAROLYN LARKIN MD on 8/31/2023 at 20:01             XR Chest 1 View    Result Date: 8/29/2023  PROCEDURE: XR CHEST 1 VW  COMPARISON: [facility], CR, XR CHEST 1 VW, 8/25/2023, 8:32.  INDICATIONS: SHORTNESS OF BREATH TODAY  FINDINGS:  The heart remains slightly enlarged.  The pulmonary vascularity is congested.  Bibasilar pulmonary infiltrate or edema is unchanged.  The costophrenic angles remain slightly blunted.  Dual lead AICD remains in place.        Findings consistent with moderate congestive heart failure, not significantly changed in comparison to 8/25/2023.  Dual lead AICD remains in place.       LORENA GARNICA MD       Electronically Signed and Approved By: LORENA GARNICA MD on 8/29/2023 at 13:02             XR Chest 1 View    Result Date: 8/25/2023  PROCEDURE: XR CHEST 1 VW  COMPARISON: None   INDICATIONS: SOA Triage Protocol/SHORTNESS OF BREATH AND DIZZINESS  FINDINGS:  Left subclavian transvenous pacemaker defibrillator is in place.  There is moderate cardiomegaly.  Upper lobe pulmonary vessels are prominent compatible with pulmonary vascular congestion.  There is bibasilar airspace disease with small bilateral pleural effusions.  Is no evidence pneumothorax.  Bony structures are unremarkable.        1. Cardiomegaly and pulmonary vascular congestion 2. Bibasilar airspace disease and small bilateral pleural effusions all of which is most likely related to congestive heart failure and pulmonary edema       RJ MONTAGUE MD       Electronically Signed and Approved By: RJ MONTAGUE MD on 8/25/2023 at 8:42             US Paracentesis    Result Date: 9/1/2023  PROCEDURE: US PARACENTESIS  COMPARISON: None  INDICATIONS: ASCITES. 5 Khmer 7CM YUEH. 1.7 LITERS REMOVED. FLUID DUNN BROWN; CLEAR. DIAGNOSTIC.  CONSENT:   Prior to the procedure, the risks, benefits and alternatives were thoroughly explained.  This included the risk of bleeding, infection, injury to associated structures, and risk of bowel injury.  The patient expressed understanding and wished to continue.  Informed written consent was obtained.  FINDINGS: Preliminary ultrasound demonstrated appropriate fluid in the right mid abdomen.  The overlying skin was prepped and draped in normal sterile fashion.  Lidocaine was injected along the anticipated tract of the needle.   A 5 Honduran 7 cm Yueh catheter was advanced into the peritoneal space.  1.7 liters dunn clear fluid was aspirated.   The catheter was removed without complication.  A sample of fluid was sent to the lab for analysis.        Successful ultrasound-guided diagnostic and therapeutic paracentesis without evidence of complication.   REGINE TOMPKINS MD       Electronically Signed and Approved By: REGINE TOMPKINS MD on 9/01/2023 at 11:31             Doppler Arterial Multi Level  Lower Extremity - Bilateral CAR    Result Date: 8/31/2023    Right Conclusion: The right STEFANIA is normal. Mild digital ischemia.   Left Conclusion: The left STEFANIA is normal. Mild digital ischemia.     XR Hip With or Without Pelvis 2 - 3 View Left    Result Date: 8/31/2023  PROCEDURE: XR HIP W OR WO PELVIS 2-3 VIEW LEFT  COMPARISON: None  INDICATIONS: fell and hit hip  FINDINGS:   No evidence of acute fracture or dislocation.  Atherosclerotic vascular calcification is present.  There is a surgical screwplate in the pubic symphysis.  The midportion of the screwplate is fractured.  There is a fracture of the 2nd screw from the left.  There appear to be 2 soft tissue anchors in the region of the left ischium.  IMPRESSION: No evidence of acute fracture or dislocation.  Hardware fractures in the pubic symphysis may be acute or chronic in nature.  Recommend a follow-up examination if symptoms persist.   CAROLYN LARKIN MD       Electronically Signed and Approved By: CAROLYN LARKIN MD on 8/31/2023 at 17:00             [x]    EKG/Telemetry   []  Cardiology/Vascular   []  Pathology  [x]  Old records  []  Other:    Assessment & Plan   Assessment / Plan     Assessment/Plan:      Assessment:  Chronic systolic CHF with acute exacerbation EF 25 to 30%  Aortic valve disease  Abdominal ascites  Hyponatremia of clinical significance  Acute metabolic encephalopathy likely secondary to hypoxemia  Essential hypertension  Mixed hyperlipidemia  Hypokalemia  Chronic anemia  Current tobacco abuser  Abnormal monotypic B-cell population detected    Plan:  Labs and imaging reviewed  Consulted , hematology oncology for evaluation of abnormal monotypic B-cell population in the peritoneal fluid  Lasix continued 60 mg IV twice daily  Cardiac cath procedure today discussed with Dr. Ji  Replacement potassium 40 mEq  Out of bed to chair  PT/OT  Continuing midodrine 5 mg 3 times daily and monitor blood pressures  Continue lactulose  20 g 3 times a day  Cardiology recommendations appreciated  Palliative care consult to establish goals of care  Prophylactic thiamine and folate replacement  Iron replacement  Telemetry monitoring  Neurochecks  Try to obtain records from hospitals in [place] he has been admitted to to get a better picture of his past medical history  Supplemental oxygen to maintain sats greater 90% wean per tolerance  A.m. labs  Full code  DVT prophylaxis with Lovenox  Clinical course dictate further management  Discussed with nurse at the bedside      DVT prophylaxis:  Medical DVT prophylaxis orders are present.    CODE STATUS:   Level Of Support Discussed With: Patient  Code Status (Patient has no pulse and is not breathing): CPR (Attempt to Resuscitate)  Medical Interventions (Patient has pulse or is breathing): Full Support    Electronically signed by Gaurav Espinosa MD, 09/05/23, 12:40 PM EDT.    Portions of this documentation were transcribed electronically from a voice recognition software.  I confirm all data accurately represents the service(s) I performed at today's visit.

## 2023-09-05 NOTE — PROGRESS NOTES
[facility]     Cardiology Progress Note    Patient Name: Bradley Crane  : 1955  MRN: [id number]  Primary Care Physician:  Rosa Isela Kim APRN  Date of admission: 2023    Subjective   Subjective     No acute events overnight.  He continues to have waxing and waning mentation.  He seems to be alert and oriented this morning.  He reports chest tightness overnight.  He continues to have shortness of breath although it continues to improve.  He has no other complaints.    Review of Systems   All systems were reviewed and negative except as above    Objective   Objective     Vitals:   Temp:  [97.5 °F (36.4 °C)-98.1 °F (36.7 °C)] 97.5 °F (36.4 °C)  Heart Rate:  [] 101  Resp:  [17-18] 18  BP: ()/(60-83) 102/71  Physical Exam             Constitutional: Awake, alert, No acute distress               Eyes: PERRLA, sclerae anicteric, no conjunctival injection              HENT: NCAT, mucous membranes moist              Neck: Supple, no thyromegaly, no lymphadenopathy, trachea midline              Respiratory: Diffuse rhonchi,, nonlabored respirations               Cardiovascular: RRR, 2/6 systolic ejection murmur at the apex, no rubs, or gallops, palpable pedal pulses bilaterally              Gastrointestinal: Positive bowel sounds, soft, nontender, mildly distended.              Musculoskeletal: Trace bilateral ankle edema, no clubbing or cyanosis to extremities              Psychiatric: Appropriate affect, cooperative              Neurologic: Oriented x 3, nonfocal      Scheduled Meds:ammonium lactate, , Topical, Daily  aspirin, 81 mg, Oral, Daily  enoxaparin, 40 mg, Subcutaneous, Q24H  ferrous sulfate, 325 mg, Oral, Daily With Breakfast  folic acid, 1 mg, Oral, Daily  furosemide, 60 mg, Intravenous, BID  lactulose, 20 g, Oral, TID  lubiprostone, 24 mcg, Oral, BID With Meals  midodrine, 5 mg, Oral, TID AC  nicotine, 1 patch, Transdermal, Q24H  senna-docusate sodium, 2 tablet, Oral,  BID  sodium chloride, 10 mL, Intravenous, Q12H  thiamine (B-1) IV, 200 mg, Intravenous, Q8H   Followed by  thiamine, 100 mg, Oral, Daily      Continuous Infusions:        Result Review    Result Review:  I have personally reviewed the results from the time of this admission to 9/5/2023 09:36 EDT and agree with these findings:  [x]  Laboratory  []  Microbiology  [x]  Radiology  [x]  EKG/Telemetry   [x]  Cardiology/Vascular   []  Pathology  []  Old records  []  Other:  Most notable findings include:     CBC          9/3/2023    04:03 9/4/2023    04:35 9/5/2023    04:17   CBC   WBC 5.50  6.89  6.21    RBC 2.93  3.05  2.82    Hemoglobin 10.4  10.5  9.7    Hematocrit 28.6  30.8  27.7    MCV 97.6  101.0  98.2    MCH 35.5  34.4  34.4    MCHC 36.4  34.1  35.0    RDW 14.6  14.9  14.4    Platelets 232  234  219      CMP          9/3/2023    04:03 9/3/2023    22:06 9/4/2023    04:35 9/5/2023    04:17   CMP   Glucose 111   101  101    BUN 21   20  18    Creatinine 0.74   0.82  0.73    EGFR 99.9   96.9  100.3    Sodium 132   129  133    Potassium 2.9  4.1  4.8  3.3    Chloride 95   96  97    Calcium 9.2   9.1  9.0    Total Protein 6.5   6.6  6.4    Albumin 4.0   4.0  3.9    Total Bilirubin 1.4   1.3  1.1    Alkaline Phosphatase 130   138  169    AST (SGOT) 19   23  20    ALT (SGPT) 8   9  9    BUN/Creatinine Ratio 28.4   24.4  24.7    Anion Gap 13.1   10.2  13.1       CARDIAC LABS:      Lab 08/30/23  1414 08/29/23  1442 08/29/23  1229   PROBNP  --   --  26,219.0*   HSTROP T  --  37* 54*   PROTIME 15.2*  --   --    INR 1.20*  --   --         Assessment & Plan   Assessment / Plan     Brief Patient Summary:  Bradley Crane is a 68 y.o. male with:     Acute systolic congestive heart failure exacerbation, LVEF 25 to 30%, on IV Lasix  Chest tightness, resolved this morning  Ascites, status post paracentesis.  Altered mental status, improved  Hyponatremia, improving.  Hypertension, blood pressure soft, on midodrine 5 mg 3 times  daily  Mixed dyslipidemia  Anemia  Alcohol abuse  Active smoking     Plan:   Patient with good urine output overnight.  Continue IV Lasix at the current dose.  Maintain potassium around 4 magnesium around 2  Monitor renal function  Blood pressure currently soft and does not allow to initiate guideline directed medical therapy for HFrEF.  These medications can be started on an outpatient basis after blood pressure stabilizes.  Wean off midodrine as able. Currently on 5 mg TID.     We will proceed with right and left cardiac catheterization to evaluate pulmonary and intracardiac filling pressures and to rule out CAD.  Risks versus benefits of the procedure were discussed with the patient he was agreeable to proceed.  Please keep him n.p.o. after breakfast.    Discussed with Dr. Espinosa and RN at bedside.    Thank for the consultation.  Further recommendations to follow pending cardiac catheterization results.  Please call with any questions.      CODE STATUS:   Level Of Support Discussed With: Patient  Code Status (Patient has no pulse and is not breathing): CPR (Attempt to Resuscitate)  Medical Interventions (Patient has pulse or is breathing): Full Support      Electronically signed by Merly Ji MD, 09/05/23, 9:36 AM EDT.

## 2023-09-05 NOTE — THERAPY TREATMENT NOTE
Acute Care - Physical Therapy Treatment Note  [facility]     Patient Name: Bradley Crane  : 1955  MRN: [id number]  Today's Date: 2023      Visit Dx:     ICD-10-CM ICD-9-CM   1. Acute on chronic congestive heart failure, unspecified heart failure type  I50.9 428.0   2. Failure of outpatient treatment  Z78.9 V49.89   3. Decreased activities of daily living (ADL)  Z78.9 V49.89   4. Difficulty walking  R26.2 719.7   5. Acute HFrEF (heart failure with reduced ejection fraction)  I50.21 428.21   6. Chest pain, atypical  R07.89 786.59     Patient Active Problem List   Diagnosis    CHF exacerbation    Acute HFrEF (heart failure with reduced ejection fraction)    Chest pain, atypical     Past Medical History:   Diagnosis Date    CHF (congestive heart failure)     Hypertension      Past Surgical History:   Procedure Laterality Date    CARDIAC SURGERY       PT Assessment (last 12 hours)       PT Evaluation and Treatment       Row Name 23 1156          Physical Therapy Time and Intention    Subjective Information complains of;weakness;fatigue;dizziness;pain  -RH     Document Type therapy note (daily note)  -RH     Mode of Treatment physical therapy;individual therapy  -     Patient Effort fair  -RH       Row Name 23 1156          Pain    Additional Documentation Pain Scale: FACES Pre/Post-Treatment (Group)  -       Row Name 23 1156          Pain Scale: FACES Pre/Post-Treatment    Pain: FACES Scale, Pretreatment 2-->hurts little bit  -RH     Posttreatment Pain Rating 2-->hurts little bit  -RH     Pain Location lower  -RH     Pain Location - back  -RH       Row Name 23 1156          Bed Mobility    Bed Mobility scooting/bridging;supine-sit;sit-supine  -RH     Scooting/Bridging [place] (Bed Mobility) contact guard  -RH     Supine-Sit [place] (Bed Mobility) contact guard  -RH     Sit-Supine [place] (Bed Mobility) contact guard  -RH     Bed Mobility, Safety Issues decreased use of  legs for bridging/pushing  -     Assistive Device (Bed Mobility) bed rails  -     Comment, (Bed Mobility) Pt maintains sitting with SBA.  -       Row Name 09/05/23 1156          Transfers    Transfers sit-stand transfer;stand-sit transfer  -       Row Name 09/05/23 1156          Sit-Stand Transfer    Sit-Stand [place] (Transfers) contact guard;minimum assist (75% patient effort)  -     Assistive Device (Sit-Stand Transfers) walker, front-wheeled  -     Comment, (Sit-Stand Transfer) Pt c/o weakness initailly upon standing.  -       Row Name 09/05/23 1156          Stand-Sit Transfer    Stand-Sit [place] (Transfers) contact guard;minimum assist (75% patient effort)  -     Assistive Device (Stand-Sit Transfers) walker, front-wheeled  -       Row Name 09/05/23 1156          Gait/Stairs (Locomotion)    Gait/Stairs Locomotion gait/ambulation independence;gait/ambulation assistive device;distance ambulated;gait pattern;gait deviations  -     [place] Level (Gait) contact guard  -     Assistive Device (Gait) walker, front-wheeled  -     Distance in Feet (Gait) 50  -RH     Pattern (Gait) 3-point;step-through  -     Deviations/Abnormal Patterns (Gait) base of support, narrow;gait speed decreased  -     Bilateral Gait Deviations forward flexed posture;heel strike decreased;knee buckling bilaterally  -     Gait Assessment/Intervention Pt amb with RW and CGA with 3 point step-through gait pattern with narrow base of support, decreased gait speed and stride length, and with pt unable to extend either knee.  -       Row Name 09/05/23 1156          Balance    Dynamic Standing Balance contact guard  -       Row Name             Wound 08/29/23 1837 Left lower leg Other (comment)    Wound - Properties Group Placement Date: 08/29/23  -AT Placement Time: 1837  -AT Present on Hospital Admission: Y  -AT Side: Left  -AT Orientation: lower  -AT Location: leg  -AT Primary Wound Type: Other  -AT,  wound     Retired Wound - Properties Group Placement Date: 08/29/23  -AT Placement Time: 1837  -AT Present on Hospital Admission: Y  -AT Side: Left  -AT Orientation: lower  -AT Location: leg  -AT Primary Wound Type: Other  -AT, wound     Retired Wound - Properties Group Date first assessed: 08/29/23  -AT Time first assessed: 1837  -AT Present on Hospital Admission: Y  -AT Side: Left  -AT Location: leg  -AT Primary Wound Type: Other  -AT, wound       Row Name             Wound 08/31/23 1326 Left lower arm Skin Tear    Wound - Properties Group Placement Date: 08/31/23  -LG Placement Time: 1326  -LG Side: Left  -LG Orientation: lower  -LG Location: arm  -LG Primary Wound Type: Skin tear  -LG    Retired Wound - Properties Group Placement Date: 08/31/23  -LG Placement Time: 1326  -LG Side: Left  -LG Orientation: lower  -LG Location: arm  -LG Primary Wound Type: Skin tear  -LG    Retired Wound - Properties Group Date first assessed: 08/31/23  -LG Time first assessed: 1326  -LG Side: Left  -LG Location: arm  -LG Primary Wound Type: Skin tear  -LG      Row Name 09/05/23 1156          Vital Signs    O2 Delivery Intra Treatment room air  -RH       Row Name 09/05/23 1200          Progress Summary (PT)    Progress Toward Functional Goals (PT) progress toward functional goals is fair  -RH               User Key  (r) = Recorded By, (t) = Taken By, (c) = Cosigned By      Initials Name Provider Type    AT Syl Swanson RN Registered Nurse    RH Jan Prado PTA Physical Therapist Assistant     Kary Delgado RN Registered Nurse                    Physical Therapy Education       Title: PT OT SLP Therapies (In Progress)       Topic: Physical Therapy (In Progress)       Point: Mobility training (In Progress)       Learning Progress Summary             Patient Acceptance, E,TB, NR by AV at 8/31/2023 2195                         Point: Home exercise program (Not Started)       Learner Progress:  Not documented in this visit.               Point: Body mechanics (In Progress)       Learning Progress Summary             Patient Acceptance, E,TB, NR by AV at 8/31/2023 1405                         Point: Precautions (Done)       Learning Progress Summary             Patient Acceptance, E, VU by CG at 9/3/2023 2000    Acceptance, E,TB, NR by AV at 8/31/2023 1405   Family Acceptance, E, VU by CG at 9/3/2023 2000                                         User Key       Initials Effective Dates Name Provider Type Discipline    AV 06/11/21 -  Cirilo Schreiber, PT Physical Therapist PT    CG 04/25/23 -  Sarah Montesinos, RN Registered Nurse Nurse                  PT Recommendation and Plan     Progress Summary (PT)  Progress Toward Functional Goals (PT): progress toward functional goals is fair   Outcome Measures       Row Name 09/05/23 1200             How much help from another person do you currently need...    Turning from your back to your side while in flat bed without using bedrails? 4  -RH      Moving from lying on back to sitting on the side of a flat bed without bedrails? 3  -RH      Moving to and from a bed to a chair (including a wheelchair)? 2  -RH      Standing up from a chair using your arms (e.g., wheelchair, bedside chair)? 2  -RH      Climbing 3-5 steps with a railing? 2  -RH      To walk in hospital room? 2  -RH      AM-PAC 6 Clicks Score (PT) 15  -RH                User Key  (r) = Recorded By, (t) = Taken By, (c) = Cosigned By      Initials Name Provider Type     Jan Prado PTA Physical Therapist Assistant                     Time Calculation:    PT Charges       Row Name 09/05/23 1155             Time Calculation    PT Received On 09/05/23  -RH         Timed Charges    70025 - Gait Training Minutes  8  -RH      43161 - PT Therapeutic Activity Minutes 15  -RH         Total Minutes    Timed Charges Total Minutes 23  -RH       Total Minutes 23  -RH                User Key  (r) = Recorded By, (t) = Taken By, (c) = Cosigned By       Initials Name Provider Type     Jan Prado PTA Physical Therapist Assistant                  Therapy Charges for Today       Code Description Service Date Service Provider Modifiers Qty    41119429474 HC GAIT TRAINING EA 15 MIN 9/5/2023 Jan Prado PTA GP 1    62987474946 HC PT THERAPEUTIC ACT EA 15 MIN 9/5/2023 Jan Prado PTA GP 1            PT G-Codes  Outcome Measure Options: AM-PAC 6 Clicks Basic Mobility (PT)  AM-PAC 6 Clicks Score (PT): 15  AM-PAC 6 Clicks Score (OT): 21    Jan Prado PTA  9/5/2023

## 2023-09-05 NOTE — CONSULTS
[facility]   Consult Note    Patient Name: Bradley Crane  : 1955  MRN: [id number]  Primary Care Physician:  Rosa Isela Kim APRN  Date of admission: 2023    Subjective   Subjective     Reason for Consult: Anemia and abnormal flow cytometric analysis findings suspicious for lymphoma    HPI: Patient was admitted with diagnosis of congestive heart failure was also found to have ascites and paracentesis flow finding consistent with CD5 negative CD10 negative lymphocytosis but marginal zone lymphoma or low-grade lymphoma CT scan of the abdomen pelvis chest and neck will be obtained to look for possible lymphadenopathy also get flow cytometric analysis of the blood bone marrow aspiration biopsy also will be considered the meantime is getting the work-up for cardiology as well    Bradley Crane is a 68 y.o. male patient with lymphocytosis atypical possible lymphoma not a very good historian    Review of Systems   All systems were reviewed and negative except for: Reviewed    Personal History     Past Medical History:   Diagnosis Date    CHF (congestive heart failure)     Hypertension        Past Surgical History:   Procedure Laterality Date    CARDIAC SURGERY         Family History: family history is not on file. Otherwise pertinent FHx was reviewed and not pertinent to current issue.    Social History:  reports that he has been smoking cigarettes. He has been smoking an average of 1.5 packs per day. He does not have any smokeless tobacco history on file. He reports current alcohol use of about 3.0 standard drinks per week. He reports that he does not use drugs.    Home Medications:  HYDROcodone-acetaminophen, Melatonin, aspirin, atorvastatin, carvedilol, cephalexin, furosemide, metOLazone, midodrine, ondansetron, potassium chloride ER, sacubitril-valsartan, and spironolactone      Allergies:  No Known Allergies    Objective   Objective     Vitals:   Temp:  [97.5 °F (36.4 °C)-98.3 °F (36.8 °C)] 98.3 °F  (36.8 °C)  Heart Rate:  [] 100  Resp:  [18] 18  BP: ()/(60-77) 105/77  Flow (L/min):  [2] 2  Physical Exam    Constitutional: Awake, alert   Eyes: PERRLA, sclerae anicteric, no conjunctival injection   HENT: NCAT, mucous membranes moist   Neck: Supple, no thyromegaly, no lymphadenopathy, trachea midline   Respiratory: Clear to auscultation bilaterally, nonlabored respirations    Cardiovascular: RRR, no murmurs, rubs, or gallops, palpable pedal pulses bilaterally   Gastrointestinal: Positive bowel sounds, soft, nontender, nondistended   Musculoskeletal: No bilateral ankle edema, no clubbing or cyanosis to extremities   Psychiatric: Appropriate affect, cooperative   Neurologic: Oriented x 3, strength symmetric in all extremities, Cranial Nerves grossly intact to confrontation, speech clear   Skin: No rashes   No palpable lymphadenopathy  Result Review    Result Review:  I have personally reviewed the results from the time of this admission to 9/5/2023 18:34 EDT and agree with these findings:  [x]  Laboratory anemia lymphocytosis  []  Microbiology  []  Radiology  []  EKG/Telemetry   []  Cardiology/Vascular   []  Pathology  []  Old records  []  Other:  Most notable findings include: Anemia and lymphocytosis    Assessment & Plan   Assessment / Plan     Brief Patient Summary:  Bradley Crane is a 68 y.o. male who patient with anemia lymphocytosis possible lymphoma    Active Hospital Problems:  Active Hospital Problems    Diagnosis     **CHF exacerbation     CAD, multiple vessel     Pulmonary HTN     Acute HFrEF (heart failure with reduced ejection fraction)     Chest pain, atypical        Plan:   CT scan of the chest abdomen pelvis and neck to look for possible lymphadenopathy get flow cytometric analysis of peripheral blood possible bone marrow aspiration and biopsy        Electronically signed by Kasi Rogers MD, 09/05/23, 6:34 PM EDT.    Part of this note may be an electronic transcription/translation of  spoken language to printed text using the Dragon Dictation System.

## 2023-09-05 NOTE — CONSULTS
"Purpose of the visit was to evaluate for: goals of care/advanced care planning. Spoke with RN and patient and discussed palliative care, goals of care, resuscitation status, and advanced care planning.      Assessment: Patient is currently on pcu. Updated by primary rn regarding patients current condition. Patient is sitting up in bed at time of visit. Patient is alert and oriented to person, place, time and situation. Introduced self and explained role. Discussed patients current condition. Patient reports he always has pain all over and is \"so dizzy.\" Patient states he is not legally , but his ex-wife and himself have continued to be involved with each other. Patient reports he has 4 children, 3 local and 1 who is out of state. Discussed advanced care planning- patient requests to discuss that further when patients ex-wife is present as well. Discussed code status- full code versus DNR, provided education on cpr. Patient reports he would like to think about that, and reports he \"can't think\" when he is \"so dizzy.\" Emotional support provided. Palliative care will follow up.      Recommendations/Plan:  Further decisions to be made based on patients clinical course .    Tasks Completed: Emotional Support.    Palliative care will continue to follow to support and assist.    Barbara ALVES, RN, CHPN  Palliative Care      "

## 2023-09-06 VITALS
HEART RATE: 106 BPM | WEIGHT: 201.72 LBS | DIASTOLIC BLOOD PRESSURE: 76 MMHG | TEMPERATURE: 98.3 F | BODY MASS INDEX: 26.73 KG/M2 | SYSTOLIC BLOOD PRESSURE: 106 MMHG | RESPIRATION RATE: 24 BRPM | HEIGHT: 73 IN | OXYGEN SATURATION: 100 %

## 2023-09-06 LAB
ALBUMIN SERPL-MCNC: 4.3 G/DL (ref 3.5–5.2)
ALP SERPL-CCNC: 151 U/L (ref 39–117)
ALPHA-FETOPROTEIN: <2 NG/ML (ref 0–8.3)
ALT SERPL W P-5'-P-CCNC: 9 U/L (ref 1–41)
ANION GAP SERPL CALCULATED.3IONS-SCNC: 15.1 MMOL/L (ref 5–15)
AST SERPL-CCNC: 20 U/L (ref 1–40)
BASOPHILS # BLD AUTO: 0.05 10*3/MM3 (ref 0–0.2)
BASOPHILS NFR BLD AUTO: 1 % (ref 0–1.5)
BILIRUB CONJ SERPL-MCNC: 0.7 MG/DL (ref 0–0.3)
BILIRUB INDIRECT SERPL-MCNC: 0.6 MG/DL
BILIRUB SERPL-MCNC: 1.3 MG/DL (ref 0–1.2)
BUN SERPL-MCNC: 20 MG/DL (ref 8–23)
BUN/CREAT SERPL: 28.6 (ref 7–25)
CALCIUM SPEC-SCNC: 9.6 MG/DL (ref 8.6–10.5)
CHLORIDE SERPL-SCNC: 96 MMOL/L (ref 98–107)
CHOLEST SERPL-MCNC: 96 MG/DL (ref 0–200)
CO2 SERPL-SCNC: 23.9 MMOL/L (ref 22–29)
CREAT SERPL-MCNC: 0.7 MG/DL (ref 0.76–1.27)
CYTO UR: NORMAL
DEPRECATED RDW RBC AUTO: 53.1 FL (ref 37–54)
EGFRCR SERPLBLD CKD-EPI 2021: 101.6 ML/MIN/1.73
EOSINOPHIL # BLD AUTO: 0.07 10*3/MM3 (ref 0–0.4)
EOSINOPHIL NFR BLD AUTO: 1.4 % (ref 0.3–6.2)
ERYTHROCYTE [DISTWIDTH] IN BLOOD BY AUTOMATED COUNT: 14.6 % (ref 12.3–15.4)
GLUCOSE SERPL-MCNC: 116 MG/DL (ref 65–99)
HBA1C MFR BLD: 5.7 % (ref 4.8–5.6)
HCT VFR BLD AUTO: 29.7 % (ref 37.5–51)
HDLC SERPL-MCNC: 43 MG/DL (ref 40–60)
HGB BLD-MCNC: 10.3 G/DL (ref 13–17.7)
IMM GRANULOCYTES # BLD AUTO: 0.01 10*3/MM3 (ref 0–0.05)
IMM GRANULOCYTES NFR BLD AUTO: 0.2 % (ref 0–0.5)
IRON 24H UR-MRATE: 37 MCG/DL (ref 59–158)
IRON SATN MFR SERPL: 10 % (ref 20–50)
LAB AP CASE REPORT: NORMAL
LAB AP CLINICAL INFORMATION: NORMAL
LDLC SERPL CALC-MCNC: 41 MG/DL (ref 0–100)
LDLC/HDLC SERPL: 1.03 {RATIO}
LYMPHOCYTES # BLD AUTO: 1.48 10*3/MM3 (ref 0.7–3.1)
LYMPHOCYTES NFR BLD AUTO: 29.2 % (ref 19.6–45.3)
Lab: NORMAL
MAGNESIUM SERPL-MCNC: 1.9 MG/DL (ref 1.6–2.4)
MCH RBC QN AUTO: 34.6 PG (ref 26.6–33)
MCHC RBC AUTO-ENTMCNC: 34.7 G/DL (ref 31.5–35.7)
MCV RBC AUTO: 99.7 FL (ref 79–97)
MONOCYTES # BLD AUTO: 0.75 10*3/MM3 (ref 0.1–0.9)
MONOCYTES NFR BLD AUTO: 14.8 % (ref 5–12)
NEUTROPHILS NFR BLD AUTO: 2.7 10*3/MM3 (ref 1.7–7)
NEUTROPHILS NFR BLD AUTO: 53.4 % (ref 42.7–76)
NRBC BLD AUTO-RTO: 0 /100 WBC (ref 0–0.2)
PATH REPORT.FINAL DX SPEC: NORMAL
PATH REPORT.GROSS SPEC: NORMAL
PHOSPHATE SERPL-MCNC: 4.2 MG/DL (ref 2.5–4.5)
PLATELET # BLD AUTO: 226 10*3/MM3 (ref 140–450)
PMV BLD AUTO: 9.6 FL (ref 6–12)
POTASSIUM SERPL-SCNC: 3.3 MMOL/L (ref 3.5–5.2)
PROT SERPL-MCNC: 7.3 G/DL (ref 6–8.5)
RBC # BLD AUTO: 2.98 10*6/MM3 (ref 4.14–5.8)
RETICS # AUTO: 0.06 10*6/MM3 (ref 0.02–0.13)
RETICS/RBC NFR AUTO: 1.94 % (ref 0.7–1.9)
SODIUM SERPL-SCNC: 135 MMOL/L (ref 136–145)
TIBC SERPL-MCNC: 353 MCG/DL (ref 298–536)
TRANSFERRIN SERPL-MCNC: 237 MG/DL (ref 200–360)
TRIGL SERPL-MCNC: 44 MG/DL (ref 0–150)
VLDLC SERPL-MCNC: 12 MG/DL (ref 5–40)
WBC NRBC COR # BLD: 5.06 10*3/MM3 (ref 3.4–10.8)

## 2023-09-06 PROCEDURE — 83036 HEMOGLOBIN GLYCOSYLATED A1C: CPT | Performed by: INTERNAL MEDICINE

## 2023-09-06 PROCEDURE — 99232 SBSQ HOSP IP/OBS MODERATE 35: CPT | Performed by: INTERNAL MEDICINE

## 2023-09-06 PROCEDURE — 83540 ASSAY OF IRON: CPT | Performed by: INTERNAL MEDICINE

## 2023-09-06 PROCEDURE — 80061 LIPID PANEL: CPT | Performed by: INTERNAL MEDICINE

## 2023-09-06 PROCEDURE — 84100 ASSAY OF PHOSPHORUS: CPT | Performed by: INTERNAL MEDICINE

## 2023-09-06 PROCEDURE — 82784 ASSAY IGA/IGD/IGG/IGM EACH: CPT | Performed by: INTERNAL MEDICINE

## 2023-09-06 PROCEDURE — 84466 ASSAY OF TRANSFERRIN: CPT | Performed by: INTERNAL MEDICINE

## 2023-09-06 PROCEDURE — 85045 AUTOMATED RETICULOCYTE COUNT: CPT | Performed by: INTERNAL MEDICINE

## 2023-09-06 PROCEDURE — 99232 SBSQ HOSP IP/OBS MODERATE 35: CPT | Performed by: FAMILY MEDICINE

## 2023-09-06 PROCEDURE — 80048 BASIC METABOLIC PNL TOTAL CA: CPT | Performed by: INTERNAL MEDICINE

## 2023-09-06 PROCEDURE — 83521 IG LIGHT CHAINS FREE EACH: CPT | Performed by: INTERNAL MEDICINE

## 2023-09-06 PROCEDURE — 25010000002 FUROSEMIDE PER 20 MG: Performed by: INTERNAL MEDICINE

## 2023-09-06 PROCEDURE — 83735 ASSAY OF MAGNESIUM: CPT | Performed by: INTERNAL MEDICINE

## 2023-09-06 PROCEDURE — 80076 HEPATIC FUNCTION PANEL: CPT | Performed by: INTERNAL MEDICINE

## 2023-09-06 PROCEDURE — 85025 COMPLETE CBC W/AUTO DIFF WBC: CPT | Performed by: INTERNAL MEDICINE

## 2023-09-06 PROCEDURE — 86334 IMMUNOFIX E-PHORESIS SERUM: CPT | Performed by: INTERNAL MEDICINE

## 2023-09-06 RX ORDER — ATORVASTATIN CALCIUM 40 MG/1
40 TABLET, FILM COATED ORAL NIGHTLY
Qty: 90 TABLET | Refills: 3 | Status: SHIPPED | OUTPATIENT
Start: 2023-09-06

## 2023-09-06 RX ORDER — POTASSIUM CHLORIDE 20 MEQ/1
40 TABLET, EXTENDED RELEASE ORAL ONCE
Status: COMPLETED | OUTPATIENT
Start: 2023-09-06 | End: 2023-09-06

## 2023-09-06 RX ORDER — ATORVASTATIN CALCIUM 40 MG/1
40 TABLET, FILM COATED ORAL NIGHTLY
Status: DISCONTINUED | OUTPATIENT
Start: 2023-09-06 | End: 2023-09-07 | Stop reason: HOSPADM

## 2023-09-06 RX ADMIN — BACITRACIN 0.9 G: 500 OINTMENT TOPICAL at 20:23

## 2023-09-06 RX ADMIN — MIDODRINE HYDROCHLORIDE 5 MG: 5 TABLET ORAL at 17:18

## 2023-09-06 RX ADMIN — LACTULOSE 20 G: 20 SOLUTION ORAL at 20:23

## 2023-09-06 RX ADMIN — ASPIRIN 81 MG: 81 TABLET, COATED ORAL at 08:51

## 2023-09-06 RX ADMIN — MIDODRINE HYDROCHLORIDE 5 MG: 5 TABLET ORAL at 11:57

## 2023-09-06 RX ADMIN — Medication: at 08:52

## 2023-09-06 RX ADMIN — HYDROCODONE BITARTRATE AND ACETAMINOPHEN 1 TABLET: 10; 325 TABLET ORAL at 17:30

## 2023-09-06 RX ADMIN — FOLIC ACID 1 MG: 1 TABLET ORAL at 08:51

## 2023-09-06 RX ADMIN — LORAZEPAM 1 MG: 0.5 TABLET ORAL at 09:04

## 2023-09-06 RX ADMIN — FUROSEMIDE 60 MG: 10 INJECTION, SOLUTION INTRAMUSCULAR; INTRAVENOUS at 08:50

## 2023-09-06 RX ADMIN — HYDROCODONE BITARTRATE AND ACETAMINOPHEN 1 TABLET: 10; 325 TABLET ORAL at 04:16

## 2023-09-06 RX ADMIN — FUROSEMIDE 60 MG: 10 INJECTION, SOLUTION INTRAMUSCULAR; INTRAVENOUS at 17:18

## 2023-09-06 RX ADMIN — POTASSIUM CHLORIDE 40 MEQ: 1500 TABLET, EXTENDED RELEASE ORAL at 08:51

## 2023-09-06 RX ADMIN — TICAGRELOR 180 MG: 90 TABLET ORAL at 11:57

## 2023-09-06 RX ADMIN — FERROUS SULFATE TAB 325 MG (65 MG ELEMENTAL FE) 325 MG: 325 (65 FE) TAB at 08:51

## 2023-09-06 RX ADMIN — HYDROCODONE BITARTRATE AND ACETAMINOPHEN 1 TABLET: 10; 325 TABLET ORAL at 10:20

## 2023-09-06 RX ADMIN — Medication 10 ML: at 20:23

## 2023-09-06 RX ADMIN — ATORVASTATIN CALCIUM 40 MG: 40 TABLET, FILM COATED ORAL at 20:23

## 2023-09-06 RX ADMIN — Medication 100 MG: at 09:04

## 2023-09-06 RX ADMIN — TICAGRELOR 90 MG: 90 TABLET ORAL at 20:23

## 2023-09-06 RX ADMIN — LUBIPROSTONE 24 MCG: 24 CAPSULE, GELATIN COATED ORAL at 08:51

## 2023-09-06 RX ADMIN — LACTULOSE 20 G: 20 SOLUTION ORAL at 17:18

## 2023-09-06 RX ADMIN — SENNOSIDES AND DOCUSATE SODIUM 2 TABLET: 50; 8.6 TABLET ORAL at 08:51

## 2023-09-06 RX ADMIN — Medication 10 MG: at 20:23

## 2023-09-06 RX ADMIN — LUBIPROSTONE 24 MCG: 24 CAPSULE, GELATIN COATED ORAL at 17:18

## 2023-09-06 RX ADMIN — BACITRACIN 0.9 G: 500 OINTMENT TOPICAL at 08:51

## 2023-09-06 RX ADMIN — Medication 10 ML: at 08:53

## 2023-09-06 RX ADMIN — LACTULOSE 20 G: 20 SOLUTION ORAL at 08:50

## 2023-09-06 RX ADMIN — MIDODRINE HYDROCHLORIDE 5 MG: 5 TABLET ORAL at 08:50

## 2023-09-06 NOTE — PROGRESS NOTES
[facility]   Hospitalist Progress Note  Date: 2023  Patient Name: Bradley Crane  : 1955  MRN: [id number]  Date of admission: 2023      Subjective   Subjective      Chief complaint: Shortness of breath    68-year-old male with history of chronic systolic CHF, CAD, dyslipidemia, current alcohol consumer, concern for liver cirrhosis secondary to alcoholism, current tobacco smoker, chronic anemia, was hospitalized on 2023 with chief complaint of shortness of breath, hospitalized with acute decompensation of chronic systolic CHF, cardiology consulted, with concern for acute hepatic encephalopathy, status post paracentesis 2023, 1.7 L of fluid drained. On diuretics for CHF exacerbation. Electrolytes being replaced. Started lactulose to help promote bowel movements. Consulted hematology after his paracentesis fluid cytology came back abnormal describing abnormal monotypic B-cell population detected, HIV negative, will need to follow-up with hematology as outpatient. Proceeded with a right and left heart cath to evaluate for pulmonary and intracardiac filling pressures and rule out CAD. Patient underwent cardiac cath 2023, had multivessel CAD, left ventricular end-diastolic pressure 35, moderate to severe pulmonary hypertension, he will require CABG and multivessel PCI, cardiology arranged transfer to [facility] to the service of Dr. Garcia, completing discharge summary so patient can be transferred to [facility] to the service of Dr. Garcia for further care and management.  Awaiting transfer to [facility] based on bed availability, remains hemodynamically stable for transfer as of 2023.    Interval follow-up: Seen and examined this morning, no acute distress, no acute major night events, no family at the bedside, awaiting transport to [facility], no chest pain or palpitations.  Still has shortness of breath symptoms with exertion.  Potassium 3.3 this morning, replacement potassium  ordered, hemoglobin A1c 5.7, white blood cell count 5000, hemoglobin 10.3.  No acute major rhythmic events.  Still awaiting bed at [facility], remains hemodynamically stable for transfer.  Still has unsteady gait and intermittent confusion.    Review of systems:  All systems reviewed and negative except for generalized fatigue, generalized weakness, intermittent cough, shortness of breath primarily with exertional activity, intermittent confusion, unsteady gait        Objective   Objective     Vitals:   Temp:  [97.3 °F (36.3 °C)-98.1 °F (36.7 °C)] 98.1 °F (36.7 °C)  Heart Rate:  [] 101  Resp:  [18-21] 18  BP: (103-120)/(58-94) 108/81  Flow (L/min):  [2] 2  Physical Exam   General: Awake, alert, NAD, lying in bed  HENT: NCAT, MMM  Eyes: pupils equal, no scleral icterus  Cardiovascular: RRR, no murmurs   Pulmonary: Crackles at the bases bilaterally; no wheezes; no conversational dyspnea  Gastrointestinal: S/ND/NT, +BS  Musculoskeletal: No gross deformities  Skin: No jaundice, no rash on exposed skin appreciated  Neuro: CN II through XII grossly intact; speech clear; no tremor  Psych: Labile mood, aggressive and apologetic  : No Dos Santos catheter; no suprapubic tenderness      Result Review    Result Review:  I have personally reviewed the pertinent results from the past 24 hours to 9/6/2023 16:53 EDT and agree with these findings:  [x]  Laboratory   CBC          9/4/2023    04:35 9/5/2023    04:17 9/6/2023    04:34   CBC   WBC 6.89  6.21  5.06    RBC 3.05  2.82  2.98    Hemoglobin 10.5  9.7  10.3    Hematocrit 30.8  27.7  29.7    .0  98.2  99.7    MCH 34.4  34.4  34.6    MCHC 34.1  35.0  34.7    RDW 14.9  14.4  14.6    Platelets 234  219  226      BMP          9/4/2023    04:35 9/5/2023    04:17 9/5/2023    15:49 9/6/2023    04:34   BMP   BUN 20  18   20    Creatinine 0.82  0.73   0.70    Sodium 129  133   135    Potassium 4.8  3.3  3.7  3.3    Chloride 96  97   96    CO2 22.8  22.9   23.9    Calcium 9.1  9.0    9.6    LIVER FUNCTION TESTS:      Lab 09/06/23  0434 09/05/23  0417 09/04/23  0435 09/03/23  0403 08/31/23  0415   TOTAL PROTEIN 7.3 6.4 6.6 6.5 6.3   ALBUMIN 4.3 3.9 4.0 4.0 3.8   ALT (SGPT) 9 9 9 8 8   AST (SGOT) 20 20 23 19 19   BILIRUBIN 1.3* 1.1 1.3* 1.4* 1.1   INDIRECT BILIRUBIN 0.6 0.5 0.8 0.8 0.5   BILIRUBIN DIRECT 0.7* 0.6* 0.5* 0.6* 0.6*   ALK PHOS 151* 169* 138* 130* 146*         [x]  Microbiology No results found for: ACANTHNAEG, AFBCX, BPERTUSSISCX, BLOODCX  No results found for: BCIDPCR, CXREFLEX, CSFCX, CULTURETIS  No results found for: CULTURES, HSVCX, URCX  No results found for: EYECULTURE, GCCX, HSVCULTURE, LABHSV  No results found for: LEGIONELLA, MRSACX, MUMPSCX, MYCOPLASCX  No results found for: NOCARDIACX, STOOLCX  No results found for: THROATCX, UNSTIMCULT, URINECX, CULTURE, VZVCULTUR  No results found for: VIRALCULTU, WOUNDCX    [x]  Radiology Adult Transthoracic Echo Complete w/ Color, Spectral and Contrast if necessary per protocol    Result Date: 8/30/2023    Left ventricular ejection fraction appears to be 26 - 30%.   The left ventricular cavity is mildly dilated.   Left ventricular diastolic function was indeterminate.   The right ventricular cavity is mild to moderately dilated.   The left atrial cavity is moderately dilated.   The right atrial cavity is moderately  dilated.   Moderate tricuspid valve regurgitation is present.   Estimated right ventricular systolic pressure from tricuspid regurgitation is normal (<35 mmHg).   Aortic valve appears sclerotic with reduced excursion.  Max/mean pressure gradient was 20/11 mmHg.  Aortic valve area was 0.93 cm².  Could be consistent with low-flow, low gradient AAS.     CT Head Without Contrast    Result Date: 8/31/2023  PROCEDURE: CT HEAD WO CONTRAST  COMPARISON:  None INDICATIONS: fell and hit back of head  PROTOCOL:   Standard imaging protocol performed    RADIATION:   DLP: 861mGy*cm   MA and/or KV was adjusted to minimize radiation dose.      TECHNIQUE: Axial images of the head without intravenous contrast.  FINDINGS:  There is generalized atrophy.  There are chronic appearing changes in the white matter.  The  The ventricles have a normal size and configuration. There is no evidence of acute intracranial hemorrhage, mass or midline shift. No extra-axial fluid collections are identified. There are no skull fractures.  There is chronic left maxillary sinusitis an air-fluid level suggesting superimposed acute sinusitis.  There is minimal polypoid mucosal thickening in the right maxillary sinus.  The visualized paranasal sinuses and mastoid air cells are otherwise grossly clear.  IMPRESSION:  1. No acute intracranial abnormalities are identified.  2. Findings suggest acute on chronic left maxillary sinusitis.  CAROLYN LARKIN MD       Electronically Signed and Approved By: CAROLYN LARKIN MD on 8/31/2023 at 15:11             US Abdomen Complete    Result Date: 8/31/2023  PROCEDURE: US ABDOMEN COMPLETE  COMPARISON: None  INDICATIONS: abdominal distension, asses for ascites  TECHNIQUE: Focused abdominal ultrasound to evaluate for ascites.   FINDINGS:   There is a moderate to large amount of abdominal ascites in all 4 quadrants.  The visualized liver has a subtle nodular contour suggesting cirrhosis.  IMPRESSION: Moderate to large amount of abdominal ascites.   CAROLYN LARKIN MD       Electronically Signed and Approved By: CAROLYN LARKIN MD on 8/31/2023 at 20:01             XR Chest 1 View    Result Date: 8/29/2023  PROCEDURE: XR CHEST 1 VW  COMPARISON: [facility], , XR CHEST 1 VW, 8/25/2023, 8:32.  INDICATIONS: SHORTNESS OF BREATH TODAY  FINDINGS:  The heart remains slightly enlarged.  The pulmonary vascularity is congested.  Bibasilar pulmonary infiltrate or edema is unchanged.  The costophrenic angles remain slightly blunted.  Dual lead AICD remains in place.        Findings consistent with moderate congestive heart failure, not  significantly changed in comparison to 8/25/2023.  Dual lead AICD remains in place.       LORENA GARNICA MD       Electronically Signed and Approved By: LORENA GARNICA MD on 8/29/2023 at 13:02             XR Chest 1 View    Result Date: 8/25/2023  PROCEDURE: XR CHEST 1 VW  COMPARISON: None  INDICATIONS: SOA Triage Protocol/SHORTNESS OF BREATH AND DIZZINESS  FINDINGS:  Left subclavian transvenous pacemaker defibrillator is in place.  There is moderate cardiomegaly.  Upper lobe pulmonary vessels are prominent compatible with pulmonary vascular congestion.  There is bibasilar airspace disease with small bilateral pleural effusions.  Is no evidence pneumothorax.  Bony structures are unremarkable.        1. Cardiomegaly and pulmonary vascular congestion 2. Bibasilar airspace disease and small bilateral pleural effusions all of which is most likely related to congestive heart failure and pulmonary edema       RJ MONTAGUE MD       Electronically Signed and Approved By: RJ MONTAGUE MD on 8/25/2023 at 8:42             US Paracentesis    Result Date: 9/1/2023  PROCEDURE: US PARACENTESIS  COMPARISON: None  INDICATIONS: ASCITES. 5 Albanian 7CM YUEH. 1.7 LITERS REMOVED. FLUID DUNN BROWN; CLEAR. DIAGNOSTIC.  CONSENT:   Prior to the procedure, the risks, benefits and alternatives were thoroughly explained.  This included the risk of bleeding, infection, injury to associated structures, and risk of bowel injury.  The patient expressed understanding and wished to continue.  Informed written consent was obtained.  FINDINGS: Preliminary ultrasound demonstrated appropriate fluid in the right mid abdomen.  The overlying skin was prepped and draped in normal sterile fashion.  Lidocaine was injected along the anticipated tract of the needle.   A 5 Khmer 7 cm Yueh catheter was advanced into the peritoneal space.  1.7 liters dunn clear fluid was aspirated.   The catheter was removed without complication.  A sample of fluid was sent  to the lab for analysis.        Successful ultrasound-guided diagnostic and therapeutic paracentesis without evidence of complication.   REGINE TOMPKINS MD       Electronically Signed and Approved By: REGINE TOMPKINS MD on 9/01/2023 at 11:31             Doppler Arterial Multi Level Lower Extremity - Bilateral CAR    Result Date: 8/31/2023    Right Conclusion: The right STEFANIA is normal. Mild digital ischemia.   Left Conclusion: The left STEFANIA is normal. Mild digital ischemia.     XR Hip With or Without Pelvis 2 - 3 View Left    Result Date: 8/31/2023  PROCEDURE: XR HIP W OR WO PELVIS 2-3 VIEW LEFT  COMPARISON: None  INDICATIONS: fell and hit hip  FINDINGS:   No evidence of acute fracture or dislocation.  Atherosclerotic vascular calcification is present.  There is a surgical screwplate in the pubic symphysis.  The midportion of the screwplate is fractured.  There is a fracture of the 2nd screw from the left.  There appear to be 2 soft tissue anchors in the region of the left ischium.  IMPRESSION: No evidence of acute fracture or dislocation.  Hardware fractures in the pubic symphysis may be acute or chronic in nature.  Recommend a follow-up examination if symptoms persist.   CAROLYN LARKIN MD       Electronically Signed and Approved By: CAROLYN LARKIN MD on 8/31/2023 at 17:00             [x]    EKG/Telemetry   []  Cardiology/Vascular   []  Pathology  [x]  Old records  []  Other:    Assessment & Plan   Assessment / Plan     Assessment/Plan:      Assessment:  Active and Resolved Hospital Problems:  Chronic systolic CHF with acute exacerbation EF 25 to 30%  Aortic valve disease  Abdominal ascites  Hyponatremia of clinical significance  Acute metabolic encephalopathy likely secondary to hypoxemia  Essential hypertension  Mixed hyperlipidemia  Hypokalemia  Chronic anemia  Current tobacco abuser  Abnormal monotypic B-cell population detected  Moderate to severe pulmonary hypertension  Multivessel CAD       Plan:  Labs  and imaging reviewed  Await transfer to [facility] for further cardiac interventions  While awaiting bed, will continue to monitor manage active symptoms of CHF  Consulted , hematology oncology for evaluation of abnormal monotypic B-cell population in the peritoneal fluid, needs to follow-up as outpatient after discharge from [facility]  Lasix continued 60 mg IV twice daily  Cardiac cath procedure and disposition plan discussed with Dr. Ji  Replacement potassium 40 mEq x1 dose today  Out of bed to chair  PT/OT  Continuing midodrine 5 mg 3 times daily and monitor blood pressures  Continue lactulose 20 g 3 times a day  Cardiology recommendations appreciated  Palliative care consulted to establish goals of care  Prophylactic thiamine and folate replacement  Iron replacement  Telemetry monitoring while we await transfer  Neurochecks  Supplemental oxygen to maintain sats greater 90% wean per tolerance  A.m. labs  Full code  DVT prophylaxis with Lovenox  Clinical course dictate further management  Discussed with nurse at the bedside  Hemodynamically stable for transfer once a bed is available at [facility]        DVT prophylaxis:  Medical DVT prophylaxis orders are present.    CODE STATUS:   Level Of Support Discussed With: Patient  Code Status (Patient has no pulse and is not breathing): CPR (Attempt to Resuscitate)  Medical Interventions (Patient has pulse or is breathing): Full Support    Electronically signed by Gaurav Espinosa MD, 09/06/23, 4:57 PM EDT.  Portions of this documentation were transcribed electronically from a voice recognition software.  I confirm all data accurately represents the service(s) I performed at today's visit.

## 2023-09-06 NOTE — PROGRESS NOTES
"Enter Query Response Below      Query Response:     -Heart failure due to hypertension, ischemic cardiomyopathy and valvular disease              If applicable, please update the problem list.     Patient: Bradley Crane        : 1955  Account: [id number]           Admit Date:         How to Respond to this query:       a. Click New Note     b. Answer query within the yellow box.                c. Update the Problem List, if applicable.      If you have any questions about this query contact me at: [email or web address]    Dr. Ji,     Patient admitted with acute on chronic systolic CHF. History of HTN, aortic valve disease, ischemic cardiomyopathy.  ECHO 2023 EF 26-30%, per cardiac cath report \"severely reduced LV systolic function\" and \"Moderate to severe pulmonary hypertension\" severe multivessel disease with plans for intervention.  Treatment includes IV lasix, spironolactone, bumex.    Please clarify the etiology of the patient’s heart failure:  -Heart failure due to hypertension  -Heart failure due to ischemic cardiomyopathy  -Heart failure due to valvular disease  -Heart failure due to hypertension, ischemic cardiomyopathy and valvular disease  -Other- specify__________  -Unable to determine    By submitting this query, we are merely seeking further clarification of documentation to accurately reflect all conditions that you are monitoring, evaluating, treating or that extend the hospitalization or utilize additional resources of care. Please utilize your independent clinical judgment when addressing the question(s) above.     This query and your response, once completed, will be entered into the legal medical record.    Sincerely,  Daphnie Glover RN BSN  Clinical Documentation Integrity Program   "

## 2023-09-06 NOTE — PROGRESS NOTES
[facility]     Cardiology Progress Note    Patient Name: Bradley Crane  : 1955  MRN: [id number]  Primary Care Physician:  Rosa Isela Kim APRN  Date of admission: 2023    Subjective   Subjective     No acute events overnight.  He continues to have intermittent chest discomfort and dyspnea    Review of Systems   All systems were reviewed and negative except as above    Objective   Objective     Vitals:   Temp:  [97.3 °F (36.3 °C)-98.3 °F (36.8 °C)] 97.3 °F (36.3 °C)  Heart Rate:  [] 99  Resp:  [18-20] 20  BP: (100-120)/(58-94) 107/69  Flow (L/min):  [2] 2  Physical Exam             Constitutional: Awake, alert, No acute distress               Eyes: PERRLA, sclerae anicteric, no conjunctival injection              HENT: NCAT, mucous membranes moist              Neck: Supple, no thyromegaly, no lymphadenopathy, trachea midline              Respiratory: Diffuse rhonchi,, nonlabored respirations               Cardiovascular: RRR, 2/6 systolic ejection murmur at the apex, no rubs, or gallops, palpable pedal pulses bilaterally              Gastrointestinal: Positive bowel sounds, soft, nontender, mildly distended.              Musculoskeletal: Trace bilateral ankle edema, no clubbing or cyanosis to extremities              Psychiatric: Appropriate affect, cooperative              Neurologic: Oriented x 3, nonfocal    Scheduled Meds:albumin human, 25 g, Intravenous, On Call  ammonium lactate, , Topical, Daily  aspirin, 81 mg, Oral, Daily  bacitracin, 1 application , Topical, Q12H  enoxaparin, 40 mg, Subcutaneous, Q24H  ferrous sulfate, 325 mg, Oral, Daily With Breakfast  folic acid, 1 mg, Oral, Daily  furosemide, 60 mg, Intravenous, BID  Iohexol, 500 mL, Oral, Once  lactulose, 20 g, Oral, TID  lubiprostone, 24 mcg, Oral, BID With Meals  midodrine, 5 mg, Oral, TID AC  nicotine, 1 patch, Transdermal, Q24H  senna-docusate sodium, 2 tablet, Oral, BID  sodium chloride, 10 mL, Intravenous,  Q12H  thiamine, 100 mg, Oral, Daily      Continuous Infusions:        Result Review    Result Review:  I have personally reviewed the results from the time of this admission to 9/6/2023 09:25 EDT and agree with these findings:  [x]  Laboratory  []  Microbiology  [x]  Radiology  [x]  EKG/Telemetry   [x]  Cardiology/Vascular   []  Pathology  []  Old records  []  Other:  Most notable findings include:     CBC          9/4/2023    04:35 9/5/2023    04:17 9/6/2023    04:34   CBC   WBC 6.89  6.21  5.06    RBC 3.05  2.82  2.98    Hemoglobin 10.5  9.7  10.3    Hematocrit 30.8  27.7  29.7    .0  98.2  99.7    MCH 34.4  34.4  34.6    MCHC 34.1  35.0  34.7    RDW 14.9  14.4  14.6    Platelets 234  219  226      CMP          9/4/2023    04:35 9/5/2023    04:17 9/5/2023    15:49 9/6/2023    04:34   CMP   Glucose 101  101   116    BUN 20  18   20    Creatinine 0.82  0.73   0.70    EGFR 96.9  100.3   101.6    Sodium 129  133   135    Potassium 4.8  3.3  3.7  3.3    Chloride 96  97   96    Calcium 9.1  9.0   9.6    Total Protein 6.6  6.4   7.3    Albumin 4.0  3.9   4.3    Total Bilirubin 1.3  1.1   1.3    Alkaline Phosphatase 138  169   151    AST (SGOT) 23  20   20    ALT (SGPT) 9  9   9    BUN/Creatinine Ratio 24.4  24.7   28.6    Anion Gap 10.2  13.1   15.1       CARDIAC LABS:      Lab 08/30/23  1414   PROTIME 15.2*   INR 1.20*        Assessment & Plan   Assessment / Plan     Brief Patient Summary:  Bradley Crane is a 68 y.o. male with:     Acute systolic congestive heart failure exacerbation, LVEF 25 to 30%, on IV Lasix  Severe ischemic cardiomyopathy, status post previous ICD implantation  Severe multivessel coronary artery disease  Status post previous IVC filter placement  Ascites, status post paracentesis.  Altered mental status, improved  Hyponatremia, improving.  Hypertension, blood pressure soft, on midodrine 5 mg 3 times daily  Mixed dyslipidemia  Anemia  Alcohol abuse  Active smoking    Patient underwent cardiac  catheterization yesterday which demonstrated highly elevated LVEDP and severe multivessel coronary artery disease.  He continues to have intermittent chest discomfort.     Plan:   Patient will be transferred to [facility] for Impella assisted high risk PCI.  Continue IV diuresis.  He will be loaded on Brilinta 180 mg and started on Brilinta 90 mg twice daily.  Will start atorvastatin 40 mg daily.  Continue IV Lasix at the current dose.  Maintain potassium around 4 magnesium around 2  Monitor renal function  Blood pressure currently soft and does not allow to initiate guideline directed medical therapy for HFrEF.  These medications can be started on an outpatient basis after blood pressure stabilizes.  Wean off midodrine as able. Currently on 5 mg TID.     CODE STATUS:   Level Of Support Discussed With: Patient  Code Status (Patient has no pulse and is not breathing): CPR (Attempt to Resuscitate)  Medical Interventions (Patient has pulse or is breathing): Full Support      Electronically signed by Merly Ji MD, 09/06/23, 9:25 AM EDT.

## 2023-09-06 NOTE — PROGRESS NOTES
[facility]     Progress Note    Patient Name: Bradley Crane  : 1955  MRN: [id number]  Primary Care Physician:  Rosa Isela Kim APRN  Date of admission: 2023    Subjective   Subjective     Chief Complaint: Patient has extensive coronary disease and is being transferred to [facility] for further management he does probably have underlying lymphoma but the work-up and management can wait I would like to see him in about 4 weeks and a follow-up visit as an outpatient    HPI: Possible lymphoma but immediate problem at this time is that of coronary artery disease I will see him as an outpatient in a follow-up    Review of Systems   All systems were reviewed and negative except for: Reviewed    Objective   Objective     Vitals:   Temp:  [97.4 °F (36.3 °C)-98.3 °F (36.8 °C)] 97.4 °F (36.3 °C)  Heart Rate:  [] 97  Resp:  [18-20] 20  BP: (100-120)/(58-94) 103/58  Flow (L/min):  [2] 2    Physical Exam    Constitutional: Awake, alert   Eyes: PERRLA, sclerae anicteric, no conjunctival injection   HENT: NCAT, mucous membranes moist   Neck: Supple, no thyromegaly, no lymphadenopathy, trachea midline   Respiratory: Clear to auscultation bilaterally, nonlabored respirations    Cardiovascular: RRR, no murmurs, rubs, or gallops, palpable pedal pulses bilaterally   Gastrointestinal: Positive bowel sounds, soft, nontender, nondistended   Musculoskeletal: No bilateral ankle edema, no clubbing or cyanosis to extremities   Psychiatric: Appropriate affect, cooperative   Neurologic: Oriented x 3, strength symmetric in all extremities, Cranial Nerves grossly intact to confrontation, speech clear   Skin: No rashes     Result Review    Result Review:  I have personally reviewed the results from the time of this admission to 2023 07:25 EDT and agree with these findings:  [x]  Laboratory  []  Microbiology  []  Radiology  []  EKG/Telemetry   []  Cardiology/Vascular   []  Pathology  []  Old records  []   Other:  Most notable findings include: Possible lymphoma    Assessment & Plan   Assessment / Plan     Brief Patient Summary:  Bradley Crane is a 68 y.o. male who stable low-grade lymphoma management can wait until his immediate problems with cardiology are taken care of    Active Hospital Problems:  Active Hospital Problems    Diagnosis     **CHF exacerbation     CAD, multiple vessel     Pulmonary HTN     Acute HFrEF (heart failure with reduced ejection fraction)     Chest pain, atypical        Plan:   Patient to be transferred to [facility] I will see him in 4 weeks as an outpatient follow-up    DVT prophylaxis:  Medical DVT prophylaxis orders are present.    CODE STATUS:   Level Of Support Discussed With: Patient  Code Status (Patient has no pulse and is not breathing): CPR (Attempt to Resuscitate)  Medical Interventions (Patient has pulse or is breathing): Full Support    Disposition:  I expect patient to be discharged patient has been discharged to [facility].    Electronically signed by aKsi Rogers MD, 09/06/23, 7:25 AM EDT.      Part of this note may be an electronic transcription/translation of spoken language to printed text using the Dragon Dictation System.

## 2023-09-06 NOTE — PLAN OF CARE
Goal Outcome Evaluation:  VSS. No acute changes during shift. Family came to visit. Awaiting EMS for transfer to [facility].

## 2023-09-06 NOTE — NURSING NOTE
Met with patient and patients ex-wife at bedside. Patient unwilling to have productive goals of care conversations. Patient unwilling to have living will completed, or discuss code status further. Updated primary rn.    Barbara ALVES, RN, [facility]  Palliative Care

## 2023-09-07 LAB
BACTERIA SPEC ANAEROBE CULT: NORMAL
BILIRUB FLD-MCNC: 0.7 MG/DL
IGA SERPL-MCNC: 513 MG/DL (ref 61–437)
IGG SERPL-MCNC: 1015 MG/DL (ref 603–1613)
IGM SERPL-MCNC: 95 MG/DL (ref 20–172)
PROT PATTERN SERPL IFE-IMP: ABNORMAL

## 2023-09-07 NOTE — PLAN OF CARE
Goal Outcome Evaluation:  Plan of Care Reviewed With: patient        Progress: no change  Outcome Evaluation: No acute changes during shift. EMS arrived to transfer patient at [facility] 2145. Patient AAOx4, reports back pain related to being cold. Warm blanket applied, pain improved. No other complaints per patient.

## 2023-09-08 LAB
FUNGUS WND CULT: NORMAL
KAPPA LC FREE SER-MCNC: 45.4 MG/L (ref 3.3–19.4)
KAPPA LC FREE/LAMBDA FREE SER: 1.19 {RATIO} (ref 0.26–1.65)
LAMBDA LC FREE SERPL-MCNC: 38.1 MG/L (ref 5.7–26.3)

## 2023-09-14 LAB
CYTO UR: NORMAL
LAB AP CASE REPORT: NORMAL
LAB AP CLINICAL INFORMATION: NORMAL
Lab: NORMAL
Lab: NORMAL
PATH REPORT.ADDENDUM SPEC: NORMAL
PATH REPORT.FINAL DX SPEC: NORMAL
PATH REPORT.GROSS SPEC: NORMAL

## 2023-09-15 LAB — FUNGUS WND CULT: NORMAL

## 2023-09-18 DIAGNOSIS — M54.50 CHRONIC BILATERAL LOW BACK PAIN WITHOUT SCIATICA: Primary | ICD-10-CM

## 2023-09-18 DIAGNOSIS — G89.29 CHRONIC BILATERAL LOW BACK PAIN WITHOUT SCIATICA: Primary | ICD-10-CM

## 2023-09-18 DIAGNOSIS — F10.930 ALCOHOL WITHDRAWAL SYNDROME WITHOUT COMPLICATION: ICD-10-CM

## 2023-09-18 RX ORDER — LORAZEPAM 1 MG/1
1 TABLET ORAL EVERY 8 HOURS PRN
Qty: 90 TABLET | Refills: 0 | Status: SHIPPED | OUTPATIENT
Start: 2023-09-18

## 2023-09-18 RX ORDER — HYDROCODONE BITARTRATE AND ACETAMINOPHEN 10; 325 MG/1; MG/1
1 TABLET ORAL EVERY 8 HOURS PRN
Qty: 90 TABLET | Refills: 0 | Status: SHIPPED | OUTPATIENT
Start: 2023-09-18 | End: 2023-09-25 | Stop reason: DRUGHIGH

## 2023-09-18 RX ORDER — HYDROCODONE BITARTRATE AND ACETAMINOPHEN 10; 325 MG/1; MG/1
1 TABLET ORAL EVERY 8 HOURS PRN
Status: CANCELLED | OUTPATIENT
Start: 2023-09-18

## 2023-09-18 NOTE — TELEPHONE ENCOUNTER
Caller: LOUIS SPENCER    Relationship: Emergency Contact    Best call back number: [phone]    Requested Prescriptions:   Requested Prescriptions     Pending Prescriptions Disp Refills    HYDROcodone-acetaminophen (NORCO)  MG per tablet       Sig: Take 1 tablet by mouth Every 8 (Eight) Hours As Needed.        Pharmacy where request should be sent: [id number] - [place], [place] - [address]  AT [facility] [address] - [phone] [facility] [phone] FX     Last office visit with prescribing clinician: 8/29/2023   Last telemedicine visit with prescribing clinician: Visit date not found   Next office visit with prescribing clinician: 9/20/2023     Additional details provided by patient: PATIENT IS OUT OF MEDICATION, HAS  AN APPOINTMENT 09/20/2023    Does the patient have less than a 3 day supply:  [x] Yes  [] No    Would you like a call back once the refill request has been completed: [] Yes [x] No    If the office needs to give you a call back, can they leave a voicemail: [] Yes [x] No    Sandra Solorzano Rep   09/18/23 10:29 EDT

## 2023-09-18 NOTE — TELEPHONE ENCOUNTER
Caller: LOUIS SPENCER    Relationship: Emergency Contact    Best call back number: [phone]    What medication are you requesting: ATIVAN    What are your current symptoms: ALCOHOL WITHDRAWAL    How long have you been experiencing symptoms:     Have you had these symptoms before:    [x] Yes  [] No    Have you been treated for these symptoms before:   [x] Yes  [] No    If a prescription is needed, what is your preferred pharmacy and phone number: [id number]  [place] [id number] AT [facility] [address] - [phone] [facility] [phone] FX     Additional notes: PATIENT WAS GIVEN THIS MEDICATION WHILE HOSPITALIZED BUT THEY DID NOT GIVE A PRESCRIPTION WHEN HE WAS RELEASED   IT HELPS TO KEEP HIM AWAY FROM THE ALCOHOL

## 2023-09-20 ENCOUNTER — OFFICE VISIT (OUTPATIENT)
Dept: FAMILY MEDICINE CLINIC | Facility: CLINIC | Age: 68
End: 2023-09-20
Payer: MEDICARE

## 2023-09-20 ENCOUNTER — REFERRAL TRIAGE (OUTPATIENT)
Dept: CASE MANAGEMENT | Facility: OTHER | Age: 68
End: 2023-09-20
Payer: MEDICARE

## 2023-09-20 VITALS
BODY MASS INDEX: 25.31 KG/M2 | WEIGHT: 191 LBS | HEART RATE: 79 BPM | DIASTOLIC BLOOD PRESSURE: 77 MMHG | TEMPERATURE: 98.4 F | SYSTOLIC BLOOD PRESSURE: 100 MMHG | HEIGHT: 73 IN | OXYGEN SATURATION: 95 %

## 2023-09-20 DIAGNOSIS — I50.23 ACUTE ON CHRONIC SYSTOLIC CONGESTIVE HEART FAILURE: Primary | ICD-10-CM

## 2023-09-20 DIAGNOSIS — S91.109A OPEN TOE WOUND, INITIAL ENCOUNTER: ICD-10-CM

## 2023-09-20 DIAGNOSIS — S81.802A WOUND OF LEFT LOWER EXTREMITY, INITIAL ENCOUNTER: ICD-10-CM

## 2023-09-20 DIAGNOSIS — G89.29 CHRONIC BILATERAL LOW BACK PAIN WITHOUT SCIATICA: ICD-10-CM

## 2023-09-20 DIAGNOSIS — M54.50 CHRONIC BILATERAL LOW BACK PAIN WITHOUT SCIATICA: ICD-10-CM

## 2023-09-20 DIAGNOSIS — S32.020S COMPRESSION FRACTURE OF L2 VERTEBRA, SEQUELA: ICD-10-CM

## 2023-09-20 DIAGNOSIS — F17.200 SMOKER: ICD-10-CM

## 2023-09-20 DIAGNOSIS — I25.10 CAD, MULTIPLE VESSEL: ICD-10-CM

## 2023-09-20 DIAGNOSIS — J44.9 COPD MIXED TYPE: ICD-10-CM

## 2023-09-20 RX ORDER — ONDANSETRON 4 MG/1
4 TABLET, FILM COATED ORAL EVERY 8 HOURS PRN
COMMUNITY

## 2023-09-20 RX ORDER — ATORVASTATIN CALCIUM 80 MG/1
80 TABLET, FILM COATED ORAL DAILY
COMMUNITY

## 2023-09-20 RX ORDER — METOPROLOL SUCCINATE 25 MG/1
0.5 TABLET, EXTENDED RELEASE ORAL DAILY
COMMUNITY
Start: 2023-09-13

## 2023-09-20 RX ORDER — POTASSIUM CHLORIDE 20MEQ/15ML
20 LIQUID (ML) ORAL DAILY
COMMUNITY

## 2023-09-20 RX ORDER — ALBUTEROL SULFATE 90 UG/1
2 AEROSOL, METERED RESPIRATORY (INHALATION) EVERY 4 HOURS PRN
Qty: 18 G | Refills: 1 | Status: SHIPPED | OUTPATIENT
Start: 2023-09-20

## 2023-09-20 RX ORDER — TORSEMIDE 100 MG/1
100 TABLET ORAL DAILY
COMMUNITY

## 2023-09-20 NOTE — PROGRESS NOTES
Chief Complaint  Congestive Heart Failure (Stents placed since last appointment )    Subjective          Bradley Crane is a 68 y.o. male who presents to [facility]    History of Present Illness    States he has cut down on drinking beer by half, now only drinks 4-6 cans of beer a day.  Recently had a stent placed in his heart.    Has an open wounds on left shin and on left second toe.  [employer] shortage Norco  mg    PHQ-2 Total Score:     PHQ-9 Total Score:          Review of Systems        Medical History: has a past medical history of CHF (congestive heart failure) and Hypertension.     Surgical History: has a past surgical history that includes Cardiac surgery and Cardiac catheterization (N/A, 9/5/2023).     Family History: family history is not on file.     Social History: reports that he has been smoking cigarettes. He has been smoking an average of 1.5 packs per day. He does not have any smokeless tobacco history on file. He reports current alcohol use of about 3.0 standard drinks per week. He reports that he does not use drugs.    Allergies: Patient has no known allergies.      Health Maintenance Due   Topic Date Due    COLORECTAL CANCER SCREENING  Never done    COVID-19 Vaccine (1) Never done    Pneumococcal Vaccine 65+ (1 - PCV) Never done    TDAP/TD VACCINES (1 - Tdap) Never done    ZOSTER VACCINE (1 of 2) Never done    ANNUAL WELLNESS VISIT  Never done            Current Outpatient Medications:     Aspirin Low Dose 81 MG EC tablet, Take 1 tablet by mouth Daily., Disp: , Rfl:     HYDROcodone-acetaminophen (NORCO)  MG per tablet, Take 1 tablet by mouth Every 8 (Eight) Hours As Needed for Moderate Pain., Disp: 90 tablet, Rfl: 0    Melatonin 10 MG tablet, Take 1 tablet by mouth At Night As Needed., Disp: , Rfl:     metoprolol succinate XL (TOPROL-XL) 25 MG 24 hr tablet, Take 0.5 tablets by mouth Daily., Disp: , Rfl:     midodrine (PROAMATINE) 2.5 MG tablet, Take  "1 tablet by mouth 3 (Three) Times a Day., Disp: , Rfl:     ondansetron (ZOFRAN) 4 MG tablet, Take 1 tablet by mouth Every 8 (Eight) Hours As Needed for Nausea or Vomiting., Disp: , Rfl:     potassium chloride (KAYCIEL) 20 mEq/15 mL solution, Take 15 mL by mouth Daily., Disp: , Rfl:     sacubitril-valsartan (ENTRESTO) 24-26 MG tablet, Take 1 tablet by mouth 2 (Two) Times a Day., Disp: , Rfl:     spironolactone (ALDACTONE) 25 MG tablet, Take 1 tablet by mouth Daily., Disp: , Rfl:     ticagrelor (BRILINTA) 90 MG tablet tablet, Take 1 tablet by mouth 2 (Two) Times a Day., Disp: 60 tablet, Rfl: 11    albuterol sulfate  (90 Base) MCG/ACT inhaler, Inhale 2 puffs Every 4 (Four) Hours As Needed for Wheezing., Disp: 18 g, Rfl: 1    atorvastatin (LIPITOR) 80 MG tablet, Take 1 tablet by mouth Daily., Disp: , Rfl:     LORazepam (Ativan) 1 MG tablet, Take 1 tablet by mouth Every 8 (Eight) Hours As Needed for Anxiety. (Patient not taking: Reported on 9/20/2023), Disp: 90 tablet, Rfl: 0    tiotropium (Spiriva HandiHaler) 18 MCG per inhalation capsule, Place 1 capsule into inhaler and inhale Daily., Disp: 28 capsule, Rfl: 0    torsemide (DEMADEX) 100 MG tablet, Take 1 tablet by mouth Daily., Disp: , Rfl:         There is no immunization history on file for this patient.      Objective       Vitals:    09/20/23 1028   BP: 100/77   BP Location: Left arm   Patient Position: Sitting   Cuff Size: Adult   Pulse: 79   Temp: 98.4 °F (36.9 °C)   TempSrc: Temporal   SpO2: 95%   Weight: 86.6 kg (191 lb)   Height: 185.4 cm (73\")   PainSc: 10-Worst pain ever   PainLoc: Back  Comment: feet      Body mass index is 25.2 kg/m².   Wt Readings from Last 3 Encounters:   09/20/23 86.6 kg (191 lb)   08/29/23 91.5 kg (201 lb 11.5 oz)   08/29/23 89 kg (196 lb 3.2 oz)      BP Readings from Last 3 Encounters:   09/20/23 100/77   09/06/23 106/76   08/29/23 117/82                Physical Exam  Vitals reviewed.   Constitutional:       Appearance: Normal " appearance.   HENT:      Head: Normocephalic and atraumatic.   Eyes:      Conjunctiva/sclera: Conjunctivae normal.      Pupils: Pupils are equal, round, and reactive to light.   Cardiovascular:      Rate and Rhythm: Normal rate and regular rhythm.      Pulses: Normal pulses.      Heart sounds: Normal heart sounds.   Pulmonary:      Effort: Pulmonary effort is normal.      Breath sounds: Normal breath sounds. Examination of the right-upper field reveals wheezing. Examination of the left-upper field reveals wheezing. Examination of the right-lower field reveals decreased breath sounds. Examination of the left-lower field reveals decreased breath sounds.   Abdominal:      General: Bowel sounds are normal.      Palpations: Abdomen is soft.   Skin:     General: Skin is warm and dry.             Comments: Left shin with quarter size wound on his shin, open and weeping serosanguinous drainage. Also open wound on left second toe about dime size. Both cleaned with sterile water and H2O2 and nonadhering dressing applied.     Neurological:      Mental Status: He is alert and oriented to person, place, and time.   Psychiatric:         Mood and Affect: Mood normal.         Behavior: Behavior normal.         Thought Content: Thought content normal.         Judgment: Judgment normal.           Result Review :       Common labs          9/4/2023    04:35 9/5/2023    04:17 9/5/2023    15:49 9/6/2023    04:34   Common Labs   Glucose 101  101   116    BUN 20  18   20    Creatinine 0.82  0.73   0.70    Sodium 129  133   135    Potassium 4.8  3.3  3.7  3.3    Chloride 96  97   96    Calcium 9.1  9.0   9.6    Albumin 4.0  3.9   4.3    Total Bilirubin 1.3  1.1   1.3    Alkaline Phosphatase 138  169   151    AST (SGOT) 23  20   20    ALT (SGPT) 9  9   9    WBC 6.89  6.21   5.06    Hemoglobin 10.5  9.7   10.3    Hematocrit 30.8  27.7   29.7    Platelets 234  219   226    Total Cholesterol    96    Triglycerides    44    HDL Cholesterol     43    LDL Cholesterol     41    Hemoglobin A1C    5.70                       Assessment and Plan        Diagnoses and all orders for this visit:    1. Acute on chronic systolic congestive heart failure (Primary)  -     Ambulatory Referral to Chronic Care Management  -     Ambulatory Referral to Home Health    2. CAD, multiple vessel  -     Ambulatory Referral to Chronic Care Management  -     Ambulatory Referral to Home Health    3. Chronic bilateral low back pain without sciatica    4. Compression fracture of L2 vertebra, sequela    5. Wound of left lower extremity, initial encounter  -     Ambulatory Referral to Home Health    6. Open toe wound, initial encounter  -     Ambulatory Referral to Home Health    7. Smoker  -     tiotropium (Spiriva HandiHaler) 18 MCG per inhalation capsule; Place 1 capsule into inhaler and inhale Daily.  Dispense: 28 capsule; Refill: 0  -     albuterol sulfate  (90 Base) MCG/ACT inhaler; Inhale 2 puffs Every 4 (Four) Hours As Needed for Wheezing.  Dispense: 18 g; Refill: 1    8. COPD mixed type  -     tiotropium (Spiriva HandiHaler) 18 MCG per inhalation capsule; Place 1 capsule into inhaler and inhale Daily.  Dispense: 28 capsule; Refill: 0  -     albuterol sulfate  (90 Base) MCG/ACT inhaler; Inhale 2 puffs Every 4 (Four) Hours As Needed for Wheezing.  Dispense: 18 g; Refill: 1          Follow Up     Return in about 4 weeks (around 10/18/2023).    Patient was given instructions and counseling regarding his condition or for health maintenance advice. Please see specific information pulled into the AVS if appropriate.     Rosa Isela Kim APRN

## 2023-09-22 LAB — FUNGUS WND CULT: NORMAL

## 2023-09-25 ENCOUNTER — TELEPHONE (OUTPATIENT)
Dept: FAMILY MEDICINE CLINIC | Facility: CLINIC | Age: 68
End: 2023-09-25

## 2023-09-25 DIAGNOSIS — M54.50 CHRONIC BILATERAL LOW BACK PAIN WITHOUT SCIATICA: Primary | ICD-10-CM

## 2023-09-25 DIAGNOSIS — G89.29 CHRONIC BILATERAL LOW BACK PAIN WITHOUT SCIATICA: Primary | ICD-10-CM

## 2023-09-25 RX ORDER — HYDROCODONE BITARTRATE AND ACETAMINOPHEN 5; 325 MG/1; MG/1
2 TABLET ORAL EVERY 8 HOURS PRN
Qty: 120 TABLET | Refills: 0 | Status: SHIPPED | OUTPATIENT
Start: 2023-09-25

## 2023-09-25 RX ORDER — HYDROCODONE BITARTRATE AND ACETAMINOPHEN 5; 325 MG/1; MG/1
2 TABLET ORAL EVERY 6 HOURS PRN
Qty: 120 TABLET | Refills: 0 | Status: SHIPPED | OUTPATIENT
Start: 2023-09-25 | End: 2023-09-25

## 2023-09-25 NOTE — TELEPHONE ENCOUNTER
Pts partner called and states she was made aware by [facility] that they have 5mg hydrocodone in stock and wanted to know if pt could have script sent in since they are out of 10mg. Please advise

## 2023-09-28 ENCOUNTER — PATIENT OUTREACH (OUTPATIENT)
Dept: CASE MANAGEMENT | Facility: OTHER | Age: 68
End: 2023-09-28
Payer: MEDICARE

## 2023-09-28 DIAGNOSIS — I50.9 ACUTE ON CHRONIC CONGESTIVE HEART FAILURE, UNSPECIFIED HEART FAILURE TYPE: Primary | ICD-10-CM

## 2023-09-28 DIAGNOSIS — I50.21 ACUTE HFREF (HEART FAILURE WITH REDUCED EJECTION FRACTION): ICD-10-CM

## 2023-09-28 NOTE — OUTREACH NOTE
AMBULATORY CASE MANAGEMENT NOTE    Name and Relationship of Patient/Support Person: Roas Maria Bradley - Self  Self    Contacted patient and he asked I call back later. Informed [facility] will be making visit today. He is aware.    Care Coordination    Contacted [facility] and they are doing admission visit today.     Education Documentation  No documentation found.        Marcy LANZA  Ambulatory Case Management    9/28/2023, 09:33 EDT

## 2023-09-29 ENCOUNTER — TELEPHONE (OUTPATIENT)
Dept: CASE MANAGEMENT | Facility: OTHER | Age: 68
End: 2023-09-29
Payer: MEDICARE

## 2023-09-29 LAB — FUNGUS WND CULT: NORMAL

## 2023-09-29 NOTE — TELEPHONE ENCOUNTER
Left message on voicemail to return my call.     [facility] started  Wounds left leg and toe  Cardio appt [facility] 11/2

## 2023-10-03 ENCOUNTER — PATIENT OUTREACH (OUTPATIENT)
Dept: CASE MANAGEMENT | Facility: OTHER | Age: 68
End: 2023-10-03
Payer: MEDICARE

## 2023-10-03 DIAGNOSIS — I50.21 ACUTE HFREF (HEART FAILURE WITH REDUCED EJECTION FRACTION): ICD-10-CM

## 2023-10-03 DIAGNOSIS — I50.9 ACUTE ON CHRONIC CONGESTIVE HEART FAILURE, UNSPECIFIED HEART FAILURE TYPE: Primary | ICD-10-CM

## 2023-10-03 NOTE — OUTREACH NOTE
AMBULATORY CASE MANAGEMENT NOTE    Name and Relationship of Patient/Support Person: Bradley Crane - Self    Spoke with Mr Rosa Maria. He reports [facility] is in the home and providing wound care to left lower leg and toes. Reports looking better. Medication list reviewed and he reports taking as ordered. SDOH and Care Gaps reviewed.     Education Documentation  No documentation found.        Marcy LANZA  Ambulatory Case Management    10/3/2023, 09:57 EDT

## 2023-10-09 ENCOUNTER — APPOINTMENT (OUTPATIENT)
Dept: GENERAL RADIOLOGY | Facility: HOSPITAL | Age: 68
DRG: 292 | End: 2023-10-09
Payer: MEDICARE

## 2023-10-09 ENCOUNTER — HOSPITAL ENCOUNTER (OUTPATIENT)
Facility: HOSPITAL | Age: 68
Setting detail: OBSERVATION
Discharge: LEFT BEFORE DISCHARGE | DRG: 292 | End: 2023-10-10
Attending: EMERGENCY MEDICINE | Admitting: INTERNAL MEDICINE
Payer: MEDICARE

## 2023-10-09 ENCOUNTER — APPOINTMENT (OUTPATIENT)
Dept: INTERVENTIONAL RADIOLOGY/VASCULAR | Facility: HOSPITAL | Age: 68
DRG: 292 | End: 2023-10-09
Payer: MEDICARE

## 2023-10-09 VITALS
RESPIRATION RATE: 18 BRPM | HEIGHT: 73 IN | BODY MASS INDEX: 28.81 KG/M2 | HEART RATE: 95 BPM | SYSTOLIC BLOOD PRESSURE: 92 MMHG | WEIGHT: 217.37 LBS | OXYGEN SATURATION: 95 % | DIASTOLIC BLOOD PRESSURE: 59 MMHG | TEMPERATURE: 98.4 F

## 2023-10-09 DIAGNOSIS — I50.9 ACUTE ON CHRONIC CONGESTIVE HEART FAILURE, UNSPECIFIED HEART FAILURE TYPE: Primary | ICD-10-CM

## 2023-10-09 DIAGNOSIS — R18.8 OTHER ASCITES: ICD-10-CM

## 2023-10-09 PROBLEM — I50.20 SYSTOLIC HEART FAILURE: Status: ACTIVE | Noted: 2023-10-09

## 2023-10-09 LAB
ALBUMIN FLD-MCNC: 1.6 G/DL
ALBUMIN SERPL-MCNC: 4 G/DL (ref 3.5–5.2)
ALBUMIN/GLOB SERPL: 1.4 G/DL
ALP SERPL-CCNC: 129 U/L (ref 39–117)
ALT SERPL W P-5'-P-CCNC: 11 U/L (ref 1–41)
ANION GAP SERPL CALCULATED.3IONS-SCNC: 9.9 MMOL/L (ref 5–15)
APPEARANCE FLD: ABNORMAL
APTT PPP: 34.6 SECONDS (ref 78–95.9)
AST SERPL-CCNC: 31 U/L (ref 1–40)
BASOPHILS # BLD AUTO: 0.06 10*3/MM3 (ref 0–0.2)
BASOPHILS NFR BLD AUTO: 0.9 % (ref 0–1.5)
BILIRUB SERPL-MCNC: 1.1 MG/DL (ref 0–1.2)
BUN SERPL-MCNC: 12 MG/DL (ref 8–23)
BUN/CREAT SERPL: 15.6 (ref 7–25)
CALCIUM SPEC-SCNC: 8.9 MG/DL (ref 8.6–10.5)
CHLORIDE SERPL-SCNC: 103 MMOL/L (ref 98–107)
CO2 SERPL-SCNC: 21.1 MMOL/L (ref 22–29)
COLOR FLD: YELLOW
CREAT SERPL-MCNC: 0.77 MG/DL (ref 0.76–1.27)
DEPRECATED RDW RBC AUTO: 59.6 FL (ref 37–54)
EGFRCR SERPLBLD CKD-EPI 2021: 98.7 ML/MIN/1.73
EOSINOPHIL # BLD AUTO: 0.14 10*3/MM3 (ref 0–0.4)
EOSINOPHIL NFR BLD AUTO: 2 % (ref 0.3–6.2)
ERYTHROCYTE [DISTWIDTH] IN BLOOD BY AUTOMATED COUNT: 15.5 % (ref 12.3–15.4)
GLOBULIN UR ELPH-MCNC: 2.8 GM/DL
GLUCOSE SERPL-MCNC: 99 MG/DL (ref 65–99)
HCT VFR BLD AUTO: 32.1 % (ref 37.5–51)
HGB BLD-MCNC: 10.5 G/DL (ref 13–17.7)
HOLD SPECIMEN: NORMAL
HOLD SPECIMEN: NORMAL
IMM GRANULOCYTES # BLD AUTO: 0.02 10*3/MM3 (ref 0–0.05)
IMM GRANULOCYTES NFR BLD AUTO: 0.3 % (ref 0–0.5)
INR PPP: 1.2 (ref 0.86–1.15)
LYMPHOCYTES # BLD AUTO: 1.2 10*3/MM3 (ref 0.7–3.1)
LYMPHOCYTES NFR BLD AUTO: 17.5 % (ref 19.6–45.3)
LYMPHOCYTES NFR FLD MANUAL: 22 %
MACROPHAGE FLUID: 54 %
MCH RBC QN AUTO: 34.7 PG (ref 26.6–33)
MCHC RBC AUTO-ENTMCNC: 32.7 G/DL (ref 31.5–35.7)
MCV RBC AUTO: 105.9 FL (ref 79–97)
MESOTHL CELL NFR FLD MANUAL: 11 %
MONOCYTES # BLD AUTO: 0.68 10*3/MM3 (ref 0.1–0.9)
MONOCYTES NFR BLD AUTO: 9.9 % (ref 5–12)
MONOCYTES NFR FLD: 7 %
NEUTROPHILS NFR BLD AUTO: 4.77 10*3/MM3 (ref 1.7–7)
NEUTROPHILS NFR BLD AUTO: 69.4 % (ref 42.7–76)
NEUTROPHILS NFR FLD MANUAL: 6 %
NRBC BLD AUTO-RTO: 0 /100 WBC (ref 0–0.2)
NT-PROBNP SERPL-MCNC: 8301 PG/ML (ref 0–900)
NUC CELL # FLD: 411 /MM3
PLATELET # BLD AUTO: 228 10*3/MM3 (ref 140–450)
PMV BLD AUTO: 9.2 FL (ref 6–12)
POTASSIUM SERPL-SCNC: 4.8 MMOL/L (ref 3.5–5.2)
PROT SERPL-MCNC: 6.8 G/DL (ref 6–8.5)
PROTHROMBIN TIME: 15.3 SECONDS (ref 11.8–14.9)
RBC # BLD AUTO: 3.03 10*6/MM3 (ref 4.14–5.8)
RBC # FLD AUTO: 2000 /MM3
SODIUM SERPL-SCNC: 134 MMOL/L (ref 136–145)
TROPONIN T SERPL HS-MCNC: 36 NG/L
WBC NRBC COR # BLD: 6.87 10*3/MM3 (ref 3.4–10.8)
WHOLE BLOOD HOLD COAG: NORMAL
WHOLE BLOOD HOLD SPECIMEN: NORMAL

## 2023-10-09 PROCEDURE — G0378 HOSPITAL OBSERVATION PER HR: HCPCS

## 2023-10-09 PROCEDURE — 80053 COMPREHEN METABOLIC PANEL: CPT | Performed by: EMERGENCY MEDICINE

## 2023-10-09 PROCEDURE — 25010000002 ALBUMIN HUMAN 25% PER 50 ML: Performed by: INTERNAL MEDICINE

## 2023-10-09 PROCEDURE — 84484 ASSAY OF TROPONIN QUANT: CPT | Performed by: EMERGENCY MEDICINE

## 2023-10-09 PROCEDURE — 71045 X-RAY EXAM CHEST 1 VIEW: CPT

## 2023-10-09 PROCEDURE — 83880 ASSAY OF NATRIURETIC PEPTIDE: CPT | Performed by: EMERGENCY MEDICINE

## 2023-10-09 PROCEDURE — 94761 N-INVAS EAR/PLS OXIMETRY MLT: CPT

## 2023-10-09 PROCEDURE — 93005 ELECTROCARDIOGRAM TRACING: CPT | Performed by: EMERGENCY MEDICINE

## 2023-10-09 PROCEDURE — 93010 ELECTROCARDIOGRAM REPORT: CPT | Performed by: INTERNAL MEDICINE

## 2023-10-09 PROCEDURE — 76942 ECHO GUIDE FOR BIOPSY: CPT

## 2023-10-09 PROCEDURE — 87205 SMEAR GRAM STAIN: CPT | Performed by: INTERNAL MEDICINE

## 2023-10-09 PROCEDURE — 85730 THROMBOPLASTIN TIME PARTIAL: CPT | Performed by: EMERGENCY MEDICINE

## 2023-10-09 PROCEDURE — 94640 AIRWAY INHALATION TREATMENT: CPT

## 2023-10-09 PROCEDURE — 85025 COMPLETE CBC W/AUTO DIFF WBC: CPT | Performed by: EMERGENCY MEDICINE

## 2023-10-09 PROCEDURE — 89051 BODY FLUID CELL COUNT: CPT | Performed by: INTERNAL MEDICINE

## 2023-10-09 PROCEDURE — 94799 UNLISTED PULMONARY SVC/PX: CPT

## 2023-10-09 PROCEDURE — 85610 PROTHROMBIN TIME: CPT | Performed by: EMERGENCY MEDICINE

## 2023-10-09 PROCEDURE — P9047 ALBUMIN (HUMAN), 25%, 50ML: HCPCS | Performed by: INTERNAL MEDICINE

## 2023-10-09 PROCEDURE — 87070 CULTURE OTHR SPECIMN AEROBIC: CPT | Performed by: INTERNAL MEDICINE

## 2023-10-09 PROCEDURE — 99223 1ST HOSP IP/OBS HIGH 75: CPT | Performed by: INTERNAL MEDICINE

## 2023-10-09 PROCEDURE — 88108 CYTOPATH CONCENTRATE TECH: CPT | Performed by: INTERNAL MEDICINE

## 2023-10-09 PROCEDURE — 0W9G30Z DRAINAGE OF PERITONEAL CAVITY WITH DRAINAGE DEVICE, PERCUTANEOUS APPROACH: ICD-10-PCS | Performed by: PEDIATRICS

## 2023-10-09 PROCEDURE — 99284 EMERGENCY DEPT VISIT MOD MDM: CPT

## 2023-10-09 PROCEDURE — 82042 OTHER SOURCE ALBUMIN QUAN EA: CPT | Performed by: INTERNAL MEDICINE

## 2023-10-09 PROCEDURE — 96374 THER/PROPH/DIAG INJ IV PUSH: CPT

## 2023-10-09 PROCEDURE — 25010000002 THIAMINE PER 100 MG: Performed by: INTERNAL MEDICINE

## 2023-10-09 RX ORDER — SODIUM CHLORIDE 0.9 % (FLUSH) 0.9 %
10 SYRINGE (ML) INJECTION EVERY 12 HOURS SCHEDULED
Status: DISCONTINUED | OUTPATIENT
Start: 2023-10-09 | End: 2023-10-10 | Stop reason: HOSPADM

## 2023-10-09 RX ORDER — BUMETANIDE 0.25 MG/ML
3 INJECTION INTRAMUSCULAR; INTRAVENOUS ONCE
Status: COMPLETED | OUTPATIENT
Start: 2023-10-09 | End: 2023-10-09

## 2023-10-09 RX ORDER — LORAZEPAM 2 MG/ML
2 INJECTION INTRAMUSCULAR
Status: DISCONTINUED | OUTPATIENT
Start: 2023-10-09 | End: 2023-10-10 | Stop reason: HOSPADM

## 2023-10-09 RX ORDER — SPIRONOLACTONE 25 MG/1
25 TABLET ORAL DAILY
Status: DISCONTINUED | OUTPATIENT
Start: 2023-10-09 | End: 2023-10-10 | Stop reason: HOSPADM

## 2023-10-09 RX ORDER — ATORVASTATIN CALCIUM 40 MG/1
80 TABLET, FILM COATED ORAL NIGHTLY
Status: DISCONTINUED | OUTPATIENT
Start: 2023-10-09 | End: 2023-10-10 | Stop reason: HOSPADM

## 2023-10-09 RX ORDER — METOPROLOL SUCCINATE 25 MG/1
12.5 TABLET, EXTENDED RELEASE ORAL DAILY
Status: DISCONTINUED | OUTPATIENT
Start: 2023-10-09 | End: 2023-10-10 | Stop reason: HOSPADM

## 2023-10-09 RX ORDER — HYDROCODONE BITARTRATE AND ACETAMINOPHEN 5; 325 MG/1; MG/1
2 TABLET ORAL EVERY 8 HOURS PRN
Status: DISCONTINUED | OUTPATIENT
Start: 2023-10-09 | End: 2023-10-10 | Stop reason: HOSPADM

## 2023-10-09 RX ORDER — ACETAMINOPHEN 325 MG/1
650 TABLET ORAL EVERY 4 HOURS PRN
Status: DISCONTINUED | OUTPATIENT
Start: 2023-10-09 | End: 2023-10-10 | Stop reason: HOSPADM

## 2023-10-09 RX ORDER — FUROSEMIDE 10 MG/ML
40 INJECTION INTRAMUSCULAR; INTRAVENOUS ONCE
Status: DISCONTINUED | OUTPATIENT
Start: 2023-10-09 | End: 2023-10-09

## 2023-10-09 RX ORDER — SODIUM CHLORIDE 0.9 % (FLUSH) 0.9 %
10 SYRINGE (ML) INJECTION AS NEEDED
Status: DISCONTINUED | OUTPATIENT
Start: 2023-10-09 | End: 2023-10-10 | Stop reason: HOSPADM

## 2023-10-09 RX ORDER — LORAZEPAM 0.5 MG/1
1 TABLET ORAL
Status: DISCONTINUED | OUTPATIENT
Start: 2023-10-09 | End: 2023-10-10 | Stop reason: HOSPADM

## 2023-10-09 RX ORDER — ASPIRIN 81 MG/1
81 TABLET ORAL DAILY
Status: DISCONTINUED | OUTPATIENT
Start: 2023-10-09 | End: 2023-10-10 | Stop reason: HOSPADM

## 2023-10-09 RX ORDER — LORAZEPAM 2 MG/ML
1 INJECTION INTRAMUSCULAR
Status: DISCONTINUED | OUTPATIENT
Start: 2023-10-09 | End: 2023-10-10 | Stop reason: HOSPADM

## 2023-10-09 RX ORDER — SODIUM CHLORIDE 9 MG/ML
40 INJECTION, SOLUTION INTRAVENOUS AS NEEDED
Status: DISCONTINUED | OUTPATIENT
Start: 2023-10-09 | End: 2023-10-10 | Stop reason: HOSPADM

## 2023-10-09 RX ORDER — CHOLECALCIFEROL (VITAMIN D3) 125 MCG
10 CAPSULE ORAL NIGHTLY PRN
Status: DISCONTINUED | OUTPATIENT
Start: 2023-10-09 | End: 2023-10-10 | Stop reason: HOSPADM

## 2023-10-09 RX ORDER — ENOXAPARIN SODIUM 100 MG/ML
40 INJECTION SUBCUTANEOUS DAILY
Status: DISCONTINUED | OUTPATIENT
Start: 2023-10-09 | End: 2023-10-10 | Stop reason: HOSPADM

## 2023-10-09 RX ORDER — MIDODRINE HYDROCHLORIDE 2.5 MG/1
2.5 TABLET ORAL
Status: DISCONTINUED | OUTPATIENT
Start: 2023-10-09 | End: 2023-10-10 | Stop reason: HOSPADM

## 2023-10-09 RX ORDER — LIDOCAINE HYDROCHLORIDE 20 MG/ML
10 INJECTION, SOLUTION INFILTRATION; PERINEURAL ONCE
Status: COMPLETED | OUTPATIENT
Start: 2023-10-09 | End: 2023-10-09

## 2023-10-09 RX ORDER — LORAZEPAM 0.5 MG/1
2 TABLET ORAL
Status: DISCONTINUED | OUTPATIENT
Start: 2023-10-09 | End: 2023-10-10 | Stop reason: HOSPADM

## 2023-10-09 RX ORDER — METOLAZONE 2.5 MG/1
2.5 TABLET ORAL DAILY
COMMUNITY
Start: 2023-08-11 | End: 2023-10-19 | Stop reason: HOSPADM

## 2023-10-09 RX ORDER — HYDROCODONE BITARTRATE AND ACETAMINOPHEN 10; 325 MG/1; MG/1
1 TABLET ORAL EVERY 6 HOURS PRN
COMMUNITY
End: 2023-10-19 | Stop reason: SDUPTHER

## 2023-10-09 RX ORDER — FOLIC ACID 1 MG/1
1 TABLET ORAL DAILY
Status: DISCONTINUED | OUTPATIENT
Start: 2023-10-09 | End: 2023-10-10 | Stop reason: HOSPADM

## 2023-10-09 RX ORDER — ALBUMIN (HUMAN) 12.5 G/50ML
25 SOLUTION INTRAVENOUS ONCE
Status: COMPLETED | OUTPATIENT
Start: 2023-10-09 | End: 2023-10-09

## 2023-10-09 RX ORDER — THIAMINE HYDROCHLORIDE 100 MG/ML
200 INJECTION, SOLUTION INTRAMUSCULAR; INTRAVENOUS EVERY 8 HOURS SCHEDULED
Status: DISCONTINUED | OUTPATIENT
Start: 2023-10-09 | End: 2023-10-10 | Stop reason: HOSPADM

## 2023-10-09 RX ADMIN — TIOTROPIUM BROMIDE INHALATION SPRAY 2 PUFF: 3.12 SPRAY, METERED RESPIRATORY (INHALATION) at 21:48

## 2023-10-09 RX ADMIN — ATORVASTATIN CALCIUM 80 MG: 40 TABLET, FILM COATED ORAL at 21:35

## 2023-10-09 RX ADMIN — ALBUMIN HUMAN 25 G: 0.25 SOLUTION INTRAVENOUS at 18:26

## 2023-10-09 RX ADMIN — THIAMINE HYDROCHLORIDE 200 MG: 200 INJECTION, SOLUTION INTRAMUSCULAR; INTRAVENOUS at 21:35

## 2023-10-09 RX ADMIN — TICAGRELOR 90 MG: 90 TABLET ORAL at 21:35

## 2023-10-09 RX ADMIN — FOLIC ACID 1 MG: 1 TABLET ORAL at 20:07

## 2023-10-09 RX ADMIN — HYDROCODONE BITARTRATE AND ACETAMINOPHEN 2 TABLET: 5; 325 TABLET ORAL at 19:16

## 2023-10-09 RX ADMIN — Medication 10 ML: at 21:35

## 2023-10-09 RX ADMIN — MIDODRINE HYDROCHLORIDE 2.5 MG: 2.5 TABLET ORAL at 20:07

## 2023-10-09 RX ADMIN — BUMETANIDE 3 MG: 0.25 INJECTION, SOLUTION INTRAMUSCULAR; INTRAVENOUS at 16:45

## 2023-10-09 RX ADMIN — LIDOCAINE HYDROCHLORIDE 7 ML: 20 INJECTION, SOLUTION INFILTRATION; PERINEURAL at 15:23

## 2023-10-09 RX ADMIN — SPIRONOLACTONE 25 MG: 25 TABLET ORAL at 20:07

## 2023-10-09 RX ADMIN — SODIUM BICARBONATE 1 ML: 84 INJECTION INTRAVENOUS at 15:23

## 2023-10-09 RX ADMIN — ASPIRIN 81 MG: 81 TABLET, COATED ORAL at 20:07

## 2023-10-09 RX ADMIN — Medication 10 MG: at 21:35

## 2023-10-09 NOTE — PLAN OF CARE
Goal Outcome Evaluation:   Pt admitted from ER. 5L drained from paracentesis. Abdomen still distended and tight. Requesting home pain meds and melatonin requested. Provider notified. Urinal at bedside. Wound on BLLE. No acute distress.   Irene Meza RN

## 2023-10-09 NOTE — ED PROVIDER NOTES
Time: 1:37 PM EDT  Date of encounter:  10/9/2023  Independent Historian/Clinical History and Information was obtained by:   Patient and Family    History is limited by: N/A    Chief Complaint: Shortness of breath      History of Present Illness:  Patient is a 68 y.o. year old male who presents to the emergency department for evaluation of shortness of breath.  Patient has a history of ascites, CHF, hypertension, coronary disease who presents with complaints of shortness of breath.  Reports that he has had 70 pound weight gain as well as his abdomen has become significantly more distended.  States he was just in the hospital couple weeks ago and had to be transferred to local for further care.  Also had a recent stent placed.  States that over the last few days he has significantly gotten more swollen.  Has been taking all meds as directed.  States he can barely get around or breathe because of the amount of swelling he has..    HPI    Patient Care Team  Primary Care Provider: Rosa Isela Kim APRN    Past Medical History:     No Known Allergies  Past Medical History:   Diagnosis Date    CHF (congestive heart failure)     Hypertension      Past Surgical History:   Procedure Laterality Date    CARDIAC CATHETERIZATION N/A 9/5/2023    Procedure: Right and LHC with possible coronary intevention;  Surgeon: Merly Ji MD;  Location: [facility] CATH INVASIVE LOCATION;  Service: Cardiology;  Laterality: N/A;    CARDIAC SURGERY       History reviewed. No pertinent family history.    Home Medications:  Prior to Admission medications    Medication Sig Start Date End Date Taking? Authorizing Provider   albuterol sulfate  (90 Base) MCG/ACT inhaler Inhale 2 puffs Every 4 (Four) Hours As Needed for Wheezing. 9/20/23   Rosa Isela Kim APRN   Aspirin Low Dose 81 MG EC tablet Take 1 tablet by mouth Daily. 8/7/23   Provider, MD Joshua   atorvastatin (LIPITOR) 80 MG tablet Take 1 tablet by mouth Daily.     Joshua Connors MD   HYDROcodone-acetaminophen (NORCO) 5-325 MG per tablet Take 2 tablets by mouth Every 8 (Eight) Hours As Needed for Moderate Pain. 9/25/23   Rosa Isela Kim APRN   LORazepam (Ativan) 1 MG tablet Take 1 tablet by mouth Every 8 (Eight) Hours As Needed for Anxiety.  Patient not taking: Reported on 9/20/2023 9/18/23   Rosa Isela Kim APRN   Melatonin 10 MG tablet Take 1 tablet by mouth At Night As Needed.    Joshua Connors MD   metoprolol succinate XL (TOPROL-XL) 25 MG 24 hr tablet Take 0.5 tablets by mouth Daily. 9/13/23   Joshua Connors MD   midodrine (PROAMATINE) 2.5 MG tablet Take 1 tablet by mouth 3 (Three) Times a Day. 8/7/23   Joshua Connors MD   ondansetron (ZOFRAN) 4 MG tablet Take 1 tablet by mouth Every 8 (Eight) Hours As Needed for Nausea or Vomiting.    Joshua Connors MD   potassium chloride (KAYCIEL) 20 mEq/15 mL solution Take 15 mL by mouth Daily.    Joshua Connors MD   sacubitril-valsartan (ENTRESTO) 24-26 MG tablet Take 1 tablet by mouth 2 (Two) Times a Day.    Joshua Connors MD   spironolactone (ALDACTONE) 25 MG tablet Take 1 tablet by mouth Daily.    Joshua Connors MD   ticagrelor (BRILINTA) 90 MG tablet tablet Take 1 tablet by mouth 2 (Two) Times a Day. 9/6/23   Merly Ji MD   tiotropium (Spiriva HandiHaler) 18 MCG per inhalation capsule Place 1 capsule into inhaler and inhale Daily. 9/20/23   Rosa Isela Kim APRN   torsemide (DEMADEX) 100 MG tablet Take 1 tablet by mouth Daily.    Joshua Connors MD        Social History:   Social History     Tobacco Use    Smoking status: Every Day     Packs/day: 1.5     Types: Cigarettes   Vaping Use    Vaping Use: Never used   Substance Use Topics    Alcohol use: Yes     Alcohol/week: 3.0 standard drinks of alcohol     Types: 3 Cans of beer per week     Comment: 3-4 beer daily    Drug use: Never         Review of Systems:  Review of Systems   Respiratory:   "Positive for shortness of breath.    Gastrointestinal:  Positive for abdominal distention.        Physical Exam:  /66 (BP Location: Left arm, Patient Position: Sitting)   Pulse 69   Temp 98.9 øF (37.2 øC) (Oral)   Resp 13   Ht 185.4 cm (73\")   Wt 101 kg (221 lb 12.5 oz)   SpO2 95%   BMI 29.26 kg/mý     Physical Exam  Vitals and nursing note reviewed.   Constitutional:       Appearance: Normal appearance.   HENT:      Head: Normocephalic and atraumatic.   Eyes:      General: No scleral icterus.  Cardiovascular:      Rate and Rhythm: Normal rate and regular rhythm.   Pulmonary:      Effort: Respiratory distress present.      Comments: Patient with mild respiratory distress and tachypnea. decreased breath sounds bilaterally.  Upper fields of the lungs are clear but the lower fields are decreased  Abdominal:      General: There is distension.      Palpations: Abdomen is soft.      Tenderness: There is no abdominal tenderness.   Musculoskeletal:         General: Normal range of motion.      Cervical back: Normal range of motion.      Right lower leg: Edema present.      Left lower leg: Edema present.   Skin:     Findings: No rash.   Neurological:      General: No focal deficit present.      Mental Status: He is alert.                  Procedures:  Procedures      Medical Decision Making:      Comorbidities that affect care:    Congestive Heart Failure, Coronary Artery Disease, Hypertension    External Notes reviewed:    Reviewed discharge summary from 9/15/2023      The following orders were placed and all results were independently analyzed by me:  Orders Placed This Encounter   Procedures    XR Chest 1 View    US Paracentesis    [place] Draw    Comprehensive Metabolic Panel    BNP    Single High Sensitivity Troponin T    CBC Auto Differential    Protime-INR    aPTT    NPO Diet NPO Type: Strict NPO    Undress & Gown    Continuous Pulse Oximetry    Vital Signs    IP General Consult (Use specialty-specific " consult if known)    Inpatient Hospitalist Consult    Oxygen Therapy- Nasal Cannula; Titrate 1-6 LPM Per SpO2; 90 - 95%    ECG 12 Lead ED Triage Standing Order; SOA    Insert Peripheral IV    CBC & Differential    Green Top (Gel)    Lavender Top    Gold Top - SST    Light Blue Top       Medications Given in the Emergency Department:  Medications   sodium chloride 0.9 % flush 10 mL (has no administration in time range)   furosemide (LASIX) injection 40 mg (has no administration in time range)   lidocaine (XYLOCAINE) 2% injection 10 mL (has no administration in time range)   sodium bicarbonate injection 8.4% 10 mL (has no administration in time range)        ED Course:    ED Course as of 10/09/23 1452   Mon Oct 09, 2023   1424 Spoke with Dr. Baird who agrees to admit. [MA]      ED Course User Index  [MA] Benjamin Solis MD       Labs:    Lab Results (last 24 hours)       Procedure Component Value Units Date/Time    CBC & Differential [[id number]]  (Abnormal) Collected: 10/09/23 1346    Specimen: Blood from Arm, Left Updated: 10/09/23 1420    Narrative:      The following orders were created for panel order CBC & Differential.  Procedure                               Abnormality         Status                     ---------                               -----------         ------                     CBC Auto Differential[[id number]]        Abnormal            Final result               Scan Slide[[id number]]                                                                    Please view results for these tests on the individual orders.    Comprehensive Metabolic Panel [[id number]] Collected: 10/09/23 1346    Specimen: Blood from Arm, Left Updated: 10/09/23 1357    BNP [[id number]]  (Abnormal) Collected: 10/09/23 1346    Specimen: Blood from Arm, Left Updated: 10/09/23 1445     proBNP 8,301.0 pg/mL     Narrative:      This assay is used as an aid in the diagnosis of individuals suspected of having heart failure. It can be  used as an aid in the diagnosis of acute decompensated heart failure (ADHF) in patients presenting with signs and symptoms of ADHF to the emergency department (ED). In addition, NT-proBNP of <300 pg/mL indicates ADHF is not likely.    Age Range Result Interpretation  NT-proBNP Concentration (pg/mL:      <50             Positive            >450                   Gray                 300-450                    Negative             <300    50-75           Positive            >900                  Gray                300-900                  Negative            <300      >75             Positive            >1800                  Gray                300-1800                  Negative            <300    Single High Sensitivity Troponin T [[id number]]  (Abnormal) Collected: 10/09/23 1346    Specimen: Blood from Arm, Left Updated: 10/09/23 1450     HS Troponin T 36 ng/L     Narrative:      High Sensitive Troponin T Reference Range:  <10.0 ng/L- Negative Female for AMI  <15.0 ng/L- Negative Male for AMI  >=10 - Abnormal Female indicating possible myocardial injury.  >=15 - Abnormal Male indicating possible myocardial injury.   Clinicians would have to utilize clinical acumen, EKG, Troponin, and serial changes to determine if it is an Acute Myocardial Infarction or myocardial injury due to an underlying chronic condition.         CBC Auto Differential [[id number]]  (Abnormal) Collected: 10/09/23 1346    Specimen: Blood from Arm, Left Updated: 10/09/23 1416     WBC 6.87 10*3/mm3      RBC 3.03 10*6/mm3      Hemoglobin 10.5 g/dL      Hematocrit 32.1 %      .9 fL      MCH 34.7 pg      MCHC 32.7 g/dL      RDW 15.5 %      RDW-SD 59.6 fl      MPV 9.2 fL      Platelets 228 10*3/mm3      Neutrophil % 69.4 %      Lymphocyte % 17.5 %      Monocyte % 9.9 %      Eosinophil % 2.0 %      Basophil % 0.9 %      Immature Grans % 0.3 %      Neutrophils, Absolute 4.77 10*3/mm3      Lymphocytes, Absolute 1.20 10*3/mm3      Monocytes,  Absolute 0.68 10*3/mm3      Eosinophils, Absolute 0.14 10*3/mm3      Basophils, Absolute 0.06 10*3/mm3      Immature Grans, Absolute 0.02 10*3/mm3      nRBC 0.0 /100 WBC     Protime-INR [[id number]]  (Abnormal) Collected: 10/09/23 1346    Specimen: Blood from Arm, Left Updated: 10/09/23 1431     Protime 15.3 Seconds      INR 1.20    Narrative:      Suggested Therapeutic Ranges For Oral Anticoagulant Therapy:  Level of Therapy                      INR Target Range  Standard Dose                            2.0-3.0  High Dose                                2.5-3.5  Patients not receiving anticoagulant  Therapy Normal Range                     0.86-1.15    aPTT [[id number]]  (Abnormal) Collected: 10/09/23 1346    Specimen: Blood from Arm, Left Updated: 10/09/23 1431     PTT 34.6 seconds              Imaging:    XR Chest 1 View    Result Date: 10/9/2023  PROCEDURE: XR CHEST 1 VW  COMPARISON: [facility], CR, XR CHEST 1 VW, 8/25/2023, 8:32.  [facility], CR, XR CHEST 1 VW, 8/29/2023, 12:53.  INDICATIONS: SOA Triage Protocol  FINDINGS:  The heart remains slightly enlarged.  The pulmonary vascularity is prominent centrally.  Low lung volumes are evident with worsening atelectasis at the lung bases.  Small pleural effusions are unchanged.  Dual lead AICD is in unchanged position.        Findings consistent with moderate congestive heart failure.  Low lung volumes with worsening atelectasis at the lung bases.  Dual lead AICD remains in place.       LORENA GARNICA MD       Electronically Signed and Approved By: LORENA GARNICA MD on 10/09/2023 at 14:12                Differential Diagnosis and Discussion:    Dyspnea: Differential diagnosis includes but is not limited to metabolic acidosis, neurological disorders, psychogenic, asthma, pneumothorax, upper airway obstruction, COPD, pneumonia, noncardiogenic pulmonary edema, interstitial lung disease, anemia, congestive heart failure, and pulmonary  embolism    All labs were reviewed and interpreted by me.  All X-rays impressions were independently interpreted by me.  EKG was interpreted by me.    MDM     Patient presents with complaints of shortness of breath and weight gain and swelling.  Has extensive edema noted.  Has distention of the abdomen with fluid.  Patient states he normally weighs about 185 and currently is 220 pounds.  Fluid overload at this time.  Will admit for further work-up and management.  Lasix has been started.  Spoke with IR for possible abdominal paracentesis.      Patient Care Considerations:          Consultants/Shared Management Plan:    Hospitalist: I have discussed the case with Dr. Baird who agrees to accept the patient for admission.    Social Determinants of Health:          Disposition and Care Coordination:    Admit:   Through independent evaluation of the patient's history, physical, and imperical data, the patient meets criteria for observation/admission to the hospital.        Final diagnoses:   Acute on chronic congestive heart failure, unspecified heart failure type   Other ascites        ED Disposition       ED Disposition   Decision to Admit    Condition   --    Comment   --               This medical record created using voice recognition software.             Benjamin Solis MD  10/09/23 9120

## 2023-10-09 NOTE — H&P
[facility]  Date: 10/9/2023   Patient Name: Bradley Crane  : 1955  MRN: [id number]  Primary Care Physician:  Rosa Isela Kim, SRIKANTH  Date of admission: 10/9/2023    Subjective   Subjective     Chief Complaint: Edema    HPI:    Bradley Crane is a 68 y.o. male past medical history of coronary artery disease with recent stent placement assisted by Impella, systolic heart failure EF of 26 to 30%, hypertension, and cirrhosis of the liver who presents with anasarca    Patient does not give a great history.  He was recently admitted here at this hospital and then transferred to [facility] for high risk PCI.  Patient is on dual and platelet therapy.  He continues to drink and smoke.  States that he has several beers a day.  Main reason he came today is continued swelling.    In the emergency department the patient's vital signs are as follows: Patient's temperature is 97.3, pulse 101, respirate 20, blood pressure 86/65, 99% on room air.  CBC shows a white count of 6.7 hemoglobin 10.5 with normal platelets.  CMP shows a bicarb of 21.1 with no anion gap.  Sodium is 134.  INR is normal.  BNP is 8300 and it was previously as high as 2600.  X-ray shows mild congestive heart failure.  Patient admitted to hospital for acute systolic heart failure exacerbation.    All systems reviewed abnormalities noted above    Personal History     Past Medical History:  Coronary artery disease  Systolic heart failure with EF of 26 to 30%  Aortic valve sclerosis with a max of 20 and a mean of 11 with a valve area of 1.93  Hypertension  Cirrhosis of the liver seen with a nodular liver on ultrasound    Past Surgical History:  Cardiac catheterization  Cardiac catheterization with Impella    Family History:   No known family history of heart disease    Social History:   Smokes daily  Drinks daily    Home Medications:  HYDROcodone-acetaminophen, LORazepam, Melatonin, albuterol sulfate HFA, aspirin,  atorvastatin, metoprolol succinate XL, midodrine, ondansetron, potassium chloride, sacubitril-valsartan, spironolactone, ticagrelor, tiotropium, and torsemide    Allergies:  No Known Allergies      Objective   Objective     Vitals:   Temp:  [98.9 øF (37.2 øC)] 98.9 øF (37.2 øC)  Heart Rate:  [69] 69  Resp:  [13] 13  BP: (100)/(66) 100/66    Physical Exam    Constitutional: Awake, alert, no acute distress   Eyes: Pupils equal, sclerae anicteric, no conjunctival injection   HENT: NCAT, mucous membranes moist   Neck: Supple, no thyromegaly, no lymphadenopathy, trachea midline   Respiratory: Clear to auscultation bilaterally, nonlabored respirations    Cardiovascular: RRR, no murmurs, rubs, or gallops, palpable pedal pulses bilaterally   Gastrointestinal: Distended abdomen with no pain.   Musculoskeletal: 2+ pitting edema all the way up to the waist no clubbing or cyanosis to extremities   Psychiatric: Appropriate affect, cooperative   Neurologic: Oriented x 3, strength symmetric in all extremities, Cranial Nerves grossly intact to confrontation, speech clear   Skin: No rashes     Result Review    Result Review:  I have personally reviewed the results from the time of this admission to 10/9/2023 15:02 EDT and agree with these findings:  BNP is 8300 which is actually down from his previous BNP of 55564 and 16659 on last admission      Assessment & Plan   Assessment / Plan     Assessment/Plan:   Acute systolic heart failure exacerbation  Coronary artery disease with recent stent dual antiplatelet therapy  Hypertension  Cirrhosis of the liver with ascites      Plan:  --Admit to hospital service  -- Patient had paracentesis in the IR  -- Removed 5 L and supplement with albumin  -- Gave patient 3 mg of Bumex (home dose is 100 of torsemide)  -- We will hold further diuresis due to significant fluid shifts from paracentesis and diuretics until tomorrow  -- CIWA protocol due to alcohol use  -- Long discussion about alcohol use  and causing worsening ascites and also fluid overload  -- Continue midodrine  -- Hold Entresto due to relative hypotension  -- Continue dual antiplatelet therapy with Brilinta and aspirin        DVT prophylaxis:  Lovenox    CODE STATUS:     Full code    Admission Status:  I believe this patient meets observation status.    Electronically signed by Francis Baird MD, 10/09/23, 3:02 PM EDT.

## 2023-10-10 LAB
QT INTERVAL: 397 MS
QTC INTERVAL: 486 MS

## 2023-10-10 PROCEDURE — G0378 HOSPITAL OBSERVATION PER HR: HCPCS

## 2023-10-10 RX ADMIN — MIDODRINE HYDROCHLORIDE 2.5 MG: 2.5 TABLET ORAL at 06:43

## 2023-10-10 RX ADMIN — LORAZEPAM 2 MG: 0.5 TABLET ORAL at 01:19

## 2023-10-10 RX ADMIN — HYDROCODONE BITARTRATE AND ACETAMINOPHEN 2 TABLET: 5; 325 TABLET ORAL at 06:43

## 2023-10-10 NOTE — PLAN OF CARE
Goal Outcome Evaluation:    Patient is AO x 4, on room air, complaint of pain, medicated , see mar, on ciwa protocol, labs refused, pa notified, no acute changes in condition, plan of care ongoing.

## 2023-10-10 NOTE — NURSING NOTE
"Patient signed out AMA.  Informed patient that if he signs out his insurance his insurance will not cover his hospital bill. He stated \"well I'll let the insurance company worry about that\". Patient signed AMA papers and left facility.  "

## 2023-10-10 NOTE — DISCHARGE SUMMARY
[facility]         HOSPITALIST  DISCHARGE SUMMARY    Patient Name: Bradley Crane  : 1955  MRN: [id number]    Date of Admission: 10/9/2023  Date of Discharge:  10/10/23  Primary Care Physician: Rosa Isela Kim APRN    Consults       Date and Time Order Name Status Description    10/9/2023  2:07 PM Inpatient Hospitalist Consult      10/9/2023  2:07 PM IP General Consult (Use specialty-specific consult if known)              Active and Resolved Hospital Problems:  Active Hospital Problems    Diagnosis POA    **Systolic heart failure [I50.20] Yes      Resolved Hospital Problems   No resolved problems to display.       Hospital Course     Hospital Course:  Bradley Crane is a 68 y.o. male past medical history of coronary artery disease with recent stent placement assisted by Impella, systolic heart failure EF of 26 to 30%, hypertension, and cirrhosis of the liver who presents with anasarca     Patient does not give a great history.  He was recently admitted here at this hospital and then transferred to [facility] for high risk PCI.  Patient is on dual and platelet therapy.  He continues to drink and smoke.  States that he has several beers a day.  Main reason he came today is continued swelling.     In the emergency department the patient's vital signs are as follows: Patient's temperature is 97.3, pulse 101, respirate 20, blood pressure 86/65, 99% on room air.  CBC shows a white count of 6.7 hemoglobin 10.5 with normal platelets.  CMP shows a bicarb of 21.1 with no anion gap.  Sodium is 134.  INR is normal.  BNP is 8300 and it was previously as high as 2600.  X-ray shows mild congestive heart failure.  Patient admitted to hospital for acute systolic heart failure exacerbation.    5L removal paracentesis in ER.     Patient left AMA on the morning of 10/10/23 prior to being examined.      Day of Discharge     Vital Signs:  Temp:  [97.3 øF (36.3 øC)-98.9 øF (37.2 øC)] 98.4 øF (36.9  øC)  Heart Rate:  [] 95  Resp:  [13-20] 18  BP: ()/(59-92) 92/59    Physical Exam:   Not obtained      Discharge Details        Discharge Medications        ASK your doctor about these medications        Instructions Start Date   albuterol sulfate  (90 Base) MCG/ACT inhaler  Commonly known as: PROVENTIL HFA;VENTOLIN HFA;PROAIR HFA   2 puffs, Inhalation, Every 4 Hours PRN      Aspirin Low Dose 81 MG EC tablet  Generic drug: aspirin   1 tablet, Oral, Daily      atorvastatin 80 MG tablet  Commonly known as: LIPITOR   80 mg, Oral, Daily      Norco  MG per tablet  Generic drug: HYDROcodone-acetaminophen   1 tablet, Oral, Every 6 Hours PRN      HYDROcodone-acetaminophen 5-325 MG per tablet  Commonly known as: NORCO   2 tablets, Oral, Every 8 Hours PRN      LORazepam 1 MG tablet  Commonly known as: Ativan   1 mg, Oral, Every 8 Hours PRN      Melatonin 10 MG tablet   10 mg, Oral, Nightly PRN      metOLazone 2.5 MG tablet  Commonly known as: ZAROXOLYN   2.5 mg, Oral, Daily      metoprolol succinate XL 25 MG 24 hr tablet  Commonly known as: TOPROL-XL   0.5 tablets, Oral, Daily      midodrine 2.5 MG tablet  Commonly known as: PROAMATINE   1 tablet, Oral, 3 Times Daily      ondansetron 4 MG tablet  Commonly known as: ZOFRAN   4 mg, Oral, Every 8 Hours PRN      POTASSIUM CHLORIDE PO   20 mEq, Oral, Daily      sacubitril-valsartan 24-26 MG tablet  Commonly known as: ENTRESTO   1 tablet, Oral, 2 Times Daily      spironolactone 25 MG tablet  Commonly known as: ALDACTONE   25 mg, Oral, Daily      ticagrelor 90 MG tablet tablet  Commonly known as: BRILINTA   90 mg, Oral, 2 Times Daily      tiotropium 18 MCG per inhalation capsule  Commonly known as: Spiriva HandiHaler   1 capsule, Inhalation, Daily - RT      torsemide 100 MG tablet  Commonly known as: DEMADEX   100 mg, Oral, Daily               No Known Allergies    Discharge Disposition:  Left Against Medical Advice    Diet:  Hospital:  Diet Order    Procedures    Diet: Cardiac Diets, Diabetic Diets, Fluid Restriction (240 mL/tray) Diets; Low Sodium (2g); Consistent Carbohydrate; 1500 mL/day; Texture: Regular Texture (IDDSI 7); Fluid Consistency: Thin (IDDSI 0)       Discharge Activity:       CODE STATUS:  Code Status and Medical Interventions:   Ordered at: 10/09/23 1526     Level Of Support Discussed With:    Patient     Code Status (Patient has no pulse and is not breathing):    CPR (Attempt to Resuscitate)     Medical Interventions (Patient has pulse or is breathing):    Full Support         Future Appointments   Date Time Provider Department Center   10/20/2023 11:45 AM Rosa Isela Kim APRN [facility]           Pertinent  and/or Most Recent Results         LAB RESULTS:      Lab 10/09/23  1346   WBC 6.87   HEMOGLOBIN 10.5*   HEMATOCRIT 32.1*   PLATELETS 228   NEUTROS ABS 4.77   IMMATURE GRANS (ABS) 0.02   LYMPHS ABS 1.20   MONOS ABS 0.68   EOS ABS 0.14   .9*   PROTIME 15.3*   APTT 34.6*         Lab 10/09/23  1346   SODIUM 134*   POTASSIUM 4.8   CHLORIDE 103   CO2 21.1*   ANION GAP 9.9   BUN 12   CREATININE 0.77   EGFR 98.7   GLUCOSE 99   CALCIUM 8.9         Lab 10/09/23  1346   TOTAL PROTEIN 6.8   ALBUMIN 4.0   GLOBULIN 2.8   ALT (SGPT) 11   AST (SGOT) 31   BILIRUBIN 1.1   ALK PHOS 129*         Lab 10/09/23  1346   PROBNP 8,301.0*   HSTROP T 36*   PROTIME 15.3*   INR 1.20*                 Brief Urine Lab Results       None          Microbiology Results (last 10 days)       Procedure Component Value - Date/Time    Body Fluid Culture - Body Fluid, Peritoneum [[id number]] Collected: 10/09/23 1525    Lab Status: Preliminary result Specimen: Body Fluid from Peritoneum Updated: 10/10/23 0846     Body Fluid Culture No growth     Gram Stain No organisms seen            US Paracentesis    Result Date: 10/9/2023   Ultrasound-guided paracentesis was performed without complication, patient tolerated procedure with 5 L of clear, dunn ascitic fluid  removed. A sample specimen was sent to the lab for further diagnostic evaluation.  The patient was instructed to obtain follow up care from the referring physician.    CARMEN LUCAS       Electronically Signed and Approved By: NAYE MUNOZ MD on 10/09/2023 at 16:43             XR Chest 1 View    Result Date: 10/9/2023    Findings consistent with moderate congestive heart failure.  Low lung volumes with worsening atelectasis at the lung bases.  Dual lead AICD remains in place.       LORENA GARNICA MD       Electronically Signed and Approved By: LORENA GARNICA MD on 10/09/2023 at 14:12                     Labs Pending at Discharge:  Pending Labs       Order Current Status    Non-gynecologic Cytology Collected (10/09/23 1525)    Body Fluid Culture - Body Fluid, Peritoneum Preliminary result              Time spent on Discharge including face to face service:  <30 minutes    Electronically signed by Meghann Blankenship DO, 10/10/23, 9:31 AM EDT.

## 2023-10-11 ENCOUNTER — HOSPITAL ENCOUNTER (INPATIENT)
Facility: HOSPITAL | Age: 68
LOS: 7 days | Discharge: HOME | DRG: 292 | End: 2023-10-19
Attending: EMERGENCY MEDICINE | Admitting: STUDENT IN AN ORGANIZED HEALTH CARE EDUCATION/TRAINING PROGRAM
Payer: MEDICARE

## 2023-10-11 ENCOUNTER — APPOINTMENT (OUTPATIENT)
Dept: GENERAL RADIOLOGY | Facility: HOSPITAL | Age: 68
DRG: 292 | End: 2023-10-11
Payer: MEDICARE

## 2023-10-11 DIAGNOSIS — E87.70 HYPERVOLEMIA, UNSPECIFIED HYPERVOLEMIA TYPE: ICD-10-CM

## 2023-10-11 DIAGNOSIS — K74.60 CIRRHOSIS OF LIVER WITH ASCITES, UNSPECIFIED HEPATIC CIRRHOSIS TYPE: ICD-10-CM

## 2023-10-11 DIAGNOSIS — I50.23 ACUTE ON CHRONIC HFREF (HEART FAILURE WITH REDUCED EJECTION FRACTION): ICD-10-CM

## 2023-10-11 DIAGNOSIS — I50.9 ACUTE CONGESTIVE HEART FAILURE, UNSPECIFIED HEART FAILURE TYPE: Primary | ICD-10-CM

## 2023-10-11 DIAGNOSIS — Z74.09 IMPAIRED MOBILITY AND ADLS: ICD-10-CM

## 2023-10-11 DIAGNOSIS — I50.23 ACUTE ON CHRONIC SYSTOLIC CONGESTIVE HEART FAILURE: ICD-10-CM

## 2023-10-11 DIAGNOSIS — R26.2 DIFFICULTY WALKING: ICD-10-CM

## 2023-10-11 DIAGNOSIS — R18.8 CIRRHOSIS OF LIVER WITH ASCITES, UNSPECIFIED HEPATIC CIRRHOSIS TYPE: ICD-10-CM

## 2023-10-11 DIAGNOSIS — Z78.9 IMPAIRED MOBILITY AND ADLS: ICD-10-CM

## 2023-10-11 LAB
ALBUMIN SERPL-MCNC: 3.3 G/DL (ref 3.5–5.2)
ALBUMIN/GLOB SERPL: 1.2 G/DL
ALP SERPL-CCNC: 125 U/L (ref 39–117)
ALT SERPL W P-5'-P-CCNC: 9 U/L (ref 1–41)
ANION GAP SERPL CALCULATED.3IONS-SCNC: 10.2 MMOL/L (ref 5–15)
AST SERPL-CCNC: 22 U/L (ref 1–40)
BASOPHILS # BLD AUTO: 0.04 10*3/MM3 (ref 0–0.2)
BASOPHILS NFR BLD AUTO: 0.6 % (ref 0–1.5)
BILIRUB SERPL-MCNC: 0.9 MG/DL (ref 0–1.2)
BUN SERPL-MCNC: 14 MG/DL (ref 8–23)
BUN/CREAT SERPL: 18.2 (ref 7–25)
CALCIUM SPEC-SCNC: 8.6 MG/DL (ref 8.6–10.5)
CHLORIDE SERPL-SCNC: 104 MMOL/L (ref 98–107)
CO2 SERPL-SCNC: 20.8 MMOL/L (ref 22–29)
CREAT SERPL-MCNC: 0.77 MG/DL (ref 0.76–1.27)
CYTO UR: NORMAL
DEPRECATED RDW RBC AUTO: 57.5 FL (ref 37–54)
EGFRCR SERPLBLD CKD-EPI 2021: 98.7 ML/MIN/1.73
EOSINOPHIL # BLD AUTO: 0.07 10*3/MM3 (ref 0–0.4)
EOSINOPHIL NFR BLD AUTO: 1.1 % (ref 0.3–6.2)
ERYTHROCYTE [DISTWIDTH] IN BLOOD BY AUTOMATED COUNT: 15.2 % (ref 12.3–15.4)
GLOBULIN UR ELPH-MCNC: 2.7 GM/DL
GLUCOSE SERPL-MCNC: 97 MG/DL (ref 65–99)
HCT VFR BLD AUTO: 30.3 % (ref 37.5–51)
HGB BLD-MCNC: 10.1 G/DL (ref 13–17.7)
HOLD SPECIMEN: NORMAL
HOLD SPECIMEN: NORMAL
IMM GRANULOCYTES # BLD AUTO: 0.02 10*3/MM3 (ref 0–0.05)
IMM GRANULOCYTES NFR BLD AUTO: 0.3 % (ref 0–0.5)
LAB AP CASE REPORT: NORMAL
LAB AP CLINICAL INFORMATION: NORMAL
LYMPHOCYTES # BLD AUTO: 0.97 10*3/MM3 (ref 0.7–3.1)
LYMPHOCYTES NFR BLD AUTO: 14.6 % (ref 19.6–45.3)
MCH RBC QN AUTO: 34.5 PG (ref 26.6–33)
MCHC RBC AUTO-ENTMCNC: 33.3 G/DL (ref 31.5–35.7)
MCV RBC AUTO: 103.4 FL (ref 79–97)
MONOCYTES # BLD AUTO: 0.73 10*3/MM3 (ref 0.1–0.9)
MONOCYTES NFR BLD AUTO: 11 % (ref 5–12)
NEUTROPHILS NFR BLD AUTO: 4.81 10*3/MM3 (ref 1.7–7)
NEUTROPHILS NFR BLD AUTO: 72.4 % (ref 42.7–76)
NRBC BLD AUTO-RTO: 0 /100 WBC (ref 0–0.2)
NT-PROBNP SERPL-MCNC: 9863 PG/ML (ref 0–900)
PATH REPORT.FINAL DX SPEC: NORMAL
PATH REPORT.GROSS SPEC: NORMAL
PLATELET # BLD AUTO: 284 10*3/MM3 (ref 140–450)
PMV BLD AUTO: 9.5 FL (ref 6–12)
POTASSIUM SERPL-SCNC: 4.1 MMOL/L (ref 3.5–5.2)
PROT SERPL-MCNC: 6 G/DL (ref 6–8.5)
RBC # BLD AUTO: 2.93 10*6/MM3 (ref 4.14–5.8)
SODIUM SERPL-SCNC: 135 MMOL/L (ref 136–145)
TROPONIN T SERPL HS-MCNC: 38 NG/L
WBC NRBC COR # BLD: 6.64 10*3/MM3 (ref 3.4–10.8)
WHOLE BLOOD HOLD COAG: NORMAL
WHOLE BLOOD HOLD SPECIMEN: NORMAL

## 2023-10-11 PROCEDURE — 83880 ASSAY OF NATRIURETIC PEPTIDE: CPT

## 2023-10-11 PROCEDURE — 82607 VITAMIN B-12: CPT | Performed by: STUDENT IN AN ORGANIZED HEALTH CARE EDUCATION/TRAINING PROGRAM

## 2023-10-11 PROCEDURE — 25010000002 FUROSEMIDE PER 20 MG: Performed by: EMERGENCY MEDICINE

## 2023-10-11 PROCEDURE — 85025 COMPLETE CBC W/AUTO DIFF WBC: CPT

## 2023-10-11 PROCEDURE — 99285 EMERGENCY DEPT VISIT HI MDM: CPT

## 2023-10-11 PROCEDURE — 36415 COLL VENOUS BLD VENIPUNCTURE: CPT

## 2023-10-11 PROCEDURE — 83735 ASSAY OF MAGNESIUM: CPT | Performed by: STUDENT IN AN ORGANIZED HEALTH CARE EDUCATION/TRAINING PROGRAM

## 2023-10-11 PROCEDURE — 93010 ELECTROCARDIOGRAM REPORT: CPT | Performed by: INTERNAL MEDICINE

## 2023-10-11 PROCEDURE — 82077 ASSAY SPEC XCP UR&BREATH IA: CPT | Performed by: STUDENT IN AN ORGANIZED HEALTH CARE EDUCATION/TRAINING PROGRAM

## 2023-10-11 PROCEDURE — 99223 1ST HOSP IP/OBS HIGH 75: CPT | Performed by: STUDENT IN AN ORGANIZED HEALTH CARE EDUCATION/TRAINING PROGRAM

## 2023-10-11 PROCEDURE — 93005 ELECTROCARDIOGRAM TRACING: CPT | Performed by: EMERGENCY MEDICINE

## 2023-10-11 PROCEDURE — 84484 ASSAY OF TROPONIN QUANT: CPT | Performed by: EMERGENCY MEDICINE

## 2023-10-11 PROCEDURE — 71045 X-RAY EXAM CHEST 1 VIEW: CPT

## 2023-10-11 PROCEDURE — 93005 ELECTROCARDIOGRAM TRACING: CPT

## 2023-10-11 PROCEDURE — 84100 ASSAY OF PHOSPHORUS: CPT | Performed by: STUDENT IN AN ORGANIZED HEALTH CARE EDUCATION/TRAINING PROGRAM

## 2023-10-11 PROCEDURE — 80053 COMPREHEN METABOLIC PANEL: CPT | Performed by: EMERGENCY MEDICINE

## 2023-10-11 RX ORDER — FUROSEMIDE 10 MG/ML
80 INJECTION INTRAMUSCULAR; INTRAVENOUS ONCE
Status: COMPLETED | OUTPATIENT
Start: 2023-10-11 | End: 2023-10-11

## 2023-10-11 RX ORDER — SODIUM CHLORIDE 0.9 % (FLUSH) 0.9 %
10 SYRINGE (ML) INJECTION AS NEEDED
Status: DISCONTINUED | OUTPATIENT
Start: 2023-10-11 | End: 2023-10-19 | Stop reason: HOSPADM

## 2023-10-11 RX ADMIN — FUROSEMIDE 80 MG: 10 INJECTION, SOLUTION INTRAMUSCULAR; INTRAVENOUS at 23:13

## 2023-10-12 PROBLEM — I50.9 ACUTE EXACERBATION OF CHF (CONGESTIVE HEART FAILURE): Status: ACTIVE | Noted: 2023-10-12

## 2023-10-12 LAB
ANION GAP SERPL CALCULATED.3IONS-SCNC: 11.5 MMOL/L (ref 5–15)
BACTERIA FLD CULT: NORMAL
BUN SERPL-MCNC: 20 MG/DL (ref 8–23)
BUN/CREAT SERPL: 16.1 (ref 7–25)
CALCIUM SPEC-SCNC: 9 MG/DL (ref 8.6–10.5)
CHLORIDE SERPL-SCNC: 98 MMOL/L (ref 98–107)
CO2 SERPL-SCNC: 23.5 MMOL/L (ref 22–29)
CREAT SERPL-MCNC: 1.24 MG/DL (ref 0.76–1.27)
D-LACTATE SERPL-SCNC: 1.5 MMOL/L (ref 0.5–2)
D-LACTATE SERPL-SCNC: 2.3 MMOL/L (ref 0.5–2)
DEPRECATED RDW RBC AUTO: 59.8 FL (ref 37–54)
EGFRCR SERPLBLD CKD-EPI 2021: 64.1 ML/MIN/1.73
ERYTHROCYTE [DISTWIDTH] IN BLOOD BY AUTOMATED COUNT: 15.3 % (ref 12.3–15.4)
ETHANOL BLD-MCNC: <10 MG/DL (ref 0–10)
ETHANOL UR QL: <0.01 %
GLUCOSE SERPL-MCNC: 105 MG/DL (ref 65–99)
GRAM STN SPEC: NORMAL
HCT VFR BLD AUTO: 30.7 % (ref 37.5–51)
HGB BLD-MCNC: 9.9 G/DL (ref 13–17.7)
MAGNESIUM SERPL-MCNC: 1.6 MG/DL (ref 1.6–2.4)
MAGNESIUM SERPL-MCNC: 2 MG/DL (ref 1.6–2.4)
MCH RBC QN AUTO: 34.4 PG (ref 26.6–33)
MCHC RBC AUTO-ENTMCNC: 32.2 G/DL (ref 31.5–35.7)
MCV RBC AUTO: 106.6 FL (ref 79–97)
PHOSPHATE SERPL-MCNC: 4.6 MG/DL (ref 2.5–4.5)
PLATELET # BLD AUTO: 246 10*3/MM3 (ref 140–450)
PMV BLD AUTO: 8.8 FL (ref 6–12)
POTASSIUM SERPL-SCNC: 4.2 MMOL/L (ref 3.5–5.2)
PROCALCITONIN SERPL-MCNC: 0.04 NG/ML (ref 0–0.25)
QT INTERVAL: 376 MS
QTC INTERVAL: 489 MS
RBC # BLD AUTO: 2.88 10*6/MM3 (ref 4.14–5.8)
SODIUM SERPL-SCNC: 133 MMOL/L (ref 136–145)
WBC NRBC COR # BLD: 6.69 10*3/MM3 (ref 3.4–10.8)

## 2023-10-12 PROCEDURE — P9047 ALBUMIN (HUMAN), 25%, 50ML: HCPCS | Performed by: STUDENT IN AN ORGANIZED HEALTH CARE EDUCATION/TRAINING PROGRAM

## 2023-10-12 PROCEDURE — 99232 SBSQ HOSP IP/OBS MODERATE 35: CPT | Performed by: STUDENT IN AN ORGANIZED HEALTH CARE EDUCATION/TRAINING PROGRAM

## 2023-10-12 PROCEDURE — 99222 1ST HOSP IP/OBS MODERATE 55: CPT | Performed by: INTERNAL MEDICINE

## 2023-10-12 PROCEDURE — 85027 COMPLETE CBC AUTOMATED: CPT | Performed by: STUDENT IN AN ORGANIZED HEALTH CARE EDUCATION/TRAINING PROGRAM

## 2023-10-12 PROCEDURE — 83605 ASSAY OF LACTIC ACID: CPT | Performed by: STUDENT IN AN ORGANIZED HEALTH CARE EDUCATION/TRAINING PROGRAM

## 2023-10-12 PROCEDURE — 94799 UNLISTED PULMONARY SVC/PX: CPT

## 2023-10-12 PROCEDURE — 25010000002 HEPARIN (PORCINE) PER 1000 UNITS: Performed by: STUDENT IN AN ORGANIZED HEALTH CARE EDUCATION/TRAINING PROGRAM

## 2023-10-12 PROCEDURE — 25010000002 ALBUMIN HUMAN 25% PER 50 ML: Performed by: STUDENT IN AN ORGANIZED HEALTH CARE EDUCATION/TRAINING PROGRAM

## 2023-10-12 PROCEDURE — 94640 AIRWAY INHALATION TREATMENT: CPT

## 2023-10-12 PROCEDURE — 94664 DEMO&/EVAL PT USE INHALER: CPT

## 2023-10-12 PROCEDURE — 25010000002 FUROSEMIDE PER 20 MG: Performed by: INTERNAL MEDICINE

## 2023-10-12 PROCEDURE — 25010000002 FUROSEMIDE PER 20 MG: Performed by: STUDENT IN AN ORGANIZED HEALTH CARE EDUCATION/TRAINING PROGRAM

## 2023-10-12 PROCEDURE — 80048 BASIC METABOLIC PNL TOTAL CA: CPT | Performed by: STUDENT IN AN ORGANIZED HEALTH CARE EDUCATION/TRAINING PROGRAM

## 2023-10-12 PROCEDURE — 83735 ASSAY OF MAGNESIUM: CPT | Performed by: STUDENT IN AN ORGANIZED HEALTH CARE EDUCATION/TRAINING PROGRAM

## 2023-10-12 PROCEDURE — 84145 PROCALCITONIN (PCT): CPT

## 2023-10-12 RX ORDER — METOPROLOL SUCCINATE 25 MG/1
12.5 TABLET, EXTENDED RELEASE ORAL DAILY
Status: DISCONTINUED | OUTPATIENT
Start: 2023-10-12 | End: 2023-10-13

## 2023-10-12 RX ORDER — LORAZEPAM 2 MG/ML
2 INJECTION INTRAMUSCULAR
Status: DISCONTINUED | OUTPATIENT
Start: 2023-10-12 | End: 2023-10-14

## 2023-10-12 RX ORDER — SODIUM CHLORIDE 0.9 % (FLUSH) 0.9 %
10 SYRINGE (ML) INJECTION AS NEEDED
Status: DISCONTINUED | OUTPATIENT
Start: 2023-10-12 | End: 2023-10-19 | Stop reason: HOSPADM

## 2023-10-12 RX ORDER — LORAZEPAM 1 MG/1
1 TABLET ORAL EVERY 8 HOURS PRN
COMMUNITY
End: 2023-10-19 | Stop reason: HOSPADM

## 2023-10-12 RX ORDER — HYDROCODONE BITARTRATE AND ACETAMINOPHEN 10; 325 MG/1; MG/1
1 TABLET ORAL EVERY 6 HOURS PRN
Status: DISCONTINUED | OUTPATIENT
Start: 2023-10-12 | End: 2023-10-19 | Stop reason: HOSPADM

## 2023-10-12 RX ORDER — SODIUM CHLORIDE 0.9 % (FLUSH) 0.9 %
10 SYRINGE (ML) INJECTION EVERY 12 HOURS SCHEDULED
Status: DISCONTINUED | OUTPATIENT
Start: 2023-10-12 | End: 2023-10-19 | Stop reason: HOSPADM

## 2023-10-12 RX ORDER — LORAZEPAM 2 MG/1
2 TABLET ORAL
Status: DISCONTINUED | OUTPATIENT
Start: 2023-10-12 | End: 2023-10-14

## 2023-10-12 RX ORDER — LORAZEPAM 0.5 MG/1
1 TABLET ORAL
Status: DISCONTINUED | OUTPATIENT
Start: 2023-10-12 | End: 2023-10-14

## 2023-10-12 RX ORDER — LORAZEPAM 2 MG/1
2 TABLET ORAL EVERY 6 HOURS
Status: DISCONTINUED | OUTPATIENT
Start: 2023-10-12 | End: 2023-10-12

## 2023-10-12 RX ORDER — LORAZEPAM 2 MG/ML
1 INJECTION INTRAMUSCULAR
Status: DISCONTINUED | OUTPATIENT
Start: 2023-10-12 | End: 2023-10-14

## 2023-10-12 RX ORDER — NITROGLYCERIN 0.4 MG/1
0.4 TABLET SUBLINGUAL
Status: DISCONTINUED | OUTPATIENT
Start: 2023-10-12 | End: 2023-10-19 | Stop reason: HOSPADM

## 2023-10-12 RX ORDER — ASPIRIN 81 MG/1
81 TABLET ORAL DAILY
Status: DISCONTINUED | OUTPATIENT
Start: 2023-10-12 | End: 2023-10-19 | Stop reason: HOSPADM

## 2023-10-12 RX ORDER — ATORVASTATIN CALCIUM 40 MG/1
40 TABLET, FILM COATED ORAL NIGHTLY
Status: DISCONTINUED | OUTPATIENT
Start: 2023-10-12 | End: 2023-10-19 | Stop reason: HOSPADM

## 2023-10-12 RX ORDER — LORAZEPAM 0.5 MG/1
1 TABLET ORAL EVERY 6 HOURS
Status: DISCONTINUED | OUTPATIENT
Start: 2023-10-13 | End: 2023-10-12

## 2023-10-12 RX ORDER — HEPARIN SODIUM 5000 [USP'U]/ML
5000 INJECTION, SOLUTION INTRAVENOUS; SUBCUTANEOUS EVERY 8 HOURS SCHEDULED
Status: DISCONTINUED | OUTPATIENT
Start: 2023-10-12 | End: 2023-10-19 | Stop reason: HOSPADM

## 2023-10-12 RX ORDER — SODIUM CHLORIDE 9 MG/ML
40 INJECTION, SOLUTION INTRAVENOUS AS NEEDED
Status: DISCONTINUED | OUTPATIENT
Start: 2023-10-12 | End: 2023-10-19 | Stop reason: HOSPADM

## 2023-10-12 RX ORDER — FUROSEMIDE 10 MG/ML
60 INJECTION INTRAMUSCULAR; INTRAVENOUS 3 TIMES DAILY
Status: DISCONTINUED | OUTPATIENT
Start: 2023-10-12 | End: 2023-10-16

## 2023-10-12 RX ORDER — NICOTINE 21 MG/24HR
1 PATCH, TRANSDERMAL 24 HOURS TRANSDERMAL
Status: DISCONTINUED | OUTPATIENT
Start: 2023-10-12 | End: 2023-10-19 | Stop reason: HOSPADM

## 2023-10-12 RX ORDER — FOLIC ACID 1 MG/1
1 TABLET ORAL DAILY
Status: DISCONTINUED | OUTPATIENT
Start: 2023-10-12 | End: 2023-10-14

## 2023-10-12 RX ORDER — ALBUMIN (HUMAN) 12.5 G/50ML
25 SOLUTION INTRAVENOUS ONCE
Status: COMPLETED | OUTPATIENT
Start: 2023-10-12 | End: 2023-10-12

## 2023-10-12 RX ORDER — METHION/INOS/CHOL BT/B COM/LIV 110MG-86MG
100 CAPSULE ORAL DAILY
Status: DISCONTINUED | OUTPATIENT
Start: 2023-10-12 | End: 2023-10-19 | Stop reason: HOSPADM

## 2023-10-12 RX ORDER — ONDANSETRON 4 MG/1
4 TABLET, FILM COATED ORAL EVERY 6 HOURS PRN
Status: DISCONTINUED | OUTPATIENT
Start: 2023-10-12 | End: 2023-10-14

## 2023-10-12 RX ORDER — FUROSEMIDE 10 MG/ML
80 INJECTION INTRAMUSCULAR; INTRAVENOUS ONCE
Status: COMPLETED | OUTPATIENT
Start: 2023-10-12 | End: 2023-10-12

## 2023-10-12 RX ORDER — ONDANSETRON 2 MG/ML
4 INJECTION INTRAMUSCULAR; INTRAVENOUS EVERY 6 HOURS PRN
Status: DISCONTINUED | OUTPATIENT
Start: 2023-10-12 | End: 2023-10-14

## 2023-10-12 RX ADMIN — ALBUMIN HUMAN 25 G: 0.25 SOLUTION INTRAVENOUS at 09:01

## 2023-10-12 RX ADMIN — HEPARIN SODIUM 5000 UNITS: 5000 INJECTION INTRAVENOUS; SUBCUTANEOUS at 21:33

## 2023-10-12 RX ADMIN — HEPARIN SODIUM 5000 UNITS: 5000 INJECTION INTRAVENOUS; SUBCUTANEOUS at 05:14

## 2023-10-12 RX ADMIN — TICAGRELOR 90 MG: 90 TABLET ORAL at 21:33

## 2023-10-12 RX ADMIN — NICOTINE 1 PATCH: 21 PATCH, EXTENDED RELEASE TRANSDERMAL at 12:12

## 2023-10-12 RX ADMIN — FOLIC ACID 1 MG: 1 TABLET ORAL at 09:00

## 2023-10-12 RX ADMIN — Medication 100 MG: at 09:01

## 2023-10-12 RX ADMIN — FUROSEMIDE 80 MG: 10 INJECTION, SOLUTION INTRAMUSCULAR; INTRAVENOUS at 09:00

## 2023-10-12 RX ADMIN — LORAZEPAM 2 MG: 2 TABLET ORAL at 21:46

## 2023-10-12 RX ADMIN — ASPIRIN 81 MG: 81 TABLET, COATED ORAL at 09:01

## 2023-10-12 RX ADMIN — Medication 10 ML: at 21:34

## 2023-10-12 RX ADMIN — FUROSEMIDE 60 MG: 10 INJECTION, SOLUTION INTRAMUSCULAR; INTRAVENOUS at 15:47

## 2023-10-12 RX ADMIN — LORAZEPAM 2 MG: 2 TABLET ORAL at 09:13

## 2023-10-12 RX ADMIN — FUROSEMIDE 60 MG: 10 INJECTION, SOLUTION INTRAMUSCULAR; INTRAVENOUS at 21:33

## 2023-10-12 RX ADMIN — TICAGRELOR 90 MG: 90 TABLET ORAL at 09:01

## 2023-10-12 RX ADMIN — TIOTROPIUM BROMIDE INHALATION SPRAY 2 PUFF: 3.12 SPRAY, METERED RESPIRATORY (INHALATION) at 06:09

## 2023-10-12 RX ADMIN — HYDROCODONE BITARTRATE AND ACETAMINOPHEN 1 TABLET: 10; 325 TABLET ORAL at 21:33

## 2023-10-12 RX ADMIN — ATORVASTATIN CALCIUM 40 MG: 40 TABLET, FILM COATED ORAL at 21:33

## 2023-10-12 RX ADMIN — HYDROCODONE BITARTRATE AND ACETAMINOPHEN 1 TABLET: 10; 325 TABLET ORAL at 15:51

## 2023-10-12 RX ADMIN — HYDROCODONE BITARTRATE AND ACETAMINOPHEN 1 TABLET: 10; 325 TABLET ORAL at 03:45

## 2023-10-12 RX ADMIN — Medication 10 ML: at 09:03

## 2023-10-12 NOTE — PLAN OF CARE
"Goal Outcome Evaluation:      At start of shift pt's BP were soft--80's / 60's w/ MAPS in the mid 60's. Provider notified and 25g albumin given x1; SBP improved to low 100's and MAP has been maintained in low 70's. W/ all other VS WNL.   CIWA started this morning--scores low of 2 and high of 8.   Pt was A&O majority of shift, however after about 1500  he gradually became more altered; he has still been able to answer questions correctly and follow commands, but says inappropriate/bizarre things (ex. \"Throw me out in the dumpster so I can go to the pub,\" \"there must be snipers trained on me outside...\"). Provider made aware--stat lactic & bmp labs ordered.   Pt's family brought him fluids despite being aware of the restriction in place.   At 1830: pt's intake is at about 960. Educated pt on him only having a small allotted amt left for today, including over night shift--pt acknowledged.   Will continue to closely monitor.                "

## 2023-10-12 NOTE — NURSING NOTE
"This nurse was notified by phlebotomist that pt refused his AM lab draw, this nurse entered room 205 to talk to patient and he said \"Don't make me walk out of here again, they just frank blood on me and are here wanting to do it again\", phlebotomist verified that the patient had his blood drawn yesterday, this nurse tried to explain to patient that being hospitalized requires almost daily monitoring of his labs to help the care team to treat him, pt said \"maybe in 20 minutes she can come back but not right now\". LANCE Miranda notified of pt's refusal for lab draw and said to document it in the record.  "

## 2023-10-12 NOTE — ED PROVIDER NOTES
Time: 10:22 PM EDT  Date of encounter:  10/11/2023  Independent Historian/Clinical History and Information was obtained by:   Patient and Chart    History is limited by: N/A    Chief Complaint: Shortness of breath      History of Present Illness:  Patient is a 68 y.o. year old male who presents to the emergency department for evaluation of shortness of breath    Patient describes increasing shortness of breath with dyspnea on exertion worsening over the past day.  He admits that he was in the hospital yesterday morning and believes that he misunderstood nursing staff and got dressed and ready to leave.  His chart reflects that he left AGAINST MEDICAL ADVICE.  He states that he was not ready to leave and wishes that he had not.  He believes he needs to continue treatment as he is having increasing difficulty with breathing.    HPI    Patient Care Team  Primary Care Provider: Emerita Connors Known    Past Medical History:     No Known Allergies  Past Medical History:   Diagnosis Date    CHF (congestive heart failure)     Hypertension      Past Surgical History:   Procedure Laterality Date    CARDIAC CATHETERIZATION N/A 9/5/2023    Procedure: Right and LHC with possible coronary intevention;  Surgeon: Merly Ji MD;  Location: [facility] INVASIVE LOCATION;  Service: Cardiology;  Laterality: N/A;    CARDIAC SURGERY       History reviewed. No pertinent family history.    Home Medications:  Prior to Admission medications    Medication Sig Start Date End Date Taking? Authorizing Provider   albuterol sulfate  (90 Base) MCG/ACT inhaler Inhale 2 puffs Every 4 (Four) Hours As Needed for Wheezing. 9/20/23   Rosa Isela Kim APRN   Aspirin Low Dose 81 MG EC tablet Take 1 tablet by mouth Daily. 8/7/23   Joshua Connors MD   atorvastatin (LIPITOR) 80 MG tablet Take 1 tablet by mouth Daily.    Joshua Connors MD   HYDROcodone-acetaminophen (Norco)  MG per tablet Take 1 tablet by mouth Every 6 (Six)  Hours As Needed.    Joshua Connors MD   HYDROcodone-acetaminophen (NORCO) 5-325 MG per tablet Take 2 tablets by mouth Every 8 (Eight) Hours As Needed for Moderate Pain. 9/25/23   Rosa Isela Kim APRN   LORazepam (Ativan) 1 MG tablet Take 1 tablet by mouth Every 8 (Eight) Hours As Needed for Anxiety.  Patient not taking: Reported on 9/20/2023 9/18/23   Rosa Isela Kim APRN   Melatonin 10 MG tablet Take 1 tablet by mouth At Night As Needed.    Joshua Connors MD   metOLazone (ZAROXOLYN) 2.5 MG tablet Take 1 tablet by mouth Daily. 8/11/23   Joshua Connors MD   metoprolol succinate XL (TOPROL-XL) 25 MG 24 hr tablet Take 0.5 tablets by mouth Daily. 9/13/23   Joshua Connors MD   midodrine (PROAMATINE) 2.5 MG tablet Take 1 tablet by mouth 3 (Three) Times a Day. 8/7/23   Joshua Connors MD   ondansetron (ZOFRAN) 4 MG tablet Take 1 tablet by mouth Every 8 (Eight) Hours As Needed for Nausea or Vomiting.    Joshua Cnonors MD   POTASSIUM CHLORIDE PO Take 20 mEq by mouth Daily.    Joshua Connors MD   sacubitril-valsartan (ENTRESTO) 24-26 MG tablet Take 1 tablet by mouth 2 (Two) Times a Day.    Joshua Connors MD   spironolactone (ALDACTONE) 25 MG tablet Take 1 tablet by mouth Daily.    Joshua Connors MD   ticagrelor (BRILINTA) 90 MG tablet tablet Take 1 tablet by mouth 2 (Two) Times a Day. 9/6/23   Merly Ji MD   tiotropium (Spiriva HandiHaler) 18 MCG per inhalation capsule Place 1 capsule into inhaler and inhale Daily. 9/20/23   Rosa Isela Kim APRN   torsemide (DEMADEX) 100 MG tablet Take 1 tablet by mouth Daily.    Joshua Connors MD        Social History:   Social History     Tobacco Use    Smoking status: Every Day     Packs/day: 1.5     Types: Cigarettes   Vaping Use    Vaping Use: Never used   Substance Use Topics    Alcohol use: Yes     Alcohol/week: 3.0 standard drinks of alcohol     Types: 3 Cans of beer per week     Comment: 3-4  "beer daily    Drug use: Never         Review of Systems:  Review of Systems   Constitutional:  Negative for chills and fever.   HENT:  Negative for congestion, ear pain and sore throat.    Eyes:  Negative for pain.   Respiratory:  Positive for chest tightness and shortness of breath. Negative for cough.    Cardiovascular:  Positive for leg swelling. Negative for chest pain.   Gastrointestinal:  Positive for abdominal distention. Negative for abdominal pain, diarrhea, nausea and vomiting.   Genitourinary:  Negative for flank pain and hematuria.   Musculoskeletal:  Negative for joint swelling.   Skin:  Negative for pallor.   Neurological:  Negative for seizures and headaches.   All other systems reviewed and are negative.       Physical Exam:  /65 (BP Location: Left arm, Patient Position: Lying)   Pulse 95   Temp 98.2 °F (36.8 °C) (Oral)   Resp 18   Ht 185.4 cm (72.99\")   Wt 97.8 kg (215 lb 9.8 oz)   SpO2 95%   BMI 28.45 kg/m²     Physical Exam  Vitals and nursing note reviewed.   Constitutional:       General: He is not in acute distress.     Appearance: Normal appearance. He is not toxic-appearing.   HENT:      Head: Normocephalic and atraumatic.      Jaw: There is normal jaw occlusion.   Eyes:      General: Lids are normal.      Extraocular Movements: Extraocular movements intact.      Conjunctiva/sclera: Conjunctivae normal.      Pupils: Pupils are equal, round, and reactive to light.   Cardiovascular:      Rate and Rhythm: Normal rate and regular rhythm.      Pulses: Normal pulses.      Heart sounds: Normal heart sounds.   Pulmonary:      Effort: Pulmonary effort is normal. No respiratory distress.      Breath sounds: Examination of the right-middle field reveals decreased breath sounds. Examination of the left-middle field reveals decreased breath sounds. Examination of the right-lower field reveals decreased breath sounds. Examination of the left-lower field reveals decreased breath sounds. " Decreased breath sounds present. No wheezing or rhonchi.   Abdominal:      General: Abdomen is flat.      Palpations: Abdomen is soft.      Tenderness: There is no abdominal tenderness. There is no guarding or rebound.   Musculoskeletal:         General: Normal range of motion.      Cervical back: Normal range of motion and neck supple.      Right lower leg: Edema present.      Left lower leg: Edema present.   Skin:     General: Skin is warm and dry.      Comments: Pain in joints of bilateral lower extremities no evidence of acute infection   Neurological:      Mental Status: He is alert and oriented to person, place, and time. Mental status is at baseline.   Psychiatric:         Mood and Affect: Mood normal.                Procedures:  Procedures      Medical Decision Making:      Comorbidities that affect care:    Congestive Heart Failure, Hypertension    External Notes reviewed:    Hospital Discharge Summary: Discharge summary from yesterday for volume overload      The following orders were placed and all results were independently analyzed by me:  Orders Placed This Encounter   Procedures    XR Chest 1 View    Holiday Draw    Comprehensive Metabolic Panel    BNP    Single High Sensitivity Troponin T    CBC Auto Differential    Phosphorus    Magnesium    Comprehensive Metabolic Panel    Ethanol    CBC Auto Differential    Diet: Cardiac Diets, Fluid Restriction (240 mL/tray) Diets; Healthy Heart (2-3 Na+); Other (Specify mL/day); Texture: Regular Texture (IDDSI 7); Fluid Consistency: Thin (IDDSI 0)    Undress & Gown    Vital Signs    Vital Signs    Intake & Output    Weigh Patient    Oral Care    Telemetry - Maintain IV Access    Telemetry - Place Orders & Notify Provider of Results When Patient Experiences Acute Chest Pain, Dysrhythmia or Respiratory Distress    Pulse Oximetry, Continuous    Strict Intake & Output    Daily Weights    Code Status and Medical Interventions:    Hospitalist (on-call MD unless  specified)    Inpatient Cardiology Consult    Oxygen Therapy- Nasal Cannula; Titrate 1-6 LPM Per SpO2; 90 - 95%    ECG 12 Lead ED Triage Standing Order; SOA    Insert Peripheral IV    Insert Peripheral IV    Inpatient Admission    CBC & Differential    Green Top (Gel)    Lavender Top    Gold Top - SST    Light Blue Top    CBC & Differential       Medications Given in the Emergency Department:  Medications   sodium chloride 0.9 % flush 10 mL (has no administration in time range)   HYDROcodone-acetaminophen (NORCO)  MG per tablet 1 tablet (1 tablet Oral Given 10/12/23 0345)   aspirin EC tablet 81 mg (has no administration in time range)   ticagrelor (BRILINTA) tablet 90 mg (has no administration in time range)   sodium chloride 0.9 % flush 10 mL (has no administration in time range)   sodium chloride 0.9 % flush 10 mL (has no administration in time range)   sodium chloride 0.9 % infusion 40 mL (has no administration in time range)   heparin (porcine) 5000 UNIT/ML injection 5,000 Units (5,000 Units Subcutaneous Given 10/12/23 0514)   nitroglycerin (NITROSTAT) SL tablet 0.4 mg (has no administration in time range)   furosemide (LASIX) injection 80 mg (has no administration in time range)   metoprolol succinate XL (TOPROL-XL) 24 hr tablet 12.5 mg (has no administration in time range)   tiotropium (SPIRIVA RESPIMAT) 2.5 mcg/act aerosol solution inhaler (2 puffs Inhalation Given 10/12/23 0609)   furosemide (LASIX) injection 80 mg (80 mg Intravenous Given 10/11/23 2313)        ED Course:    ED Course as of 10/12/23 0648   Wed Oct 11, 2023   2223 My interpretation of EKG: Sinus tachycardia 101, no acute ischemia [JS]   2342 I discussed patient with the hospitalist who agrees to admission.  Will initiate diuresis in the emergency department.  The patient's airway is intact, vital signs, and respiratory status are safe for admission at this time. [JS]      ED Course User Index  [JS] David Guallpa MD       Labs:    Lab  Results (last 24 hours)       Procedure Component Value Units Date/Time    CBC & Differential [[id number]]  (Abnormal) Collected: 10/11/23 2141    Specimen: Blood Updated: 10/11/23 2149    Narrative:      The following orders were created for panel order CBC & Differential.  Procedure                               Abnormality         Status                     ---------                               -----------         ------                     CBC Auto Differential[[id number]]        Abnormal            Final result                 Please view results for these tests on the individual orders.    BNP [[id number]]  (Abnormal) Collected: 10/11/23 2141    Specimen: Blood Updated: 10/11/23 2212     proBNP 9,863.0 pg/mL     Narrative:      This assay is used as an aid in the diagnosis of individuals suspected of having heart failure. It can be used as an aid in the diagnosis of acute decompensated heart failure (ADHF) in patients presenting with signs and symptoms of ADHF to the emergency department (ED). In addition, NT-proBNP of <300 pg/mL indicates ADHF is not likely.    Age Range Result Interpretation  NT-proBNP Concentration (pg/mL:      <50             Positive            >450                   Gray                 300-450                    Negative             <300    50-75           Positive            >900                  Gray                300-900                  Negative            <300      >75             Positive            >1800                  Gray                300-1800                  Negative            <300    CBC Auto Differential [[id number]]  (Abnormal) Collected: 10/11/23 2141    Specimen: Blood Updated: 10/11/23 2149     WBC 6.64 10*3/mm3      RBC 2.93 10*6/mm3      Hemoglobin 10.1 g/dL      Hematocrit 30.3 %      .4 fL      MCH 34.5 pg      MCHC 33.3 g/dL      RDW 15.2 %      RDW-SD 57.5 fl      MPV 9.5 fL      Platelets 284 10*3/mm3      Neutrophil % 72.4 %      Lymphocyte % 14.6 %       Monocyte % 11.0 %      Eosinophil % 1.1 %      Basophil % 0.6 %      Immature Grans % 0.3 %      Neutrophils, Absolute 4.81 10*3/mm3      Lymphocytes, Absolute 0.97 10*3/mm3      Monocytes, Absolute 0.73 10*3/mm3      Eosinophils, Absolute 0.07 10*3/mm3      Basophils, Absolute 0.04 10*3/mm3      Immature Grans, Absolute 0.02 10*3/mm3      nRBC 0.0 /100 WBC     Comprehensive Metabolic Panel [[id number]]  (Abnormal) Collected: 10/11/23 2225    Specimen: Blood Updated: 10/11/23 2258     Glucose 97 mg/dL      BUN 14 mg/dL      Creatinine 0.77 mg/dL      Sodium 135 mmol/L      Potassium 4.1 mmol/L      Chloride 104 mmol/L      CO2 20.8 mmol/L      Calcium 8.6 mg/dL      Total Protein 6.0 g/dL      Albumin 3.3 g/dL      ALT (SGPT) 9 U/L      AST (SGOT) 22 U/L      Alkaline Phosphatase 125 U/L      Total Bilirubin 0.9 mg/dL      Globulin 2.7 gm/dL      A/G Ratio 1.2 g/dL      BUN/Creatinine Ratio 18.2     Anion Gap 10.2 mmol/L      eGFR 98.7 mL/min/1.73     Narrative:      GFR Normal >60  Chronic Kidney Disease <60  Kidney Failure <15      Single High Sensitivity Troponin T [[id number]]  (Abnormal) Collected: 10/11/23 2225    Specimen: Blood Updated: 10/11/23 2258     HS Troponin T 38 ng/L     Narrative:      High Sensitive Troponin T Reference Range:  <10.0 ng/L- Negative Female for AMI  <15.0 ng/L- Negative Male for AMI  >=10 - Abnormal Female indicating possible myocardial injury.  >=15 - Abnormal Male indicating possible myocardial injury.   Clinicians would have to utilize clinical acumen, EKG, Troponin, and serial changes to determine if it is an Acute Myocardial Infarction or myocardial injury due to an underlying chronic condition.         Ethanol [[id number]] Collected: 10/11/23 2225    Specimen: Blood Updated: 10/12/23 0217     Ethanol <10 mg/dL      Ethanol % <0.010 %     Narrative:      Ethanol (Plasma)  <10 Essentially Negative    Toxic Concentrations           mg/dL    Flushing, slowing of reflexes     Impaired visual activity         Depression of CNS              >100  Possible Coma                  >300                Imaging:    XR Chest 1 View    Result Date: 10/11/2023  PROCEDURE: XR CHEST 1 VW  COMPARISON: [facility], CR, XR CHEST 1 VW, 8/25/2023, 8:32.  [facility], CR, XR CHEST 1 VW, 8/29/2023, 12:53.  [facility], CR, XR CHEST 1 VW, 10/09/2023, 14:00.  INDICATIONS: SHORTNESS OF BREATH  FINDINGS:  Cardiac silhouette remains enlarged.  ICD leads are not entirely included.  There are stable bibasilar patchy opacities with obscuration of each hemidiaphragm.  Central pulmonary vessels remain prominent.  No pneumothorax is seen.        Stable appearance of the chest with suspected bilateral pleural effusions with underlying atelectasis, pulmonary vascular congestion/edema, enlarged cardiac silhouette.       CLAYTON GIBBS MD       Electronically Signed and Approved By: CLAYTON GIBBS MD on 10/11/2023 at 22:25                Differential Diagnosis and Discussion:    Dyspnea: Differential diagnosis includes but is not limited to metabolic acidosis, neurological disorders, psychogenic, asthma, pneumothorax, upper airway obstruction, COPD, pneumonia, noncardiogenic pulmonary edema, interstitial lung disease, anemia, congestive heart failure, and pulmonary embolism    All labs were reviewed and interpreted by me.  All X-rays impressions were independently interpreted by me.  EKG was interpreted by me.    MDM     Amount and/or Complexity of Data Reviewed  Decide to obtain previous medical records or to obtain history from someone other than the patient: yes             Patient Care Considerations:    CT CHEST: I considered ordering a CT scan of the chest, however may be performed not emergently      Consultants/Shared Management Plan:    Hospitalist: I have discussed the case with the hospitalist who agrees to accept the patient for admission.    Social  Determinants of Health:    Patient is independent, reliable, and has access to care.       Disposition and Care Coordination:    Admit:   Through independent evaluation of the patient's history, physical, and imperical data, the patient meets criteria for observation/admission to the hospital.        Final diagnoses:   Acute congestive heart failure, unspecified heart failure type   Cirrhosis of liver with ascites, unspecified hepatic cirrhosis type   Hypervolemia, unspecified hypervolemia type        ED Disposition       ED Disposition   Decision to Admit    Condition   --    Comment   Level of Care: Telemetry [5]   Diagnosis: Acute exacerbation of CHF (congestive heart failure) [003569]   Certification: I Certify That Inpatient Hospital Services Are Medically Necessary For Greater Than 2 Midnights                 This medical record created using voice recognition software.             David Guallpa MD  10/12/23 0601

## 2023-10-12 NOTE — CONSULTS
[facility]   Cardiology Consult Note    Patient Name: Bradley Crane  : 1955  MRN: [id number]  Primary Care Physician:  Provider, No Known  Referring Physician: No Known Provider  Date of admission: 10/11/2023    Subjective   Subjective     Reason for Consult/ Chief Complaint: Shortness of breath, CHF exacerbation    HPI:  Bradley Crane is a 68 y.o. male with CAD, status post recent PCI of the left main artery/LAD, HFrEF with severely reduced LV systolic function, LVEF 26 to 30%, alcohol abuse, dyslipidemia, smoking and noncompliance presents to the emergency room with worsening shortness of breath and lower extremity swelling.  His symptoms started several days ago and have been progressing.  He reports associated orthopnea and fast pulse.  He continues to drink up to 8 beers a day at least 3-4 times a week.  He reports to be compliant with his medications but states that he recently ran out of some of them.  He is unsure which ones he ran out of.  Patient has no chest pain, palpitations, dizziness, presyncope or syncope.    In the ER, he was noted to be hypotensive with blood pressure of 90/68.  He was given 2 units of albumin.  His blood pressure seems to have stabilized.  He continues to have shortness of breath but it seems to be improving.  He continues to have chest discomfort.    Review of Systems   All systems were reviewed and negative except as above    Personal History     Past Medical History:   Diagnosis Date    CHF (congestive heart failure)     Hypertension     --CAD, status post recent PCI of the left main artery/LAD about 4 weeks ago.  -- Alcohol abuse  -- Active smoking  -- Severe HFrEF  -- Mixed dyslipidemia      Family History: family history is not on file. Otherwise pertinent FHx was reviewed and not pertinent to current issue.    Social History:  reports that he has been smoking cigarettes. He has been smoking an average of 1.5 packs per day. He does not have any smokeless tobacco  history on file. He reports current alcohol use of about 3.0 standard drinks of alcohol per week. He reports that he does not use drugs.    Home Medications:  HYDROcodone-acetaminophen, LORazepam, Melatonin, Potassium Chloride, albuterol sulfate HFA, aspirin, atorvastatin, metOLazone, metoprolol succinate XL, midodrine, ondansetron, sacubitril-valsartan, spironolactone, ticagrelor, tiotropium, and torsemide    Allergies:  No Known Allergies    Objective    Objective     Vitals:   Temp:  [97.7 °F (36.5 °C)-98.2 °F (36.8 °C)] 97.7 °F (36.5 °C)  Heart Rate:  [] 98  Resp:  [18-20] 18  BP: ()/(62-85) 95/70      Physical Exam:   Constitutional: Awake, alert, No acute distress    Eyes: PERRLA, sclerae anicteric, no conjunctival injection   HENT: NCAT, mucous membranes moist   Neck: Supple, no thyromegaly, no lymphadenopathy, trachea midline   Respiratory: Reduced airway entry at the bases, nonlabored respirations    Cardiovascular: RRR, no murmurs, rubs, or gallops, palpable pedal pulses bilaterally   Gastrointestinal: Positive bowel sounds, soft, nontender, nondistended   Musculoskeletal: 2+ bilateral lower extremity edema, no clubbing or cyanosis to extremities   Psychiatric: Appropriate affect, cooperative   Neurologic: Oriented x 3, nonfocal   Skin: No rashes     Result Review    Result Review:  I have personally reviewed the results from the time of this admission to 10/12/2023 11:38 EDT and agree with these findings:  [x]  Laboratory  []  Microbiology  [x]  Radiology  [x]  EKG/Telemetry   [x]  Cardiology/Vascular   []  Pathology  [x]  Old records  []  Other:  Most notable findings include:     CMP          9/6/2023    04:34 10/9/2023    13:46 10/11/2023    22:25   CMP   Glucose 116  99  97    BUN 20  12  14    Creatinine 0.70  0.77  0.77    EGFR 101.6  98.7  98.7    Sodium 135  134  135    Potassium 3.3  4.8  4.1    Chloride 96  103  104    Calcium 9.6  8.9  8.6    Total Protein 7.3  6.8  6.0    Albumin  4.3  4.0  3.3    Globulin  2.8  2.7    Total Bilirubin 1.3  1.1  0.9    Alkaline Phosphatase 151  129  125    AST (SGOT) 20  31  22    ALT (SGPT) 9  11  9    Albumin/Globulin Ratio  1.4  1.2    BUN/Creatinine Ratio 28.6  15.6  18.2    Anion Gap 15.1  9.9  10.2       CBC          9/6/2023    04:34 10/9/2023    13:46 10/11/2023    21:41   CBC   WBC 5.06  6.87  6.64    RBC 2.98  3.03  2.93    Hemoglobin 10.3  10.5  10.1    Hematocrit 29.7  32.1  30.3    MCV 99.7  105.9  103.4    MCH 34.6  34.7  34.5    MCHC 34.7  32.7  33.3    RDW 14.6  15.5  15.2    Platelets 226  228  284       Lab Results   Component Value Date    TROPONINT 38 (H) 10/11/2023         Assessment & Plan   Assessment / Plan     Brief Patient Summary:  Bradley Crane is a 68 y.o. male with:    Acute on chronic systolic congestive heart failure exacerbation, most likely related to medication noncompliance.  LVEF 26 to 30%.  Severe multivessel coronary artery disease, status post recent PCI of the left main artery into the LAD was done about 4 weeks ago.  He has residual  of the right coronary artery.  Currently without angina.  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  Moderate to severe pulmonary hypertension, mean PA pressure 37 mmHg.  Mild aortic valve stenosis by recent cardiac catheterization.  Ongoing alcohol abuse and smoking  Concern for liver cirrhosis    Plan:   Patient will be started on gentle IV diuresis.  If blood pressure remains labile, he will be started on dopamine infusion.  Maintain K around 4 and mag around 2.  Monitor kidney function.  Metoprolol will be held for now.  Continue aspirin and Brilinta.  He will be started on atorvastatin 40 mg daily.  Patient was counseled about the importance of medications compliance.  He expressed understanding.  Other issues as per primary team.    Overall prognosis is grim.  Recommend palliative care consultation and consideration of hospice care.    Thank you for the  consultation.  Please call with any questions.    Electronically signed by Merly Ji MD, 10/12/23, 11:38 AM EDT.

## 2023-10-12 NOTE — PROGRESS NOTES
[facility]   Hospitalist Progress Note  Date: 10/12/2023  Patient Name: Bradley Crane  : 1955  MRN: [id number]  Date of admission: 10/11/2023  Room/Bed: 205/      Subjective   Subjective     Chief Complaint: Dyspnea on exertion, tachypnea     Summary:Bradley Crane is a 68 y.o. male with a past medical history of CAD s/p PCI of LAD and LM with RIO x 2, impella assisted and rotablator atherectomy on 2023, HFrEF EF of 26 to 30%, hypertension, current alcohol use, concern for cirrhosis of the liver secondary to alcohol use, current tobacco smoker presented to the ED on 10/9/2023 with anasarca, heart failure exacerbation and had paracentesis with 5 L removed in the ER, admitted overnight but patient left AMA the next morning, presented to the ED today for evaluation of shortness of breath, dyspnea on exertion.  Associated with orthopnea, tachypnea.  Patient is significantly volume overloaded with pitting edema extending up to the sacral region.  Patient still consumes up to 8 beers a day at least 3-4 times a week, last drink 4 days ago.  States that he has been taking the medications as prescribed but recently he ran out of some of the medications unsure which ones he ran out of.  Denies any fevers, chills, nausea, vomiting, abdominal pain, chest pain, focal weakness, numbness, tingling, diarrhea.     In the ED, vital signs temperature 97.7, pulse 105, respiratory 20, blood pressure 90/68 on room air saturating around 98%.  Labs showed initial troponin 38, proBNP 9863, sodium 135, bicarb 20.8, rest of the CMP with no significant findings, WBC 6.64, hemoglobin 10.1, platelets 284.  Chest x-ray showed stable appearance of the chest with suspected bilateral pleural effusions with underlying atelectasis, pulmonary vascular congestion/edema, enlarged cardiac silhouette.  Received IV Lasix in the ED.  Patient has been admitted for further evaluation and management of acute systolic heart failure exacerbation,  dyspnea on exertion    Interval Followup: BP low this morning, improved after albumin x2. No acute distress. He is resting in bed, appears comfortable, some sob when speaking. He denies any chest pain or abdominal pain. Complaining of SOB. Reports he hasn't drank anything in three days, denies any previous severe withdrawal in the past. Current 1.5 ppd smoker.     Review of Systems    All systems reviewed and negative except for what is outlined above.      Objective   Objective     Vitals:   Temp:  [97.7 °F (36.5 °C)-98.2 °F (36.8 °C)] 97.7 °F (36.5 °C)  Heart Rate:  [] 91  Resp:  [18-20] 18  BP: ()/(62-85) 103/69    Physical Exam   General: Awake, alert, NAD  Eyes: pupils equal  Cardiovascular: RRR, no murmurs   Pulmonary: CTA bilaterally; no wheezes; dyspnea with conversation   Gastrointestinal: S/NT, distended, +BS  Musculoskeletal: No gross deformities  Skin: Jaundice  Neuro: speech clear; no tremor  : No Dos Santos catheter; no suprapubic tenderness  Extremities: bilateral pedal edema     Result Review    Result Review:  I have personally reviewed these results:  [x]  Laboratory      Lab 10/11/23  2141 10/09/23  1346   WBC 6.64 6.87   HEMOGLOBIN 10.1* 10.5*   HEMATOCRIT 30.3* 32.1*   PLATELETS 284 228   NEUTROS ABS 4.81 4.77   IMMATURE GRANS (ABS) 0.02 0.02   LYMPHS ABS 0.97 1.20   MONOS ABS 0.73 0.68   EOS ABS 0.07 0.14   .4* 105.9*   PROTIME  --  15.3*   APTT  --  34.6*         Lab 10/11/23  2225 10/09/23  1346   SODIUM 135* 134*   POTASSIUM 4.1 4.8   CHLORIDE 104 103   CO2 20.8* 21.1*   ANION GAP 10.2 9.9   BUN 14 12   CREATININE 0.77 0.77   EGFR 98.7 98.7   GLUCOSE 97 99   CALCIUM 8.6 8.9         Lab 10/11/23  2225 10/09/23  1346   TOTAL PROTEIN 6.0 6.8   ALBUMIN 3.3* 4.0   GLOBULIN 2.7 2.8   ALT (SGPT) 9 11   AST (SGOT) 22 31   BILIRUBIN 0.9 1.1   ALK PHOS 125* 129*         Lab 10/11/23  2225 10/11/23  2141 10/09/23  1346   PROBNP  --  9,863.0* 8,301.0*   HSTROP T 38*  --  36*   PROTIME   --   --  15.3*   INR  --   --  1.20*                 Brief Urine Lab Results       None          [x]  Microbiology   Microbiology Results (last 10 days)       Procedure Component Value - Date/Time    Body Fluid Culture - Body Fluid, Peritoneum [[id number]] Collected: 10/09/23 1525    Lab Status: Final result Specimen: Body Fluid from Peritoneum Updated: 10/12/23 0808     Body Fluid Culture No growth at 3 days     Gram Stain No organisms seen          [x]  Radiology  XR Chest 1 View    Result Date: 10/11/2023    Stable appearance of the chest with suspected bilateral pleural effusions with underlying atelectasis, pulmonary vascular congestion/edema, enlarged cardiac silhouette.       CLAYTON GIBBS MD       Electronically Signed and Approved By: CLAYTON GIBBS MD on 10/11/2023 at 22:25             US Paracentesis    Result Date: 10/9/2023   Ultrasound-guided paracentesis was performed without complication, patient tolerated procedure with 5 L of clear, dunn ascitic fluid removed. A sample specimen was sent to the lab for further diagnostic evaluation.  The patient was instructed to obtain follow up care from the referring physician.    CARMEN LUCAS       Electronically Signed and Approved By: NAYE MUNOZ MD on 10/09/2023 at 16:43             XR Chest 1 View    Result Date: 10/9/2023    Findings consistent with moderate congestive heart failure.  Low lung volumes with worsening atelectasis at the lung bases.  Dual lead AICD remains in place.       LORENA GARNICA MD       Electronically Signed and Approved By: LORENA GARNICA MD on 10/09/2023 at 14:12            []  EKG/Telemetry   []  Cardiology/Vascular   []  Pathology  []  Old records  []  Other:    Assessment & Plan   Assessment / Plan     Assessment:  Acute on chronic HFrEF  Volume overload  Alcohol abuse  CAD s/p PCI of LAD and LM with RIO x 2, impella assisted and rotablator atherectomy on 9/12/2023   Tobacco abuse  Jaundice  Concern for  cirrhosis    Plan:  Low blood pressures this morning.  He had a 5 L paracentesis 2 days ago and left AMA, did not receive any fluids at that time.  Give albumin x2  Continue IV Lasix 80 mg twice daily  CIWA protocol, patient reports not drinking the past 3 days and denies any previous severe withdrawals.  P.o. thiamine and folate replacement.  Nicotine patch  Cardiology consulted  Monitor urine output, strict I&O  Hold home Entresto and Aldactone due to blood pressure  Review home meds once confirmed     Discussed with RN.    DVT prophylaxis:  Medical DVT prophylaxis orders are present.    CODE STATUS:   Level Of Support Discussed With: Patient  Code Status (Patient has no pulse and is not breathing): CPR (Attempt to Resuscitate)  Medical Interventions (Patient has pulse or is breathing): Full Support      Electronically signed by Meghann Blankenship DO, 10/12/23, 10:52 AM EDT.

## 2023-10-12 NOTE — PLAN OF CARE
Goal Outcome Evaluation:    Pt admitted overnight for CHF exacerbation, pt in no apparent distress, SOA, supplemental 02 applied, no apparent signs of distress noted.

## 2023-10-12 NOTE — SIGNIFICANT NOTE
10/12/23 1045   Coping/Psychosocial   Observed Emotional State calm;cooperative   Verbalized Emotional State hopefulness   Trust Relationship/Rapport empathic listening provided   Involvement in Care interacting with patient   Additional Documentation Spiritual Care (Group)   Spiritual Care   Use of Spiritual Resources non-Jehovah's witness use of spiritual care   Spiritual Care Source  initiative   Spiritual Care Follow-Up follow-up, none required as presently assessed   Response to Spiritual Care receptive of support;engaged in conversation   Spiritual Care Interventions supportive conversation provided   Spiritual Care Visit Type initial   Receptivity to Spiritual Care visit welcomed

## 2023-10-12 NOTE — H&P
[facility]   HOSPITALIST HISTORY AND PHYSICAL  Date: 10/12/2023   Patient Name: Bradley Crane  : 1955  MRN: [id number]  Primary Care Physician:  Dory, No Known  Date of admission: 10/11/2023    Subjective   Subjective     Chief Complaint: Dyspnea on exertion, tachypnea    HPI:    Bradley Crane is a 68 y.o. male with a past medical history of CAD s/p PCI of LAD and LM with RIO x 2, impella assisted and rotablator atherectomy on 2023, HFrEF EF of 26 to 30%, hypertension, current alcohol use, concern for cirrhosis of the liver secondary to alcohol use, current tobacco smoker presented to the ED on 10/9/2023 with anasarca, heart failure exacerbation and had paracentesis with 5 L removed in the ER, admitted overnight but patient left AMA the next morning, presented to the ED today for evaluation of shortness of breath, dyspnea on exertion.  Associated with orthopnea, tachypnea.  Patient is significantly volume overloaded with pitting edema extending up to the sacral region.  Patient still consumes up to 8 beers a day at least 3-4 times a week, last drink 4 days ago.  States that he has been taking the medications as prescribed but recently he ran out of some of the medications unsure which ones he ran out of.  Denies any fevers, chills, nausea, vomiting, abdominal pain, chest pain, focal weakness, numbness, tingling, diarrhea.    In the ED, vital signs temperature 97.7, pulse 105, respiratory 20, blood pressure 90/68 on room air saturating around 98%.  Labs showed initial troponin 38, proBNP 9863, sodium 135, bicarb 20.8, rest of the CMP with no significant findings, WBC 6.64, hemoglobin 10.1, platelets 284.  Chest x-ray showed stable appearance of the chest with suspected bilateral pleural effusions with underlying atelectasis, pulmonary vascular congestion/edema, enlarged cardiac silhouette.  Received IV Lasix in the ED.  Patient has been admitted for further evaluation and management of acute  systolic heart failure exacerbation, dyspnea on exertion      Personal History     Past Medical History:   Diagnosis Date    CHF (congestive heart failure)     Hypertension          Past Surgical History:   Procedure Laterality Date    CARDIAC CATHETERIZATION N/A 9/5/2023    Procedure: Right and LHC with possible coronary intevention;  Surgeon: Merly Ji MD;  Location: [facility] CATH INVASIVE LOCATION;  Service: Cardiology;  Laterality: N/A;    CARDIAC SURGERY           No family history on file.      Social History     Socioeconomic History    Marital status:    Tobacco Use    Smoking status: Every Day     Packs/day: 1.5     Types: Cigarettes   Vaping Use    Vaping Use: Never used   Substance and Sexual Activity    Alcohol use: Yes     Alcohol/week: 3.0 standard drinks of alcohol     Types: 3 Cans of beer per week     Comment: 3-4 beer daily    Drug use: Never    Sexual activity: Defer         Home Medications:  HYDROcodone-acetaminophen, LORazepam, Melatonin, Potassium Chloride, albuterol sulfate HFA, aspirin, atorvastatin, metOLazone, metoprolol succinate XL, midodrine, ondansetron, sacubitril-valsartan, spironolactone, ticagrelor, tiotropium, and torsemide    Allergies:  No Known Allergies    Review of Systems   All other systems reviewed and negative except as mentioned above in the HPI    Objective   Objective     Vitals:   Temp:  [97.7 °F (36.5 °C)] 97.7 °F (36.5 °C)  Heart Rate:  [] 97  Resp:  [20] 20  BP: ()/(68-72) 108/72    Physical Exam    Constitutional: Awake, alert, no acute distress   Eyes: Pupils equal, sclerae anicteric, no conjunctival injection   HENT: NCAT, mucous membranes dry   Respiratory: Bilateral air entry present, bibasilar crackles noted, tachypneic   Cardiovascular: Regular, normal rate, no murmurs, rubs, or gallops   Gastrointestinal: Positive bowel sounds, distended, ascites present   Musculoskeletal: Significant bilateral lower extremity edema extending up  to the sacral region   Neurologic: Oriented x 3, speech clear    Result Review    Result Review:  I have personally reviewed the results from the time of this admission to 10/12/2023 00:04 EDT and agree with these findings:  [x]  Laboratory  []  Microbiology  [x]  Radiology  [x]  EKG/Telemetry   [x]  Cardiology/Vascular   []  Pathology  [x]  Old records  []  Other:        Imaging Results (Last 24 Hours)       Procedure Component Value Units Date/Time    XR Chest 1 View [[id number]] Collected: 10/11/23 2225     Updated: 10/11/23 2228    Narrative:      PROCEDURE: XR CHEST 1 VW     COMPARISON: [facility], CR, XR CHEST 1 VW, 8/25/2023, 8:32.  [facility], CR, XR CHEST 1 VW, 8/29/2023, 12:53.  [facility], CR, XR CHEST 1 VW,   10/09/2023, 14:00.     INDICATIONS: SHORTNESS OF BREATH     FINDINGS:   Cardiac silhouette remains enlarged.  ICD leads are not entirely included.  There are stable   bibasilar patchy opacities with obscuration of each hemidiaphragm.  Central pulmonary vessels   remain prominent.  No pneumothorax is seen.       Impression:         Stable appearance of the chest with suspected bilateral pleural effusions with underlying   atelectasis, pulmonary vascular congestion/edema, enlarged cardiac silhouette.                  CLAYTON GIBBS MD         Electronically Signed and Approved By: CLAYTON GIBBS MD on 10/11/2023 at 22:25                                    Assessment & Plan   Assessment / Plan     Assessment/Plan:   Acute systolic heart failure exacerbation  History of HFrEF, EF 26 to 30%  Concern for cirrhosis of the liver  CAD s/p PCI of LAD and LM with RIO x 2, impella assisted and rotablator atherectomy on 9/12/2023  COPD  Chronic back pain    Plan  Admit to inpatient, telemetry  Received IV Lasix in the ED, another dose of lasix 80 mg IV in the AM, further diuresis per cardiology recommendations   Monitor urine output, strict I&O  Daily  weights  Cardiology consult in the a.m.  Continue home aspirin, ticagrelor, Metoprolol  Resume Entresto, Aldactone based on the blood pressure in the morning  Resume other appropriate home medications once reconciled  Norco every 6 hours as needed for chronic moderate to severe pain  Follow-up serum with alcohol level      DVT prophylaxis:  No DVT prophylaxis order currently exists.    CODE STATUS:    Level Of Support Discussed With: Patient  Code Status (Patient has no pulse and is not breathing): CPR (Attempt to Resuscitate)  Medical Interventions (Patient has pulse or is breathing): Full Support      Admission Status:  I believe this patient meets inpatient status.    Part of this note may be an electronic transcription/translation of spoken language to printed text using the Dragon Dictation System    David Smith MD

## 2023-10-13 PROCEDURE — 94664 DEMO&/EVAL PT USE INHALER: CPT

## 2023-10-13 PROCEDURE — 94799 UNLISTED PULMONARY SVC/PX: CPT

## 2023-10-13 PROCEDURE — 99232 SBSQ HOSP IP/OBS MODERATE 35: CPT | Performed by: STUDENT IN AN ORGANIZED HEALTH CARE EDUCATION/TRAINING PROGRAM

## 2023-10-13 PROCEDURE — 25010000002 FUROSEMIDE PER 20 MG: Performed by: INTERNAL MEDICINE

## 2023-10-13 PROCEDURE — 25010000002 HEPARIN (PORCINE) PER 1000 UNITS: Performed by: STUDENT IN AN ORGANIZED HEALTH CARE EDUCATION/TRAINING PROGRAM

## 2023-10-13 PROCEDURE — 99232 SBSQ HOSP IP/OBS MODERATE 35: CPT | Performed by: INTERNAL MEDICINE

## 2023-10-13 RX ORDER — CHOLECALCIFEROL (VITAMIN D3) 125 MCG
10 CAPSULE ORAL NIGHTLY PRN
Status: DISCONTINUED | OUTPATIENT
Start: 2023-10-13 | End: 2023-10-14

## 2023-10-13 RX ORDER — ALBUTEROL SULFATE 2.5 MG/3ML
2.5 SOLUTION RESPIRATORY (INHALATION) EVERY 6 HOURS PRN
Status: DISCONTINUED | OUTPATIENT
Start: 2023-10-13 | End: 2023-10-19 | Stop reason: HOSPADM

## 2023-10-13 RX ORDER — MIDODRINE HYDROCHLORIDE 5 MG/1
5 TABLET ORAL
Status: DISCONTINUED | OUTPATIENT
Start: 2023-10-13 | End: 2023-10-19 | Stop reason: HOSPADM

## 2023-10-13 RX ADMIN — HEPARIN SODIUM 5000 UNITS: 5000 INJECTION INTRAVENOUS; SUBCUTANEOUS at 21:42

## 2023-10-13 RX ADMIN — METOPROLOL SUCCINATE 12.5 MG: 25 TABLET, EXTENDED RELEASE ORAL at 09:21

## 2023-10-13 RX ADMIN — HEPARIN SODIUM 5000 UNITS: 5000 INJECTION INTRAVENOUS; SUBCUTANEOUS at 13:48

## 2023-10-13 RX ADMIN — Medication 100 MG: at 09:23

## 2023-10-13 RX ADMIN — MIDODRINE HYDROCHLORIDE 5 MG: 5 TABLET ORAL at 09:22

## 2023-10-13 RX ADMIN — Medication 10 ML: at 09:22

## 2023-10-13 RX ADMIN — HYDROCODONE BITARTRATE AND ACETAMINOPHEN 1 TABLET: 10; 325 TABLET ORAL at 05:39

## 2023-10-13 RX ADMIN — FOLIC ACID 1 MG: 1 TABLET ORAL at 09:20

## 2023-10-13 RX ADMIN — FUROSEMIDE 60 MG: 10 INJECTION, SOLUTION INTRAMUSCULAR; INTRAVENOUS at 09:22

## 2023-10-13 RX ADMIN — ATORVASTATIN CALCIUM 40 MG: 40 TABLET, FILM COATED ORAL at 21:41

## 2023-10-13 RX ADMIN — TICAGRELOR 90 MG: 90 TABLET ORAL at 09:21

## 2023-10-13 RX ADMIN — HEPARIN SODIUM 5000 UNITS: 5000 INJECTION INTRAVENOUS; SUBCUTANEOUS at 05:23

## 2023-10-13 RX ADMIN — NICOTINE 1 PATCH: 21 PATCH, EXTENDED RELEASE TRANSDERMAL at 09:21

## 2023-10-13 RX ADMIN — TIOTROPIUM BROMIDE INHALATION SPRAY 2 PUFF: 3.12 SPRAY, METERED RESPIRATORY (INHALATION) at 10:10

## 2023-10-13 RX ADMIN — ASPIRIN 81 MG: 81 TABLET, COATED ORAL at 09:20

## 2023-10-13 RX ADMIN — FUROSEMIDE 60 MG: 10 INJECTION, SOLUTION INTRAMUSCULAR; INTRAVENOUS at 21:42

## 2023-10-13 RX ADMIN — FUROSEMIDE 60 MG: 10 INJECTION, SOLUTION INTRAMUSCULAR; INTRAVENOUS at 16:41

## 2023-10-13 RX ADMIN — MIDODRINE HYDROCHLORIDE 5 MG: 5 TABLET ORAL at 11:05

## 2023-10-13 RX ADMIN — Medication 10 ML: at 21:43

## 2023-10-13 RX ADMIN — TICAGRELOR 90 MG: 90 TABLET ORAL at 21:41

## 2023-10-13 RX ADMIN — Medication 10 MG: at 21:57

## 2023-10-13 RX ADMIN — HYDROCODONE BITARTRATE AND ACETAMINOPHEN 1 TABLET: 10; 325 TABLET ORAL at 21:42

## 2023-10-13 RX ADMIN — Medication 10 MG: at 00:16

## 2023-10-13 NOTE — PROGRESS NOTES
[facility]   Hospitalist Progress Note  Date: 10/13/2023  Patient Name: Brdaley Crane  : 1955  MRN: [id number]  Date of admission: 10/11/2023  Room/Bed: 205/1      Subjective   Subjective     Chief Complaint: Dyspnea on exertion, tachypnea     Summary:Bradley Crane is a 68 y.o. male with a past medical history of CAD s/p PCI of LAD and LM with RIO x 2, impella assisted and rotablator atherectomy on 2023, HFrEF EF of 26 to 30%, hypertension, current alcohol use, concern for cirrhosis of the liver secondary to alcohol use, current tobacco smoker presented to the ED on 10/9/2023 with anasarca, heart failure exacerbation and had paracentesis with 5 L removed in the ER, admitted overnight but patient left AMA the next morning, presented to the ED today for evaluation of shortness of breath, dyspnea on exertion.  Associated with orthopnea, tachypnea.  Patient is significantly volume overloaded with pitting edema extending up to the sacral region.  Patient still consumes up to 8 beers a day at least 3-4 times a week, last drink 4 days ago.  States that he has been taking the medications as prescribed but recently he ran out of some of the medications unsure which ones he ran out of.  Denies any fevers, chills, nausea, vomiting, abdominal pain, chest pain, focal weakness, numbness, tingling, diarrhea.     In the ED, vital signs temperature 97.7, pulse 105, respiratory 20, blood pressure 90/68 on room air saturating around 98%.  Labs showed initial troponin 38, proBNP 9863, sodium 135, bicarb 20.8, rest of the CMP with no significant findings, WBC 6.64, hemoglobin 10.1, platelets 284.  Chest x-ray showed stable appearance of the chest with suspected bilateral pleural effusions with underlying atelectasis, pulmonary vascular congestion/edema, enlarged cardiac silhouette.  Received IV Lasix in the ED.  Patient has been admitted for further evaluation and management of acute systolic heart failure exacerbation,  dyspnea on exertion    Interval Followup: Bps remain borderline, started Midodrine. He was threatening to leave AMA and being rude to staff. He is regretful of this and now understands the importance of his treatment. He is more comfortable today but reports that he is still very short of breath. He denies any chest pain or abdominal pain.     Review of Systems    All systems reviewed and negative except for what is outlined above.      Objective   Objective     Vitals:   Temp:  [97.2 °F (36.2 °C)-97.9 °F (36.6 °C)] 97.3 °F (36.3 °C)  Heart Rate:  [] 103  Resp:  [18] 18  BP: ()/(57-71) 98/69    Physical Exam   General: Awake, alert, NAD  Eyes: pupils equal  Cardiovascular: RRR, no murmurs   Pulmonary: CTA bilaterally; no wheezes; dyspnea with conversation   Gastrointestinal: S/NT, distended, +BS  Musculoskeletal: No gross deformities  Neuro: speech clear; no tremor  : No Dos Santos catheter; no suprapubic tenderness  Extremities: bilateral pedal edema     Result Review    Result Review:  I have personally reviewed these results:  [x]  Laboratory      Lab 10/12/23  2132 10/12/23  1812 10/11/23  2141 10/09/23  1346   WBC  --  6.69 6.64 6.87   HEMOGLOBIN  --  9.9* 10.1* 10.5*   HEMATOCRIT  --  30.7* 30.3* 32.1*   PLATELETS  --  246 284 228   NEUTROS ABS  --   --  4.81 4.77   IMMATURE GRANS (ABS)  --   --  0.02 0.02   LYMPHS ABS  --   --  0.97 1.20   MONOS ABS  --   --  0.73 0.68   EOS ABS  --   --  0.07 0.14   MCV  --  106.6* 103.4* 105.9*   PROCALCITONIN  --  0.04  --   --    LACTATE 1.5 2.3*  --   --    PROTIME  --   --   --  15.3*   APTT  --   --   --  34.6*         Lab 10/12/23  1812 10/11/23  2225 10/09/23  1346   SODIUM 133* 135* 134*   POTASSIUM 4.2 4.1 4.8   CHLORIDE 98 104 103   CO2 23.5 20.8* 21.1*   ANION GAP 11.5 10.2 9.9   BUN 20 14 12   CREATININE 1.24 0.77 0.77   EGFR 64.1 98.7 98.7   GLUCOSE 105* 97 99   CALCIUM 9.0 8.6 8.9   MAGNESIUM 1.6 2.0  --    PHOSPHORUS  --  4.6*  --          Lab  10/11/23  2225 10/09/23  1346   TOTAL PROTEIN 6.0 6.8   ALBUMIN 3.3* 4.0   GLOBULIN 2.7 2.8   ALT (SGPT) 9 11   AST (SGOT) 22 31   BILIRUBIN 0.9 1.1   ALK PHOS 125* 129*         Lab 10/11/23  2225 10/11/23  2141 10/09/23  1346   PROBNP  --  9,863.0* 8,301.0*   HSTROP T 38*  --  36*   PROTIME  --   --  15.3*   INR  --   --  1.20*                 Brief Urine Lab Results       None          [x]  Microbiology   Microbiology Results (last 10 days)       Procedure Component Value - Date/Time    Body Fluid Culture - Body Fluid, Peritoneum [[id number]] Collected: 10/09/23 1525    Lab Status: Final result Specimen: Body Fluid from Peritoneum Updated: 10/12/23 0808     Body Fluid Culture No growth at 3 days     Gram Stain No organisms seen          [x]  Radiology  XR Chest 1 View    Result Date: 10/11/2023    Stable appearance of the chest with suspected bilateral pleural effusions with underlying atelectasis, pulmonary vascular congestion/edema, enlarged cardiac silhouette.       CLAYTON GIBBS MD       Electronically Signed and Approved By: CLAYTON GIBBS MD on 10/11/2023 at 22:25             US Paracentesis    Result Date: 10/9/2023   Ultrasound-guided paracentesis was performed without complication, patient tolerated procedure with 5 L of clear, dunn ascitic fluid removed. A sample specimen was sent to the lab for further diagnostic evaluation.  The patient was instructed to obtain follow up care from the referring physician.    CARMEN LUCAS       Electronically Signed and Approved By: NAYE MUNOZ MD on 10/09/2023 at 16:43             XR Chest 1 View    Result Date: 10/9/2023    Findings consistent with moderate congestive heart failure.  Low lung volumes with worsening atelectasis at the lung bases.  Dual lead AICD remains in place.       LORENA GARNICA MD       Electronically Signed and Approved By: LORENA GARNICA MD on 10/09/2023 at 14:12            []  EKG/Telemetry   []  Cardiology/Vascular   []   Pathology  []  Old records  []  Other:    Assessment & Plan   Assessment / Plan     Assessment:  Acute on chronic HFrEF  Volume overload  Alcohol abuse  CAD s/p PCI of LAD and LM with RIO x 2, impella assisted and rotablator atherectomy on 9/12/2023   Tobacco abuse  Jaundice  Concern for cirrhosis  CHRISTIANA    Plan:  Low blood pressures continue. Started Midodrine. Responded well to albumin, can repeat if neccessary.   IV Lasix changed by cardiology to 60 mg TID  CIWA protocol, patient reports not drinking the past 3 days and denies any previous severe withdrawals.  P.o. thiamine and folate replacement.  Nicotine patch  Cardiology consulted  Continue lab monitoring, pt refusing sticks, educated him on importance of electrolyte labs   Hb slowly trending down  CHRISTIANA with creatinine 1.24 from 0.77  Monitor urine output, strict I&O  Hold home Entresto and Aldactone due to blood pressure  Review home meds once confirmed     Discussed with RN.    DVT prophylaxis:  Medical DVT prophylaxis orders are present.    CODE STATUS:   Level Of Support Discussed With: Patient  Code Status (Patient has no pulse and is not breathing): CPR (Attempt to Resuscitate)  Medical Interventions (Patient has pulse or is breathing): Full Support      Electronically signed by Meghann Blankenship DO, 10/12/23, 10:52 AM EDT.                     normal performance

## 2023-10-13 NOTE — PLAN OF CARE
Patient has been up walking with assistance today. No complaints of pain. Fluid restriction amount increased. No acute changes this shift. Plan of care ongoing.     Cirilo Traylor RN

## 2023-10-13 NOTE — SIGNIFICANT NOTE
Wound Eval / Progress Noted     [facility]     Patient Name: Bradley Crane  : 1955  MRN: [id number]  Today's Date: 10/13/2023                 Admit Date: 10/11/2023    Visit Dx:    ICD-10-CM ICD-9-CM   1. Acute congestive heart failure, unspecified heart failure type  I50.9 428.0   2. Cirrhosis of liver with ascites, unspecified hepatic cirrhosis type  K74.60 571.5    R18.8    3. Hypervolemia, unspecified hypervolemia type  E87.70 276.69         Acute exacerbation of CHF (congestive heart failure)        Past Medical History:   Diagnosis Date    CHF (congestive heart failure)     Hypertension         Past Surgical History:   Procedure Laterality Date    CARDIAC CATHETERIZATION N/A 2023    Procedure: Right and LHC with possible coronary intevention;  Surgeon: Merly iJ MD;  Location: [facility] INVASIVE LOCATION;  Service: Cardiology;  Laterality: N/A;    CARDIAC SURGERY           Physical Assessment:  Wound 23 1837 Left lower leg Other (comment) (Active)   Wound Image   10/13/23 1120   Dressing Appearance intact;moist drainage 10/13/23 1120   Closure None 10/13/23 1120   Base moist;red;slough 10/13/23 1120   Periwound edematous;swelling;dry 10/13/23 1120   Periwound Temperature warm 10/13/23 1120   Periwound Skin Turgor soft 10/13/23 1120   Edges open 10/13/23 1120   Wound Length (cm) 2.5 cm 10/13/23 1120   Wound Width (cm) 1.5 cm 10/13/23 1120   Wound Depth (cm) 0.2 cm 10/13/23 1120   Wound Surface Area (cm^2) 3.75 cm^2 10/13/23 1120   Wound Volume (cm^3) 0.75 cm^3 10/13/23 1120   Drainage Characteristics/Odor serosanguineous 10/13/23 1120   Drainage Amount small 10/13/23 1120   Care, Wound cleansed with;sterile normal saline 10/13/23 1120   Dressing Care dressing applied;gauze, dry;abdominal pad;hydrofiber;silver impregnated 10/13/23 1120   Periwound Care absorptive dressing applied 10/13/23 1120       Wound 10/09/23 8267 Left anterior second toe (Active)   Dressing Appearance open to air  10/13/23 1120   Closure None 10/13/23 1120   Base moist;red 10/13/23 1120   Periwound swelling;edematous;dry 10/13/23 1120   Periwound Temperature warm 10/13/23 1120   Periwound Skin Turgor soft 10/13/23 1120   Edges open 10/13/23 1120   Drainage Characteristics/Odor serous 10/13/23 1120   Drainage Amount small 10/13/23 1120   Care, Wound cleansed with;sterile normal saline 10/13/23 1120   Dressing Care dressing applied;hydrofiber;silver impregnated;other (see comments) 10/13/23 1120   Periwound Care absorptive dressing applied 10/13/23 1120       Wound 10/09/23 1718 Right lower leg (Active)   Wound Image   10/13/23 1120   Dressing Appearance intact;moist drainage 10/13/23 1120   Closure None 10/13/23 1120   Base moist;red 10/13/23 1120   Periwound dry;edematous;swelling 10/13/23 1120   Periwound Temperature warm 10/13/23 1120   Periwound Skin Turgor soft 10/13/23 1120   Edges open 10/13/23 1120   Drainage Characteristics/Odor serosanguineous 10/13/23 1120   Drainage Amount small 10/13/23 1120   Care, Wound cleansed with;sterile normal saline 10/13/23 1120   Dressing Care dressing applied;abdominal pad;gauze;hydrofiber;silver impregnated 10/13/23 1120   Periwound Care absorptive dressing applied 10/13/23 1120        Wound Check / Follow-up:  Patient seen today for wound consult. Family present at bedside.   Patient sitting up on side of bed with feet down.   Patient with dry scaly flaky skin to BLE with taut thickened skin. He has some shallow ulcerations to RLE with red moist wound bases. LLE with shallow ulcerations with one larger deeper wound to left distal lower leg measuring as above. Recommending good skin care / hygiene to bilateral lower legs and then dressings over open ulcerations consisting of silver impregnated hydrofiber and silicone border dressings.   Patient also with ulceration to left 2nd toe anterior aspect. Wound base is pink and moist. Recommending daily dressing with silver impregnated  hydrofiber and bandaid.     Patient denies any other wounds or open areas. There are scattered crusted areas noted to bilateral hands.       Impression: Edema with ulcerations to BLE, ulceration to left 2nd toe    Short term goals:  Regain skin integrity. Skin care / hygiene, Daily dressing changes. Skin protection, elevation.    Daria Das RN    10/13/2023    13:31 EDT

## 2023-10-13 NOTE — PLAN OF CARE
Goal Outcome Evaluation:     Pt resistant to injections, lab draws and any type of intervention that involves using a needle, pt does take meds as ordered, impulsive at times and requires reminders that he needs assistance to get up due to his risk of falling, no apparent signs of distress this shift.

## 2023-10-13 NOTE — PROGRESS NOTES
[facility]     Cardiology Progress Note    Patient Name: Bradley Crane  : 1955  MRN: [id number]  Primary Care Physician:  Provider, No Known  Date of admission: 10/11/2023    Subjective   Subjective     No acute events overnight.  He reports improvement of his shortness of breath.  He has no chest pain.  He is complaining about fluid restriction and meals portions.  He was talking about leaving AMA but seems to be agreeable to stay after my discussion with him.    Review of Systems   All systems were reviewed and negative except as above    Objective   Objective     Vitals:   Temp:  [97.2 °F (36.2 °C)-97.9 °F (36.6 °C)] 97.2 °F (36.2 °C)  Heart Rate:  [] 97  Resp:  [18] 18  BP: ()/(57-71) 99/70  Physical Exam                  Constitutional: Awake, alert, No acute distress               Eyes: PERRLA, sclerae anicteric, no conjunctival injection              HENT: NCAT, mucous membranes moist              Neck: Supple, no thyromegaly, no lymphadenopathy, trachea midline              Respiratory: Reduced airway entry at the bases, nonlabored respirations               Cardiovascular: RRR, no murmurs, rubs, or gallops, palpable pedal pulses bilaterally              Gastrointestinal: Positive bowel sounds, soft, nontender, nondistended              Musculoskeletal: 2+ bilateral lower extremity edema, no clubbing or cyanosis to extremities              Psychiatric: Appropriate affect, cooperative              Neurologic: Oriented x 3, nonfocal              Skin: No rashes     Scheduled Meds:aspirin, 81 mg, Oral, Daily  atorvastatin, 40 mg, Oral, Nightly  folic acid, 1 mg, Oral, Daily  furosemide, 60 mg, Intravenous, TID  heparin (porcine), 5,000 Units, Subcutaneous, Q8H  metoprolol succinate XL, 12.5 mg, Oral, Daily  midodrine, 5 mg, Oral, TID AC  nicotine, 1 patch, Transdermal, Q24H  sodium chloride, 10 mL, Intravenous, Q12H  thiamine, 100 mg, Oral, Daily  ticagrelor, 90 mg, Oral, BID  tiotropium  bromide monohydrate, 2 puff, Inhalation, Daily - RT      Continuous Infusions:        Result Review    Result Review:  I have personally reviewed the results from the time of this admission to 10/13/2023 09:23 EDT and agree with these findings:  [x]  Laboratory  []  Microbiology  [x]  Radiology  [x]  EKG/Telemetry   [x]  Cardiology/Vascular   []  Pathology  []  Old records  []  Other:  Most notable findings include:     CBC          10/9/2023    13:46 10/11/2023    21:41 10/12/2023    18:12   CBC   WBC 6.87  6.64  6.69    RBC 3.03  2.93  2.88    Hemoglobin 10.5  10.1  9.9    Hematocrit 32.1  30.3  30.7    .9  103.4  106.6    MCH 34.7  34.5  34.4    MCHC 32.7  33.3  32.2    RDW 15.5  15.2  15.3    Platelets 228  284  246      CMP          10/9/2023    13:46 10/11/2023    22:25 10/12/2023    18:12   CMP   Glucose 99  97  105    BUN 12  14  20    Creatinine 0.77  0.77  1.24    EGFR 98.7  98.7  64.1    Sodium 134  135  133    Potassium 4.8  4.1  4.2    Chloride 103  104  98    Calcium 8.9  8.6  9.0    Total Protein 6.8  6.0     Albumin 4.0  3.3     Globulin 2.8  2.7     Total Bilirubin 1.1  0.9     Alkaline Phosphatase 129  125     AST (SGOT) 31  22     ALT (SGPT) 11  9     Albumin/Globulin Ratio 1.4  1.2     BUN/Creatinine Ratio 15.6  18.2  16.1    Anion Gap 9.9  10.2  11.5       CARDIAC LABS:      Lab 10/11/23  2225 10/11/23  2141 10/09/23  1346   PROBNP  --  9,863.0* 8,301.0*   HSTROP T 38*  --  36*   PROTIME  --   --  15.3*   INR  --   --  1.20*        Assessment & Plan   Assessment / Plan     Brief Patient Summary:  Bradley Crane is a 68 y.o. male with:     Acute on chronic systolic congestive heart failure exacerbation, most likely related to medication noncompliance.  LVEF 26 to 30%.  Severe multivessel coronary artery disease, status post recent PCI of the left main artery into the LAD was done about 4 weeks ago.  He has residual  of the right coronary artery.  Currently without angina.  ECG shows old  inferior MI without acute ischemic ST-T changes.  Moderate to severe pulmonary hypertension, mean PA pressure 37 mmHg.  Mild aortic valve stenosis by recent cardiac catheterization.  Ongoing alcohol abuse and smoking  Concern for liver cirrhosis  Medical noncompliance     Plan:   Continue gentle IV diuresis.  If blood pressure remains labile, he will be started on dopamine infusion.  Maintain K around 4 and mag around 2.  Monitor kidney function.  Metoprolol will be held for now.  Will wean off midodrine as blood pressure allows.  Continue aspirin, Brilinta and atorvastatin.  Patient was counseled about the importance of medications compliance.  He expressed understanding.  Other issues as per primary team.    Overall prognosis is poor.  Consider palliative care consultation to determine goals of care and for hospice evaluation.    CODE STATUS:   Level Of Support Discussed With: Patient  Code Status (Patient has no pulse and is not breathing): CPR (Attempt to Resuscitate)  Medical Interventions (Patient has pulse or is breathing): Full Support      Electronically signed by Merly Ji MD, 10/13/23, 9:23 AM EDT.

## 2023-10-14 ENCOUNTER — APPOINTMENT (OUTPATIENT)
Dept: GENERAL RADIOLOGY | Facility: HOSPITAL | Age: 68
DRG: 292 | End: 2023-10-14
Payer: MEDICARE

## 2023-10-14 LAB
ANION GAP SERPL CALCULATED.3IONS-SCNC: 10.1 MMOL/L (ref 5–15)
BASOPHILS # BLD AUTO: 0.04 10*3/MM3 (ref 0–0.2)
BASOPHILS NFR BLD AUTO: 0.9 % (ref 0–1.5)
BUN SERPL-MCNC: 25 MG/DL (ref 8–23)
BUN/CREAT SERPL: 25.3 (ref 7–25)
CALCIUM SPEC-SCNC: 8.7 MG/DL (ref 8.6–10.5)
CHLORIDE SERPL-SCNC: 102 MMOL/L (ref 98–107)
CO2 SERPL-SCNC: 25.9 MMOL/L (ref 22–29)
CREAT SERPL-MCNC: 0.99 MG/DL (ref 0.76–1.27)
DEPRECATED RDW RBC AUTO: 55.8 FL (ref 37–54)
EGFRCR SERPLBLD CKD-EPI 2021: 84 ML/MIN/1.73
EOSINOPHIL # BLD AUTO: 0.16 10*3/MM3 (ref 0–0.4)
EOSINOPHIL NFR BLD AUTO: 3.4 % (ref 0.3–6.2)
ERYTHROCYTE [DISTWIDTH] IN BLOOD BY AUTOMATED COUNT: 14.9 % (ref 12.3–15.4)
GLUCOSE SERPL-MCNC: 110 MG/DL (ref 65–99)
HCT VFR BLD AUTO: 27.1 % (ref 37.5–51)
HGB BLD-MCNC: 9 G/DL (ref 13–17.7)
IMM GRANULOCYTES # BLD AUTO: 0.01 10*3/MM3 (ref 0–0.05)
IMM GRANULOCYTES NFR BLD AUTO: 0.2 % (ref 0–0.5)
LYMPHOCYTES # BLD AUTO: 0.82 10*3/MM3 (ref 0.7–3.1)
LYMPHOCYTES NFR BLD AUTO: 17.4 % (ref 19.6–45.3)
MAGNESIUM SERPL-MCNC: 1.7 MG/DL (ref 1.6–2.4)
MCH RBC QN AUTO: 34.1 PG (ref 26.6–33)
MCHC RBC AUTO-ENTMCNC: 33.2 G/DL (ref 31.5–35.7)
MCV RBC AUTO: 102.7 FL (ref 79–97)
MONOCYTES # BLD AUTO: 0.75 10*3/MM3 (ref 0.1–0.9)
MONOCYTES NFR BLD AUTO: 16 % (ref 5–12)
NEUTROPHILS NFR BLD AUTO: 2.92 10*3/MM3 (ref 1.7–7)
NEUTROPHILS NFR BLD AUTO: 62.1 % (ref 42.7–76)
NRBC BLD AUTO-RTO: 0 /100 WBC (ref 0–0.2)
PLATELET # BLD AUTO: 217 10*3/MM3 (ref 140–450)
PMV BLD AUTO: 9.1 FL (ref 6–12)
POTASSIUM SERPL-SCNC: 3.5 MMOL/L (ref 3.5–5.2)
RBC # BLD AUTO: 2.64 10*6/MM3 (ref 4.14–5.8)
SODIUM SERPL-SCNC: 138 MMOL/L (ref 136–145)
VIT B12 BLD-MCNC: >2000 PG/ML (ref 211–946)
WBC NRBC COR # BLD: 4.7 10*3/MM3 (ref 3.4–10.8)

## 2023-10-14 PROCEDURE — 99232 SBSQ HOSP IP/OBS MODERATE 35: CPT | Performed by: INTERNAL MEDICINE

## 2023-10-14 PROCEDURE — 25010000002 FUROSEMIDE PER 20 MG: Performed by: INTERNAL MEDICINE

## 2023-10-14 PROCEDURE — 25010000002 HEPARIN (PORCINE) PER 1000 UNITS: Performed by: STUDENT IN AN ORGANIZED HEALTH CARE EDUCATION/TRAINING PROGRAM

## 2023-10-14 PROCEDURE — 71045 X-RAY EXAM CHEST 1 VIEW: CPT

## 2023-10-14 PROCEDURE — 94664 DEMO&/EVAL PT USE INHALER: CPT

## 2023-10-14 PROCEDURE — 85025 COMPLETE CBC W/AUTO DIFF WBC: CPT | Performed by: STUDENT IN AN ORGANIZED HEALTH CARE EDUCATION/TRAINING PROGRAM

## 2023-10-14 PROCEDURE — 25010000002 MAGNESIUM SULFATE IN D5W 1G/100ML (PREMIX) 1-5 GM/100ML-% SOLUTION: Performed by: STUDENT IN AN ORGANIZED HEALTH CARE EDUCATION/TRAINING PROGRAM

## 2023-10-14 PROCEDURE — 80048 BASIC METABOLIC PNL TOTAL CA: CPT | Performed by: STUDENT IN AN ORGANIZED HEALTH CARE EDUCATION/TRAINING PROGRAM

## 2023-10-14 PROCEDURE — P9047 ALBUMIN (HUMAN), 25%, 50ML: HCPCS | Performed by: STUDENT IN AN ORGANIZED HEALTH CARE EDUCATION/TRAINING PROGRAM

## 2023-10-14 PROCEDURE — 94799 UNLISTED PULMONARY SVC/PX: CPT

## 2023-10-14 PROCEDURE — 25010000002 ALBUMIN HUMAN 25% PER 50 ML: Performed by: STUDENT IN AN ORGANIZED HEALTH CARE EDUCATION/TRAINING PROGRAM

## 2023-10-14 PROCEDURE — 83735 ASSAY OF MAGNESIUM: CPT | Performed by: STUDENT IN AN ORGANIZED HEALTH CARE EDUCATION/TRAINING PROGRAM

## 2023-10-14 PROCEDURE — 99232 SBSQ HOSP IP/OBS MODERATE 35: CPT | Performed by: STUDENT IN AN ORGANIZED HEALTH CARE EDUCATION/TRAINING PROGRAM

## 2023-10-14 RX ORDER — MAGNESIUM SULFATE 1 G/100ML
1 INJECTION INTRAVENOUS ONCE
Status: COMPLETED | OUTPATIENT
Start: 2023-10-14 | End: 2023-10-14

## 2023-10-14 RX ORDER — ACETAMINOPHEN 325 MG/1
650 TABLET ORAL EVERY 6 HOURS PRN
Status: DISCONTINUED | OUTPATIENT
Start: 2023-10-14 | End: 2023-10-19 | Stop reason: HOSPADM

## 2023-10-14 RX ORDER — CHOLECALCIFEROL (VITAMIN D3) 125 MCG
10 CAPSULE ORAL NIGHTLY PRN
Status: DISCONTINUED | OUTPATIENT
Start: 2023-10-14 | End: 2023-10-19 | Stop reason: HOSPADM

## 2023-10-14 RX ORDER — POTASSIUM CHLORIDE 750 MG/1
20 CAPSULE, EXTENDED RELEASE ORAL ONCE
Status: COMPLETED | OUTPATIENT
Start: 2023-10-14 | End: 2023-10-14

## 2023-10-14 RX ORDER — ALBUMIN (HUMAN) 12.5 G/50ML
25 SOLUTION INTRAVENOUS ONCE
Status: COMPLETED | OUTPATIENT
Start: 2023-10-14 | End: 2023-10-14

## 2023-10-14 RX ADMIN — Medication 10 ML: at 08:36

## 2023-10-14 RX ADMIN — MAGNESIUM SULFATE 1 G: 1 INJECTION INTRAVENOUS at 12:10

## 2023-10-14 RX ADMIN — TIOTROPIUM BROMIDE INHALATION SPRAY 2 PUFF: 3.12 SPRAY, METERED RESPIRATORY (INHALATION) at 11:00

## 2023-10-14 RX ADMIN — Medication 100 MG: at 08:35

## 2023-10-14 RX ADMIN — FUROSEMIDE 60 MG: 10 INJECTION, SOLUTION INTRAMUSCULAR; INTRAVENOUS at 21:56

## 2023-10-14 RX ADMIN — Medication 10 MG: at 21:57

## 2023-10-14 RX ADMIN — FUROSEMIDE 60 MG: 10 INJECTION, SOLUTION INTRAMUSCULAR; INTRAVENOUS at 08:35

## 2023-10-14 RX ADMIN — FUROSEMIDE 60 MG: 10 INJECTION, SOLUTION INTRAMUSCULAR; INTRAVENOUS at 16:48

## 2023-10-14 RX ADMIN — TICAGRELOR 90 MG: 90 TABLET ORAL at 21:56

## 2023-10-14 RX ADMIN — NICOTINE 1 PATCH: 21 PATCH, EXTENDED RELEASE TRANSDERMAL at 08:34

## 2023-10-14 RX ADMIN — ALBUMIN HUMAN 25 G: 0.25 SOLUTION INTRAVENOUS at 08:34

## 2023-10-14 RX ADMIN — Medication 10 ML: at 21:57

## 2023-10-14 RX ADMIN — MIDODRINE HYDROCHLORIDE 5 MG: 5 TABLET ORAL at 11:05

## 2023-10-14 RX ADMIN — ACETAMINOPHEN 650 MG: 325 TABLET ORAL at 16:54

## 2023-10-14 RX ADMIN — ASPIRIN 81 MG: 81 TABLET, COATED ORAL at 08:35

## 2023-10-14 RX ADMIN — HYDROCODONE BITARTRATE AND ACETAMINOPHEN 1 TABLET: 10; 325 TABLET ORAL at 05:45

## 2023-10-14 RX ADMIN — FOLIC ACID 1 MG: 1 TABLET ORAL at 08:35

## 2023-10-14 RX ADMIN — MIDODRINE HYDROCHLORIDE 5 MG: 5 TABLET ORAL at 16:48

## 2023-10-14 RX ADMIN — HYDROCODONE BITARTRATE AND ACETAMINOPHEN 1 TABLET: 10; 325 TABLET ORAL at 14:18

## 2023-10-14 RX ADMIN — HEPARIN SODIUM 5000 UNITS: 5000 INJECTION INTRAVENOUS; SUBCUTANEOUS at 21:56

## 2023-10-14 RX ADMIN — ATORVASTATIN CALCIUM 40 MG: 40 TABLET, FILM COATED ORAL at 21:56

## 2023-10-14 RX ADMIN — MIDODRINE HYDROCHLORIDE 5 MG: 5 TABLET ORAL at 07:29

## 2023-10-14 RX ADMIN — WHITE PETROLATUM 1 APPLICATION: 1.75 OINTMENT TOPICAL at 08:36

## 2023-10-14 RX ADMIN — HYDROCODONE BITARTRATE AND ACETAMINOPHEN 1 TABLET: 10; 325 TABLET ORAL at 21:56

## 2023-10-14 RX ADMIN — TICAGRELOR 90 MG: 90 TABLET ORAL at 08:35

## 2023-10-14 RX ADMIN — POTASSIUM CHLORIDE 20 MEQ: 10 CAPSULE, COATED, EXTENDED RELEASE ORAL at 12:10

## 2023-10-14 RX ADMIN — HEPARIN SODIUM 5000 UNITS: 5000 INJECTION INTRAVENOUS; SUBCUTANEOUS at 05:46

## 2023-10-14 NOTE — PLAN OF CARE
Goal Outcome Evaluation:    Pt has been pleasant and cooperative with care this shift, strict intake and output maintained, no apparent signs of distress noted this shift.

## 2023-10-14 NOTE — PROGRESS NOTES
[facility]   Hospitalist Progress Note  Date: 10/14/2023  Patient Name: Bradley Crane  : 1955  MRN: [id number]  Date of admission: 10/11/2023  Room/Bed: 205/1      Subjective   Subjective     Chief Complaint: Dyspnea on exertion, tachypnea     Summary:Bradley Crane is a 68 y.o. male with a past medical history of CAD s/p PCI of LAD and LM with RIO x 2, impella assisted and rotablator atherectomy on 2023, HFrEF EF of 26 to 30%, hypertension, current alcohol use, concern for cirrhosis of the liver secondary to alcohol use, current tobacco smoker presented to the ED on 10/9/2023 with anasarca, heart failure exacerbation and had paracentesis with 5 L removed in the ER, admitted overnight but patient left AMA the next morning, presented to the ED today for evaluation of shortness of breath, dyspnea on exertion.  Associated with orthopnea, tachypnea.  Patient is significantly volume overloaded with pitting edema extending up to the sacral region.  Patient still consumes up to 8 beers a day at least 3-4 times a week, last drink 4 days ago.  States that he has been taking the medications as prescribed but recently he ran out of some of the medications unsure which ones he ran out of.  Denies any fevers, chills, nausea, vomiting, abdominal pain, chest pain, focal weakness, numbness, tingling, diarrhea.     In the ED, vital signs temperature 97.7, pulse 105, respiratory 20, blood pressure 90/68 on room air saturating around 98%.  Labs showed initial troponin 38, proBNP 9863, sodium 135, bicarb 20.8, rest of the CMP with no significant findings, WBC 6.64, hemoglobin 10.1, platelets 284.  Chest x-ray showed stable appearance of the chest with suspected bilateral pleural effusions with underlying atelectasis, pulmonary vascular congestion/edema, enlarged cardiac silhouette.  Received IV Lasix in the ED.  Patient has been admitted for further evaluation and management of acute systolic heart failure exacerbation,  dyspnea on exertion    Interval Followup: No acute overnight events.  No acute distress.  Patient resting comfortably in bed this morning.  He reports that he slept really well last night.  No new complaints or concerns.  Denies chest pain or abdominal pain.  Reports that he has not had a bowel movement in around 2 days.  Otherwise, is feeling better.  Wanting to get up and walk the halls today.      Review of Systems    All systems reviewed and negative except for what is outlined above.      Objective   Objective     Vitals:   Temp:  [97.3 °F (36.3 °C)-97.9 °F (36.6 °C)] 97.3 °F (36.3 °C)  Heart Rate:  [] 88  Resp:  [16-20] 16  BP: (83-98)/(58-69) 83/58    Physical Exam   General: Awake, alert, NAD  Eyes: pupils equal  Cardiovascular: RRR, no murmurs   Pulmonary: CTA bilaterally; no wheezes; dyspnea with conversation   Gastrointestinal: S/NT, distended, +BS  Musculoskeletal: No gross deformities  Neuro: speech clear; no tremor  : No Dos Santos catheter; no suprapubic tenderness  Extremities: bilateral pedal edema     Result Review    Result Review:  I have personally reviewed these results:  [x]  Laboratory      Lab 10/14/23  0816 10/12/23  2132 10/12/23  1812 10/11/23  2141 10/09/23  1346   WBC 4.70  --  6.69 6.64 6.87   HEMOGLOBIN 9.0*  --  9.9* 10.1* 10.5*   HEMATOCRIT 27.1*  --  30.7* 30.3* 32.1*   PLATELETS 217  --  246 284 228   NEUTROS ABS 2.92  --   --  4.81 4.77   IMMATURE GRANS (ABS) 0.01  --   --  0.02 0.02   LYMPHS ABS 0.82  --   --  0.97 1.20   MONOS ABS 0.75  --   --  0.73 0.68   EOS ABS 0.16  --   --  0.07 0.14   .7*  --  106.6* 103.4* 105.9*   PROCALCITONIN  --   --  0.04  --   --    LACTATE  --  1.5 2.3*  --   --    PROTIME  --   --   --   --  15.3*   APTT  --   --   --   --  34.6*         Lab 10/14/23  0816 10/12/23  1812 10/11/23  2225   SODIUM 138 133* 135*   POTASSIUM 3.5 4.2 4.1   CHLORIDE 102 98 104   CO2 25.9 23.5 20.8*   ANION GAP 10.1 11.5 10.2   BUN 25* 20 14   CREATININE 0.99  1.24 0.77   EGFR 84.0 64.1 98.7   GLUCOSE 110* 105* 97   CALCIUM 8.7 9.0 8.6   MAGNESIUM 1.7 1.6 2.0   PHOSPHORUS  --   --  4.6*         Lab 10/11/23  2225 10/09/23  1346   TOTAL PROTEIN 6.0 6.8   ALBUMIN 3.3* 4.0   GLOBULIN 2.7 2.8   ALT (SGPT) 9 11   AST (SGOT) 22 31   BILIRUBIN 0.9 1.1   ALK PHOS 125* 129*         Lab 10/11/23  2225 10/11/23  2141 10/09/23  1346   PROBNP  --  9,863.0* 8,301.0*   HSTROP T 38*  --  36*   PROTIME  --   --  15.3*   INR  --   --  1.20*                 Brief Urine Lab Results       None          [x]  Microbiology   Microbiology Results (last 10 days)       Procedure Component Value - Date/Time    Body Fluid Culture - Body Fluid, Peritoneum [[id number]] Collected: 10/09/23 1525    Lab Status: Final result Specimen: Body Fluid from Peritoneum Updated: 10/12/23 0808     Body Fluid Culture No growth at 3 days     Gram Stain No organisms seen          [x]  Radiology  XR Chest 1 View    Result Date: 10/11/2023    Stable appearance of the chest with suspected bilateral pleural effusions with underlying atelectasis, pulmonary vascular congestion/edema, enlarged cardiac silhouette.       CLAYTON GIBBS MD       Electronically Signed and Approved By: CLAYTON GIBBS MD on 10/11/2023 at 22:25             US Paracentesis    Result Date: 10/9/2023   Ultrasound-guided paracentesis was performed without complication, patient tolerated procedure with 5 L of clear, dunn ascitic fluid removed. A sample specimen was sent to the lab for further diagnostic evaluation.  The patient was instructed to obtain follow up care from the referring physician.    CARMEN LUCAS       Electronically Signed and Approved By: NAYE MUNOZ MD on 10/09/2023 at 16:43             XR Chest 1 View    Result Date: 10/9/2023    Findings consistent with moderate congestive heart failure.  Low lung volumes with worsening atelectasis at the lung bases.  Dual lead AICD remains in place.       LORENA GARNICA MD        Electronically Signed and Approved By: LORENA GARNICA MD on 10/09/2023 at 14:12            []  EKG/Telemetry   []  Cardiology/Vascular   []  Pathology  []  Old records  []  Other:    Assessment & Plan   Assessment / Plan     Assessment:  Acute on chronic HFrEF  Volume overload  Alcohol abuse  CAD s/p PCI of LAD and LM with RIO x 2, impella assisted and rotablator atherectomy on 9/12/2023   Tobacco abuse  Jaundice  Concern for cirrhosis  CHRISTIANA  Macrocytic anemia    Plan:  Low blood pressures continue.  Continue midodrine.  Albumin x1 this morning.    IV Lasix by cardiology to 60 mg TID  CIWA protocol, patient reports not drinking the past 3 days and denies any previous severe withdrawals.  P.o. thiamine and folate replacement.  Nicotine patch  Cardiology consulted  Hb 9, macrocytic.  Suspect this is secondary to his alcohol abuse.  Added B12 and folate labs.  He is already on folate replacement.  Creatinine improved from 1.24 to 0.9  Monitor urine output, strict I&O  Hold home Entresto and Aldactone due to blood pressure  Repeat chest x-ray reviewed, some improvement in his pulmonary edema  Potassium and magnesium replacement ordered  A.m. labs     Discussed with RN.    DVT prophylaxis:  Medical DVT prophylaxis orders are present.    CODE STATUS:   Level Of Support Discussed With: Patient  Code Status (Patient has no pulse and is not breathing): CPR (Attempt to Resuscitate)  Medical Interventions (Patient has pulse or is breathing): Full Support      Electronically signed by Meghann Blankenship DO, 10/12/23, 10:52 AM EDT.

## 2023-10-14 NOTE — PROGRESS NOTES
[facility]     Cardiology Progress Note    Patient Name: Bradley Crane  : 1955  MRN: [id number]  Primary Care Physician:  Provider, No Known  Date of admission: 10/11/2023    Subjective   Subjective     No acute events overnight.  Diuresing relatively well.  Blood pressure soft but no symptoms    Review of Systems   All systems were reviewed and negative except as above    Objective   Objective     Vitals:   Temp:  [97.3 °F (36.3 °C)-97.9 °F (36.6 °C)] 97.3 °F (36.3 °C)  Heart Rate:  [] 93  Resp:  [18-20] 18  BP: (83-98)/(58-69) 83/58  Physical Exam                  Constitutional: Awake, alert, No acute distress               Eyes: PERRLA, sclerae anicteric, no conjunctival injection              HENT: NCAT, mucous membranes moist              Neck: Supple, no thyromegaly, no lymphadenopathy, trachea midline              Respiratory: Clear to auscultation              Cardiovascular: RRR, no murmurs, rubs, or gallops, palpable pedal pulses bilaterally              Gastrointestinal: Positive bowel sounds, soft, nontender, nondistended              Musculoskeletal: 2+ bilateral lower extremity edema              Psychiatric: Appropriate affect, cooperative              Neurologic: Oriented x 3, nonfocal              Skin: No rashes     Scheduled Meds:aspirin, 81 mg, Oral, Daily  atorvastatin, 40 mg, Oral, Nightly  folic acid, 1 mg, Oral, Daily  furosemide, 60 mg, Intravenous, TID  heparin (porcine), 5,000 Units, Subcutaneous, Q8H  midodrine, 5 mg, Oral, TID AC  mineral oil-hydrophilic petrolatum, 1 application , Topical, Daily  nicotine, 1 patch, Transdermal, Q24H  sodium chloride, 10 mL, Intravenous, Q12H  thiamine, 100 mg, Oral, Daily  ticagrelor, 90 mg, Oral, BID  tiotropium bromide monohydrate, 2 puff, Inhalation, Daily - RT      Continuous Infusions:        Result Review    Result Review:  I have personally reviewed the results from the time of this admission to 10/14/2023 10:47 EDT and agree  with these findings:  [x]  Laboratory  []  Microbiology  [x]  Radiology  [x]  EKG/Telemetry   [x]  Cardiology/Vascular   []  Pathology  []  Old records  []  Other:  Most notable findings include:     CBC          10/11/2023    21:41 10/12/2023    18:12 10/14/2023    08:16   CBC   WBC 6.64  6.69  4.70    RBC 2.93  2.88  2.64    Hemoglobin 10.1  9.9  9.0    Hematocrit 30.3  30.7  27.1    .4  106.6  102.7    MCH 34.5  34.4  34.1    MCHC 33.3  32.2  33.2    RDW 15.2  15.3  14.9    Platelets 284  246  217      CMP          10/11/2023    22:25 10/12/2023    18:12 10/14/2023    08:16   CMP   Glucose 97  105  110    BUN 14  20  25    Creatinine 0.77  1.24  0.99    EGFR 98.7  64.1  84.0    Sodium 135  133  138    Potassium 4.1  4.2  3.5    Chloride 104  98  102    Calcium 8.6  9.0  8.7    Total Protein 6.0      Albumin 3.3      Globulin 2.7      Total Bilirubin 0.9      Alkaline Phosphatase 125      AST (SGOT) 22      ALT (SGPT) 9      Albumin/Globulin Ratio 1.2      BUN/Creatinine Ratio 18.2  16.1  25.3    Anion Gap 10.2  11.5  10.1       CARDIAC LABS:      Lab 10/11/23  2225 10/11/23  2141 10/09/23  1346   PROBNP  --  9,863.0* 8,301.0*   HSTROP T 38*  --  36*   PROTIME  --   --  15.3*   INR  --   --  1.20*        Assessment & Plan   Assessment / Plan     Brief Patient Summary:  Bradley Crane is a 68 y.o. male with:     Acute on chronic systolic congestive heart failure exacerbation, most likely related to medication noncompliance.  LVEF 26 to 30%.  Severe multivessel coronary artery disease, status post recent PCI of the left main artery into the LAD was done about 4 weeks ago.  He has residual  of the right coronary artery.  Currently without angina.  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  Moderate to severe pulmonary hypertension, mean PA pressure 37 mmHg.  Mild aortic valve stenosis by recent cardiac catheterization.  Ongoing alcohol abuse and smoking  Concern for liver  cirrhosis  Medical noncompliance     Plan:   Continue gentle IV diuresis.  Volume balance is negative, patient complaining about frequent urination but refuses a catheter.  Monitor kidney function.  Metoprolol will be held for now.  Continue aspirin, Brilinta and atorvastatin.          CODE STATUS:   Level Of Support Discussed With: Patient  Code Status (Patient has no pulse and is not breathing): CPR (Attempt to Resuscitate)  Medical Interventions (Patient has pulse or is breathing): Full Support      Electronically signed by Guru Voss MD, 10/14/23, 10:49 AM EDT.

## 2023-10-14 NOTE — PLAN OF CARE
Patient has been resting throughout the day. Has been up to walk around the room. Lasix continued. Albumin given per MD request. PRN pain medication given.     Cirilo Traylor RN

## 2023-10-15 LAB
ANION GAP SERPL CALCULATED.3IONS-SCNC: 14.6 MMOL/L (ref 5–15)
BASOPHILS # BLD AUTO: 0.03 10*3/MM3 (ref 0–0.2)
BASOPHILS NFR BLD AUTO: 0.5 % (ref 0–1.5)
BUN SERPL-MCNC: 26 MG/DL (ref 8–23)
BUN/CREAT SERPL: 31.3 (ref 7–25)
CALCIUM SPEC-SCNC: 9.3 MG/DL (ref 8.6–10.5)
CHLORIDE SERPL-SCNC: 98 MMOL/L (ref 98–107)
CO2 SERPL-SCNC: 20.4 MMOL/L (ref 22–29)
CREAT SERPL-MCNC: 0.83 MG/DL (ref 0.76–1.27)
DEPRECATED RDW RBC AUTO: 55.2 FL (ref 37–54)
EGFRCR SERPLBLD CKD-EPI 2021: 96.5 ML/MIN/1.73
EOSINOPHIL # BLD AUTO: 0.23 10*3/MM3 (ref 0–0.4)
EOSINOPHIL NFR BLD AUTO: 3.6 % (ref 0.3–6.2)
ERYTHROCYTE [DISTWIDTH] IN BLOOD BY AUTOMATED COUNT: 14.8 % (ref 12.3–15.4)
FOLATE SERPL-MCNC: >20 NG/ML (ref 4.78–24.2)
GLUCOSE SERPL-MCNC: 111 MG/DL (ref 65–99)
HCT VFR BLD AUTO: 28.9 % (ref 37.5–51)
HGB BLD-MCNC: 9.6 G/DL (ref 13–17.7)
HOLD SPECIMEN: NORMAL
IMM GRANULOCYTES # BLD AUTO: 0.02 10*3/MM3 (ref 0–0.05)
IMM GRANULOCYTES NFR BLD AUTO: 0.3 % (ref 0–0.5)
LYMPHOCYTES # BLD AUTO: 0.78 10*3/MM3 (ref 0.7–3.1)
LYMPHOCYTES NFR BLD AUTO: 12.1 % (ref 19.6–45.3)
MCH RBC QN AUTO: 34 PG (ref 26.6–33)
MCHC RBC AUTO-ENTMCNC: 33.2 G/DL (ref 31.5–35.7)
MCV RBC AUTO: 102.5 FL (ref 79–97)
MONOCYTES # BLD AUTO: 0.74 10*3/MM3 (ref 0.1–0.9)
MONOCYTES NFR BLD AUTO: 11.5 % (ref 5–12)
NEUTROPHILS NFR BLD AUTO: 4.65 10*3/MM3 (ref 1.7–7)
NEUTROPHILS NFR BLD AUTO: 72 % (ref 42.7–76)
NRBC BLD AUTO-RTO: 0 /100 WBC (ref 0–0.2)
PLATELET # BLD AUTO: 263 10*3/MM3 (ref 140–450)
PMV BLD AUTO: 9.4 FL (ref 6–12)
POTASSIUM SERPL-SCNC: 3.5 MMOL/L (ref 3.5–5.2)
RBC # BLD AUTO: 2.82 10*6/MM3 (ref 4.14–5.8)
SODIUM SERPL-SCNC: 133 MMOL/L (ref 136–145)
WBC NRBC COR # BLD: 6.45 10*3/MM3 (ref 3.4–10.8)

## 2023-10-15 PROCEDURE — 80048 BASIC METABOLIC PNL TOTAL CA: CPT | Performed by: STUDENT IN AN ORGANIZED HEALTH CARE EDUCATION/TRAINING PROGRAM

## 2023-10-15 PROCEDURE — 94799 UNLISTED PULMONARY SVC/PX: CPT

## 2023-10-15 PROCEDURE — 94664 DEMO&/EVAL PT USE INHALER: CPT

## 2023-10-15 PROCEDURE — 97165 OT EVAL LOW COMPLEX 30 MIN: CPT

## 2023-10-15 PROCEDURE — 97161 PT EVAL LOW COMPLEX 20 MIN: CPT

## 2023-10-15 PROCEDURE — 99232 SBSQ HOSP IP/OBS MODERATE 35: CPT | Performed by: INTERNAL MEDICINE

## 2023-10-15 PROCEDURE — 25010000002 FUROSEMIDE PER 20 MG: Performed by: INTERNAL MEDICINE

## 2023-10-15 PROCEDURE — 25010000002 HEPARIN (PORCINE) PER 1000 UNITS: Performed by: STUDENT IN AN ORGANIZED HEALTH CARE EDUCATION/TRAINING PROGRAM

## 2023-10-15 PROCEDURE — 85025 COMPLETE CBC W/AUTO DIFF WBC: CPT | Performed by: STUDENT IN AN ORGANIZED HEALTH CARE EDUCATION/TRAINING PROGRAM

## 2023-10-15 PROCEDURE — 99232 SBSQ HOSP IP/OBS MODERATE 35: CPT | Performed by: STUDENT IN AN ORGANIZED HEALTH CARE EDUCATION/TRAINING PROGRAM

## 2023-10-15 PROCEDURE — 82746 ASSAY OF FOLIC ACID SERUM: CPT | Performed by: STUDENT IN AN ORGANIZED HEALTH CARE EDUCATION/TRAINING PROGRAM

## 2023-10-15 RX ADMIN — HYDROCODONE BITARTRATE AND ACETAMINOPHEN 1 TABLET: 10; 325 TABLET ORAL at 05:17

## 2023-10-15 RX ADMIN — MIDODRINE HYDROCHLORIDE 5 MG: 5 TABLET ORAL at 10:34

## 2023-10-15 RX ADMIN — Medication 10 ML: at 08:34

## 2023-10-15 RX ADMIN — HEPARIN SODIUM 5000 UNITS: 5000 INJECTION INTRAVENOUS; SUBCUTANEOUS at 21:16

## 2023-10-15 RX ADMIN — HYDROCODONE BITARTRATE AND ACETAMINOPHEN 1 TABLET: 10; 325 TABLET ORAL at 13:22

## 2023-10-15 RX ADMIN — MIDODRINE HYDROCHLORIDE 5 MG: 5 TABLET ORAL at 16:46

## 2023-10-15 RX ADMIN — NICOTINE 1 PATCH: 21 PATCH, EXTENDED RELEASE TRANSDERMAL at 08:34

## 2023-10-15 RX ADMIN — TICAGRELOR 90 MG: 90 TABLET ORAL at 21:16

## 2023-10-15 RX ADMIN — ATORVASTATIN CALCIUM 40 MG: 40 TABLET, FILM COATED ORAL at 21:16

## 2023-10-15 RX ADMIN — HEPARIN SODIUM 5000 UNITS: 5000 INJECTION INTRAVENOUS; SUBCUTANEOUS at 05:17

## 2023-10-15 RX ADMIN — FUROSEMIDE 60 MG: 10 INJECTION, SOLUTION INTRAMUSCULAR; INTRAVENOUS at 16:46

## 2023-10-15 RX ADMIN — FUROSEMIDE 60 MG: 10 INJECTION, SOLUTION INTRAMUSCULAR; INTRAVENOUS at 21:16

## 2023-10-15 RX ADMIN — ASPIRIN 81 MG: 81 TABLET, COATED ORAL at 08:34

## 2023-10-15 RX ADMIN — Medication 100 MG: at 08:34

## 2023-10-15 RX ADMIN — TIOTROPIUM BROMIDE INHALATION SPRAY 2 PUFF: 3.12 SPRAY, METERED RESPIRATORY (INHALATION) at 06:10

## 2023-10-15 RX ADMIN — TICAGRELOR 90 MG: 90 TABLET ORAL at 08:34

## 2023-10-15 RX ADMIN — Medication 10 ML: at 21:16

## 2023-10-15 RX ADMIN — HYDROCODONE BITARTRATE AND ACETAMINOPHEN 1 TABLET: 10; 325 TABLET ORAL at 20:33

## 2023-10-15 RX ADMIN — FUROSEMIDE 60 MG: 10 INJECTION, SOLUTION INTRAMUSCULAR; INTRAVENOUS at 08:34

## 2023-10-15 RX ADMIN — MIDODRINE HYDROCHLORIDE 5 MG: 5 TABLET ORAL at 07:46

## 2023-10-15 NOTE — PROGRESS NOTES
[facility]     Cardiology Progress Note    Patient Name: Bradley Crane  : 1955  MRN: [id number]  Primary Care Physician:  Provider, No Known  Date of admission: 10/11/2023    Subjective   Subjective     No acute events overnight.  Had a transient run of atrial fibrillation this morning on telemetry, currently back in sinus rhythm.  Diuresing relatively well.    Review of Systems   All systems were reviewed and negative except as above    Objective   Objective     Vitals:   Temp:  [97.3 °F (36.3 °C)-97.7 °F (36.5 °C)] 97.7 °F (36.5 °C)  Heart Rate:  [84-93] 92  Resp:  [16-20] 16  BP: ()/(66-76) 99/76  Physical Exam                  Constitutional: Awake, alert, No acute distress               Eyes: PERRLA, sclerae anicteric, no conjunctival injection              HENT: NCAT, mucous membranes moist              Neck: Supple, no thyromegaly, no lymphadenopathy, trachea midline              Respiratory: Clear to auscultation              Cardiovascular: RRR, no murmurs, rubs, or gallops, palpable pedal pulses bilaterally              Gastrointestinal: Positive bowel sounds, soft, nontender, nondistended              Musculoskeletal: 2+ bilateral lower extremity edema              Psychiatric: Appropriate affect, cooperative              Neurologic: Oriented x 3, nonfocal              Skin: No rashes     Scheduled Meds:aspirin, 81 mg, Oral, Daily  atorvastatin, 40 mg, Oral, Nightly  furosemide, 60 mg, Intravenous, TID  heparin (porcine), 5,000 Units, Subcutaneous, Q8H  midodrine, 5 mg, Oral, TID AC  mineral oil-hydrophilic petrolatum, 1 application , Topical, Daily  nicotine, 1 patch, Transdermal, Q24H  sodium chloride, 10 mL, Intravenous, Q12H  thiamine, 100 mg, Oral, Daily  ticagrelor, 90 mg, Oral, BID  tiotropium bromide monohydrate, 2 puff, Inhalation, Daily - RT      Continuous Infusions:        Result Review    Result Review:  I have personally reviewed the results from the time of this admission  to 10/15/2023 10:56 EDT and agree with these findings:  [x]  Laboratory  []  Microbiology  [x]  Radiology  [x]  EKG/Telemetry   [x]  Cardiology/Vascular   []  Pathology  []  Old records  []  Other:  Most notable findings include:     CBC          10/11/2023    21:41 10/12/2023    18:12 10/14/2023    08:16   CBC   WBC 6.64  6.69  4.70    RBC 2.93  2.88  2.64    Hemoglobin 10.1  9.9  9.0    Hematocrit 30.3  30.7  27.1    .4  106.6  102.7    MCH 34.5  34.4  34.1    MCHC 33.3  32.2  33.2    RDW 15.2  15.3  14.9    Platelets 284  246  217      CMP          10/11/2023    22:25 10/12/2023    18:12 10/14/2023    08:16   CMP   Glucose 97  105  110    BUN 14  20  25    Creatinine 0.77  1.24  0.99    EGFR 98.7  64.1  84.0    Sodium 135  133  138    Potassium 4.1  4.2  3.5    Chloride 104  98  102    Calcium 8.6  9.0  8.7    Total Protein 6.0      Albumin 3.3      Globulin 2.7      Total Bilirubin 0.9      Alkaline Phosphatase 125      AST (SGOT) 22      ALT (SGPT) 9      Albumin/Globulin Ratio 1.2      BUN/Creatinine Ratio 18.2  16.1  25.3    Anion Gap 10.2  11.5  10.1       CARDIAC LABS:      Lab 10/11/23  2225 10/11/23  2141 10/09/23  1346   PROBNP  --  9,863.0* 8,301.0*   HSTROP T 38*  --  36*   PROTIME  --   --  15.3*   INR  --   --  1.20*        Assessment & Plan   Assessment / Plan     Brief Patient Summary:  Bradley Crane is a 68 y.o. male with:     Acute on chronic systolic congestive heart failure exacerbation, most likely related to medication noncompliance.  LVEF 26 to 30%.  Severe multivessel coronary artery disease, status post recent PCI of the left main artery into the LAD was done about 4 weeks ago.  He has residual  of the right coronary artery.  Currently without angina.  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  Moderate to severe pulmonary hypertension, mean PA pressure 37 mmHg.  Mild aortic valve stenosis by recent cardiac catheterization.  Ongoing alcohol abuse and  smoking  Concern for liver cirrhosis  Medical noncompliance     Plan:   Continue gentle IV diuresis.  Volume balance is negative, but diuresis is slow.  Relatively soft blood pressure prevents more aggressive diuretic use.  Beta-blocker was held earlier this hospital stay and lieu of diuresis, once we are able to switch back over to oral diuretic we can try to resume a low-dose beta-blocker.  Continue to monitor for rhythm changes.  Dr. Ji will resume care tomorrow          CODE STATUS:   Level Of Support Discussed With: Patient  Code Status (Patient has no pulse and is not breathing): CPR (Attempt to Resuscitate)  Medical Interventions (Patient has pulse or is breathing): Full Support      Electronically signed by Guru Voss MD, 10/14/23, 10:49 AM EDT.

## 2023-10-15 NOTE — PLAN OF CARE
Goal Outcome Evaluation:  Plan of Care Reviewed With: patient        Progress: no change  Outcome Evaluation: No acute changes throughout shift. C/o pain relieved with prn medications, see MAR. BP soft throughout night.

## 2023-10-15 NOTE — PROGRESS NOTES
[facility]   Hospitalist Progress Note  Date: 10/15/2023  Patient Name: Bradley Crane  : 1955  MRN: [id number]  Date of admission: 10/11/2023  Room/Bed: 205/1      Subjective   Subjective     Chief Complaint: Dyspnea on exertion, tachypnea     Summary:Bradley Crane is a 68 y.o. male with a past medical history of CAD s/p PCI of LAD and LM with RIO x 2, impella assisted and rotablator atherectomy on 2023, HFrEF EF of 26 to 30%, hypertension, current alcohol use, concern for cirrhosis of the liver secondary to alcohol use, current tobacco smoker presented to the ED on 10/9/2023 with anasarca, heart failure exacerbation and had paracentesis with 5 L removed in the ER, admitted overnight but patient left AMA the next morning, presented to the ED today for evaluation of shortness of breath, dyspnea on exertion.  Associated with orthopnea, tachypnea.  Patient is significantly volume overloaded with pitting edema extending up to the sacral region.  Patient still consumes up to 8 beers a day at least 3-4 times a week, last drink 4 days ago.  States that he has been taking the medications as prescribed but recently he ran out of some of the medications unsure which ones he ran out of.  Denies any fevers, chills, nausea, vomiting, abdominal pain, chest pain, focal weakness, numbness, tingling, diarrhea.     In the ED, vital signs temperature 97.7, pulse 105, respiratory 20, blood pressure 90/68 on room air saturating around 98%.  Labs showed initial troponin 38, proBNP 9863, sodium 135, bicarb 20.8, rest of the CMP with no significant findings, WBC 6.64, hemoglobin 10.1, platelets 284.  Chest x-ray showed stable appearance of the chest with suspected bilateral pleural effusions with underlying atelectasis, pulmonary vascular congestion/edema, enlarged cardiac silhouette.  Received IV Lasix in the ED.  Patient has been admitted for further evaluation and management of acute systolic heart failure exacerbation,  dyspnea on exertion.     Interval Followup: No acute overnight events.  No acute distress.  Patient resting comfortably in bed this morning.  He reports that he slept really well last night.  No new complaints or concerns.  Denies chest pain or abdominal pain.  Reports that he has not had a bowel movement in around 2 days.  Otherwise, is feeling better.  Wanting to get up and walk the halls today.      Review of Systems    All systems reviewed and negative except for what is outlined above.      Objective   Objective     Vitals:   Temp:  [97.3 °F (36.3 °C)-97.7 °F (36.5 °C)] 97.7 °F (36.5 °C)  Heart Rate:  [84-93] 92  Resp:  [16-20] 16  BP: ()/(66-76) 99/76    Physical Exam   General: Awake, alert, NAD  Eyes: pupils equal  Cardiovascular: RRR, no murmurs   Pulmonary: CTA bilaterally; no wheezes; dyspnea with conversation   Gastrointestinal: S/NT, distended, +BS  Musculoskeletal: No gross deformities  Neuro: speech clear; no tremor  : No Dos Santos catheter; no suprapubic tenderness  Extremities: bilateral pedal edema     Result Review    Result Review:  I have personally reviewed these results:  [x]  Laboratory      Lab 10/14/23  0816 10/12/23  2132 10/12/23  1812 10/11/23  2141 10/09/23  1346   WBC 4.70  --  6.69 6.64 6.87   HEMOGLOBIN 9.0*  --  9.9* 10.1* 10.5*   HEMATOCRIT 27.1*  --  30.7* 30.3* 32.1*   PLATELETS 217  --  246 284 228   NEUTROS ABS 2.92  --   --  4.81 4.77   IMMATURE GRANS (ABS) 0.01  --   --  0.02 0.02   LYMPHS ABS 0.82  --   --  0.97 1.20   MONOS ABS 0.75  --   --  0.73 0.68   EOS ABS 0.16  --   --  0.07 0.14   .7*  --  106.6* 103.4* 105.9*   PROCALCITONIN  --   --  0.04  --   --    LACTATE  --  1.5 2.3*  --   --    PROTIME  --   --   --   --  15.3*   APTT  --   --   --   --  34.6*         Lab 10/14/23  0816 10/12/23  1812 10/11/23  2225   SODIUM 138 133* 135*   POTASSIUM 3.5 4.2 4.1   CHLORIDE 102 98 104   CO2 25.9 23.5 20.8*   ANION GAP 10.1 11.5 10.2   BUN 25* 20 14   CREATININE  0.99 1.24 0.77   EGFR 84.0 64.1 98.7   GLUCOSE 110* 105* 97   CALCIUM 8.7 9.0 8.6   MAGNESIUM 1.7 1.6 2.0   PHOSPHORUS  --   --  4.6*         Lab 10/11/23  2225 10/09/23  1346   TOTAL PROTEIN 6.0 6.8   ALBUMIN 3.3* 4.0   GLOBULIN 2.7 2.8   ALT (SGPT) 9 11   AST (SGOT) 22 31   BILIRUBIN 0.9 1.1   ALK PHOS 125* 129*         Lab 10/11/23  2225 10/11/23  2141 10/09/23  1346   PROBNP  --  9,863.0* 8,301.0*   HSTROP T 38*  --  36*   PROTIME  --   --  15.3*   INR  --   --  1.20*             Lab 10/11/23  2141   VITAMIN B 12 >2,000*         Brief Urine Lab Results       None          [x]  Microbiology   Microbiology Results (last 10 days)       Procedure Component Value - Date/Time    Body Fluid Culture - Body Fluid, Peritoneum [[id number]] Collected: 10/09/23 1525    Lab Status: Final result Specimen: Body Fluid from Peritoneum Updated: 10/12/23 0808     Body Fluid Culture No growth at 3 days     Gram Stain No organisms seen          [x]  Radiology  XR Chest 1 View    Result Date: 10/11/2023    Stable appearance of the chest with suspected bilateral pleural effusions with underlying atelectasis, pulmonary vascular congestion/edema, enlarged cardiac silhouette.       CLAYTON GIBBS MD       Electronically Signed and Approved By: CLAYTON GIBBS MD on 10/11/2023 at 22:25             US Paracentesis    Result Date: 10/9/2023   Ultrasound-guided paracentesis was performed without complication, patient tolerated procedure with 5 L of clear, dunn ascitic fluid removed. A sample specimen was sent to the lab for further diagnostic evaluation.  The patient was instructed to obtain follow up care from the referring physician.    CARMEN LUCAS       Electronically Signed and Approved By: NAYE MUNOZ MD on 10/09/2023 at 16:43             XR Chest 1 View    Result Date: 10/9/2023    Findings consistent with moderate congestive heart failure.  Low lung volumes with worsening atelectasis at the lung bases.  Dual lead AICD remains  in place.       LORENA GARNICA MD       Electronically Signed and Approved By: LORENA GARNICA MD on 10/09/2023 at 14:12            []  EKG/Telemetry   []  Cardiology/Vascular   []  Pathology  []  Old records  []  Other:    Assessment & Plan   Assessment / Plan     Assessment:  Acute on chronic HFrEF  Volume overload  Alcohol abuse  CAD s/p PCI of LAD and LM with RIO x 2, impella assisted and rotablator atherectomy on 9/12/2023   Tobacco abuse  Jaundice  Concern for cirrhosis  CHRISTIANA  Macrocytic anemia    Plan:  Low blood pressures continue.  Continue midodrine.  Hopeful once we start backing off his IV Lasix his BPs will improve and can taper off midodrine.  IV Lasix 60 mg TID  CIWA protocol, patient reports not drinking the past 3 days previous to admission and denies any previous severe withdrawals.  P.o. thiamine and folate replacement.  Nicotine patch  Cardiology consulted  Repeat chest x-ray yesterday with improvement of his pulmonary edema  Patient is intermittently refusing lab draws, reinforced importance of monitoring his electrolytes and renal function while on IV diuretics  Hb 9, macrocytic.  Suspect this is secondary to his alcohol abuse.  Added B12 and folate labs.  He is already on folate replacement.  Creatinine improved from 1.24 to 0.9  Monitor urine output, strict I&O  Hold home Entresto and Aldactone due to blood pressure  A.m. labs     Discussed with RN.    DVT prophylaxis:  Medical DVT prophylaxis orders are present.    CODE STATUS:   Level Of Support Discussed With: Patient  Code Status (Patient has no pulse and is not breathing): CPR (Attempt to Resuscitate)  Medical Interventions (Patient has pulse or is breathing): Full Support      Electronically signed by Meghann Blankenship DO, 10/12/23, 10:52 AM EDT.

## 2023-10-15 NOTE — PLAN OF CARE
Goal Outcome Evaluation:  Plan of Care Reviewed With: patient        Progress: no change  Outcome Evaluation: Patient not appropriate for skilled OT services at this time as patient states that he is at his baseline for ADLs or ADL transfers/fx'l mobility. patient safe to d/c to previous setting when medically appropriate. d/c occupational therapy services.      Anticipated Discharge Disposition (OT): home

## 2023-10-15 NOTE — PLAN OF CARE
Goal Outcome Evaluation:  Plan of Care Reviewed With: patient           Outcome Evaluation: Patient is able to complete all transfers independently in his room and ambulate ad [place].  He does not need inpatient PT services.      Anticipated Discharge Disposition (PT): home

## 2023-10-15 NOTE — PLAN OF CARE
Patient has been resting throughout the day. PRN pain medication given per patient request. No acute changes. Patient refused morning labs and RN explained why labs are needed. PM labs obtained.     Cirilo Traylor RN

## 2023-10-15 NOTE — THERAPY EVALUATION
Acute Care - Physical Therapy Initial Evaluation   Clif     Patient Name: Bradley Crane  : 1955  MRN: [id number]  Today's Date: 10/15/2023      Visit Dx:     ICD-10-CM ICD-9-CM   1. Acute congestive heart failure, unspecified heart failure type  I50.9 428.0   2. Cirrhosis of liver with ascites, unspecified hepatic cirrhosis type  K74.60 571.5    R18.8    3. Hypervolemia, unspecified hypervolemia type  E87.70 276.69   4. Impaired mobility and ADLs  Z74.09 V49.89    Z78.9    5. Difficulty walking  R26.2 719.7     Patient Active Problem List   Diagnosis    CHF exacerbation    Acute HFrEF (heart failure with reduced ejection fraction)    Chest pain, atypical    CAD, multiple vessel    Pulmonary HTN    Abdominal ascites    Bilateral pleural effusion    Chronic back pain    Compression fracture of L2 lumbar vertebra    Systolic heart failure    Acute exacerbation of CHF (congestive heart failure)     Past Medical History:   Diagnosis Date    CHF (congestive heart failure)     Hypertension      Past Surgical History:   Procedure Laterality Date    CARDIAC CATHETERIZATION N/A 2023    Procedure: Right and LHC with possible coronary intevention;  Surgeon: Merly iJ MD;  Location: [facility] INVASIVE LOCATION;  Service: Cardiology;  Laterality: N/A;    CARDIAC SURGERY       PT Assessment (last 12 hours)       PT Evaluation and Treatment       Row Name 10/15/23 1200          Physical Therapy Time and Intention    Subjective Information no complaints  -DP     Document Type evaluation  -DP     Mode of Treatment individual therapy;physical therapy  -DP     Patient Effort good  -DP       Row Name 10/15/23 1200          General Information    Patient Profile Reviewed yes  -DP     Prior Level of Function independent:;gait;transfer;bed mobility;ADL's  -DP     Equipment Currently Used at Home cane, straight  -DP     Existing Precautions/Restrictions no known precautions/restrictions  -DP     Barriers to Rehab  none identified  -DP       Row Name 10/15/23 1200          Living Environment    Current Living Arrangements home  -DP     People in Home child(anna), adult  -DP       Row Name 10/15/23 1200          Home Use of Assistive/Adaptive Equipment    Equipment Currently Used at Home cane, straight  -DP       Row Name 10/15/23 1200          Range of Motion (ROM)    Range of Motion bilateral lower extremities;ROM is WFL  -DP       Row Name 10/15/23 1200          Strength (Manual Muscle Testing)    Strength (Manual Muscle Testing) bilateral lower extremities;strength is WFL  -DP       Row Name 10/15/23 1200          Bed Mobility    Bed Mobility supine-sit-supine  -DP     All Activities, [place] (Bed Mobility) independent  -DP       Row Name 10/15/23 1200          Transfers    Transfers sit-stand transfer  -DP       Row Name 10/15/23 1200          Sit-Stand Transfer    Sit-Stand [place] (Transfers) independent  -DP       Row Name 10/15/23 1200          Gait/Stairs (Locomotion)    Gait/Stairs Locomotion gait/ambulation independence  -DP     [place] Level (Gait) independent  -DP     Assistive Device (Gait) --  none  -DP     Comment, (Gait/Stairs) Patient is able to ambulate ad [place]. in his room  -DP       Row Name             Wound 10/09/23 1718 Right lower leg    Wound - Properties Group Placement Date: 10/09/23  - Placement Time: 1718  -AH Side: Right  -AH Orientation: lower  -AH Location: leg  -AH    Retired Wound - Properties Group Placement Date: 10/09/23  - Placement Time: 1718  -AH Side: Right  -AH Orientation: lower  -AH Location: leg  -AH    Retired Wound - Properties Group Date first assessed: 10/09/23  - Time first assessed: 1718  -AH Side: Right  -AH Location: leg  -AH      Row Name             Wound 08/29/23 1837 Left lower leg Other (comment)    Wound - Properties Group Placement Date: 08/29/23  -AT Placement Time: 1837  -AT Present on Original Admission: Y  -AT Side: Left  -AT Orientation: lower   -AT Location: leg  -AT Primary Wound Type: Other  -AT, wound     Retired Wound - Properties Group Placement Date: 08/29/23  -AT Placement Time: 1837  -AT Present on Original Admission: Y  -AT Side: Left  -AT Orientation: lower  -AT Location: leg  -AT Primary Wound Type: Other  -AT, wound     Retired Wound - Properties Group Date first assessed: 08/29/23  -AT Time first assessed: 1837  -AT Present on Original Admission: Y  -AT Side: Left  -AT Location: leg  -AT Primary Wound Type: Other  -AT, wound       Row Name             Wound 10/09/23 1716 Left anterior second toe    Wound - Properties Group Placement Date: 10/09/23  - Placement Time: 1716  -AH Side: Left  -AH Orientation: anterior  -AH Location: second toe  -AH    Retired Wound - Properties Group Placement Date: 10/09/23  -AH Placement Time: 1716  -AH Side: Left  -AH Orientation: anterior  -AH Location: second toe  -AH    Retired Wound - Properties Group Date first assessed: 10/09/23  - Time first assessed: 1716  -AH Side: Left  -AH Location: second toe  -AH      Row Name 10/15/23 1200          Plan of Care Review    Plan of Care Reviewed With patient  -DP     Outcome Evaluation Patient is able to complete all transfers independently in his room and ambulate ad [place].  He does not need inpatient PT services.  -DP       Row Name 10/15/23 1200          Therapy Assessment/Plan (PT)    Criteria for Skilled Interventions Met (PT) no problems identified which require skilled intervention  -DP     Therapy Frequency (PT) evaluation only  -DP       Row Name 10/15/23 1200          PT Evaluation Complexity    History, PT Evaluation Complexity no personal factors and/or comorbidities  -DP     Examination of Body Systems (PT Eval Complexity) total of 4 or more elements  -DP     Clinical Presentation (PT Evaluation Complexity) stable  -DP     Clinical Decision Making (PT Evaluation Complexity) low complexity  -DP     Overall Complexity (PT Evaluation Complexity) low  complexity  -DP               User Key  (r) = Recorded By, (t) = Taken By, (c) = Cosigned By      Initials Name Provider Type    AT Syl Swanson, RN Registered Nurse    Irene Figueroa RN Registered Nurse    Angela Carter, PT Physical Therapist                      PT Recommendation and Plan  Anticipated Discharge Disposition (PT): home  Therapy Frequency (PT): evaluation only  Plan of Care Reviewed With: patient  Outcome Evaluation: Patient is able to complete all transfers independently in his room and ambulate ad [place].  He does not need inpatient PT services.   Outcome Measures       Row Name 10/15/23 1200             How much help from another person do you currently need...    Turning from your back to your side while in flat bed without using bedrails? 4  -DP      Moving from lying on back to sitting on the side of a flat bed without bedrails? 4  -DP      Moving to and from a bed to a chair (including a wheelchair)? 4  -DP      Standing up from a chair using your arms (e.g., wheelchair, bedside chair)? 4  -DP      Climbing 3-5 steps with a railing? 4  -DP      To walk in hospital room? 4  -DP      AM-PAC 6 Clicks Score (PT) 24  -DP      Highest level of mobility 8 --> Walked 250 feet or more  -DP         Functional Assessment    Outcome Measure Options AM-PAC 6 Clicks Basic Mobility (PT)  -DP                User Key  (r) = Recorded By, (t) = Taken By, (c) = Cosigned By      Initials Name Provider Type    Angela Carter, PT Physical Therapist                     Time Calculation:    PT Charges       Row Name 10/15/23 1248             Time Calculation    PT Received On 10/15/23  -DP         Untimed Charges    PT Eval/Re-eval Minutes 30  -DP         Total Minutes    Untimed Charges Total Minutes 30  -DP       Total Minutes 30  -DP                User Key  (r) = Recorded By, (t) = Taken By, (c) = Cosigned By      Initials Name Provider Type    Angela Carter, PT Physical Therapist                       PT G-Codes  Outcome Measure Options: AM-PAC 6 Clicks Basic Mobility (PT)  AM-PAC 6 Clicks Score (PT): 24  AM-PAC 6 Clicks Score (OT): 21    Angela Haas PT  10/15/2023

## 2023-10-15 NOTE — THERAPY EVALUATION
Patient Name: Bradley Crane  : 1955    MRN: [id number]                              Today's Date: 10/15/2023       Admit Date: 10/11/2023    Visit Dx:     ICD-10-CM ICD-9-CM   1. Acute congestive heart failure, unspecified heart failure type  I50.9 428.0   2. Cirrhosis of liver with ascites, unspecified hepatic cirrhosis type  K74.60 571.5    R18.8    3. Hypervolemia, unspecified hypervolemia type  E87.70 276.69   4. Impaired mobility and ADLs  Z74.09 V49.89    Z78.9      Patient Active Problem List   Diagnosis    CHF exacerbation    Acute HFrEF (heart failure with reduced ejection fraction)    Chest pain, atypical    CAD, multiple vessel    Pulmonary HTN    Abdominal ascites    Bilateral pleural effusion    Chronic back pain    Compression fracture of L2 lumbar vertebra    Systolic heart failure    Acute exacerbation of CHF (congestive heart failure)     Past Medical History:   Diagnosis Date    CHF (congestive heart failure)     Hypertension      Past Surgical History:   Procedure Laterality Date    CARDIAC CATHETERIZATION N/A 2023    Procedure: Right and LHC with possible coronary intevention;  Surgeon: Merly Ji MD;  Location: [facility] INVASIVE LOCATION;  Service: Cardiology;  Laterality: N/A;    CARDIAC SURGERY        General Information       Row Name 10/15/23 1043          OT Time and Intention    Document Type evaluation  -AC     Mode of Treatment individual therapy;occupational therapy  -AC       Row Name 10/15/23 1049          General Information    Patient Profile Reviewed yes  -AC     Prior Level of Function --  patient reports (I) with adls with use of cane.  -AC     Existing Precautions/Restrictions no known precautions/restrictions  -AC     Barriers to Rehab none identified  -AC       Row Name 10/15/23 1048          Occupational Profile    Reason for Services/Referral (Occupational Profile) Pt. is a 68 year old male admitted for the above diagnosis. Pt. referred to OT services  to assess independence with ADLs and adl transfers/fx'l mobility. No previous OT services for current condition.  -       Row Name 10/15/23 1043          Living Environment    People in Home child(anna), adult  -     Name(s) of People in Home none identified  -       Row Name 10/15/23 1043          Cognition    Orientation Status (Cognition) oriented x 4  -               User Key  (r) = Recorded By, (t) = Taken By, (c) = Cosigned By      Initials Name Provider Type     Bianca Sanchez OT Occupational Therapist                     Mobility/ADL's       Row Name 10/15/23 1044          Bed Mobility    Bed Mobility bed mobility (all) activities  -     All Activities, [place] (Bed Mobility) independent  -       Row Name 10/15/23 1044          Transfers    Transfers sit-stand transfer  -       Row Name 10/15/23 1044          Sit-Stand Transfer    Sit-Stand [place] (Transfers) independent  -       Row Name 10/15/23 1044          Functional Mobility    Functional Mobility- Ind. Level standby assist  -     Functional Mobility- Device cane, straight  -     Functional Mobility- Comment patient was SBA with cane for functional mobility from EOB To/from sink without loss of balance. patient states he has been walking in room (I).  -       Row Name 10/15/23 1044          Activities of Daily Living    BADL Assessment/Intervention --  patient is setup for upper body bathing/dressing, setup for grooming, setup for self-feeding, SBA with cane for lower body bathing/dressing, sba for toileting.  -               User Key  (r) = Recorded By, (t) = Taken By, (c) = Cosigned By      Initials Name Provider Type    Bianca Zhou OT Occupational Therapist                   Obj/Interventions       Row Name 10/15/23 1046          Sensory Assessment (Somatosensory)    Sensory Assessment (Somatosensory) UE sensation intact  -       Row Name 10/15/23 1046          Vision Assessment/Intervention    Visual  Impairment/Limitations WFL  -AC       Row Name 10/15/23 1046          Range of Motion Comprehensive    General Range of Motion bilateral upper extremity ROM WNL  -       Row Name 10/15/23 1046          Strength Comprehensive (MMT)    General Manual Muscle Testing (MMT) Assessment no strength deficits identified  -       Row Name 10/15/23 1046          Balance    Balance Assessment standing dynamic balance  -AC     Dynamic Standing Balance standby assist  -AC     Position/Device Used, Standing Balance supported;cane, straight  -AC               User Key  (r) = Recorded By, (t) = Taken By, (c) = Cosigned By      Initials Name Provider Type    Bianca Zhou OT Occupational Therapist                   Goals/Plan    No documentation.                  Clinical Impression       Row Name 10/15/23 1046          Pain Assessment    Pretreatment Pain Rating 0/10 - no pain  -AC     Posttreatment Pain Rating 0/10 - no pain  -       Row Name 10/15/23 1046          Plan of Care Review    Plan of Care Reviewed With patient  -AC     Progress no change  -AC     Outcome Evaluation Patient not appropriate for skilled OT services at this time as patient states that he is at his baseline for ADLs or ADL transfers/fx'l mobility. patient safe to d/c to previous setting when medically appropriate. d/c occupational therapy services.  -       Row Name 10/15/23 1046          Therapy Assessment/Plan (OT)    Patient/Family Therapy Goal Statement (OT) NA  -       Row Name 10/15/23 1046          Therapy Plan Review/Discharge Plan (OT)    Equipment Needs Upon Discharge (OT) cane, straight  -AC     Anticipated Discharge Disposition (OT) home  -       Row Name 10/15/23 1046          Positioning and Restraints    Pre-Treatment Position in bed  -AC     Post Treatment Position bed  -AC     In Bed fowlers;call light within reach;encouraged to call for assist  -AC               User Key  (r) = Recorded By, (t) = Taken By, (c) = Cosigned By       Initials Name Provider Type    Bianca Zhou OT Occupational Therapist                   Outcome Measures       Row Name 10/15/23 1047          How much help from another is currently needed...    Putting on and taking off regular lower body clothing? 3  -AC     Bathing (including washing, rinsing, and drying) 3  -AC     Toileting (which includes using toilet bed pan or urinal) 3  -AC     Putting on and taking off regular upper body clothing 4  -AC     Taking care of personal grooming (such as brushing teeth) 4  -AC     Eating meals 4  -AC     AM-PAC 6 Clicks Score (OT) 21  -AC       Row Name 10/15/23 0701          How much help from another person do you currently need...    Turning from your back to your side while in flat bed without using bedrails? 4  -AG     Moving from lying on back to sitting on the side of a flat bed without bedrails? 4  -AG     Moving to and from a bed to a chair (including a wheelchair)? 3  -AG     Standing up from a chair using your arms (e.g., wheelchair, bedside chair)? 3  -AG     Climbing 3-5 steps with a railing? 2  -AG     To walk in hospital room? 4  -AG     AM-PAC 6 Clicks Score (PT) 20  -AG     Highest level of mobility 6 --> Walked 10 steps or more  -AG       Row Name 10/15/23 1047          Functional Assessment    Outcome Measure Options AM-PAC 6 Clicks Daily Activity (OT);Optimal Instrument  -AC       Row Name 10/15/23 1047          Optimal Instrument    Optimal Instrument Optimal - 3  -AC     Bending/Stooping 2  -AC     Standing 2  -AC     Reaching 1  -AC     From the list, choose the 3 activities you would most like to be able to do without any difficulty Bending/stooping;Standing;Reaching  -AC     Total Score Optimal - 3 5  -AC               User Key  (r) = Recorded By, (t) = Taken By, (c) = Cosigned By      Initials Name Provider Type    Bianca Zhou OT Occupational Therapist    Cirilo Chowdhury, RN Registered Nurse                    Occupational Therapy  Education       Title: PT OT SLP Therapies (Done)       Topic: Occupational Therapy (Done)       Point: ADL training (Done)       Description:   Instruct learner(s) on proper safety adaptation and remediation techniques during self care or transfers.   Instruct in proper use of assistive devices.                  Learning Progress Summary             Patient Acceptance, E, VU by  at 10/15/2023 1048                         Point: Home exercise program (Done)       Description:   Instruct learner(s) on appropriate technique for monitoring, assisting and/or progressing therapeutic exercises/activities.                  Learning Progress Summary             Patient Acceptance, E, VU by  at 10/15/2023 1048                         Point: Precautions (Done)       Description:   Instruct learner(s) on prescribed precautions during self-care and functional transfers.                  Learning Progress Summary             Patient Acceptance, E, VU by  at 10/15/2023 1048                         Point: Body mechanics (Done)       Description:   Instruct learner(s) on proper positioning and spine alignment during self-care, functional mobility activities and/or exercises.                  Learning Progress Summary             Patient Acceptance, E, VU by  at 10/15/2023 1048                                         User Key       Initials Effective Dates Name Provider Type Discipline     06/16/21 -  Bianca Sanchez OT Occupational Therapist OT                  OT Recommendation and Plan     Plan of Care Review  Plan of Care Reviewed With: patient  Progress: no change  Outcome Evaluation: Patient not appropriate for skilled OT services at this time as patient states that he is at his baseline for ADLs or ADL transfers/fx'l mobility. patient safe to d/c to previous setting when medically appropriate. d/c occupational therapy services.     Time Calculation:         Time Calculation- OT       Row Name 10/15/23 1048              Time Calculation- OT    OT Received On 10/15/23  -AC         Untimed Charges    OT Eval/Re-eval Minutes 27  -AC         Total Minutes    Untimed Charges Total Minutes 27  -AC       Total Minutes 27  -AC                User Key  (r) = Recorded By, (t) = Taken By, (c) = Cosigned By      Initials Name Provider Type    AC Bianca Sanchez OT Occupational Therapist                  Therapy Charges for Today       Code Description Service Date Service Provider Modifiers Qty    31305921961 HC OT EVAL LOW COMPLEXITY 2 10/15/2023 Bianca Sanchez OT GO 1                 Bianca Sanchez OT  10/15/2023

## 2023-10-16 LAB
ANION GAP SERPL CALCULATED.3IONS-SCNC: 12.5 MMOL/L (ref 5–15)
ANION GAP SERPL CALCULATED.3IONS-SCNC: 15.5 MMOL/L (ref 5–15)
BASOPHILS # BLD AUTO: 0.05 10*3/MM3 (ref 0–0.2)
BASOPHILS NFR BLD AUTO: 0.9 % (ref 0–1.5)
BUN SERPL-MCNC: 25 MG/DL (ref 8–23)
BUN SERPL-MCNC: 27 MG/DL (ref 8–23)
BUN/CREAT SERPL: 29 (ref 7–25)
BUN/CREAT SERPL: 35.7 (ref 7–25)
CALCIUM SPEC-SCNC: 9 MG/DL (ref 8.6–10.5)
CALCIUM SPEC-SCNC: 9.6 MG/DL (ref 8.6–10.5)
CHLORIDE SERPL-SCNC: 98 MMOL/L (ref 98–107)
CHLORIDE SERPL-SCNC: 99 MMOL/L (ref 98–107)
CO2 SERPL-SCNC: 23.5 MMOL/L (ref 22–29)
CO2 SERPL-SCNC: 27.5 MMOL/L (ref 22–29)
CREAT SERPL-MCNC: 0.7 MG/DL (ref 0.76–1.27)
CREAT SERPL-MCNC: 0.93 MG/DL (ref 0.76–1.27)
DEPRECATED RDW RBC AUTO: 55.2 FL (ref 37–54)
EGFRCR SERPLBLD CKD-EPI 2021: 101.6 ML/MIN/1.73
EGFRCR SERPLBLD CKD-EPI 2021: 90.6 ML/MIN/1.73
EOSINOPHIL # BLD AUTO: 0.31 10*3/MM3 (ref 0–0.4)
EOSINOPHIL NFR BLD AUTO: 5.5 % (ref 0.3–6.2)
ERYTHROCYTE [DISTWIDTH] IN BLOOD BY AUTOMATED COUNT: 14.9 % (ref 12.3–15.4)
GLUCOSE SERPL-MCNC: 95 MG/DL (ref 65–99)
GLUCOSE SERPL-MCNC: 98 MG/DL (ref 65–99)
HCT VFR BLD AUTO: 26.8 % (ref 37.5–51)
HGB BLD-MCNC: 9 G/DL (ref 13–17.7)
IMM GRANULOCYTES # BLD AUTO: 0.01 10*3/MM3 (ref 0–0.05)
IMM GRANULOCYTES NFR BLD AUTO: 0.2 % (ref 0–0.5)
LYMPHOCYTES # BLD AUTO: 0.86 10*3/MM3 (ref 0.7–3.1)
LYMPHOCYTES NFR BLD AUTO: 15.2 % (ref 19.6–45.3)
MCH RBC QN AUTO: 34.2 PG (ref 26.6–33)
MCHC RBC AUTO-ENTMCNC: 33.6 G/DL (ref 31.5–35.7)
MCV RBC AUTO: 101.9 FL (ref 79–97)
MONOCYTES # BLD AUTO: 0.77 10*3/MM3 (ref 0.1–0.9)
MONOCYTES NFR BLD AUTO: 13.6 % (ref 5–12)
NEUTROPHILS NFR BLD AUTO: 3.67 10*3/MM3 (ref 1.7–7)
NEUTROPHILS NFR BLD AUTO: 64.6 % (ref 42.7–76)
NRBC BLD AUTO-RTO: 0 /100 WBC (ref 0–0.2)
PLATELET # BLD AUTO: 252 10*3/MM3 (ref 140–450)
PMV BLD AUTO: 9.3 FL (ref 6–12)
POTASSIUM SERPL-SCNC: 2.9 MMOL/L (ref 3.5–5.2)
POTASSIUM SERPL-SCNC: 3.4 MMOL/L (ref 3.5–5.2)
RBC # BLD AUTO: 2.63 10*6/MM3 (ref 4.14–5.8)
SODIUM SERPL-SCNC: 138 MMOL/L (ref 136–145)
SODIUM SERPL-SCNC: 138 MMOL/L (ref 136–145)
WBC NRBC COR # BLD: 5.67 10*3/MM3 (ref 3.4–10.8)

## 2023-10-16 PROCEDURE — 25010000002 HEPARIN (PORCINE) PER 1000 UNITS: Performed by: STUDENT IN AN ORGANIZED HEALTH CARE EDUCATION/TRAINING PROGRAM

## 2023-10-16 PROCEDURE — 80048 BASIC METABOLIC PNL TOTAL CA: CPT | Performed by: INTERNAL MEDICINE

## 2023-10-16 PROCEDURE — 94664 DEMO&/EVAL PT USE INHALER: CPT

## 2023-10-16 PROCEDURE — 94799 UNLISTED PULMONARY SVC/PX: CPT

## 2023-10-16 PROCEDURE — 99232 SBSQ HOSP IP/OBS MODERATE 35: CPT | Performed by: INTERNAL MEDICINE

## 2023-10-16 PROCEDURE — 85025 COMPLETE CBC W/AUTO DIFF WBC: CPT | Performed by: STUDENT IN AN ORGANIZED HEALTH CARE EDUCATION/TRAINING PROGRAM

## 2023-10-16 PROCEDURE — 80048 BASIC METABOLIC PNL TOTAL CA: CPT | Performed by: STUDENT IN AN ORGANIZED HEALTH CARE EDUCATION/TRAINING PROGRAM

## 2023-10-16 PROCEDURE — 25010000002 FUROSEMIDE PER 20 MG: Performed by: INTERNAL MEDICINE

## 2023-10-16 PROCEDURE — 25010000002 FUROSEMIDE PER 20 MG: Performed by: STUDENT IN AN ORGANIZED HEALTH CARE EDUCATION/TRAINING PROGRAM

## 2023-10-16 PROCEDURE — 99232 SBSQ HOSP IP/OBS MODERATE 35: CPT | Performed by: STUDENT IN AN ORGANIZED HEALTH CARE EDUCATION/TRAINING PROGRAM

## 2023-10-16 RX ORDER — FUROSEMIDE 10 MG/ML
60 INJECTION INTRAMUSCULAR; INTRAVENOUS 3 TIMES DAILY
Status: DISCONTINUED | OUTPATIENT
Start: 2023-10-16 | End: 2023-10-19 | Stop reason: HOSPADM

## 2023-10-16 RX ORDER — POTASSIUM CHLORIDE 750 MG/1
40 CAPSULE, EXTENDED RELEASE ORAL EVERY 4 HOURS
Status: COMPLETED | OUTPATIENT
Start: 2023-10-16 | End: 2023-10-16

## 2023-10-16 RX ORDER — BISACODYL 5 MG/1
10 TABLET, DELAYED RELEASE ORAL DAILY
Status: DISCONTINUED | OUTPATIENT
Start: 2023-10-16 | End: 2023-10-19 | Stop reason: HOSPADM

## 2023-10-16 RX ORDER — POLYETHYLENE GLYCOL 3350 17 G/17G
17 POWDER, FOR SOLUTION ORAL DAILY
Status: DISCONTINUED | OUTPATIENT
Start: 2023-10-16 | End: 2023-10-19 | Stop reason: HOSPADM

## 2023-10-16 RX ORDER — FUROSEMIDE 10 MG/ML
60 INJECTION INTRAMUSCULAR; INTRAVENOUS
Status: DISCONTINUED | OUTPATIENT
Start: 2023-10-16 | End: 2023-10-16

## 2023-10-16 RX ADMIN — POLYETHYLENE GLYCOL 3350 17 G: 17 POWDER, FOR SOLUTION ORAL at 11:11

## 2023-10-16 RX ADMIN — MIDODRINE HYDROCHLORIDE 5 MG: 5 TABLET ORAL at 11:11

## 2023-10-16 RX ADMIN — ASPIRIN 81 MG: 81 TABLET, COATED ORAL at 08:54

## 2023-10-16 RX ADMIN — HEPARIN SODIUM 5000 UNITS: 5000 INJECTION INTRAVENOUS; SUBCUTANEOUS at 15:13

## 2023-10-16 RX ADMIN — HYDROCODONE BITARTRATE AND ACETAMINOPHEN 1 TABLET: 10; 325 TABLET ORAL at 15:14

## 2023-10-16 RX ADMIN — POTASSIUM CHLORIDE 40 MEQ: 10 CAPSULE, COATED, EXTENDED RELEASE ORAL at 15:14

## 2023-10-16 RX ADMIN — FUROSEMIDE 60 MG: 10 INJECTION, SOLUTION INTRAMUSCULAR; INTRAVENOUS at 15:13

## 2023-10-16 RX ADMIN — Medication 10 ML: at 08:55

## 2023-10-16 RX ADMIN — MIDODRINE HYDROCHLORIDE 5 MG: 5 TABLET ORAL at 08:54

## 2023-10-16 RX ADMIN — Medication 100 MG: at 08:54

## 2023-10-16 RX ADMIN — HYDROCODONE BITARTRATE AND ACETAMINOPHEN 1 TABLET: 10; 325 TABLET ORAL at 20:52

## 2023-10-16 RX ADMIN — FUROSEMIDE 60 MG: 10 INJECTION, SOLUTION INTRAMUSCULAR; INTRAVENOUS at 08:54

## 2023-10-16 RX ADMIN — HEPARIN SODIUM 5000 UNITS: 5000 INJECTION INTRAVENOUS; SUBCUTANEOUS at 20:50

## 2023-10-16 RX ADMIN — BISACODYL 10 MG: 5 TABLET, COATED ORAL at 11:34

## 2023-10-16 RX ADMIN — ATORVASTATIN CALCIUM 40 MG: 40 TABLET, FILM COATED ORAL at 20:52

## 2023-10-16 RX ADMIN — HYDROCODONE BITARTRATE AND ACETAMINOPHEN 1 TABLET: 10; 325 TABLET ORAL at 05:08

## 2023-10-16 RX ADMIN — Medication 10 ML: at 20:52

## 2023-10-16 RX ADMIN — NICOTINE 1 PATCH: 21 PATCH, EXTENDED RELEASE TRANSDERMAL at 08:54

## 2023-10-16 RX ADMIN — TICAGRELOR 90 MG: 90 TABLET ORAL at 08:54

## 2023-10-16 RX ADMIN — POTASSIUM CHLORIDE 40 MEQ: 10 CAPSULE, COATED, EXTENDED RELEASE ORAL at 20:51

## 2023-10-16 RX ADMIN — POTASSIUM CHLORIDE 40 MEQ: 10 CAPSULE, COATED, EXTENDED RELEASE ORAL at 11:11

## 2023-10-16 RX ADMIN — TIOTROPIUM BROMIDE INHALATION SPRAY 2 PUFF: 3.12 SPRAY, METERED RESPIRATORY (INHALATION) at 06:33

## 2023-10-16 RX ADMIN — Medication 10 MG: at 20:52

## 2023-10-16 RX ADMIN — TICAGRELOR 90 MG: 90 TABLET ORAL at 20:52

## 2023-10-16 RX ADMIN — HEPARIN SODIUM 5000 UNITS: 5000 INJECTION INTRAVENOUS; SUBCUTANEOUS at 05:07

## 2023-10-16 RX ADMIN — MIDODRINE HYDROCHLORIDE 5 MG: 5 TABLET ORAL at 17:16

## 2023-10-16 RX ADMIN — FUROSEMIDE 60 MG: 10 INJECTION, SOLUTION INTRAMUSCULAR; INTRAVENOUS at 20:50

## 2023-10-16 NOTE — PROGRESS NOTES
[facility]     Cardiology Progress Note    Patient Name: Bradley Crane  : 1955  MRN: [id number]  Primary Care Physician:  Provider, No Known  Date of admission: 10/11/2023    Subjective   Subjective     No acute events overnight.  He is feeling well this morning.  He denies any significant dyspnea.  He has no chest discomfort or dizziness.  He reports that he has not had a bowel movement in 3 to 4 days.    Review of Systems   All systems were reviewed and negative except as above    Objective   Objective     Vitals:   Temp:  [97.2 °F (36.2 °C)-97.5 °F (36.4 °C)] 97.5 °F (36.4 °C)  Heart Rate:  [86-96] 90  Resp:  [16-18] 18  BP: ()/(61-74) 92/61  Physical Exam                  Constitutional: Awake, alert, No acute distress               Eyes: PERRLA, sclerae anicteric, no conjunctival injection              HENT: NCAT, mucous membranes moist              Neck: Supple, no thyromegaly, no lymphadenopathy, trachea midline              Respiratory: Reduced airway entry at the bases, nonlabored respirations               Cardiovascular: RRR, no murmurs, rubs, or gallops, palpable pedal pulses bilaterally              Gastrointestinal: Positive bowel sounds, soft, nontender, nondistended              Musculoskeletal: 2+ bilateral lower extremity edema, no clubbing or cyanosis to extremities              Psychiatric: Appropriate affect, cooperative              Neurologic: Oriented x 3, nonfocal              Skin: No rashes     Scheduled Meds:aspirin, 81 mg, Oral, Daily  atorvastatin, 40 mg, Oral, Nightly  bisacodyl, 10 mg, Oral, Daily  furosemide, 60 mg, Intravenous, BID  heparin (porcine), 5,000 Units, Subcutaneous, Q8H  midodrine, 5 mg, Oral, TID AC  mineral oil-hydrophilic petrolatum, 1 application , Topical, Daily  nicotine, 1 patch, Transdermal, Q24H  polyethylene glycol, 17 g, Oral, Daily  potassium chloride, 40 mEq, Oral, Q4H  sodium chloride, 10 mL, Intravenous, Q12H  thiamine, 100 mg, Oral,  Daily  ticagrelor, 90 mg, Oral, BID  tiotropium bromide monohydrate, 2 puff, Inhalation, Daily - RT      Continuous Infusions:        Result Review    Result Review:  I have personally reviewed the results from the time of this admission to 10/16/2023 12:02 EDT and agree with these findings:  [x]  Laboratory  []  Microbiology  [x]  Radiology  [x]  EKG/Telemetry   [x]  Cardiology/Vascular   []  Pathology  []  Old records  []  Other:  Most notable findings include:     CBC          10/14/2023    08:16 10/15/2023    15:58 10/16/2023    07:41   CBC   WBC 4.70  6.45  5.67    RBC 2.64  2.82  2.63    Hemoglobin 9.0  9.6  9.0    Hematocrit 27.1  28.9  26.8    .7  102.5  101.9    MCH 34.1  34.0  34.2    MCHC 33.2  33.2  33.6    RDW 14.9  14.8  14.9    Platelets 217  263  252      CMP          10/14/2023    08:16 10/15/2023    13:48 10/16/2023    07:41   CMP   Glucose 110  111  98    BUN 25  26  25    Creatinine 0.99  0.83  0.70    EGFR 84.0  96.5  101.6    Sodium 138  133  138    Potassium 3.5  3.5  2.9    Chloride 102  98  99    Calcium 8.7  9.3  9.0    BUN/Creatinine Ratio 25.3  31.3  35.7    Anion Gap 10.1  14.6  15.5       CARDIAC LABS:      Lab 10/11/23  2225 10/11/23  2141 10/09/23  1346   PROBNP  --  9,863.0* 8,301.0*   HSTROP T 38*  --  36*   PROTIME  --   --  15.3*   INR  --   --  1.20*        Assessment & Plan   Assessment / Plan     Brief Patient Summary:  Bradley Crane is a 68 y.o. male with:     Acute on chronic systolic congestive heart failure exacerbation, most likely related to medication noncompliance.  LVEF 26 to 30%.  Severe multivessel coronary artery disease, status post recent PCI of the left main artery into the LAD was done about 4 weeks ago.  He has residual  of the right coronary artery.  Currently without angina.  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  Moderate to severe pulmonary hypertension, mean PA pressure 37 mmHg.  Mild aortic valve stenosis by recent  cardiac catheterization.  Hypokalemia, iatrogenic due to diuresis.  Ongoing alcohol abuse and smoking  Concern for liver cirrhosis  Medical noncompliance     Plan:   Urine output is not adequate.  Will increase Lasix dose to 60 mg 3 times daily.  Maintain K around 4 and mg around 2.  Monitor kidney function.  Will wean off midodrine as blood pressure allows.  Continue aspirin, Brilinta and atorvastatin.  We will restart guideline directed medical therapy for HFrEF as blood pressure allows.  Currently blood pressure is labile.  Patient was counseled about the importance of medications compliance.  He expressed understanding.  Other issues as per primary team.    CODE STATUS:   Level Of Support Discussed With: Patient  Code Status (Patient has no pulse and is not breathing): CPR (Attempt to Resuscitate)  Medical Interventions (Patient has pulse or is breathing): Full Support      Electronically signed by Merly Ji MD, 10/16/23, 12:02 PM EDT.

## 2023-10-16 NOTE — PLAN OF CARE
Patient has been restless today. PRN pain medication given per request. Patient refused labs today. MD notified. No acute changes. Call light is within reach.     Cirilo Traylor RN

## 2023-10-16 NOTE — PLAN OF CARE
"Goal Outcome Evaluation:  Plan of Care Reviewed With: patient        Progress: no change  Outcome Evaluation: Pt agitated this morning for \"people moving things around in his room\". Agreed to get labs drawn later in the mornign stating,\"it is too early to get my blood\". Educated patient on the importance of getting his labs drawn to track care. No acute changes throughout shift. VSS.         "

## 2023-10-16 NOTE — PROGRESS NOTES
[facility]   Hospitalist Progress Note  Date: 10/16/2023  Patient Name: Bradley Crane  : 1955  MRN: [id number]  Date of admission: 10/11/2023  Room/Bed: 205/1      Subjective   Subjective     Chief Complaint: Dyspnea on exertion, tachypnea     Summary:Bradley Crane is a 68 y.o. male with a past medical history of CAD s/p PCI of LAD and LM with RIO x 2, impella assisted and rotablator atherectomy on 2023, HFrEF EF of 26 to 30%, hypertension, current alcohol use, concern for cirrhosis of the liver secondary to alcohol use, current tobacco smoker presented to the ED on 10/9/2023 with anasarca, heart failure exacerbation and had paracentesis with 5 L removed in the ER, admitted overnight but patient left AMA the next morning, presented to the ED today for evaluation of shortness of breath, dyspnea on exertion.  Associated with orthopnea, tachypnea.  Patient is significantly volume overloaded with pitting edema extending up to the sacral region.  Patient still consumes up to 8 beers a day at least 3-4 times a week, last drink 4 days ago.  States that he has been taking the medications as prescribed but recently he ran out of some of the medications unsure which ones he ran out of.  Denies any fevers, chills, nausea, vomiting, abdominal pain, chest pain, focal weakness, numbness, tingling, diarrhea.     In the ED, vital signs temperature 97.7, pulse 105, respiratory 20, blood pressure 90/68 on room air saturating around 98%.  Labs showed initial troponin 38, proBNP 9863, sodium 135, bicarb 20.8, rest of the CMP with no significant findings, WBC 6.64, hemoglobin 10.1, platelets 284.  Chest x-ray showed stable appearance of the chest with suspected bilateral pleural effusions with underlying atelectasis, pulmonary vascular congestion/edema, enlarged cardiac silhouette.  Received IV Lasix in the ED.  Patient has been admitted for further evaluation and management of acute systolic heart failure exacerbation,  dyspnea on exertion.     Patient was initiated on IV Lasix 60 mg every 8 hours.  All other blood pressure medications were held due to low blood pressures.  He was given IV albumin x3 and started on midodrine to help diuresis.  Patient was intermittently refusing labs and not adhering to his fluid intake restriction.    Interval Followup: No acute overnight events.  No acute distress.  Yesterday he was refusing lab checks, today he did allow them to take blood.  He is complaining about his fluid restriction, wanting juice, reports that he has been filling up his cup from the sink and drinking water.  I reiterated the importance of fluid restriction given we are trying to diurese him.  He reports that he has not had a bowel movement in 3 to 4 days.  His breathing is about the same, abdominal distention is improving.     Review of Systems    All systems reviewed and negative except for what is outlined above.      Objective   Objective     Vitals:   Temp:  [97.2 °F (36.2 °C)-97.3 °F (36.3 °C)] 97.3 °F (36.3 °C)  Heart Rate:  [86-96] 86  Resp:  [16-18] 18  BP: ()/(62-74) 99/74    Physical Exam   General: Awake, alert, NAD  Eyes: pupils equal  Cardiovascular: RRR, no murmurs   Pulmonary: CTA bilaterally; no wheezes; dyspnea with conversation   Gastrointestinal: S/NT, distended, +BS  Musculoskeletal: No gross deformities  Neuro: speech clear; no tremor  : No Dos Santos catheter; no suprapubic tenderness  Extremities: bilateral pedal edema     Result Review    Result Review:  I have personally reviewed these results:  [x]  Laboratory      Lab 10/16/23  0741 10/15/23  1558 10/14/23  0816 10/12/23  2132 10/12/23  1812 10/11/23  2141 10/09/23  1346   WBC 5.67 6.45 4.70  --  6.69   < > 6.87   HEMOGLOBIN 9.0* 9.6* 9.0*  --  9.9*   < > 10.5*   HEMATOCRIT 26.8* 28.9* 27.1*  --  30.7*   < > 32.1*   PLATELETS 252 263 217  --  246   < > 228   NEUTROS ABS 3.67 4.65 2.92  --   --    < > 4.77   IMMATURE GRANS (ABS) 0.01 0.02 0.01   --   --    < > 0.02   LYMPHS ABS 0.86 0.78 0.82  --   --    < > 1.20   MONOS ABS 0.77 0.74 0.75  --   --    < > 0.68   EOS ABS 0.31 0.23 0.16  --   --    < > 0.14   .9* 102.5* 102.7*  --  106.6*   < > 105.9*   PROCALCITONIN  --   --   --   --  0.04  --   --    LACTATE  --   --   --  1.5 2.3*  --   --    PROTIME  --   --   --   --   --   --  15.3*   APTT  --   --   --   --   --   --  34.6*    < > = values in this interval not displayed.         Lab 10/16/23  0741 10/15/23  1348 10/14/23  0816 10/12/23  1812 10/11/23  2225   SODIUM 138 133* 138 133* 135*   POTASSIUM 2.9* 3.5 3.5 4.2 4.1   CHLORIDE 99 98 102 98 104   CO2 23.5 20.4* 25.9 23.5 20.8*   ANION GAP 15.5* 14.6 10.1 11.5 10.2   BUN 25* 26* 25* 20 14   CREATININE 0.70* 0.83 0.99 1.24 0.77   EGFR 101.6 96.5 84.0 64.1 98.7   GLUCOSE 98 111* 110* 105* 97   CALCIUM 9.0 9.3 8.7 9.0 8.6   MAGNESIUM  --   --  1.7 1.6 2.0   PHOSPHORUS  --   --   --   --  4.6*         Lab 10/11/23  2225 10/09/23  1346   TOTAL PROTEIN 6.0 6.8   ALBUMIN 3.3* 4.0   GLOBULIN 2.7 2.8   ALT (SGPT) 9 11   AST (SGOT) 22 31   BILIRUBIN 0.9 1.1   ALK PHOS 125* 129*         Lab 10/11/23  2225 10/11/23  2141 10/09/23  1346   PROBNP  --  9,863.0* 8,301.0*   HSTROP T 38*  --  36*   PROTIME  --   --  15.3*   INR  --   --  1.20*             Lab 10/15/23  1348 10/11/23  2141   FOLATE >20.00  --    VITAMIN B 12  --  >2,000*         Brief Urine Lab Results       None          [x]  Microbiology   Microbiology Results (last 10 days)       Procedure Component Value - Date/Time    Body Fluid Culture - Body Fluid, Peritoneum [[id number]] Collected: 10/09/23 1525    Lab Status: Final result Specimen: Body Fluid from Peritoneum Updated: 10/12/23 0808     Body Fluid Culture No growth at 3 days     Gram Stain No organisms seen          [x]  Radiology  XR Chest 1 View    Result Date: 10/11/2023    Stable appearance of the chest with suspected bilateral pleural effusions with underlying atelectasis, pulmonary  vascular congestion/edema, enlarged cardiac silhouette.       CLAYTON GIBBS MD       Electronically Signed and Approved By: CLAYTON GIBBS MD on 10/11/2023 at 22:25             US Paracentesis    Result Date: 10/9/2023   Ultrasound-guided paracentesis was performed without complication, patient tolerated procedure with 5 L of clear, dunn ascitic fluid removed. A sample specimen was sent to the lab for further diagnostic evaluation.  The patient was instructed to obtain follow up care from the referring physician.    CARMEN LUCAS       Electronically Signed and Approved By: NAYE MUNOZ MD on 10/09/2023 at 16:43             XR Chest 1 View    Result Date: 10/9/2023    Findings consistent with moderate congestive heart failure.  Low lung volumes with worsening atelectasis at the lung bases.  Dual lead AICD remains in place.       LORENA GARNICA MD       Electronically Signed and Approved By: LORENA GARNICA MD on 10/09/2023 at 14:12            []  EKG/Telemetry   []  Cardiology/Vascular   []  Pathology  []  Old records  []  Other:    Assessment & Plan   Assessment / Plan     Assessment:  Acute on chronic HFrEF  Volume overload  Alcohol abuse  CAD s/p PCI of LAD and LM with RIO x 2, impella assisted and rotablator atherectomy on 9/12/2023   Tobacco abuse  Jaundice  Concern for cirrhosis  CHRISTIANA  Macrocytic anemia    Plan:  Low blood pressures continue.  Continue midodrine.    IV Lasix 60 mg BID  CIWA protocol, patient reports not drinking the past 3 days previous to admission and denies any previous severe withdrawals.  P.o. thiamine and folate replacement.  Nicotine patch  Cardiology consulted  Repeat chest x-ray yesterday with improvement of his pulmonary edema  Potassium 2.9, replacement ordered.  Patient intermittently refusing lab checks, reiterated the importance of monitoring his electrolytes.    Discussed importance of fluid restriction, he is filling his cup from sink  Hb stable   creatinine  stable  Monitor urine output, strict I&O  Hold home Entresto and Aldactone due to blood pressure  MiraLAX and Dulcolax for constipation  A.m. labs     Discussed with RN.    DVT prophylaxis:  Medical DVT prophylaxis orders are present.    CODE STATUS:   Level Of Support Discussed With: Patient  Code Status (Patient has no pulse and is not breathing): CPR (Attempt to Resuscitate)  Medical Interventions (Patient has pulse or is breathing): Full Support      Electronically signed by Meghann Blankenship DO, 10/12/23, 10:52 AM EDT.

## 2023-10-17 LAB
ANION GAP SERPL CALCULATED.3IONS-SCNC: 13.3 MMOL/L (ref 5–15)
BASOPHILS # BLD AUTO: 0.04 10*3/MM3 (ref 0–0.2)
BASOPHILS NFR BLD AUTO: 0.6 % (ref 0–1.5)
BUN SERPL-MCNC: 26 MG/DL (ref 8–23)
BUN/CREAT SERPL: 26.5 (ref 7–25)
CALCIUM SPEC-SCNC: 9.3 MG/DL (ref 8.6–10.5)
CHLORIDE SERPL-SCNC: 99 MMOL/L (ref 98–107)
CO2 SERPL-SCNC: 23.7 MMOL/L (ref 22–29)
CREAT SERPL-MCNC: 0.98 MG/DL (ref 0.76–1.27)
DEPRECATED RDW RBC AUTO: 55 FL (ref 37–54)
EGFRCR SERPLBLD CKD-EPI 2021: 85 ML/MIN/1.73
EOSINOPHIL # BLD AUTO: 0.3 10*3/MM3 (ref 0–0.4)
EOSINOPHIL NFR BLD AUTO: 4.5 % (ref 0.3–6.2)
ERYTHROCYTE [DISTWIDTH] IN BLOOD BY AUTOMATED COUNT: 15 % (ref 12.3–15.4)
GLUCOSE SERPL-MCNC: 130 MG/DL (ref 65–99)
HCT VFR BLD AUTO: 27.9 % (ref 37.5–51)
HGB BLD-MCNC: 9.2 G/DL (ref 13–17.7)
IMM GRANULOCYTES # BLD AUTO: 0.02 10*3/MM3 (ref 0–0.05)
IMM GRANULOCYTES NFR BLD AUTO: 0.3 % (ref 0–0.5)
LYMPHOCYTES # BLD AUTO: 0.98 10*3/MM3 (ref 0.7–3.1)
LYMPHOCYTES NFR BLD AUTO: 14.8 % (ref 19.6–45.3)
MAGNESIUM SERPL-MCNC: 1.7 MG/DL (ref 1.6–2.4)
MCH RBC QN AUTO: 33.6 PG (ref 26.6–33)
MCHC RBC AUTO-ENTMCNC: 33 G/DL (ref 31.5–35.7)
MCV RBC AUTO: 101.8 FL (ref 79–97)
MONOCYTES # BLD AUTO: 0.87 10*3/MM3 (ref 0.1–0.9)
MONOCYTES NFR BLD AUTO: 13.1 % (ref 5–12)
NEUTROPHILS NFR BLD AUTO: 4.43 10*3/MM3 (ref 1.7–7)
NEUTROPHILS NFR BLD AUTO: 66.7 % (ref 42.7–76)
NRBC BLD AUTO-RTO: 0 /100 WBC (ref 0–0.2)
PLATELET # BLD AUTO: 258 10*3/MM3 (ref 140–450)
PMV BLD AUTO: 9.4 FL (ref 6–12)
POTASSIUM SERPL-SCNC: 3.6 MMOL/L (ref 3.5–5.2)
RBC # BLD AUTO: 2.74 10*6/MM3 (ref 4.14–5.8)
SODIUM SERPL-SCNC: 136 MMOL/L (ref 136–145)
WBC NRBC COR # BLD: 6.64 10*3/MM3 (ref 3.4–10.8)

## 2023-10-17 PROCEDURE — 83735 ASSAY OF MAGNESIUM: CPT | Performed by: INTERNAL MEDICINE

## 2023-10-17 PROCEDURE — 85025 COMPLETE CBC W/AUTO DIFF WBC: CPT | Performed by: STUDENT IN AN ORGANIZED HEALTH CARE EDUCATION/TRAINING PROGRAM

## 2023-10-17 PROCEDURE — 80048 BASIC METABOLIC PNL TOTAL CA: CPT | Performed by: STUDENT IN AN ORGANIZED HEALTH CARE EDUCATION/TRAINING PROGRAM

## 2023-10-17 PROCEDURE — 25010000002 FUROSEMIDE PER 20 MG: Performed by: INTERNAL MEDICINE

## 2023-10-17 PROCEDURE — 94799 UNLISTED PULMONARY SVC/PX: CPT

## 2023-10-17 PROCEDURE — 94664 DEMO&/EVAL PT USE INHALER: CPT

## 2023-10-17 PROCEDURE — 99232 SBSQ HOSP IP/OBS MODERATE 35: CPT | Performed by: INTERNAL MEDICINE

## 2023-10-17 RX ORDER — POTASSIUM CHLORIDE 750 MG/1
40 CAPSULE, EXTENDED RELEASE ORAL ONCE
Status: DISCONTINUED | OUTPATIENT
Start: 2023-10-17 | End: 2023-10-17

## 2023-10-17 RX ORDER — MAGNESIUM SULFATE HEPTAHYDRATE 40 MG/ML
2 INJECTION, SOLUTION INTRAVENOUS ONCE
Status: DISCONTINUED | OUTPATIENT
Start: 2023-10-17 | End: 2023-10-18

## 2023-10-17 RX ADMIN — ATORVASTATIN CALCIUM 40 MG: 40 TABLET, FILM COATED ORAL at 20:29

## 2023-10-17 RX ADMIN — TICAGRELOR 90 MG: 90 TABLET ORAL at 08:19

## 2023-10-17 RX ADMIN — Medication 12.5 MG: at 17:11

## 2023-10-17 RX ADMIN — NICOTINE 1 PATCH: 21 PATCH, EXTENDED RELEASE TRANSDERMAL at 08:19

## 2023-10-17 RX ADMIN — BISACODYL 10 MG: 5 TABLET, COATED ORAL at 08:19

## 2023-10-17 RX ADMIN — HYDROCODONE BITARTRATE AND ACETAMINOPHEN 1 TABLET: 10; 325 TABLET ORAL at 16:44

## 2023-10-17 RX ADMIN — MIDODRINE HYDROCHLORIDE 5 MG: 5 TABLET ORAL at 16:43

## 2023-10-17 RX ADMIN — HYDROCODONE BITARTRATE AND ACETAMINOPHEN 1 TABLET: 10; 325 TABLET ORAL at 08:26

## 2023-10-17 RX ADMIN — MIDODRINE HYDROCHLORIDE 5 MG: 5 TABLET ORAL at 11:11

## 2023-10-17 RX ADMIN — HYDROCODONE BITARTRATE AND ACETAMINOPHEN 1 TABLET: 10; 325 TABLET ORAL at 22:44

## 2023-10-17 RX ADMIN — Medication 10 ML: at 20:30

## 2023-10-17 RX ADMIN — Medication 100 MG: at 08:18

## 2023-10-17 RX ADMIN — TICAGRELOR 90 MG: 90 TABLET ORAL at 20:29

## 2023-10-17 RX ADMIN — TIOTROPIUM BROMIDE INHALATION SPRAY 2 PUFF: 3.12 SPRAY, METERED RESPIRATORY (INHALATION) at 07:10

## 2023-10-17 RX ADMIN — FUROSEMIDE 60 MG: 10 INJECTION, SOLUTION INTRAMUSCULAR; INTRAVENOUS at 20:29

## 2023-10-17 RX ADMIN — ASPIRIN 81 MG: 81 TABLET, COATED ORAL at 08:19

## 2023-10-17 RX ADMIN — MIDODRINE HYDROCHLORIDE 5 MG: 5 TABLET ORAL at 08:18

## 2023-10-17 RX ADMIN — FUROSEMIDE 60 MG: 10 INJECTION, SOLUTION INTRAMUSCULAR; INTRAVENOUS at 14:37

## 2023-10-17 NOTE — PROGRESS NOTES
[facility]     Cardiology Progress Note    Patient Name: Bradley Crane  : 1955  MRN: [id number]  Primary Care Physician:  Provider, No Known  Date of admission: 10/11/2023    Subjective   Subjective     No acute events overnight.  He is agitated this morning because he could not get good sleep.  His shortness of breath continues to improve.  He has no chest discomfort, dizziness or other complaints.    Review of Systems   All systems were reviewed and negative except as above    Objective   Objective     Vitals:   Temp:  [97.2 °F (36.2 °C)-98.2 °F (36.8 °C)] 97.2 °F (36.2 °C)  Heart Rate:  [87-97] 96  Resp:  [18] 18  BP: ()/(64-74) 109/69  Physical Exam                  Constitutional: Awake, alert, No acute distress               Eyes: PERRLA, sclerae anicteric, no conjunctival injection              HENT: NCAT, mucous membranes moist              Neck: Supple, no thyromegaly, no lymphadenopathy, trachea midline              Respiratory: Reduced airway entry at the bases, nonlabored respirations               Cardiovascular: RRR, no murmurs, rubs, or gallops, palpable pedal pulses bilaterally              Gastrointestinal: Positive bowel sounds, soft, nontender, nondistended              Musculoskeletal: 1+ bilateral lower extremity edema, no clubbing or cyanosis to extremities              Psychiatric: Appropriate affect, cooperative              Neurologic: Oriented x 3, nonfocal              Skin: No rashes    Scheduled Meds:aspirin, 81 mg, Oral, Daily  atorvastatin, 40 mg, Oral, Nightly  bisacodyl, 10 mg, Oral, Daily  furosemide, 60 mg, Intravenous, TID  heparin (porcine), 5,000 Units, Subcutaneous, Q8H  magnesium sulfate, 2 g, Intravenous, Once  midodrine, 5 mg, Oral, TID AC  mineral oil-hydrophilic petrolatum, 1 application , Topical, Daily  nicotine, 1 patch, Transdermal, Q24H  polyethylene glycol, 17 g, Oral, Daily  potassium chloride, 40 mEq, Oral, Once  sodium chloride, 10 mL,  Intravenous, Q12H  thiamine, 100 mg, Oral, Daily  ticagrelor, 90 mg, Oral, BID  tiotropium bromide monohydrate, 2 puff, Inhalation, Daily - RT      Continuous Infusions:        Result Review    Result Review:  I have personally reviewed the results from the time of this admission to 10/17/2023 16:43 EDT and agree with these findings:  [x]  Laboratory  []  Microbiology  [x]  Radiology  [x]  EKG/Telemetry   [x]  Cardiology/Vascular   []  Pathology  []  Old records  []  Other:  Most notable findings include:     CBC          10/15/2023    15:58 10/16/2023    07:41 10/17/2023    06:05   CBC   WBC 6.45  5.67  6.64    RBC 2.82  2.63  2.74    Hemoglobin 9.6  9.0  9.2    Hematocrit 28.9  26.8  27.9    .5  101.9  101.8    MCH 34.0  34.2  33.6    MCHC 33.2  33.6  33.0    RDW 14.8  14.9  15.0    Platelets 263  252  258      CMP          10/15/2023    13:48 10/16/2023    07:41 10/16/2023    19:04 10/17/2023    06:05   CMP   Glucose 111  98  95  130    BUN 26  25  27  26    Creatinine 0.83  0.70  0.93  0.98    EGFR 96.5  101.6  90.6  85.0    Sodium 133  138  138  136    Potassium 3.5  2.9  3.4  3.6    Chloride 98  99  98  99    Calcium 9.3  9.0  9.6  9.3    BUN/Creatinine Ratio 31.3  35.7  29.0  26.5    Anion Gap 14.6  15.5  12.5  13.3       CARDIAC LABS:      Lab 10/11/23  2225 10/11/23  2141   PROBNP  --  9,863.0*   HSTROP T 38*  --         Assessment & Plan   Assessment / Plan     Brief Patient Summary:  Bradley Crane is a 68 y.o. male with:     Acute on chronic systolic congestive heart failure exacerbation, most likely related to medication noncompliance.  LVEF 26 to 30%.  Severe multivessel coronary artery disease, status post recent PCI of the left main artery into the LAD was done about 4 weeks ago.  He has residual  of the right coronary artery.  Currently without angina.  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  Moderate to severe pulmonary hypertension, mean PA pressure 37 mmHg.  Mild  aortic valve stenosis by recent cardiac catheterization.  Hypokalemia, iatrogenic due to diuresis, repleted.  Ongoing alcohol abuse and smoking  Concern for liver cirrhosis  Medical noncompliance     Plan:   Continue IV Lasix 60 mg 3 times daily.  Ins and Outs appear to be an accurate.  Will go by daily weights.  He has lost at least 6 pounds since admission.  I will add Aldactone 12.5 mg daily.  Maintain K around 4 and mg around 2.  Monitor kidney function.  Will wean off midodrine as blood pressure allows.  Continue aspirin, Brilinta and atorvastatin.  We will restart guideline directed medical therapy for HFrEF as blood pressure allows.   Patient with ascites by exam.  Consider abdominal ultrasound and paracentesis.  Patient was counseled about the importance of medications compliance.  He expressed understanding.  Other issues as per primary team.    CODE STATUS:   Level Of Support Discussed With: Patient  Code Status (Patient has no pulse and is not breathing): CPR (Attempt to Resuscitate)  Medical Interventions (Patient has pulse or is breathing): Full Support      Electronically signed by Merly Ji MD, 10/17/23, 4:43 PM EDT.

## 2023-10-17 NOTE — PLAN OF CARE
Goal Outcome Evaluation:  Plan of Care Reviewed With: patient        Progress: no change  Outcome Evaluation: No acute changes throughout night. Pt walked around room through night. Wound care done per order this shift. Pain relieved with prn medication, see MAR. VSS.

## 2023-10-17 NOTE — PROGRESS NOTES
[facility]   Hospitalist Progress Note  Date: 10/17/2023  Patient Name: Bradley Crane  : 1955  MRN: [id number]  Date of admission: 10/11/2023  Room/Bed: 205/1      Subjective   Subjective     Chief Complaint: Dyspnea on exertion, tachypnea     Summary:Bradley Crane is a 68 y.o. male with a past medical history of CAD s/p PCI of LAD and LM with RIO x 2, impella assisted and rotablator atherectomy on 2023, HFrEF EF of 26 to 30%, hypertension, current alcohol use, concern for cirrhosis of the liver secondary to alcohol use, current tobacco smoker presented to the ED on 10/9/2023 with anasarca, heart failure exacerbation and had paracentesis with 5 L removed in the ER, admitted overnight but patient left AMA the next morning on 10/10/2023 prior to seeing physician.  Patient presented to the ED again on the morning of 10/11/2023 for evaluation of shortness of breath, dyspnea on exertion.  Associated with orthopnea, tachypnea.  Patient is significantly volume overloaded with pitting edema extending up to the sacral region.  Patient still consumes up to 8 beers a day at least 3-4 times a week, last drink 4 days ago.  States he has been taking the medications as prescribed but recently he ran out of some of the medications unsure which ones he ran out of.  Denies any fevers, chills, nausea, vomiting, abdominal pain, chest pain, focal weakness, numbness, tingling, diarrhea.     In the ED, vital signs temperature 97.7, pulse 105, respiratory 20, blood pressure 90/68 on room air saturating around 98%.  Labs showed initial troponin 38, proBNP 9863, sodium 135, bicarb 20.8, rest of the CMP with no significant findings, WBC 6.64, hemoglobin 10.1, platelets 284.  Chest x-ray showed stable appearance of the chest with suspected bilateral pleural effusions with underlying atelectasis, pulmonary vascular congestion/edema, enlarged cardiac silhouette.  Received IV Lasix in the ED.  Patient has been admitted for  further evaluation and management of acute systolic heart failure exacerbation, dyspnea on exertion.     Patient was initiated on IV Lasix 60 mg every 8 hours.  All other blood pressure medications were held due to low blood pressures.  He was given IV albumin x3 and started on midodrine to help diuresis.  Patient is intermittently refusing labs and not adhering to his fluid intake restriction.    Interval Followup:   Patient frustrated this morning stating he has not slept in 3 days.  Patient states to frequently physicians, nurses and other staff are coming into the room poking and prodding.  Patient continues to drink additional fluids outside of his fluid restriction.  Continues to intermittently refuse labs and medications by nursing staff.  Patient's blood pressure still remains low.  Morning labs show potassium of 3.6 and a magnesium of 1.7.  Renal function remained stable.    Objective   Objective     Vitals:   Temp:  [97.3 °F (36.3 °C)-98.2 °F (36.8 °C)] 97.3 °F (36.3 °C)  Heart Rate:  [87-97] 87  Resp:  [18] 18  BP: ()/(61-70) 85/64    Physical Exam   General: Awake, alert, NAD  Eyes: pupils equal  Cardiovascular: RRR, no murmurs   Pulmonary: CTA bilaterally; no wheezes; dyspnea with conversation   Gastrointestinal: S/NT, distended, +BS  Musculoskeletal: No gross deformities  Neuro: speech clear; no tremor  : No Dos Santos catheter; no suprapubic tenderness  Extremities: bilateral pedal edema     Result Review    Result Review:  I have personally reviewed these results:  [x]  Laboratory      Lab 10/17/23  0605 10/16/23  0741 10/15/23  1558 10/14/23  0816 10/12/23  2132 10/12/23  1812   WBC 6.64 5.67 6.45   < >  --  6.69   HEMOGLOBIN 9.2* 9.0* 9.6*   < >  --  9.9*   HEMATOCRIT 27.9* 26.8* 28.9*   < >  --  30.7*   PLATELETS 258 252 263   < >  --  246   NEUTROS ABS 4.43 3.67 4.65   < >  --   --    IMMATURE GRANS (ABS) 0.02 0.01 0.02   < >  --   --    LYMPHS ABS 0.98 0.86 0.78   < >  --   --    MONOS ABS  0.87 0.77 0.74   < >  --   --    EOS ABS 0.30 0.31 0.23   < >  --   --    .8* 101.9* 102.5*   < >  --  106.6*   PROCALCITONIN  --   --   --   --   --  0.04   LACTATE  --   --   --   --  1.5 2.3*    < > = values in this interval not displayed.         Lab 10/17/23  0605 10/16/23  1904 10/16/23  0741 10/15/23  1348 10/14/23  0816 10/12/23  1812 10/11/23  2225   SODIUM 136 138 138   < > 138 133* 135*   POTASSIUM 3.6 3.4* 2.9*   < > 3.5 4.2 4.1   CHLORIDE 99 98 99   < > 102 98 104   CO2 23.7 27.5 23.5   < > 25.9 23.5 20.8*   ANION GAP 13.3 12.5 15.5*   < > 10.1 11.5 10.2   BUN 26* 27* 25*   < > 25* 20 14   CREATININE 0.98 0.93 0.70*   < > 0.99 1.24 0.77   EGFR 85.0 90.6 101.6   < > 84.0 64.1 98.7   GLUCOSE 130* 95 98   < > 110* 105* 97   CALCIUM 9.3 9.6 9.0   < > 8.7 9.0 8.6   MAGNESIUM 1.7  --   --   --  1.7 1.6 2.0   PHOSPHORUS  --   --   --   --   --   --  4.6*    < > = values in this interval not displayed.         Lab 10/11/23  2225   TOTAL PROTEIN 6.0   ALBUMIN 3.3*   GLOBULIN 2.7   ALT (SGPT) 9   AST (SGOT) 22   BILIRUBIN 0.9   ALK PHOS 125*         Lab 10/11/23  2225 10/11/23  2141   PROBNP  --  9,863.0*   HSTROP T 38*  --              Lab 10/15/23  1348 10/11/23  2141   FOLATE >20.00  --    VITAMIN B 12  --  >2,000*         Brief Urine Lab Results       None          [x]  Microbiology   Microbiology Results (last 10 days)       Procedure Component Value - Date/Time    Body Fluid Culture - Body Fluid, Peritoneum [[id number]] Collected: 10/09/23 1525    Lab Status: Final result Specimen: Body Fluid from Peritoneum Updated: 10/12/23 0808     Body Fluid Culture No growth at 3 days     Gram Stain No organisms seen          [x]  Radiology  XR Chest 1 View    Result Date: 10/11/2023    Stable appearance of the chest with suspected bilateral pleural effusions with underlying atelectasis, pulmonary vascular congestion/edema, enlarged cardiac silhouette.       CLAYTON GIBBS MD       Electronically Signed and  Approved By: CLAYTON GIBBS MD on 10/11/2023 at 22:25             US Paracentesis    Result Date: 10/9/2023   Ultrasound-guided paracentesis was performed without complication, patient tolerated procedure with 5 L of clear, dunn ascitic fluid removed. A sample specimen was sent to the lab for further diagnostic evaluation.  The patient was instructed to obtain follow up care from the referring physician.    CARMEN LUCAS       Electronically Signed and Approved By: NAEY MUNOZ MD on 10/09/2023 at 16:43             XR Chest 1 View    Result Date: 10/9/2023    Findings consistent with moderate congestive heart failure.  Low lung volumes with worsening atelectasis at the lung bases.  Dual lead AICD remains in place.       LORENA GARNICA MD       Electronically Signed and Approved By: LORENA GARNICA MD on 10/09/2023 at 14:12            []  EKG/Telemetry   []  Cardiology/Vascular   []  Pathology  []  Old records  []  Other:    Assessment & Plan   Assessment / Plan     Assessment:  Acute on chronic HFrEF  Volume overload  Alcohol abuse  CAD s/p PCI of LAD and LM with RIO x 2, impella assisted and rotablator atherectomy on 9/12/2023   Tobacco abuse  Jaundice  Concern for cirrhosis  CHRISTIANA  Macrocytic anemia    Plan:  Low blood pressures not currently on midodrine.  Ideally wean off of these medications.  Patient states he has a long history of hypotension  IV Lasix 60 mg 3 times a day.  Continue close monitoring of renal function and electrolytes with aggressive diuresis  CIWA protocol discontinued as patient has been stable without symptoms of withdrawal  Continue nicotine patch for ongoing tobacco abuse  Cardiology consulted, appreciate assistance  Potassium and magnesium both requiring replacement today, will recheck in the morning.  We continue to stress the importance with patient  Discussed importance of fluid restriction, however continues to fill up his cup in the sink  Renal function remained stable with  aggressive diuresis and low blood pressure, however will require continued close monitoring  Monitor urine output, strict I&O  Hold home Entresto and Aldactone due to blood pressure  MiraLAX and Dulcolax for constipation       Discussed with RN.    DVT prophylaxis:  Medical DVT prophylaxis orders are present.    CODE STATUS:   Level Of Support Discussed With: Patient  Code Status (Patient has no pulse and is not breathing): CPR (Attempt to Resuscitate)  Medical Interventions (Patient has pulse or is breathing): Full Support

## 2023-10-17 NOTE — PLAN OF CARE
Problem: Adult Inpatient Plan of Care  Goal: Plan of Care Review  Outcome: Ongoing, Progressing  Goal: Patient-Specific Goal (Individualized)  Outcome: Ongoing, Progressing  Goal: Absence of Hospital-Acquired Illness or Injury  Outcome: Ongoing, Progressing  Intervention: Identify and Manage Fall Risk  Intervention: Prevent and Manage VTE (Venous Thromboembolism) Risk  Goal: Optimal Comfort and Wellbeing  Outcome: Ongoing, Progressing  Intervention: Monitor Pain and Promote Comfort  Intervention: Provide Person-Centered Care  Goal: Readiness for Transition of Care  Outcome: Ongoing, Progressing   Goal Outcome Evaluation:  Plan of Care Reviewed With: patient        Progress: no change  Outcome Evaluation: Patient has ambulated in room independently, needs reorienting, does not remember what staff tells him. Pt has been refusing medications and lab draws, states he does not like needles. Staff has explained care and  reassured pt during each round. All questions and concerns addressed. Will continue to monitor, call light in reach.

## 2023-10-18 PROBLEM — I50.23 ACUTE ON CHRONIC HFREF (HEART FAILURE WITH REDUCED EJECTION FRACTION): Status: ACTIVE | Noted: 2023-10-18

## 2023-10-18 LAB
ANION GAP SERPL CALCULATED.3IONS-SCNC: 12.4 MMOL/L (ref 5–15)
BUN SERPL-MCNC: 23 MG/DL (ref 8–23)
BUN/CREAT SERPL: 27.7 (ref 7–25)
CALCIUM SPEC-SCNC: 9.3 MG/DL (ref 8.6–10.5)
CHLORIDE SERPL-SCNC: 100 MMOL/L (ref 98–107)
CO2 SERPL-SCNC: 25.6 MMOL/L (ref 22–29)
CREAT SERPL-MCNC: 0.83 MG/DL (ref 0.76–1.27)
DEPRECATED RDW RBC AUTO: 52 FL (ref 37–54)
EGFRCR SERPLBLD CKD-EPI 2021: 96.5 ML/MIN/1.73
ERYTHROCYTE [DISTWIDTH] IN BLOOD BY AUTOMATED COUNT: 14.5 % (ref 12.3–15.4)
GLUCOSE SERPL-MCNC: 104 MG/DL (ref 65–99)
HCT VFR BLD AUTO: 26.3 % (ref 37.5–51)
HGB BLD-MCNC: 9 G/DL (ref 13–17.7)
MAGNESIUM SERPL-MCNC: 1.8 MG/DL (ref 1.6–2.4)
MCH RBC QN AUTO: 34 PG (ref 26.6–33)
MCHC RBC AUTO-ENTMCNC: 34.2 G/DL (ref 31.5–35.7)
MCV RBC AUTO: 99.2 FL (ref 79–97)
PLATELET # BLD AUTO: 257 10*3/MM3 (ref 140–450)
PMV BLD AUTO: 9.7 FL (ref 6–12)
POTASSIUM SERPL-SCNC: 3.2 MMOL/L (ref 3.5–5.2)
RBC # BLD AUTO: 2.65 10*6/MM3 (ref 4.14–5.8)
SODIUM SERPL-SCNC: 138 MMOL/L (ref 136–145)
WBC NRBC COR # BLD: 5.51 10*3/MM3 (ref 3.4–10.8)

## 2023-10-18 PROCEDURE — 99233 SBSQ HOSP IP/OBS HIGH 50: CPT | Performed by: INTERNAL MEDICINE

## 2023-10-18 PROCEDURE — 83735 ASSAY OF MAGNESIUM: CPT | Performed by: INTERNAL MEDICINE

## 2023-10-18 PROCEDURE — 80048 BASIC METABOLIC PNL TOTAL CA: CPT | Performed by: INTERNAL MEDICINE

## 2023-10-18 PROCEDURE — 85027 COMPLETE CBC AUTOMATED: CPT | Performed by: INTERNAL MEDICINE

## 2023-10-18 PROCEDURE — 94799 UNLISTED PULMONARY SVC/PX: CPT

## 2023-10-18 PROCEDURE — 99232 SBSQ HOSP IP/OBS MODERATE 35: CPT | Performed by: INTERNAL MEDICINE

## 2023-10-18 PROCEDURE — 25010000002 MAGNESIUM SULFATE 2 GM/50ML SOLUTION: Performed by: INTERNAL MEDICINE

## 2023-10-18 PROCEDURE — 94664 DEMO&/EVAL PT USE INHALER: CPT

## 2023-10-18 PROCEDURE — 25010000002 FUROSEMIDE PER 20 MG: Performed by: INTERNAL MEDICINE

## 2023-10-18 RX ORDER — POTASSIUM CHLORIDE 750 MG/1
40 CAPSULE, EXTENDED RELEASE ORAL EVERY 4 HOURS
Qty: 8 CAPSULE | Refills: 0 | Status: DISPENSED | OUTPATIENT
Start: 2023-10-18 | End: 2023-10-18

## 2023-10-18 RX ORDER — MAGNESIUM SULFATE HEPTAHYDRATE 40 MG/ML
2 INJECTION, SOLUTION INTRAVENOUS ONCE
Status: COMPLETED | OUTPATIENT
Start: 2023-10-18 | End: 2023-10-18

## 2023-10-18 RX ADMIN — FUROSEMIDE 60 MG: 10 INJECTION, SOLUTION INTRAMUSCULAR; INTRAVENOUS at 08:22

## 2023-10-18 RX ADMIN — HYDROCODONE BITARTRATE AND ACETAMINOPHEN 1 TABLET: 10; 325 TABLET ORAL at 08:21

## 2023-10-18 RX ADMIN — ASPIRIN 81 MG: 81 TABLET, COATED ORAL at 08:21

## 2023-10-18 RX ADMIN — NICOTINE 1 PATCH: 21 PATCH, EXTENDED RELEASE TRANSDERMAL at 08:24

## 2023-10-18 RX ADMIN — HYDROCODONE BITARTRATE AND ACETAMINOPHEN 1 TABLET: 10; 325 TABLET ORAL at 21:35

## 2023-10-18 RX ADMIN — Medication 100 MG: at 08:22

## 2023-10-18 RX ADMIN — HYDROCODONE BITARTRATE AND ACETAMINOPHEN 1 TABLET: 10; 325 TABLET ORAL at 15:09

## 2023-10-18 RX ADMIN — MIDODRINE HYDROCHLORIDE 5 MG: 5 TABLET ORAL at 08:21

## 2023-10-18 RX ADMIN — MIDODRINE HYDROCHLORIDE 5 MG: 5 TABLET ORAL at 16:39

## 2023-10-18 RX ADMIN — TIOTROPIUM BROMIDE INHALATION SPRAY 2 PUFF: 3.12 SPRAY, METERED RESPIRATORY (INHALATION) at 06:19

## 2023-10-18 RX ADMIN — FUROSEMIDE 60 MG: 10 INJECTION, SOLUTION INTRAMUSCULAR; INTRAVENOUS at 15:09

## 2023-10-18 RX ADMIN — TICAGRELOR 90 MG: 90 TABLET ORAL at 20:15

## 2023-10-18 RX ADMIN — MAGNESIUM SULFATE HEPTAHYDRATE 2 G: 40 INJECTION, SOLUTION INTRAVENOUS at 08:13

## 2023-10-18 RX ADMIN — Medication 12.5 MG: at 08:22

## 2023-10-18 RX ADMIN — Medication 10 ML: at 20:15

## 2023-10-18 RX ADMIN — POTASSIUM CHLORIDE 40 MEQ: 10 CAPSULE, COATED, EXTENDED RELEASE ORAL at 08:22

## 2023-10-18 RX ADMIN — Medication 10 ML: at 08:20

## 2023-10-18 RX ADMIN — FUROSEMIDE 60 MG: 10 INJECTION, SOLUTION INTRAMUSCULAR; INTRAVENOUS at 20:15

## 2023-10-18 RX ADMIN — TICAGRELOR 90 MG: 90 TABLET ORAL at 08:22

## 2023-10-18 RX ADMIN — ATORVASTATIN CALCIUM 40 MG: 40 TABLET, FILM COATED ORAL at 20:15

## 2023-10-18 RX ADMIN — WHITE PETROLATUM 1 APPLICATION: 1.75 OINTMENT TOPICAL at 09:59

## 2023-10-18 RX ADMIN — MIDODRINE HYDROCHLORIDE 5 MG: 5 TABLET ORAL at 12:28

## 2023-10-18 NOTE — PROGRESS NOTES
[facility]     Cardiology Progress Note    Patient Name: Bradley Crane  : 1955  MRN: [id number]  Primary Care Physician:  Provider, No Known  Date of admission: 10/11/2023    Subjective   Subjective     No acute events overnight.  Her shortness of breath had improved.  He has no chest discomfort or other complaints.    Review of Systems   All systems were reviewed and negative except as above    Objective   Objective     Vitals:   Temp:  [97.2 °F (36.2 °C)-97.9 °F (36.6 °C)] 97.3 °F (36.3 °C)  Heart Rate:  [] 100  Resp:  [18-20] 18  BP: ()/(65-79) 99/66  Physical Exam                 Constitutional: Awake, alert, No acute distress               Eyes: PERRLA, sclerae anicteric, no conjunctival injection              HENT: NCAT, mucous membranes moist              Neck: Supple, no thyromegaly, no lymphadenopathy, trachea midline              Respiratory: Reduced airway entry at the bases, nonlabored respirations               Cardiovascular: RRR, no murmurs, rubs, or gallops, palpable pedal pulses bilaterally              Gastrointestinal: Positive bowel sounds, soft, nontender, nondistended              Musculoskeletal: 1+ bilateral lower extremity edema, no clubbing or cyanosis to extremities              Psychiatric: Appropriate affect, cooperative              Neurologic: Oriented x 3, nonfocal              Skin: No rashes    Scheduled Meds:aspirin, 81 mg, Oral, Daily  atorvastatin, 40 mg, Oral, Nightly  bisacodyl, 10 mg, Oral, Daily  furosemide, 60 mg, Intravenous, TID  heparin (porcine), 5,000 Units, Subcutaneous, Q8H  midodrine, 5 mg, Oral, TID AC  mineral oil-hydrophilic petrolatum, 1 application , Topical, Daily  nicotine, 1 patch, Transdermal, Q24H  polyethylene glycol, 17 g, Oral, Daily  potassium chloride, 40 mEq, Oral, Q4H  sodium chloride, 10 mL, Intravenous, Q12H  spironolactone, 12.5 mg, Oral, Daily  thiamine, 100 mg, Oral, Daily  ticagrelor, 90 mg, Oral, BID  tiotropium bromide  monohydrate, 2 puff, Inhalation, Daily - RT      Continuous Infusions:        Result Review    Result Review:  I have personally reviewed the results from the time of this admission to 10/18/2023 08:55 EDT and agree with these findings:  [x]  Laboratory  []  Microbiology  [x]  Radiology  [x]  EKG/Telemetry   [x]  Cardiology/Vascular   []  Pathology  []  Old records  []  Other:  Most notable findings include:     CBC          10/16/2023    07:41 10/17/2023    06:05 10/18/2023    05:10   CBC   WBC 5.67  6.64  5.51    RBC 2.63  2.74  2.65    Hemoglobin 9.0  9.2  9.0    Hematocrit 26.8  27.9  26.3    .9  101.8  99.2    MCH 34.2  33.6  34.0    MCHC 33.6  33.0  34.2    RDW 14.9  15.0  14.5    Platelets 252  258  257      CMP          10/16/2023    07:41 10/16/2023    19:04 10/17/2023    06:05 10/18/2023    05:10   CMP   Glucose 98  95  130  104    BUN 25  27  26  23    Creatinine 0.70  0.93  0.98  0.83    EGFR 101.6  90.6  85.0  96.5    Sodium 138  138  136  138    Potassium 2.9  3.4  3.6  3.2    Chloride 99  98  99  100    Calcium 9.0  9.6  9.3  9.3    BUN/Creatinine Ratio 35.7  29.0  26.5  27.7    Anion Gap 15.5  12.5  13.3  12.4       CARDIAC LABS:      Lab 10/11/23  2225 10/11/23  2141   PROBNP  --  9,863.0*   HSTROP T 38*  --         Assessment & Plan   Assessment / Plan     Brief Patient Summary:  Bradley Crane is a 68 y.o. male with:     Acute on chronic systolic congestive heart failure exacerbation, most likely related to medication noncompliance.  LVEF 26 to 30%.  Severe multivessel coronary artery disease, status post recent PCI of the left main artery into the LAD was done about 4 weeks ago.  He has residual  of the right coronary artery.  Currently without angina.  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  Moderate to severe pulmonary hypertension, mean PA pressure 37 mmHg.  Mild aortic valve stenosis by recent cardiac catheterization.  Hypokalemia, iatrogenic due to diuresis,  repleted.  Ongoing alcohol abuse and smoking  Concern for liver cirrhosis  Medical noncompliance     Plan:   Continue IV Lasix 60 mg 3 times daily.  This may be switched to torsemide 40 mg daily on discharge.  Ins and Outs appear to be an accurate.  Will go by daily weights.  He has lost about 10 pounds since admission.  Maintain K around 4 and mg around 2.  Monitor kidney function.  Will wean off midodrine as blood pressure allows.  This can be done as an outpatient.  Continue aspirin, Brilinta and atorvastatin.  Continue Aldactone 12.5 mg daily.  Will restart guideline directed medical therapy for HFrEF as blood pressure allows.   Patient with ascites by exam.  Consider abdominal ultrasound and paracentesis.  Patient was counseled about the importance of medications compliance.  He expressed understanding.  Other issues as per primary team.      Patient may be discharged this afternoon or tomorrow morning.  He will need follow-up with Dr. Zuniga in the heart failure clinic within 1 week after discharge.    Thank for the consultation.  Please call with any questions.    CODE STATUS:   Level Of Support Discussed With: Patient  Code Status (Patient has no pulse and is not breathing): CPR (Attempt to Resuscitate)  Medical Interventions (Patient has pulse or is breathing): Full Support      Electronically signed by Merly Ji MD, 10/18/23, 8:55 AM EDT.

## 2023-10-18 NOTE — PLAN OF CARE
Goal Outcome Evaluation:      Pt refusing heparin injections. Frustrated about fluid restriction. Educated on treatment plan and importance of adherence. Jazmyn Carlos RN

## 2023-10-18 NOTE — PROGRESS NOTES
[facility]   Hospitalist Progress Note  Date: 10/18/2023  Patient Name: Bradley Crane  : 1955  MRN: [id number]  Date of admission: 10/11/2023  Room/Bed: 205/1      Subjective   Subjective     Chief Complaint: Dyspnea on exertion, tachypnea     Summary:Bradley Crane is a 68 y.o. male with a past medical history of CAD s/p PCI of LAD and LM with RIO x 2, impella assisted and rotablator atherectomy on 2023, HFrEF EF of 26 to 30%, hypertension, current alcohol use, concern for cirrhosis of the liver secondary to alcohol use, current tobacco smoker presented to the ED on 10/9/2023 with anasarca, heart failure exacerbation and had paracentesis with 5 L removed in the ER, admitted overnight but patient left AMA the next morning on 10/10/2023 prior to seeing physician.  Patient presented to the ED again on the morning of 10/11/2023 for evaluation of shortness of breath, dyspnea on exertion.  Associated with orthopnea, tachypnea.  Patient is significantly volume overloaded with pitting edema extending up to the sacral region.  Patient still consumes up to 8 beers a day at least 3-4 times a week, last drink 4 days ago.  States he has been taking the medications as prescribed but recently he ran out of some of the medications unsure which ones he ran out of.  Denies any fevers, chills, nausea, vomiting, abdominal pain, chest pain, focal weakness, numbness, tingling, diarrhea.     In the ED, vital signs temperature 97.7, pulse 105, respiratory 20, blood pressure 90/68 on room air saturating around 98%.  Labs showed initial troponin 38, proBNP 9863, sodium 135, bicarb 20.8, rest of the CMP with no significant findings, WBC 6.64, hemoglobin 10.1, platelets 284.  Chest x-ray showed stable appearance of the chest with suspected bilateral pleural effusions with underlying atelectasis, pulmonary vascular congestion/edema, enlarged cardiac silhouette.  Received IV Lasix in the ED.  Patient has been admitted for  further evaluation and management of acute systolic heart failure exacerbation, dyspnea on exertion.     Patient was initiated on IV Lasix 60 mg every 8 hours.  All other blood pressure medications were held due to low blood pressures.  He was given IV albumin x3 and started on midodrine to help diuresis.  Patient is intermittently refusing labs and not adhering to his fluid intake restriction.    Interval Followup:   Discussed with cardiologist, offered paracentesis with patient.  Patient is not interested in having a paracentesis performed.  States he is feeling his abdomen has improved since arrival in is not interested in any additional needles.  Discussed with cardiologist, will hopefully be able to discharge patient tomorrow on oral diuretics    Objective   Objective     Vitals:   Temp:  [97.2 °F (36.2 °C)-97.9 °F (36.6 °C)] 97.3 °F (36.3 °C)  Heart Rate:  [] 95  Resp:  [18-20] 18  BP: ()/(65-79) 101/74    Physical Exam   General: Awake, alert, NAD  Eyes: pupils equal  Cardiovascular: RRR, no murmurs   Pulmonary: CTA bilaterally; no wheezes; dyspnea with conversation   Gastrointestinal: S/NT, distended, +BS  Musculoskeletal: No gross deformities  Neuro: speech clear; no tremor  : No Dos Santos catheter; no suprapubic tenderness  Extremities: bilateral pedal edema     Result Review    Result Review:  I have personally reviewed these results:  [x]  Laboratory      Lab 10/18/23  0510 10/17/23  0605 10/16/23  0741 10/15/23  1558 10/14/23  0816 10/12/23  2132 10/12/23  1812   WBC 5.51 6.64 5.67 6.45   < >  --  6.69   HEMOGLOBIN 9.0* 9.2* 9.0* 9.6*   < >  --  9.9*   HEMATOCRIT 26.3* 27.9* 26.8* 28.9*   < >  --  30.7*   PLATELETS 257 258 252 263   < >  --  246   NEUTROS ABS  --  4.43 3.67 4.65   < >  --   --    IMMATURE GRANS (ABS)  --  0.02 0.01 0.02   < >  --   --    LYMPHS ABS  --  0.98 0.86 0.78   < >  --   --    MONOS ABS  --  0.87 0.77 0.74   < >  --   --    EOS ABS  --  0.30 0.31 0.23   < >  --   --     MCV 99.2* 101.8* 101.9* 102.5*   < >  --  106.6*   PROCALCITONIN  --   --   --   --   --   --  0.04   LACTATE  --   --   --   --   --  1.5 2.3*    < > = values in this interval not displayed.         Lab 10/18/23  0510 10/17/23  0605 10/16/23  1904 10/15/23  1348 10/14/23  0816 10/12/23  1812 10/11/23  2225   SODIUM 138 136 138   < > 138   < > 135*   POTASSIUM 3.2* 3.6 3.4*   < > 3.5   < > 4.1   CHLORIDE 100 99 98   < > 102   < > 104   CO2 25.6 23.7 27.5   < > 25.9   < > 20.8*   ANION GAP 12.4 13.3 12.5   < > 10.1   < > 10.2   BUN 23 26* 27*   < > 25*   < > 14   CREATININE 0.83 0.98 0.93   < > 0.99   < > 0.77   EGFR 96.5 85.0 90.6   < > 84.0   < > 98.7   GLUCOSE 104* 130* 95   < > 110*   < > 97   CALCIUM 9.3 9.3 9.6   < > 8.7   < > 8.6   MAGNESIUM 1.8 1.7  --   --  1.7   < > 2.0   PHOSPHORUS  --   --   --   --   --   --  4.6*    < > = values in this interval not displayed.         Lab 10/11/23  2225   TOTAL PROTEIN 6.0   ALBUMIN 3.3*   GLOBULIN 2.7   ALT (SGPT) 9   AST (SGOT) 22   BILIRUBIN 0.9   ALK PHOS 125*         Lab 10/11/23  2225 10/11/23  2141   PROBNP  --  9,863.0*   HSTROP T 38*  --              Lab 10/15/23  1348 10/11/23  2141   FOLATE >20.00  --    VITAMIN B 12  --  >2,000*         Brief Urine Lab Results       None          [x]  Microbiology   Microbiology Results (last 10 days)       Procedure Component Value - Date/Time    Body Fluid Culture - Body Fluid, Peritoneum [[id number]] Collected: 10/09/23 1525    Lab Status: Final result Specimen: Body Fluid from Peritoneum Updated: 10/12/23 0808     Body Fluid Culture No growth at 3 days     Gram Stain No organisms seen          [x]  Radiology  XR Chest 1 View    Result Date: 10/11/2023    Stable appearance of the chest with suspected bilateral pleural effusions with underlying atelectasis, pulmonary vascular congestion/edema, enlarged cardiac silhouette.       CLAYTON GIBBS MD       Electronically Signed and Approved By: CLAYTON GIBBS MD on  10/11/2023 at 22:25            []  EKG/Telemetry   []  Cardiology/Vascular   []  Pathology  []  Old records  []  Other:    Assessment & Plan   Assessment / Plan     Assessment:  Acute on chronic HFrEF  Volume overload  Alcohol abuse  CAD s/p PCI of LAD and LM with RIO x 2, impella assisted and rotablator atherectomy on 9/12/2023   Tobacco abuse  Jaundice  Concern for cirrhosis  CHRISTIANA  Macrocytic anemia    Plan:  Low blood pressures now currently on midodrine.  Ideally wean off of these medications.  Patient states he has a long history of hypotension  IV Lasix 60 mg 3 times a day.  Continue close monitoring of renal function and electrolytes with aggressive diuresis  Requires the replacement of potassium and magnesium today, recheck in the morning  CIWA protocol discontinued as patient has been stable without symptoms of withdrawal  Continue nicotine patch for ongoing tobacco abuse  Cardiology consulted, appreciate assistance  Discussed with patient option of paracentesis, he is not interested currently  Discussed importance of fluid restriction, however continues to fill up his cup in the sink  Renal function remained stable with aggressive diuresis and low blood pressure, however will require continued close monitoring  Monitor urine output, strict I&O  Hold home Entresto and Aldactone due to blood pressure  MiraLAX and Dulcolax for constipation       Discussed with RN, cardiologist    DVT prophylaxis:  Medical DVT prophylaxis orders are present.    CODE STATUS:   Level Of Support Discussed With: Patient  Code Status (Patient has no pulse and is not breathing): CPR (Attempt to Resuscitate)  Medical Interventions (Patient has pulse or is breathing): Full Support

## 2023-10-18 NOTE — PLAN OF CARE
Goal Outcome Evaluation:   Patient has continued to refuse heparin injection. VSS. Patient has been very irritable today.

## 2023-10-19 ENCOUNTER — TELEPHONE (OUTPATIENT)
Dept: FAMILY MEDICINE CLINIC | Facility: CLINIC | Age: 68
End: 2023-10-19
Payer: MEDICARE

## 2023-10-19 ENCOUNTER — READMISSION MANAGEMENT (OUTPATIENT)
Dept: CALL CENTER | Facility: HOSPITAL | Age: 68
End: 2023-10-19
Payer: MEDICARE

## 2023-10-19 VITALS
WEIGHT: 194.67 LBS | HEART RATE: 103 BPM | OXYGEN SATURATION: 94 % | RESPIRATION RATE: 18 BRPM | BODY MASS INDEX: 25.8 KG/M2 | HEIGHT: 73 IN | SYSTOLIC BLOOD PRESSURE: 108 MMHG | DIASTOLIC BLOOD PRESSURE: 82 MMHG | TEMPERATURE: 97.2 F

## 2023-10-19 DIAGNOSIS — G89.29 CHRONIC BILATERAL LOW BACK PAIN WITHOUT SCIATICA: Primary | ICD-10-CM

## 2023-10-19 DIAGNOSIS — M54.50 CHRONIC BILATERAL LOW BACK PAIN WITHOUT SCIATICA: Primary | ICD-10-CM

## 2023-10-19 DIAGNOSIS — F41.9 ANXIETY: ICD-10-CM

## 2023-10-19 LAB
ANION GAP SERPL CALCULATED.3IONS-SCNC: 14.8 MMOL/L (ref 5–15)
BUN SERPL-MCNC: 25 MG/DL (ref 8–23)
BUN/CREAT SERPL: 23.4 (ref 7–25)
CALCIUM SPEC-SCNC: 9.4 MG/DL (ref 8.6–10.5)
CHLORIDE SERPL-SCNC: 99 MMOL/L (ref 98–107)
CO2 SERPL-SCNC: 23.2 MMOL/L (ref 22–29)
CREAT SERPL-MCNC: 1.07 MG/DL (ref 0.76–1.27)
DEPRECATED RDW RBC AUTO: 53 FL (ref 37–54)
EGFRCR SERPLBLD CKD-EPI 2021: 76.5 ML/MIN/1.73
ERYTHROCYTE [DISTWIDTH] IN BLOOD BY AUTOMATED COUNT: 14.6 % (ref 12.3–15.4)
GLUCOSE SERPL-MCNC: 100 MG/DL (ref 65–99)
HCT VFR BLD AUTO: 29.1 % (ref 37.5–51)
HGB BLD-MCNC: 9.9 G/DL (ref 13–17.7)
MAGNESIUM SERPL-MCNC: 1.9 MG/DL (ref 1.6–2.4)
MCH RBC QN AUTO: 34.3 PG (ref 26.6–33)
MCHC RBC AUTO-ENTMCNC: 34 G/DL (ref 31.5–35.7)
MCV RBC AUTO: 100.7 FL (ref 79–97)
PLATELET # BLD AUTO: 282 10*3/MM3 (ref 140–450)
PMV BLD AUTO: 9.4 FL (ref 6–12)
POTASSIUM SERPL-SCNC: 3.7 MMOL/L (ref 3.5–5.2)
RBC # BLD AUTO: 2.89 10*6/MM3 (ref 4.14–5.8)
SODIUM SERPL-SCNC: 137 MMOL/L (ref 136–145)
WBC NRBC COR # BLD: 6.4 10*3/MM3 (ref 3.4–10.8)

## 2023-10-19 PROCEDURE — 94799 UNLISTED PULMONARY SVC/PX: CPT

## 2023-10-19 PROCEDURE — 80048 BASIC METABOLIC PNL TOTAL CA: CPT | Performed by: INTERNAL MEDICINE

## 2023-10-19 PROCEDURE — 99232 SBSQ HOSP IP/OBS MODERATE 35: CPT | Performed by: INTERNAL MEDICINE

## 2023-10-19 PROCEDURE — 25010000002 FUROSEMIDE PER 20 MG: Performed by: INTERNAL MEDICINE

## 2023-10-19 PROCEDURE — 85027 COMPLETE CBC AUTOMATED: CPT | Performed by: INTERNAL MEDICINE

## 2023-10-19 PROCEDURE — 83735 ASSAY OF MAGNESIUM: CPT | Performed by: INTERNAL MEDICINE

## 2023-10-19 PROCEDURE — 99239 HOSP IP/OBS DSCHRG MGMT >30: CPT | Performed by: INTERNAL MEDICINE

## 2023-10-19 RX ORDER — ASPIRIN 81 MG/1
81 TABLET, COATED ORAL DAILY
Qty: 30 TABLET | Refills: 0 | Status: SHIPPED | OUTPATIENT
Start: 2023-10-19 | End: 2023-11-18

## 2023-10-19 RX ORDER — SPIRONOLACTONE 25 MG/1
12.5 TABLET ORAL DAILY
Qty: 15 TABLET | Refills: 0 | Status: SHIPPED | OUTPATIENT
Start: 2023-10-20 | End: 2023-11-19

## 2023-10-19 RX ORDER — TORSEMIDE 20 MG/1
40 TABLET ORAL DAILY
Qty: 60 TABLET | Refills: 0 | Status: SHIPPED | OUTPATIENT
Start: 2023-10-19 | End: 2023-11-18

## 2023-10-19 RX ORDER — MIDODRINE HYDROCHLORIDE 2.5 MG/1
5 TABLET ORAL 3 TIMES DAILY
Qty: 180 TABLET | Refills: 0 | Status: SHIPPED | OUTPATIENT
Start: 2023-10-19 | End: 2023-11-18

## 2023-10-19 RX ORDER — ATORVASTATIN CALCIUM 80 MG/1
80 TABLET, FILM COATED ORAL DAILY
Qty: 90 TABLET | Refills: 0 | Status: SHIPPED | OUTPATIENT
Start: 2023-10-19

## 2023-10-19 RX ADMIN — Medication 12.5 MG: at 08:06

## 2023-10-19 RX ADMIN — MIDODRINE HYDROCHLORIDE 5 MG: 5 TABLET ORAL at 08:08

## 2023-10-19 RX ADMIN — NICOTINE 1 PATCH: 21 PATCH, EXTENDED RELEASE TRANSDERMAL at 08:04

## 2023-10-19 RX ADMIN — WHITE PETROLATUM 1 APPLICATION: 1.75 OINTMENT TOPICAL at 09:26

## 2023-10-19 RX ADMIN — FUROSEMIDE 60 MG: 10 INJECTION, SOLUTION INTRAMUSCULAR; INTRAVENOUS at 08:06

## 2023-10-19 RX ADMIN — TICAGRELOR 90 MG: 90 TABLET ORAL at 08:06

## 2023-10-19 RX ADMIN — BISACODYL 10 MG: 5 TABLET, COATED ORAL at 08:06

## 2023-10-19 RX ADMIN — Medication 10 ML: at 08:09

## 2023-10-19 RX ADMIN — HYDROCODONE BITARTRATE AND ACETAMINOPHEN 1 TABLET: 10; 325 TABLET ORAL at 08:06

## 2023-10-19 RX ADMIN — TIOTROPIUM BROMIDE INHALATION SPRAY 2 PUFF: 3.12 SPRAY, METERED RESPIRATORY (INHALATION) at 09:16

## 2023-10-19 RX ADMIN — Medication 100 MG: at 08:06

## 2023-10-19 RX ADMIN — POLYETHYLENE GLYCOL 3350 17 G: 17 POWDER, FOR SOLUTION ORAL at 08:07

## 2023-10-19 RX ADMIN — ASPIRIN 81 MG: 81 TABLET, COATED ORAL at 08:06

## 2023-10-19 NOTE — DISCHARGE SUMMARY
[facility]         HOSPITALIST  DISCHARGE SUMMARY    Patient Name: Bradley Crane  : 1955  MRN: [id number]    Date of Admission: 10/11/2023  Date of Discharge:  10/19/2023  Primary Care Physician: Provider, No Known    Consults:  Cardiology    Active and Resolved Hospital Problems:  Acute on chronic HFrEF, LV ejection fraction 26 to 30%  Volume overload  Alcohol abuse  CAD s/p PCI of LAD and LM with RIO x 2, impella assisted and rotablator atherectomy on 2023  Moderate to severe pulmonary hypertension, mean PA pressure 37 mmHg  Mild aortic valve stenosis by recent cardiac catheterization  Tobacco abuse  Jaundice  Concern for cirrhosis  CHRISTIANA  Macrocytic anemia    Hospital Course     Hospital Course:  Bradley Crane is a 68 y.o. male with a past medical history of CAD s/p PCI of LAD and LM with RIO x 2, impella assisted and rotablator atherectomy on 2023, HFrEF EF of 26 to 30%, hypertension, current alcohol use, concern for cirrhosis of the liver secondary to alcohol use, current tobacco smoker presented to the ED on 10/9/2023 with anasarca, heart failure exacerbation and had paracentesis with 5 L removed in the ER, admitted overnight but patient left AMA the next morning on 10/10/2023 prior to seeing physician.  Patient presented to the ED again on the morning of 10/11/2023 for evaluation of shortness of breath, dyspnea on exertion.  Associated with orthopnea, tachypnea.  Patient is significantly volume overloaded with pitting edema extending up to the sacral region.  Patient still consumes up to 8 beers a day at least 3-4 times a week, last drink 4 days ago.  States he has been taking the medications as prescribed but recently he ran out of some of the medications unsure which ones he ran out of.  Denies any fevers, chills, nausea, vomiting, abdominal pain, chest pain, focal weakness, numbness, tingling, diarrhea.     In the ED, vital signs temperature 97.7, pulse 105, respiratory 20,  blood pressure 90/68 on room air saturating around 98%.  Labs showed initial troponin 38, proBNP 9863, sodium 135, bicarb 20.8, rest of the CMP with no significant findings, WBC 6.64, hemoglobin 10.1, platelets 284.  Chest x-ray showed stable appearance of the chest with suspected bilateral pleural effusions with underlying atelectasis, pulmonary vascular congestion/edema, enlarged cardiac silhouette.  Patient was initiated on IV Lasix 60 mg every 8 hours.  All other blood pressure medications were held due to low blood pressures, patient was started on midodrine 5 mg 3 times daily.  He was given IV albumin x3 and started on midodrine to help diuresis.  Intermittently refusing labs and refusing to adhere to fluid restriction.  Patient remains at high risk for readmission given his medical nonadherence.  Patient will need to follow-up with cardiology as scheduled as an outpatient.  Referral placed to heart failure clinic within 1 week following discharge.  Will need to follow-up with PCP.  All cardiac medications refilled and sent to pharmacy before discharging for meds to bed.  Patient seen on date of discharge, clinically and hemodynamically stable.  Patient provided concerning signs and symptoms prompting immediate medical attention, patient understanding and agreeable    DISCHARGE Follow Up Recommendations for labs and diagnostics:   Follow-up PCP soon as possible  Follow up cardiology as scheduled  Heart failure clinic referral placed for 1 week post discharge      Day of Discharge     Vital Signs:  Temp:  [97.2 °F (36.2 °C)-97.9 °F (36.6 °C)] 97.2 °F (36.2 °C)  Heart Rate:  [] 103  Resp:  [18] 18  BP: ()/(69-82) 108/82  Physical Exam:   General: Awake, alert, NAD  Eyes: pupils equal  Cardiovascular: RRR, no murmurs   Pulmonary: CTA bilaterally; no wheezes; dyspnea with conversation   Gastrointestinal: S/NT, distended, +BS  Musculoskeletal: No gross deformities  Neuro: speech clear; no tremor  : No  Dos Santos catheter; no suprapubic tenderness  Extremities: bilateral pedal edema     Discharge Details        Discharge Medications        Changes to Medications        Instructions Start Date   midodrine 2.5 MG tablet  Commonly known as: PROAMATINE  What changed: how much to take   5 mg, Oral, 3 Times Daily      spironolactone 25 MG tablet  Commonly known as: ALDACTONE  What changed: how much to take   12.5 mg, Oral, Daily   Start Date: October 20, 2023     torsemide 20 MG tablet  Commonly known as: DEMADEX  What changed:   medication strength  how much to take   40 mg, Oral, Daily             Continue These Medications        Instructions Start Date   albuterol sulfate  (90 Base) MCG/ACT inhaler  Commonly known as: PROVENTIL HFA;VENTOLIN HFA;PROAIR HFA   2 puffs, Inhalation, Every 4 Hours PRN      Aspirin Low Dose 81 MG EC tablet  Generic drug: aspirin   81 mg, Oral, Daily      atorvastatin 80 MG tablet  Commonly known as: LIPITOR   80 mg, Oral, Daily      Brilinta 90 MG tablet tablet  Generic drug: ticagrelor   90 mg, Oral, 2 Times Daily      Melatonin 10 MG tablet   10 mg, Oral, Nightly PRN      Norco  MG per tablet  Generic drug: HYDROcodone-acetaminophen   1 tablet, Oral, Every 6 Hours PRN      ondansetron 4 MG tablet  Commonly known as: ZOFRAN   4 mg, Oral, Every 8 Hours PRN      POTASSIUM CHLORIDE PO   20 mEq, Oral, Daily      tiotropium 18 MCG per inhalation capsule  Commonly known as: Spiriva HandiHaler   1 capsule, Inhalation, Daily - RT             Stop These Medications      LORazepam 1 MG tablet  Commonly known as: ATIVAN     metOLazone 2.5 MG tablet  Commonly known as: ZAROXOLYN     metoprolol succinate XL 25 MG 24 hr tablet  Commonly known as: TOPROL-XL     sacubitril-valsartan 24-26 MG tablet  Commonly known as: ENTRESTO              No Known Allergies    Discharge Disposition:  Home or Self Care    Diet:  Hospital:No active diet order      Discharge Activity:   Activity Instructions        Activity as Tolerated              CODE STATUS:  Code Status and Medical Interventions:   Ordered at: 10/12/23 0004     Level Of Support Discussed With:    Patient     Code Status (Patient has no pulse and is not breathing):    CPR (Attempt to Resuscitate)     Medical Interventions (Patient has pulse or is breathing):    Full Support         Future Appointments   Date Time Provider Department Center   10/20/2023 11:45 AM Rosa Isela Kim APRN [facility]   10/25/2023 10:30 AM Christy Zuniga MD [facility] None       Additional Instructions for the Follow-ups that You Need to Schedule       Discharge Follow-up with PCP   As directed       Currently Documented PCP:    Provider, No Known    PCP Phone Number:    None     Follow Up Details: In less than one week        Discharge Follow-up with Specified Provider: Cardiology; 3 Weeks   As directed      To: Cardiology   Follow Up: 3 Weeks                Pertinent  and/or Most Recent Results     PROCEDURES:   NA    LAB RESULTS:      Lab 10/19/23  0546 10/18/23  0510 10/17/23  0605 10/16/23  0741 10/15/23  1558 10/14/23  0816 10/12/23  2132 10/12/23  1812   WBC 6.40 5.51 6.64 5.67 6.45 4.70  --  6.69   HEMOGLOBIN 9.9* 9.0* 9.2* 9.0* 9.6* 9.0*  --  9.9*   HEMATOCRIT 29.1* 26.3* 27.9* 26.8* 28.9* 27.1*  --  30.7*   PLATELETS 282 257 258 252 263 217  --  246   NEUTROS ABS  --   --  4.43 3.67 4.65 2.92  --   --    IMMATURE GRANS (ABS)  --   --  0.02 0.01 0.02 0.01  --   --    LYMPHS ABS  --   --  0.98 0.86 0.78 0.82  --   --    MONOS ABS  --   --  0.87 0.77 0.74 0.75  --   --    EOS ABS  --   --  0.30 0.31 0.23 0.16  --   --    .7* 99.2* 101.8* 101.9* 102.5* 102.7*  --  106.6*   PROCALCITONIN  --   --   --   --   --   --   --  0.04   LACTATE  --   --   --   --   --   --  1.5 2.3*         Lab 10/19/23  0546 10/18/23  0510 10/17/23  0605 10/16/23  1904 10/16/23  0741 10/15/23  1348 10/14/23  0816 10/12/23  1812   SODIUM 137 138 136 138 138   < > 138 133*   POTASSIUM  3.7 3.2* 3.6 3.4* 2.9*   < > 3.5 4.2   CHLORIDE 99 100 99 98 99   < > 102 98   CO2 23.2 25.6 23.7 27.5 23.5   < > 25.9 23.5   ANION GAP 14.8 12.4 13.3 12.5 15.5*   < > 10.1 11.5   BUN 25* 23 26* 27* 25*   < > 25* 20   CREATININE 1.07 0.83 0.98 0.93 0.70*   < > 0.99 1.24   EGFR 76.5 96.5 85.0 90.6 101.6   < > 84.0 64.1   GLUCOSE 100* 104* 130* 95 98   < > 110* 105*   CALCIUM 9.4 9.3 9.3 9.6 9.0   < > 8.7 9.0   MAGNESIUM 1.9 1.8 1.7  --   --   --  1.7 1.6    < > = values in this interval not displayed.                     Lab 10/15/23  1348   FOLATE >20.00         Brief Urine Lab Results       None          Microbiology Results (last 10 days)       Procedure Component Value - Date/Time    Body Fluid Culture - Body Fluid, Peritoneum [[id number]] Collected: 10/09/23 1525    Lab Status: Final result Specimen: Body Fluid from Peritoneum Updated: 10/12/23 0808     Body Fluid Culture No growth at 3 days     Gram Stain No organisms seen            XR Chest 1 View    Result Date: 10/11/2023  Impression:   Stable appearance of the chest with suspected bilateral pleural effusions with underlying atelectasis, pulmonary vascular congestion/edema, enlarged cardiac silhouette.       CLAYTON GIBBS MD       Electronically Signed and Approved By: CLAYTON GIBBS MD on 10/11/2023 at 22:25             US Paracentesis    Result Date: 10/9/2023  Impression:  Ultrasound-guided paracentesis was performed without complication, patient tolerated procedure with 5 L of clear, dunn ascitic fluid removed. A sample specimen was sent to the lab for further diagnostic evaluation.  The patient was instructed to obtain follow up care from the referring physician.    CARMEN LUCAS       Electronically Signed and Approved By: NYAE MUNOZ MD on 10/09/2023 at 16:43             XR Chest 1 View    Result Date: 10/9/2023  Impression:   Findings consistent with moderate congestive heart failure.  Low lung volumes with worsening atelectasis at the lung  bases.  Dual lead AICD remains in place.       LORENA GARNICA MD       Electronically Signed and Approved By: LORENA GARNICA MD on 10/09/2023 at 14:12              Results for orders placed during the hospital encounter of 08/29/23    Doppler Arterial Multi Level Lower Extremity - Bilateral CAR    Interpretation Summary    Right Conclusion: The right STEFANIA is normal. Mild digital ischemia.    Left Conclusion: The left STEFANIA is normal. Mild digital ischemia.      Results for orders placed during the hospital encounter of 08/29/23    Doppler Arterial Multi Level Lower Extremity - Bilateral CAR    Interpretation Summary    Right Conclusion: The right STEFANIA is normal. Mild digital ischemia.    Left Conclusion: The left STEFANIA is normal. Mild digital ischemia.      Results for orders placed during the hospital encounter of 08/29/23    Adult Transthoracic Echo Complete w/ Color, Spectral and Contrast if necessary per protocol    Interpretation Summary    Left ventricular ejection fraction appears to be 26 - 30%.    The left ventricular cavity is mildly dilated.    Left ventricular diastolic function was indeterminate.    The right ventricular cavity is mild to moderately dilated.    The left atrial cavity is moderately dilated.    The right atrial cavity is moderately  dilated.    Moderate tricuspid valve regurgitation is present.    Estimated right ventricular systolic pressure from tricuspid regurgitation is normal (<35 mmHg).    Aortic valve appears sclerotic with reduced excursion.  Max/mean pressure gradient was 20/11 mmHg.  Aortic valve area was 0.93 cm².  Could be consistent with low-flow, low gradient AAS.      Labs Pending at Discharge:        Time spent on Discharge including face to face service:  40 minutes    Electronically signed by Angel Roy MD, 10/19/23, 2:24 PM EDT.

## 2023-10-19 NOTE — OUTREACH NOTE
Prep Survey      Flowsheet Row Responses   [facility] patient discharged from? [facility]   Is LACE score < 7 ? No   Eligibility [facility]   Date of Admission 10/11/23   Date of Discharge 10/19/23   Discharge Disposition Home or Self Care   Discharge diagnosis Acute exacerbation of CHF   Does the patient have one of the following disease processes/diagnoses(primary or secondary)? CHF   Does the patient have Home health ordered? No   Is there a DME ordered? No   Comments regarding appointments --  [new pcp]   Prep survey completed? Yes            CARMEN CRUZ - Registered Nurse

## 2023-10-19 NOTE — PROGRESS NOTES
[facility]     Cardiology Progress Note    Patient Name: Bradley Crane  : 1955  MRN: [id number]  Primary Care Physician:  Provider, No Known  Date of admission: 10/11/2023    Subjective   Subjective     No acute events overnight.  He is feeling well this morning.  His shortness of breath improved.  He is getting ready to be discharged.  He has no complaints.    Review of Systems   All systems were reviewed and negative except as above  Objective   Objective     Vitals:   Temp:  [97.2 °F (36.2 °C)-97.9 °F (36.6 °C)] 97.2 °F (36.2 °C)  Heart Rate:  [] 108  Resp:  [18] 18  BP: ()/(69-82) 108/82  Physical Exam                 Constitutional: Awake, alert, No acute distress               Eyes: PERRLA, sclerae anicteric, no conjunctival injection              HENT: NCAT, mucous membranes moist              Neck: Supple, no thyromegaly, no lymphadenopathy, trachea midline              Respiratory: Reduced airway entry at the bases, nonlabored respirations               Cardiovascular: RRR, no murmurs, rubs, or gallops, palpable pedal pulses bilaterally              Gastrointestinal: Positive bowel sounds, soft, nontender, nondistended              Musculoskeletal: 1+ bilateral lower extremity edema, no clubbing or cyanosis to extremities              Psychiatric: Appropriate affect, cooperative              Neurologic: Oriented x 3, nonfocal              Skin: No rashes    Scheduled Meds:aspirin, 81 mg, Oral, Daily  atorvastatin, 40 mg, Oral, Nightly  bisacodyl, 10 mg, Oral, Daily  furosemide, 60 mg, Intravenous, TID  heparin (porcine), 5,000 Units, Subcutaneous, Q8H  midodrine, 5 mg, Oral, TID AC  mineral oil-hydrophilic petrolatum, 1 application , Topical, Daily  nicotine, 1 patch, Transdermal, Q24H  polyethylene glycol, 17 g, Oral, Daily  sodium chloride, 10 mL, Intravenous, Q12H  spironolactone, 12.5 mg, Oral, Daily  thiamine, 100 mg, Oral, Daily  ticagrelor, 90 mg, Oral, BID  tiotropium bromide  monohydrate, 2 puff, Inhalation, Daily - RT      Continuous Infusions:        Result Review    Result Review:  I have personally reviewed the results from the time of this admission to 10/19/2023 09:01 EDT and agree with these findings:  [x]  Laboratory  []  Microbiology  [x]  Radiology  [x]  EKG/Telemetry   [x]  Cardiology/Vascular   []  Pathology  []  Old records  []  Other:  Most notable findings include:     CBC          10/17/2023    06:05 10/18/2023    05:10 10/19/2023    05:46   CBC   WBC 6.64  5.51  6.40    RBC 2.74  2.65  2.89    Hemoglobin 9.2  9.0  9.9    Hematocrit 27.9  26.3  29.1    .8  99.2  100.7    MCH 33.6  34.0  34.3    MCHC 33.0  34.2  34.0    RDW 15.0  14.5  14.6    Platelets 258  257  282      CMP          10/17/2023    06:05 10/18/2023    05:10 10/19/2023    05:46   CMP   Glucose 130  104  100    BUN 26  23  25    Creatinine 0.98  0.83  1.07    EGFR 85.0  96.5  76.5    Sodium 136  138  137    Potassium 3.6  3.2  3.7    Chloride 99  100  99    Calcium 9.3  9.3  9.4    BUN/Creatinine Ratio 26.5  27.7  23.4    Anion Gap 13.3  12.4  14.8       CARDIAC LABS:         Assessment & Plan   Assessment / Plan     Brief Patient Summary:  Bradley Crane is a 68 y.o. male with:     Acute on chronic systolic congestive heart failure exacerbation, most likely related to medication noncompliance.  LVEF 26 to 30%.  Severe multivessel coronary artery disease, status post recent PCI of the left main artery into the LAD was done about 4 weeks ago.  He has residual  of the right coronary artery.  Currently without angina.  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  Moderate to severe pulmonary hypertension, mean PA pressure 37 mmHg.  Mild aortic valve stenosis by recent cardiac catheterization.  Hypokalemia, iatrogenic due to diuresis, repleted.  Ongoing alcohol abuse and smoking  Concern for liver cirrhosis  Medical noncompliance     Plan:   Continue IV Lasix 60 mg 3 times daily.   This may be switched to torsemide 40 mg daily on discharge.  Ins and Outs appear to be an accurate.  Will go by daily weights.  He has lost about 10 pounds since admission.  Maintain K around 4 and mg around 2.  Monitor kidney function.  Will wean off midodrine as blood pressure allows.  This can be done as an outpatient.  Continue aspirin, Brilinta and atorvastatin.  Continue Aldactone 12.5 mg daily.  Will restart guideline directed medical therapy for HFrEF as blood pressure allows.   Patient with ascites by exam.  Consider abdominal ultrasound and paracentesis.  Patient was counseled about the importance of medications compliance.  He expressed understanding.  Other issues as per primary team.        Patient may be discharged.  He will need follow-up with Dr. Zuniga in the heart failure clinic within 1 week after discharge.     Thank for the consultation.  Please call with any questions.    CODE STATUS:   Level Of Support Discussed With: Patient  Code Status (Patient has no pulse and is not breathing): CPR (Attempt to Resuscitate)  Medical Interventions (Patient has pulse or is breathing): Full Support      Electronically signed by Merly Ji MD, 10/19/23, 9:01 AM EDT.

## 2023-10-19 NOTE — TELEPHONE ENCOUNTER
Caller: LOUIS SPENCER    Relationship: Emergency Contact    Best call back number: [phone]    Requested Prescriptions:   Requested Prescriptions     Pending Prescriptions Disp Refills    LORazepam (ATIVAN) 1 MG tablet       Sig: Take 1 tablet by mouth Every 8 (Eight) Hours As Needed for Anxiety.    HYDROcodone-acetaminophen (Norco)  MG per tablet       Sig: Take 1 tablet by mouth Every 6 (Six) Hours As Needed.        Pharmacy where request should be sent: [id number] - [place], [place] - [address]  AT [facility] [address] - [phone] [facility] [phone] [facility]     Last office visit with prescribing clinician: 9/20/2023   Last telemedicine visit with prescribing clinician: Visit date not found   Next office visit with prescribing clinician: 10/20/2023         Does the patient have less than a 3 day supply:  [x] Yes  [] No      Sandra Mott Rep   10/19/23 13:17 EDT

## 2023-10-19 NOTE — PLAN OF CARE
Goal Outcome Evaluation:  Plan of Care Reviewed With: patient, spouse        Progress: improving  Outcome Evaluation: Patient remains alert and oriented x4. Medicated with PRN pain medication per MAR. Medications brought to bedside. Discharge paperwork addressed, no questions or concerns at this time. Discharged home with spouse via personal vehicle.

## 2023-10-19 NOTE — PLAN OF CARE
Goal Outcome Evaluation:      No changes this shift. Medicated for back pain x1. VSS. Jazmyn Carlos RN

## 2023-10-20 ENCOUNTER — TRANSITIONAL CARE MANAGEMENT TELEPHONE ENCOUNTER (OUTPATIENT)
Dept: CALL CENTER | Facility: HOSPITAL | Age: 68
End: 2023-10-20
Payer: MEDICARE

## 2023-10-20 ENCOUNTER — OFFICE VISIT (OUTPATIENT)
Dept: FAMILY MEDICINE CLINIC | Facility: CLINIC | Age: 68
End: 2023-10-20
Payer: MEDICARE

## 2023-10-20 VITALS
DIASTOLIC BLOOD PRESSURE: 73 MMHG | HEIGHT: 72 IN | WEIGHT: 195 LBS | SYSTOLIC BLOOD PRESSURE: 105 MMHG | TEMPERATURE: 97.7 F | BODY MASS INDEX: 26.41 KG/M2 | OXYGEN SATURATION: 100 % | HEART RATE: 101 BPM

## 2023-10-20 DIAGNOSIS — M54.41 CHRONIC BILATERAL LOW BACK PAIN WITH RIGHT-SIDED SCIATICA: Primary | ICD-10-CM

## 2023-10-20 DIAGNOSIS — I50.23 ACUTE ON CHRONIC SYSTOLIC CONGESTIVE HEART FAILURE: ICD-10-CM

## 2023-10-20 DIAGNOSIS — F17.200 SMOKER: ICD-10-CM

## 2023-10-20 DIAGNOSIS — G89.29 CHRONIC BILATERAL LOW BACK PAIN WITH RIGHT-SIDED SCIATICA: Primary | ICD-10-CM

## 2023-10-20 DIAGNOSIS — F41.9 ANXIETY: ICD-10-CM

## 2023-10-20 RX ORDER — HYDROCODONE BITARTRATE AND ACETAMINOPHEN 5; 325 MG/1; MG/1
2 TABLET ORAL EVERY 6 HOURS PRN
Qty: 240 TABLET | Refills: 0 | Status: SHIPPED | OUTPATIENT
Start: 2023-10-20

## 2023-10-20 RX ORDER — HYDROCODONE BITARTRATE AND ACETAMINOPHEN 10; 325 MG/1; MG/1
1 TABLET ORAL EVERY 6 HOURS PRN
Qty: 120 TABLET | Refills: 0 | Status: SHIPPED | OUTPATIENT
Start: 2023-10-20 | End: 2023-10-20

## 2023-10-20 RX ORDER — CLONAZEPAM 1 MG/1
1 TABLET ORAL 3 TIMES DAILY PRN
Qty: 90 TABLET | Refills: 0 | Status: SHIPPED | OUTPATIENT
Start: 2023-10-20

## 2023-10-20 RX ORDER — NICOTINE 21 MG/24HR
1 PATCH, TRANSDERMAL 24 HOURS TRANSDERMAL EVERY 24 HOURS
Qty: 28 PATCH | Refills: 2 | Status: SHIPPED | OUTPATIENT
Start: 2023-10-20

## 2023-10-20 RX ORDER — LORAZEPAM 1 MG/1
1 TABLET ORAL EVERY 8 HOURS PRN
Qty: 90 TABLET | Refills: 0 | Status: SHIPPED | OUTPATIENT
Start: 2023-10-20 | End: 2023-10-20

## 2023-10-20 NOTE — PROGRESS NOTES
Chief Complaint  Congestive Heart Failure (Follow up), Depression, and Fatigue    Subjective          Bradley Crane is a 68 y.o. male who presents to [facility]    History of Present Illness    Follow up from recent hospitalization  Complains of right sciatic nerve pain and chronic back pain seems worse right now because of hospital bed.  Pharmacy has told them his ativan is on backorder and hydrocodone 10 mg is on backorder.  Has stopped drinking alcohol.    Caregiver and Bradley state that he is slightly depressed.  Wants to quit smoking,  Seeing Cardiologist Dr Zuniga    PHQ-2 Total Score:     PHQ-9 Total Score:          Review of Systems       Medical History: has a past medical history of CHF (congestive heart failure) and Hypertension.     Surgical History: has a past surgical history that includes Cardiac surgery and Cardiac catheterization (N/A, 9/5/2023).     Family History: family history is not on file.     Social History: reports that he has been smoking cigarettes. He has been smoking an average of 1.5 packs per day. He does not have any smokeless tobacco history on file. He reports current alcohol use of about 3.0 standard drinks of alcohol per week. He reports that he does not use drugs.    Allergies: Patient has no known allergies.      Health Maintenance Due   Topic Date Due    COLORECTAL CANCER SCREENING  Never done    COVID-19 Vaccine (1) Never done    Pneumococcal Vaccine 65+ (1 - PCV) Never done    TDAP/TD VACCINES (1 - Tdap) Never done    ZOSTER VACCINE (1 of 2) Never done    Hepatitis B (1 of 3 - Risk 3-dose series) Never done    INFLUENZA VACCINE  Never done    ANNUAL WELLNESS VISIT  Never done            Current Outpatient Medications:     albuterol sulfate  (90 Base) MCG/ACT inhaler, Inhale 2 puffs Every 4 (Four) Hours As Needed for Wheezing., Disp: 18 g, Rfl: 1    Aspirin Low Dose 81 MG EC tablet, Take 1 tablet by mouth Daily for 30 days., Disp: 30  "tablet, Rfl: 0    atorvastatin (LIPITOR) 80 MG tablet, Take 1 tablet by mouth Daily., Disp: 90 tablet, Rfl: 0    Melatonin 10 MG tablet, Take 1 tablet by mouth At Night As Needed., Disp: , Rfl:     midodrine (PROAMATINE) 2.5 MG tablet, Take 2 tablets by mouth 3 (Three) Times a Day for 30 days., Disp: 180 tablet, Rfl: 0    ondansetron (ZOFRAN) 4 MG tablet, Take 1 tablet by mouth Every 8 (Eight) Hours As Needed for Nausea or Vomiting., Disp: , Rfl:     POTASSIUM CHLORIDE PO, Take 20 mEq by mouth Daily., Disp: , Rfl:     spironolactone (ALDACTONE) 25 MG tablet, Take 0.5 tablets by mouth Daily for 30 days., Disp: 15 tablet, Rfl: 0    ticagrelor (BRILINTA) 90 MG tablet tablet, Take 1 tablet by mouth 2 (Two) Times a Day., Disp: 60 tablet, Rfl: 11    tiotropium (Spiriva HandiHaler) 18 MCG per inhalation capsule, Place 1 capsule into inhaler and inhale Daily., Disp: 28 capsule, Rfl: 0    torsemide (DEMADEX) 20 MG tablet, Take 2 tablets by mouth Daily for 30 days., Disp: 60 tablet, Rfl: 0    clonazePAM (KlonoPIN) 1 MG tablet, Take 1 tablet by mouth 3 (Three) Times a Day As Needed for Anxiety., Disp: 90 tablet, Rfl: 0    HYDROcodone-acetaminophen (NORCO) 5-325 MG per tablet, Take 2 tablets by mouth Every 6 (Six) Hours As Needed for Moderate Pain., Disp: 240 tablet, Rfl: 0    nicotine (NICODERM CQ) 21 MG/24HR patch, Place 1 patch on the skin as directed by provider Daily., Disp: 28 patch, Rfl: 2        There is no immunization history on file for this patient.      Objective       Vitals:    10/20/23 1139   BP: 105/73   BP Location: Left arm   Patient Position: Sitting   Cuff Size: Adult   Pulse: 101   Temp: 97.7 °F (36.5 °C)   TempSrc: Temporal   SpO2: 100%   Weight: 88.5 kg (195 lb)   Height: 182.9 cm (72\")   PainSc:   8   PainLoc: Back      Body mass index is 26.45 kg/m².   Wt Readings from Last 3 Encounters:   10/20/23 88.5 kg (195 lb)   10/19/23 88.3 kg (194 lb 10.7 oz)   10/09/23 98.6 kg (217 lb 6 oz)      BP Readings " from Last 3 Encounters:   10/20/23 105/73   10/19/23 108/82   10/09/23 92/59                Physical Exam  Vitals reviewed.   Constitutional:       Appearance: Normal appearance.   HENT:      Head: Normocephalic and atraumatic.   Eyes:      Conjunctiva/sclera: Conjunctivae normal.      Pupils: Pupils are equal, round, and reactive to light.   Cardiovascular:      Rate and Rhythm: Normal rate and regular rhythm.      Pulses: Normal pulses.           Dorsalis pedis pulses are 1+ on the right side and 1+ on the left side.        Posterior tibial pulses are 1+ on the right side and 1+ on the left side.      Heart sounds: Normal heart sounds.   Pulmonary:      Effort: Pulmonary effort is normal.      Breath sounds: Normal breath sounds.   Musculoskeletal:      Right lower leg: 3+      Left lower le+   Skin:     General: Skin is warm and dry.      Comments: Bilateral calf dressings clean and dry   Neurological:      Mental Status: He is alert and oriented to person, place, and time.   Psychiatric:         Mood and Affect: Mood normal.         Behavior: Behavior normal.         Thought Content: Thought content normal.         Judgment: Judgment normal.             Result Review :       Common labs          10/17/2023    06:05 10/18/2023    05:10 10/19/2023    05:46   Common Labs   Glucose 130  104  100    BUN 26  23  25    Creatinine 0.98  0.83  1.07    Sodium 136  138  137    Potassium 3.6  3.2  3.7    Chloride 99  100  99    Calcium 9.3  9.3  9.4    WBC 6.64  5.51  6.40    Hemoglobin 9.2  9.0  9.9    Hematocrit 27.9  26.3  29.1    Platelets 258  257  282                       Assessment and Plan        Diagnoses and all orders for this visit:    1. Chronic bilateral low back pain with right-sided sciatica (Primary)  Comments:  Herber and ALEXA up to date.  Orders:  -     HYDROcodone-acetaminophen (NORCO) 5-325 MG per tablet; Take 2 tablets by mouth Every 6 (Six) Hours As Needed for Moderate Pain.  Dispense: 240 tablet;  Refill: 0    2. Anxiety  Comments:  Start Klonopin  Orders:  -     clonazePAM (KlonoPIN) 1 MG tablet; Take 1 tablet by mouth 3 (Three) Times a Day As Needed for Anxiety.  Dispense: 90 tablet; Refill: 0    3. Smoker  -     nicotine (NICODERM CQ) 21 MG/24HR patch; Place 1 patch on the skin as directed by provider Daily.  Dispense: 28 patch; Refill: 2    4. Acute on chronic systolic congestive heart failure  Comments:  Advised to lower salt intake 2300 mg a day, increase exercise by walking, add more protein to diet  grams a day.  Add Ensure Complete bid  Orders:  -     Ambulatory Referral to Chronic Care Management Medication Adherence, Care Coordination, Preventative Care, High Risk for ED/Readmission, Disease Education  -     CBC & Differential; Future  -     Comprehensive Metabolic Panel; Future  -     proBNP; Future          Follow Up     Return in about 4 weeks (around 11/17/2023).    Patient was given instructions and counseling regarding his condition or for health maintenance advice. Please see specific information pulled into the AVS if appropriate.     Rosa Isela Kim, APRN

## 2023-10-20 NOTE — OUTREACH NOTE
Call Center TCM Note      Flowsheet Row Responses   [facility] patient discharged from? [facility]   Does the patient have one of the following disease processes/diagnoses(primary or secondary)? CHF   TCM attempt successful? Yes   Call start time 1419   Call end time 1425   Discharge diagnosis Acute exacerbation of CHF   Person spoke with today (if not patient) and relationship Pt and wife   Meds reviewed with patient/caregiver? Yes   Is the patient having any side effects they believe may be caused by any medication additions or changes? No   Does the patient have all medications ordered at discharge? Yes   Is the patient taking all medications as directed (includes completed medication regime)? Yes   Comments Pt completed FU with PCP today. Meets TCM guideline.   Does the patient have an appointment with their PCP within 7-14 days of discharge? Yes   Has home health visited the patient within 72 hours of discharge? Yes   Psychosocial issues? No   Did the patient receive a copy of their discharge instructions? Yes   Nursing interventions Reviewed instructions with patient   What is the patient's perception of their health status since discharge? Improving   Nursing interventions Nurse provided patient education   Is the patient able to teach back signs and symptoms of worsening condition? (i.e. weight gain, shortness of air, etc.) Yes   Is the patient/caregiver able to teach back the hierarchy of who to call/visit for symptoms/problems? PCP, Specialist, Home health nurse, Urgent Care, ED, 911 Yes   TCM call completed? Yes   Wrap up additional comments Spouse reports Pt did follow up and is taking meds as ordered. Is aware of what meds were stopped.   Call end time 1425             Trini Amor RN    10/20/2023, 14:25 EDT

## 2023-10-20 NOTE — OUTREACH NOTE
Call Center TCM Note      Flowsheet Row Responses   [facility] patient discharged from? [facility]   Does the patient have one of the following disease processes/diagnoses(primary or secondary)? CHF   TCM attempt successful? No   Unsuccessful attempts Attempt 1   Call Status Left message             Deanna Davidson RN    10/20/2023, 11:09 EDT

## 2023-10-25 ENCOUNTER — TELEPHONE (OUTPATIENT)
Dept: FAMILY MEDICINE CLINIC | Facility: CLINIC | Age: 68
End: 2023-10-25
Payer: MEDICARE

## 2023-10-25 ENCOUNTER — REMOTE PATIENT MONITORING (OUTPATIENT)
Dept: CARDIOLOGY | Facility: CLINIC | Age: 68
End: 2023-10-25

## 2023-10-25 ENCOUNTER — OFFICE VISIT (OUTPATIENT)
Dept: CARDIOLOGY | Facility: CLINIC | Age: 68
End: 2023-10-25
Payer: MEDICARE

## 2023-10-25 ENCOUNTER — TELEPHONE (OUTPATIENT)
Dept: CASE MANAGEMENT | Facility: OTHER | Age: 68
End: 2023-10-25
Payer: MEDICARE

## 2023-10-25 VITALS
HEIGHT: 72 IN | SYSTOLIC BLOOD PRESSURE: 118 MMHG | BODY MASS INDEX: 26.6 KG/M2 | DIASTOLIC BLOOD PRESSURE: 80 MMHG | HEART RATE: 95 BPM | WEIGHT: 196.4 LBS

## 2023-10-25 DIAGNOSIS — I50.23 ACUTE ON CHRONIC HFREF (HEART FAILURE WITH REDUCED EJECTION FRACTION): Primary | ICD-10-CM

## 2023-10-25 DIAGNOSIS — F17.200 SMOKER: ICD-10-CM

## 2023-10-25 DIAGNOSIS — I25.10 CAD, MULTIPLE VESSEL: ICD-10-CM

## 2023-10-25 DIAGNOSIS — E55.9 VITAMIN D DEFICIENCY: ICD-10-CM

## 2023-10-25 PROBLEM — I27.20 PULMONARY HTN: Status: RESOLVED | Noted: 2023-09-05 | Resolved: 2023-10-25

## 2023-10-25 PROBLEM — I50.21 ACUTE HFREF (HEART FAILURE WITH REDUCED EJECTION FRACTION): Status: RESOLVED | Noted: 2023-09-01 | Resolved: 2023-10-25

## 2023-10-25 LAB
ALBUMIN SERPL-MCNC: 4.3 G/DL (ref 3.5–5.2)
ALBUMIN/GLOB SERPL: 1.4 G/DL
ALP SERPL-CCNC: 140 U/L (ref 39–117)
ALT SERPL W P-5'-P-CCNC: 10 U/L (ref 1–41)
ANION GAP SERPL CALCULATED.3IONS-SCNC: 13.9 MMOL/L (ref 5–15)
AST SERPL-CCNC: 25 U/L (ref 1–40)
BASOPHILS # BLD AUTO: 0.07 10*3/MM3 (ref 0–0.2)
BASOPHILS NFR BLD AUTO: 0.9 % (ref 0–1.5)
BILIRUB SERPL-MCNC: 0.9 MG/DL (ref 0–1.2)
BUN SERPL-MCNC: 27 MG/DL (ref 8–23)
BUN/CREAT SERPL: 22.1 (ref 7–25)
CALCIUM SPEC-SCNC: 9.2 MG/DL (ref 8.6–10.5)
CHLORIDE SERPL-SCNC: 96 MMOL/L (ref 98–107)
CO2 SERPL-SCNC: 25.1 MMOL/L (ref 22–29)
CREAT SERPL-MCNC: 1.22 MG/DL (ref 0.76–1.27)
DEPRECATED RDW RBC AUTO: 55.6 FL (ref 37–54)
EGFRCR SERPLBLD CKD-EPI 2021: 65.4 ML/MIN/1.73
EOSINOPHIL # BLD AUTO: 0.12 10*3/MM3 (ref 0–0.4)
EOSINOPHIL NFR BLD AUTO: 1.6 % (ref 0.3–6.2)
ERYTHROCYTE [DISTWIDTH] IN BLOOD BY AUTOMATED COUNT: 14.8 % (ref 12.3–15.4)
GLOBULIN UR ELPH-MCNC: 3 GM/DL
GLUCOSE SERPL-MCNC: 100 MG/DL (ref 65–99)
HCT VFR BLD AUTO: 31 % (ref 37.5–51)
HGB BLD-MCNC: 10.1 G/DL (ref 13–17.7)
IMM GRANULOCYTES # BLD AUTO: 0.02 10*3/MM3 (ref 0–0.05)
IMM GRANULOCYTES NFR BLD AUTO: 0.3 % (ref 0–0.5)
IRON 24H UR-MRATE: 38 MCG/DL (ref 59–158)
IRON SATN MFR SERPL: 10 % (ref 20–50)
LYMPHOCYTES # BLD AUTO: 0.97 10*3/MM3 (ref 0.7–3.1)
LYMPHOCYTES NFR BLD AUTO: 12.7 % (ref 19.6–45.3)
MAGNESIUM SERPL-MCNC: 2.1 MG/DL (ref 1.6–2.4)
MCH RBC QN AUTO: 33.7 PG (ref 26.6–33)
MCHC RBC AUTO-ENTMCNC: 32.6 G/DL (ref 31.5–35.7)
MCV RBC AUTO: 103.3 FL (ref 79–97)
MONOCYTES # BLD AUTO: 0.93 10*3/MM3 (ref 0.1–0.9)
MONOCYTES NFR BLD AUTO: 12.2 % (ref 5–12)
NEUTROPHILS NFR BLD AUTO: 5.52 10*3/MM3 (ref 1.7–7)
NEUTROPHILS NFR BLD AUTO: 72.3 % (ref 42.7–76)
NRBC BLD AUTO-RTO: 0 /100 WBC (ref 0–0.2)
NT-PROBNP SERPL-MCNC: 6591 PG/ML (ref 0–900)
PLATELET # BLD AUTO: 307 10*3/MM3 (ref 140–450)
PMV BLD AUTO: 9.8 FL (ref 6–12)
POTASSIUM SERPL-SCNC: 4.2 MMOL/L (ref 3.5–5.2)
PROT SERPL-MCNC: 7.3 G/DL (ref 6–8.5)
RBC # BLD AUTO: 3 10*6/MM3 (ref 4.14–5.8)
SODIUM SERPL-SCNC: 135 MMOL/L (ref 136–145)
T4 FREE SERPL-MCNC: 1.7 NG/DL (ref 0.93–1.7)
TIBC SERPL-MCNC: 398 MCG/DL (ref 298–536)
TRANSFERRIN SERPL-MCNC: 267 MG/DL (ref 200–360)
TSH SERPL DL<=0.05 MIU/L-ACNC: 2.96 UIU/ML (ref 0.27–4.2)
WBC NRBC COR # BLD: 7.63 10*3/MM3 (ref 3.4–10.8)

## 2023-10-25 PROCEDURE — 80053 COMPREHEN METABOLIC PANEL: CPT | Performed by: INTERNAL MEDICINE

## 2023-10-25 PROCEDURE — 85025 COMPLETE CBC W/AUTO DIFF WBC: CPT | Performed by: INTERNAL MEDICINE

## 2023-10-25 PROCEDURE — 83880 ASSAY OF NATRIURETIC PEPTIDE: CPT | Performed by: INTERNAL MEDICINE

## 2023-10-25 PROCEDURE — 84443 ASSAY THYROID STIM HORMONE: CPT | Performed by: INTERNAL MEDICINE

## 2023-10-25 PROCEDURE — 84466 ASSAY OF TRANSFERRIN: CPT | Performed by: INTERNAL MEDICINE

## 2023-10-25 PROCEDURE — 82306 VITAMIN D 25 HYDROXY: CPT | Performed by: INTERNAL MEDICINE

## 2023-10-25 PROCEDURE — 83735 ASSAY OF MAGNESIUM: CPT | Performed by: INTERNAL MEDICINE

## 2023-10-25 PROCEDURE — 84439 ASSAY OF FREE THYROXINE: CPT | Performed by: INTERNAL MEDICINE

## 2023-10-25 PROCEDURE — 83540 ASSAY OF IRON: CPT | Performed by: INTERNAL MEDICINE

## 2023-10-25 NOTE — TELEPHONE ENCOUNTER
Caller: FRANKY    Relationship to patient: Home Health    Best call back number: [phone]     AIDAN FROM [facility] IS REQUESTING PROVIDER TO REFAX ORDER THAT WAS SENT OVER. AIDAN STATED THE FAX THAT WAS SENT WAS MISSING PART OF THE ORDER.       [phone]    PLEASE ADVISE

## 2023-10-25 NOTE — ASSESSMENT & PLAN NOTE
He has multivessel coronary disease and is status stent PCI to his left main and LAD.  He needs a high risk PCI of his circumflex when he is more stable.  Currently he does not have any symptoms of angina

## 2023-10-25 NOTE — ASSESSMENT & PLAN NOTE
I had a long discussion with him regarding the ill effects of smoking.  Smoking cessation counseling was performed for 5 to 7 minutes.  He now agrees to quit smoking as of today.  We have encouraged him to use the patches that he has at home but he does not want to use them since it does not help him.

## 2023-10-25 NOTE — PROGRESS NOTES
New Patient Office Visit    Chief Complaint  acute on chronic HFrEF    Subjective            Bradley Crane presents to [facility]  History of Present Illness  Mr. Blake is a 68 years old gentleman with heart failure with severely reduced ejection fraction and coronary artery disease who is in for follow-up of his heart failure in our clinic for the first time 20.  In the month of September he was admitted to the hospital with severe CHF.  Subsequently he was investigated and found to have multivessel severe coronary disease.  He was transferred to [facility] where he underwent a successful stent PCI to his left main and LAD.  He is supposed to get staged PCI of his circumflex coronary artery.  In the interim, he has been admitted to the hospital on 2 occasions with severe CHF exacerbation.  He is not very compliant with his medication he continues to smoke and is a heavy alcohol consumer.  He states that he has not drunk anything over the last 2 weeks.  He admits to getting short of breath with minimal activity.  He is denying chest pain now.      Past Medical History:   Diagnosis Date    CHF (congestive heart failure)     Hypertension        No Known Allergies     Past Surgical History:   Procedure Laterality Date    CARDIAC CATHETERIZATION N/A 09/05/2023    Procedure: Right and LHC with possible coronary intevention;  Surgeon: Merly Ji MD;  Location[facility] CATH INVASIVE LOC[facility];  Service: Cardiology;  Laterality: N/A;    CARDIAC DEFIBRILLATOR PLACEMENT      CARDIAC SURGERY          Social History     Tobacco Use    Smoking status: Every Day     Packs/day: 1     Types: Cigarettes   Vaping Use    Vaping Use: Never used   Substance Use Topics    Alcohol use: Not Currently     Alcohol/week: 3.0 standard drinks of alcohol     Types: 3 Cans of beer per week     Comment: LAST DRINK 2 WEEKS AGO    Drug use: Never       History reviewed. No pertinent family history.      Prior to Admission medications    Medication Sig Start Date End Date Taking? Authorizing Provider   albuterol sulfate  (90 Base) MCG/ACT inhaler Inhale 2 puffs Every 4 (Four) Hours As Needed for Wheezing. 9/20/23  Yes Rosa Isela Kim APRN   Aspirin Low Dose 81 MG EC tablet Take 1 tablet by mouth Daily for 30 days. 10/19/23 11/18/23 Yes Angel Roy MD   atorvastatin (LIPITOR) 80 MG tablet Take 1 tablet by mouth Daily. 10/19/23  Yes Angel Roy MD   clonazePAM (KlonoPIN) 1 MG tablet Take 1 tablet by mouth 3 (Three) Times a Day As Needed for Anxiety. 10/20/23  Yes Rosa Isela Kim APRN   HYDROcodone-acetaminophen (NORCO) 5-325 MG per tablet Take 2 tablets by mouth Every 6 (Six) Hours As Needed for Moderate Pain. 10/20/23  Yes Rosa Isela Kim APRN   Melatonin 10 MG tablet Take 1 tablet by mouth At Night As Needed.   Yes Joshua Connors MD   midodrine (PROAMATINE) 2.5 MG tablet Take 2 tablets by mouth 3 (Three) Times a Day for 30 days. 10/19/23 11/18/23 Yes Angel Roy MD   ondansetron (ZOFRAN) 4 MG tablet Take 1 tablet by mouth Every 8 (Eight) Hours As Needed for Nausea or Vomiting.   Yes Joshua Connors MD   POTASSIUM CHLORIDE PO Take 20 mEq by mouth Daily.   Yes Joshua Connors MD   spironolactone (ALDACTONE) 25 MG tablet Take 0.5 tablets by mouth Daily for 30 days.  Patient taking differently: Take 1 tablet by mouth Daily. 10/20/23 11/19/23 Yes Angel Ryo MD   ticagrelor (BRILINTA) 90 MG tablet tablet Take 1 tablet by mouth 2 (Two) Times a Day. 10/19/23  Yes Angel Roy MD   tiotropium (Spiriva HandiHaler) 18 MCG per inhalation capsule Place 1 capsule into inhaler and inhale Daily. 9/20/23  Yes Rosa Isela Kim APRN   torsemide (DEMADEX) 20 MG tablet Take 2 tablets by mouth Daily for 30 days. 10/19/23 11/18/23 Yes Angel Roy MD   nicotine (NICODERM CQ) 21 MG/24HR patch Place 1 patch on the skin as directed by provider Daily.  Patient not taking: Reported on  "10/25/2023 10/20/23   Rosa Isela KimSRIKANTH        Review of Systems   Constitutional:  Positive for fatigue. Negative for unexpected weight gain and unexpected weight loss.   HENT:  Positive for hearing loss. Negative for sinus pressure and swollen glands.    Eyes:  Positive for blurred vision. Negative for double vision.   Respiratory:  Positive for shortness of breath. Negative for cough and wheezing.    Cardiovascular:  Positive for chest pain. Negative for palpitations and leg swelling.   Gastrointestinal:  Negative for nausea and vomiting.   Endocrine: Positive for cold intolerance, polydipsia and polyuria. Negative for heat intolerance.   Musculoskeletal:  Positive for arthralgias and back pain.   Neurological:  Positive for dizziness. Negative for light-headedness and headache.   Hematological:  Bruises/bleeds easily.        Symptom Course: Been Stable    Weight Trend: Stable     Objective     /80   Pulse 95   Ht 182.9 cm (72\")   Wt 89.1 kg (196 lb 6.4 oz)   BMI 26.64 kg/m²       Physical Exam  Constitutional:       General: He is awake.      Appearance: Normal appearance.   Neck:      Thyroid: No thyromegaly.      Vascular: No carotid bruit or JVD.   Cardiovascular:      Rate and Rhythm: Normal rate and regular rhythm.      Chest Wall: PMI is not displaced.      Pulses: Normal pulses.      Heart sounds: Normal heart sounds, S1 normal and S2 normal. No murmur heard.     No friction rub. No gallop. No S3 or S4 sounds.   Pulmonary:      Effort: Pulmonary effort is normal.      Breath sounds: Decreased air movement present. Decreased breath sounds present. No wheezing, rhonchi or rales.   Abdominal:      General: Bowel sounds are normal.      Palpations: Abdomen is soft. There is no mass.      Tenderness: There is no abdominal tenderness.   Musculoskeletal:      Cervical back: Neck supple.      Right lower leg: No edema.      Left lower leg: No edema.   Neurological:      Mental Status: He is alert " "and oriented to person, place, and time.   Psychiatric:         Mood and Affect: Mood normal.         Behavior: Behavior is cooperative.           Result Review :                      Lab Results   Component Value Date    PROBNP 9,863.0 (H) 10/11/2023    PROBNP 8,301.0 (H) 10/09/2023    PROBNP 26,219.0 (H) 08/29/2023     CMP          10/17/2023    06:05 10/18/2023    05:10 10/19/2023    05:46   CMP   Glucose 130  104  100    BUN 26  23  25    Creatinine 0.98  0.83  1.07    EGFR 85.0  96.5  76.5    Sodium 136  138  137    Potassium 3.6  3.2  3.7    Chloride 99  100  99    Calcium 9.3  9.3  9.4    BUN/Creatinine Ratio 26.5  27.7  23.4    Anion Gap 13.3  12.4  14.8      CBC w/diff          10/17/2023    06:05 10/18/2023    05:10 10/19/2023    05:46   CBC w/Diff   WBC 6.64  5.51  6.40    RBC 2.74  2.65  2.89    Hemoglobin 9.2  9.0  9.9    Hematocrit 27.9  26.3  29.1    .8  99.2  100.7    MCH 33.6  34.0  34.3    MCHC 33.0  34.2  34.0    RDW 15.0  14.5  14.6    Platelets 258  257  282    Neutrophil Rel % 66.7      Immature Granulocyte Rel % 0.3      Lymphocyte Rel % 14.8      Monocyte Rel % 13.1      Eosinophil Rel % 4.5      Basophil Rel % 0.6         Lipid Panel          9/6/2023    04:34   Lipid Panel   Total Cholesterol 96    Triglycerides 44    HDL Cholesterol 43    VLDL Cholesterol 12    LDL Cholesterol  41    LDL/HDL Ratio 1.03       Lab Results   Component Value Date    TSH 2.040 08/29/2023      No results found for: \"FREET4\"   No results found for: \"DDIMERQUANT\"  Magnesium   Date Value Ref Range Status   10/19/2023 1.9 1.6 - 2.4 mg/dL Final      No results found for: \"DIGOXIN\"   A1C Last 3 Results          9/6/2023    04:34   HGBA1C Last 3 Results   Hemoglobin A1C 5.70           Results for orders placed during the hospital encounter of 08/29/23    Adult Transthoracic Echo Complete w/ Color, Spectral and Contrast if necessary per protocol    Interpretation Summary    Left ventricular ejection fraction " appears to be 26 - 30%.    The left ventricular cavity is mildly dilated.    Left ventricular diastolic function was indeterminate.    The right ventricular cavity is mild to moderately dilated.    The left atrial cavity is moderately dilated.    The right atrial cavity is moderately  dilated.    Moderate tricuspid valve regurgitation is present.    Estimated right ventricular systolic pressure from tricuspid regurgitation is normal (<35 mmHg).    Aortic valve appears sclerotic with reduced excursion.  Max/mean pressure gradient was 20/11 mmHg.  Aortic valve area was 0.93 cm².  Could be consistent with low-flow, low gradient AAS.  Tomorrow he is going to bring his medicine bottles 2 weeks     Assessment and Plan        Diagnoses and all orders for this visit:    1. Acute on chronic HFrEF (heart failure with reduced ejection fraction) (Primary)  Assessment & Plan:  .  Mr. Bradley Sarabia's has severe congestive heart failure along with bad lung disease.  We have discussed with him smoking cessation and avoiding alcohol completely.  In addition he is not sure about his medications and he did not bring his bottles with him.  I have therefore suggested to him that he needs to bring his bottles tomorrow so that we can look at them and then decide what to give him.  He was supposedly on Entresto when he was discharged from [facility] but he does not have that on his current list.  He is not sure whether he is taking any beta-blocker.  If he is not on any beta-blocker we will start him on carvedilol 3.125 mg twice a day.  We will also start him on Farxiga 10 mg once a day if he is not on SGLT2 inhibitor.  We may also start him on Verquvo if his blood pressure is too low to tolerate Entresto.    Orders:  -     CBC & Differential  -     Comprehensive Metabolic Panel  -     proBNP  -     T4, Free  -     TSH  -     Iron Profile; Future  -     25-HydroxyVitamin D LCMS D2+D3; Future  -     Magnesium  -     Iron Profile  -      25-HydroxyVitamin D LCMS D2+D3    2. CAD, multiple vessel  Assessment & Plan:  He has multivessel coronary disease and is status stent PCI to his left main and LAD.  He needs a high risk PCI of his circumflex when he is more stable.  Currently he does not have any symptoms of angina    Orders:  -     CBC & Differential  -     Comprehensive Metabolic Panel  -     proBNP  -     T4, Free  -     TSH  -     Iron Profile; Future  -     25-HydroxyVitamin D LCMS D2+D3; Future  -     Magnesium  -     Iron Profile  -     25-HydroxyVitamin D LCMS D2+D3    3. Smoker  Assessment & Plan:  I had a long discussion with him regarding the ill effects of smoking.  Smoking cessation counseling was performed for 5 to 7 minutes.  He now agrees to quit smoking as of today.  We have encouraged him to use the patches that he has at home but he does not want to use them since it does not help him.      4. Vitamin D deficiency  -     25-HydroxyVitamin D LCMS D2+D3; Future  -     25-HydroxyVitamin D LCMS D2+D3        Education  Bradley Crane 1955 male  who presents for follow-up of congestive heart failure. Counseling was provided to Bradley Rosa Maria for the following topics.   Daily log and monitoring of blood pressure, Bradley Crane was instructed to keep a blood pressure log to monitor for trends of normal and possible abnormal blood pressure readings. Bradley Rosa Maria was instructed to bring the blood pressure log to next appointment. Bradley Rosa Maria was instructed to keep a daily weight log and to monitor for weight gain. Instructed to notify the physician office if patient experiences a weight gain of 2-3 pounds overnight, or if experiences a weight gain of 5 pounds in a week. Instruction provided to the patient to elevate bilateral lower extremities to help alleviate edema. Instruction provided to patient to decrease salt and fluid intake. Instruction provided on new medications and possible side effects, and when to call the physician  office.     Follow Up     Return for Office visit in 2 weeks, EKG with next office visit.    Patient was given instructions and counseling regarding his condition or for health maintenance advice. Please see specific information pulled into the AVS if appropriate.     Total time spent, 40 days female look at the cath minutes.This time includes time spent by me for the following activities:  Reviewing past records including hospitalizations, performing a cardiac focused examination and/or evaluation, educating and counselling the patient , independently interpreting results and diagnostic tests and communicating that information to patient , documenting information in the medical record, etc. E/M time spent excludes any time spent on other reported services.    Electronically signed by Christy Zuniga MD, 10/25/23, 12:09 PM EDT.

## 2023-10-25 NOTE — ASSESSMENT & PLAN NOTE
.  Mr. Bradley Sarabia's has severe congestive heart failure along with bad lung disease.  We have discussed with him smoking cessation and avoiding alcohol completely.  In addition he is not sure about his medications and he did not bring his bottles with him.  I have therefore suggested to him that he needs to bring his bottles tomorrow so that we can look at them and then decide what to give him.  He was supposedly on Entresto when he was discharged from [facility] but he does not have that on his current list.  He is not sure whether he is taking any beta-blocker.  If he is not on any beta-blocker we will start him on carvedilol 3.125 mg twice a day.  We will also start him on Farxiga 10 mg once a day if he is not on SGLT2 inhibitor.  We may also start him on Verquvo if his blood pressure is too low to tolerate Entresto.

## 2023-10-25 NOTE — TELEPHONE ENCOUNTER
Called and spoke with Mansi and made her aware that we only received one page of fax and asked her to refax the order.

## 2023-10-27 ENCOUNTER — TELEPHONE (OUTPATIENT)
Dept: CARDIOLOGY | Facility: CLINIC | Age: 68
End: 2023-10-27
Payer: MEDICARE

## 2023-10-27 NOTE — TELEPHONE ENCOUNTER
Left a voicemail for patient to return call to office regarding bringing medication into clinic for review by provider.

## 2023-10-27 NOTE — TELEPHONE ENCOUNTER
Patient returned call to office and states he is not going to be able to bring medications into clinic. He will bring them in at his next appointment.

## 2023-10-31 ENCOUNTER — READMISSION MANAGEMENT (OUTPATIENT)
Dept: CALL CENTER | Facility: HOSPITAL | Age: 68
End: 2023-10-31
Payer: MEDICARE

## 2023-10-31 NOTE — OUTREACH NOTE
CHF Week 2 Survey      Flowsheet Row Responses   [facility]? [facility]   Does the patient have one of the following disease processes/diagnoses(primary or secondary)? CHF   Week 2 attempt successful? No   Unsuccessful attempts Attempt 1            CARMEN LANZA - Registered Nurse

## 2023-11-02 LAB
25(OH)D2 SERPL-MCNC: <1 NG/ML
25(OH)D3 SERPL-MCNC: 33 NG/ML
25(OH)D3+25(OH)D2 SERPL-MCNC: 33 NG/ML

## 2023-11-07 ENCOUNTER — READMISSION MANAGEMENT (OUTPATIENT)
Dept: CALL CENTER | Facility: HOSPITAL | Age: 68
End: 2023-11-07
Payer: MEDICARE

## 2023-11-07 ENCOUNTER — TELEPHONE (OUTPATIENT)
Dept: CASE MANAGEMENT | Facility: OTHER | Age: 68
End: 2023-11-07
Payer: MEDICARE

## 2023-11-07 NOTE — OUTREACH NOTE
CHF Week 2 Survey      Flowsheet Row Responses   [facility] patient discharged from? [facility]   Does the patient have one of the following disease processes/diagnoses(primary or secondary)? CHF   Week 2 attempt successful? Yes   Call start time 0937   Call end time 0938   Discharge diagnosis Acute exacerbation of CHF   Person spoke with today (if not patient) and relationship pt   Meds reviewed with patient/caregiver? Yes   Is the patient having any side effects they believe may be caused by any medication additions or changes? No   Does the patient have all medications ordered at discharge? Yes   Is the patient taking all medications as directed (includes completed medication regime)? Yes   Does the patient have a primary care provider?  Yes   Does the patient have an appointment with their PCP within 7 days of discharge? Yes   Has the patient kept scheduled appointments due by today? Yes   Psychosocial issues? No   What is the patient's perception of their health status since discharge? Improving   Nursing interventions Nurse provided patient education   Is the patient able to teach back signs and symptoms of worsening condition? (i.e. weight gain, shortness of air, etc.) Yes   If the patient is a current smoker, are they able to teach back resources for cessation? Not a smoker   Is the patient/caregiver able to teach back the hierarchy of who to call/visit for symptoms/problems? PCP, Specialist, Home health nurse, Urgent Care, ED, 911 Yes   Is the patient able to teach back Heart Failure Zones? Yes   CHF Nursing Interventions Education provided on various zones   CHF Zone this Call Green Zone   Green Zone Patient reports doing well, No new swelling -  feet, ankles and legs look normal for you   Green Zone Interventions Meds as directed, Follow up visits planned   CHF Week 2 call completed? Yes   Is the patient interested in additional calls from an ambulatory ? No   Would this patient benefit from  a Referral to Amb Social Work? No   Wrap up additional comments Pt states he is doing ok, and denies any issues with medications. Pt had PCP fu appt, and verified cardiology fu appt on 11/8/23.   Call end time 1051            Deanna JACOBS - Registered Nurse

## 2023-11-07 NOTE — TELEPHONE ENCOUNTER
Left message on voicemail reminding of Cardiology appt tomorrow and to take all medication bottles to this visit.

## 2023-11-08 ENCOUNTER — TELEPHONE (OUTPATIENT)
Dept: CARDIOLOGY | Facility: CLINIC | Age: 68
End: 2023-11-08

## 2023-11-08 ENCOUNTER — OFFICE VISIT (OUTPATIENT)
Dept: CARDIOLOGY | Facility: CLINIC | Age: 68
End: 2023-11-08
Payer: MEDICARE

## 2023-11-08 VITALS
HEART RATE: 95 BPM | BODY MASS INDEX: 26.41 KG/M2 | WEIGHT: 195 LBS | DIASTOLIC BLOOD PRESSURE: 68 MMHG | SYSTOLIC BLOOD PRESSURE: 104 MMHG | HEIGHT: 72 IN

## 2023-11-08 DIAGNOSIS — I25.10 CAD, MULTIPLE VESSEL: ICD-10-CM

## 2023-11-08 DIAGNOSIS — F17.200 SMOKER: ICD-10-CM

## 2023-11-08 DIAGNOSIS — I50.23 ACUTE ON CHRONIC HFREF (HEART FAILURE WITH REDUCED EJECTION FRACTION): Primary | ICD-10-CM

## 2023-11-08 DIAGNOSIS — E78.5 HYPERLIPIDEMIA LDL GOAL <70: ICD-10-CM

## 2023-11-08 RX ORDER — SPIRONOLACTONE 25 MG/1
25 TABLET ORAL DAILY
Qty: 30 TABLET | Refills: 0 | Status: SHIPPED | OUTPATIENT
Start: 2023-11-08 | End: 2023-11-08

## 2023-11-08 RX ORDER — SPIRONOLACTONE 25 MG/1
25 TABLET ORAL DAILY
Qty: 90 TABLET | Refills: 0 | Status: SHIPPED | OUTPATIENT
Start: 2023-11-08

## 2023-11-08 RX ORDER — DIGOXIN 125 MCG
125 TABLET ORAL DAILY
Qty: 90 TABLET | Refills: 3 | Status: SHIPPED | OUTPATIENT
Start: 2023-11-08

## 2023-11-08 RX ORDER — MIDODRINE HYDROCHLORIDE 2.5 MG/1
5 TABLET ORAL 2 TIMES DAILY PRN
Qty: 90 TABLET | Refills: 0 | Status: SHIPPED | OUTPATIENT
Start: 2023-11-08

## 2023-11-08 RX ORDER — TORSEMIDE 100 MG/1
100 TABLET ORAL DAILY
COMMUNITY
End: 2023-11-08

## 2023-11-08 RX ORDER — MIDODRINE HYDROCHLORIDE 2.5 MG/1
5 TABLET ORAL AS NEEDED
Qty: 180 TABLET | Refills: 0 | Status: SHIPPED | OUTPATIENT
Start: 2023-11-08 | End: 2023-11-08

## 2023-11-08 RX ORDER — VERICIGUAT 2.5 MG/1
2.5 TABLET, FILM COATED ORAL DAILY
Qty: 90 TABLET | Refills: 3 | Status: SHIPPED | OUTPATIENT
Start: 2023-11-08

## 2023-11-08 RX ORDER — VERICIGUAT 2.5 MG/1
2.5 TABLET, FILM COATED ORAL DAILY
Qty: 28 TABLET | Refills: 0 | COMMUNITY
Start: 2023-11-08

## 2023-11-08 NOTE — ASSESSMENT & PLAN NOTE
He has had a very large anterior wall MI and is s/p PCI to the left main and LAD.  He Denies any symptoms of angina at present.  But he is in severe CHF with systolic dysfunction.  His blood pressures are too low to give him beta-blocker or ACE inhibitor/ARNI therapy.  We will try to give him SGLT2 inhibitor, Verquvo and digoxin and if he is better in 2 weeks we will start low-dose beta-blocker.

## 2023-11-08 NOTE — ASSESSMENT & PLAN NOTE
He is trying to quit smoking but has been able to only cut down to 10 cigarettes a day.  He cannot afford the patches but is going to try on his own to stop within a month.  Smoking soon counseling was performed for 5 to 6 minutes.

## 2023-11-08 NOTE — TELEPHONE ENCOUNTER
The [facility] received a fax that requires your attention. The document has been indexed to the patient’s chart for your review.      Reason for sending: PRIOR AUTH NEEDED FOR VERQUVO.  MIDODRINE NEEDS FREQUENCY AND DAYS SUPPLY LIMITATIONS FAXED BACK    Documents Description: EXT MED [id number]    Name of Sender: JASPREET    Date Indexed: 11-8-23

## 2023-11-08 NOTE — PATIENT INSTRUCTIONS
1.  Take torsemide 20 mg 2 tablets once a day only.  2.  Start Jardiance 10 mg once a day in the morning.  #3  3.  Start digoxin 0.125 mgl 2 tablets this evening, 2 tablets tomorrow evening and then 1 tablet every evening.  4.  Take midodrine only if systolic blood pressure is less than 95 mmHg.  #5 start taking Verquvo 2.5 mg once once daily  5.  Continue fluid restriction and salt intake should be limited as instructed  6.  Do blood work 1 or 2 days before your next appointment

## 2023-11-08 NOTE — ASSESSMENT & PLAN NOTE
Bradley has coronary disease and congestive heart failure with reduced ejection fraction.  He is status post PCI of the left main and LAD.  He denies any chest pain.    I have made the following changes in his medications:    1.  Take torsemide 20 mg 2 tablets once a day only.  2.  Start Jardiance 10 mg once a day in the morning.  #3  3.  Start digoxin 0.125 mgl 2 tablets this evening, 2 tablets tomorrow evening and then 1 tablet every evening.  4.  Take midodrine only if systolic blood pressure is less than 95 mmHg.  #5 start taking Verquvo 2.5 mg once once daily  5.  Continue fluid restriction and salt intake should be limited as instructed  6.  Do blood work 1 or 2 days before your next appointment

## 2023-11-08 NOTE — PROGRESS NOTES
Office Visit    Chief Complaint  Acute on chronic HFrEF     Subjective            Bradley Crane presents to [facility] CARDIOLOGY  History of Present Illness  Bradley umanzor he is a 68 years old gentleman with severe congestive heart failure with reduced ejection fraction secondary to ischemic cardiomyopathy and status post PCI to the LAD and left main who states that he is doing the same as he was 2 weeks ago.  He was supposed to bring his bottles home medicine but he did not.  He denies any chest pain but is short of breath with mild activity and is extremely fatigued and tired.  He has been taking 100 mg of torsemide as well as the 40 mg although he was supposed to take only 40 mg once a day.  He denies syncope but gets dizzy intermittently.      Past Medical History:   Diagnosis Date    CHF (congestive heart failure)     Hypertension        No Known Allergies     Past Surgical History:   Procedure Laterality Date    CARDIAC CATHETERIZATION N/A 09/05/2023    Procedure: Right and LHC with possible coronary intevention;  Surgeon: Merly Ji MD;  Location: [facility] CATH INVASIVE LOCATION;  Service: Cardiology;  Laterality: N/A;    CARDIAC DEFIBRILLATOR PLACEMENT      CARDIAC SURGERY          Social History     Tobacco Use    Smoking status: Every Day     Packs/day: .5     Types: Cigarettes   Vaping Use    Vaping Use: Never used   Substance Use Topics    Alcohol use: Not Currently     Alcohol/week: 3.0 standard drinks of alcohol     Types: 3 Cans of beer per week     Comment: LAST DRINK 2 WEEKS AGO    Drug use: Never       History reviewed. No pertinent family history.     Prior to Admission medications    Medication Sig Start Date End Date Taking? Authorizing Provider   albuterol sulfate  (90 Base) MCG/ACT inhaler Inhale 2 puffs Every 4 (Four) Hours As Needed for Wheezing. 9/20/23   Rosa Isela Kim APRN   Aspirin Low Dose 81 MG EC tablet Take 1 tablet by mouth Daily for 30 days.  10/19/23 11/18/23  Angel Roy MD   atorvastatin (LIPITOR) 80 MG tablet Take 1 tablet by mouth Daily. 10/19/23   Angel Roy MD   clonazePAM (KlonoPIN) 1 MG tablet Take 1 tablet by mouth 3 (Three) Times a Day As Needed for Anxiety. 10/20/23   Rosa Isela Kim APRN   HYDROcodone-acetaminophen (NORCO) 5-325 MG per tablet Take 2 tablets by mouth Every 6 (Six) Hours As Needed for Moderate Pain. 10/20/23   Rosa Isela Kim APRN   Melatonin 10 MG tablet Take 1 tablet by mouth At Night As Needed.    ProviderJoshua MD   midodrine (PROAMATINE) 2.5 MG tablet Take 2 tablets by mouth 3 (Three) Times a Day for 30 days. 10/19/23 11/18/23  Angel Roy MD   nicotine (NICODERM CQ) 21 MG/24HR patch Place 1 patch on the skin as directed by provider Daily.  Patient not taking: Reported on 10/25/2023 10/20/23   Rosa Isela Kim APRN   ondansetron (ZOFRAN) 4 MG tablet Take 1 tablet by mouth Every 8 (Eight) Hours As Needed for Nausea or Vomiting.    ProviderJoshua MD   POTASSIUM CHLORIDE PO Take 20 mEq by mouth Daily.    ProviderJoshua MD   spironolactone (ALDACTONE) 25 MG tablet Take 0.5 tablets by mouth Daily for 30 days.  Patient taking differently: Take 1 tablet by mouth Daily. 10/20/23 11/19/23  Angel Roy MD   ticagrelor (BRILINTA) 90 MG tablet tablet Take 1 tablet by mouth 2 (Two) Times a Day. 10/19/23   Angel Roy MD   tiotropium (Spiriva HandiHaler) 18 MCG per inhalation capsule Place 1 capsule into inhaler and inhale Daily. 9/20/23   Rosa Isela Kim APRN   torsemide (DEMADEX) 20 MG tablet Take 2 tablets by mouth Daily for 30 days. 10/19/23 11/18/23  Angel Roy MD        Review of Systems   Constitutional:  Positive for fatigue.   Respiratory:  Positive for cough and shortness of breath.    Cardiovascular:  Positive for chest pain, palpitations and leg swelling.   Neurological:  Positive for dizziness.        Symptom Course: Been Unchanged    Weight Trend: Stable     Objective  "    /68   Pulse 95   Ht 182.9 cm (72\")   Wt 88.5 kg (195 lb)   BMI 26.45 kg/m²       Physical Exam  Constitutional:       General: He is awake.      Appearance: Normal appearance.   Neck:      Thyroid: No thyromegaly.      Vascular: No carotid bruit or JVD.   Cardiovascular:      Rate and Rhythm: Normal rate and regular rhythm.      Chest Wall: PMI is not displaced.      Pulses: Normal pulses.      Heart sounds: Normal heart sounds, S1 normal and S2 normal. No murmur heard.     No friction rub. No gallop. No S3 or S4 sounds.   Pulmonary:      Effort: Pulmonary effort is normal.      Breath sounds: Normal breath sounds. Decreased air movement present. No wheezing, rhonchi or rales.   Abdominal:      General: Bowel sounds are normal.      Palpations: Abdomen is soft. There is no mass.      Tenderness: There is no abdominal tenderness.   Musculoskeletal:      Cervical back: Neck supple.      Right lower leg: No edema.      Left lower leg: No edema.   Neurological:      Mental Status: He is alert and oriented to person, place, and time.   Psychiatric:         Mood and Affect: Mood normal.         Behavior: Behavior is cooperative.         Result Review :                    ECG 12 Lead    Date/Time: 11/8/2023 12:13 PM  Performed by: Christy Zuniga MD    Authorized by: Christy Zuniga MD  Comments: Sinus rhythm with baseline artifact and heart rate of 90 bpm.  Intermittent PVCs.  LVH with secondary repolarization abnormalities.  Low voltage limb leads.  The current EKG has PVCs which were not present on the previous EKG         Lab Results   Component Value Date    PROBNP 6,591.0 (H) 10/25/2023    PROBNP 9,863.0 (H) 10/11/2023    PROBNP 8,301.0 (H) 10/09/2023     CMP          10/18/2023    05:10 10/19/2023    05:46 10/25/2023    12:09   CMP   Glucose 104  100  100    BUN 23  25  27    Creatinine 0.83  1.07  1.22    EGFR 96.5  76.5  65.4    Sodium 138  137  135    Potassium 3.2  3.7  4.2    Chloride 100  99  96  " "  Calcium 9.3  9.4  9.2    Total Protein   7.3    Albumin   4.3    Globulin   3.0    Total Bilirubin   0.9    Alkaline Phosphatase   140    AST (SGOT)   25    ALT (SGPT)   10    Albumin/Globulin Ratio   1.4    BUN/Creatinine Ratio 27.7  23.4  22.1    Anion Gap 12.4  14.8  13.9      CBC w/diff          10/18/2023    05:10 10/19/2023    05:46 10/25/2023    12:09   CBC w/Diff   WBC 5.51  6.40  7.63    RBC 2.65  2.89  3.00    Hemoglobin 9.0  9.9  10.1    Hematocrit 26.3  29.1  31.0    MCV 99.2  100.7  103.3    MCH 34.0  34.3  33.7    MCHC 34.2  34.0  32.6    RDW 14.5  14.6  14.8    Platelets 257  282  307    Neutrophil Rel %   72.3    Immature Granulocyte Rel %   0.3    Lymphocyte Rel %   12.7    Monocyte Rel %   12.2    Eosinophil Rel %   1.6    Basophil Rel %   0.9       Lipid Panel          9/6/2023    04:34   Lipid Panel   Total Cholesterol 96    Triglycerides 44    HDL Cholesterol 43    VLDL Cholesterol 12    LDL Cholesterol  41    LDL/HDL Ratio 1.03       Lab Results   Component Value Date    TSH 2.960 10/25/2023    TSH 2.040 08/29/2023      Lab Results   Component Value Date    FREET4 1.70 10/25/2023      No results found for: \"DDIMERQUANT\"  Magnesium   Date Value Ref Range Status   10/25/2023 2.1 1.6 - 2.4 mg/dL Final      No results found for: \"DIGOXIN\"   A1C Last 3 Results          9/6/2023    04:34   HGBA1C Last 3 Results   Hemoglobin A1C 5.70           Results for orders placed during the hospital encounter of 08/29/23    Adult Transthoracic Echo Complete w/ Color, Spectral and Contrast if necessary per protocol    Interpretation Summary    Left ventricular ejection fraction appears to be 26 - 30%.    The left ventricular cavity is mildly dilated.    Left ventricular diastolic function was indeterminate.    The right ventricular cavity is mild to moderately dilated.    The left atrial cavity is moderately dilated.    The right atrial cavity is moderately  dilated.    Moderate tricuspid valve regurgitation is " present.    Estimated right ventricular systolic pressure from tricuspid regurgitation is normal (<35 mmHg).    Aortic valve appears sclerotic with reduced excursion.  Max/mean pressure gradient was 20/11 mmHg.  Aortic valve area was 0.93 cm².  Could be consistent with low-flow, low gradient AAS.        Assessment and Plan      His ex-wife who accompanied him today and I explained to her his condition and the long-term consequences.  She understands.    Diagnoses and all orders for this visit:    1. Acute on chronic HFrEF (heart failure with reduced ejection fraction) (Primary)  Assessment & Plan:  Bradley has coronary disease and congestive heart failure with reduced ejection fraction.  He is status post PCI of the left main and LAD.  He denies any chest pain.    I have made the following changes in his medications:    1.  Take torsemide 20 mg 2 tablets once a day only.  2.  Start Jardiance 10 mg once a day in the morning.  #3  3.  Start digoxin 0.125 mgl 2 tablets this evening, 2 tablets tomorrow evening and then 1 tablet every evening.  4.  Take midodrine only if systolic blood pressure is less than 95 mmHg.  #5 start taking Verquvo 2.5 mg once once daily  5.  Continue fluid restriction and salt intake should be limited as instructed  6.  Do blood work 1 or 2 days before your next appointment    Orders:  -     CBC & Differential; Future  -     Comprehensive Metabolic Panel; Future  -     Digoxin Level; Future  -     proBNP; Future  -     Magnesium; Future    2. CAD, multiple vessel  Assessment & Plan:  He has had a very large anterior wall MI and is s/p PCI to the left main and LAD.  He Denies any symptoms of angina at present.  But he is in severe CHF with systolic dysfunction.  His blood pressures are too low to give him beta-blocker or ACE inhibitor/ARNI therapy.  We will try to give him SGLT2 inhibitor, Verquvo and digoxin and if he is better in 2 weeks we will start low-dose beta-blocker.    Orders:  -     CBC  & Differential; Future  -     Comprehensive Metabolic Panel; Future  -     Digoxin Level; Future  -     proBNP; Future  -     Magnesium; Future    3. Smoker  Assessment & Plan:  He is trying to quit smoking but has been able to only cut down to 10 cigarettes a day.  He cannot afford the patches but is going to try on his own to stop within a month.  Smoking soon counseling was performed for 5 to 6 minutes.      4. Hyperlipidemia LDL goal <70    Other orders  -     spironolactone (ALDACTONE) 25 MG tablet; Take 1 tablet by mouth Daily for 30 days.  Dispense: 30 tablet; Refill: 0  -     midodrine (PROAMATINE) 2.5 MG tablet; Take 2 tablets by mouth As Needed (FOR SYSTOLIC BP LESS THAN 95 MM HG.) for up to 30 days.  Dispense: 180 tablet; Refill: 0  -     digoxin (LANOXIN) 125 MCG tablet; Take 1 tablet by mouth Daily.  Dispense: 90 tablet; Refill: 3  -     empagliflozin (Jardiance) 10 MG tablet tablet; Take 1 tablet by mouth Daily.  Dispense: 30 tablet; Refill: 3  -     Vericiguat (Verquvo) 2.5 MG tablet; Take 1 tablet by mouth Daily.  Dispense: 90 tablet; Refill: 3  -     empagliflozin (Jardiance) 10 MG tablet tablet; Take 1 tablet by mouth Daily. Indications: Cardiac Failure, [id number]  exp 1/30/2025  Dispense: 14 tablet; Refill: 0  -     Vericiguat (Verquvo) 2.5 MG tablet; Take 1 tablet by mouth Daily.  Dispense: 28 tablet; Refill: 0      He is going to need staged PCI of his circumflex.  Currently he is asymptomatic.  I spoke to Dr. Garcia interventionalists who original PCI.  He would like him to get better heart failure wise before able to approach the circumflex    I have discussed use of defibrillator or LifeVest and is not interested.  He just wants us to try medications.    Follow Up     Return for Follow-up with Rosa 2 weeks.    Patient was given instructions and counseling regarding his condition or for health maintenance advice. Please see specific information pulled into the AVS if appropriate.     Total time  spent, 50 minutes.This time includes time spent by me for the following activities:  Reviewing past records including hospitalizations, performing a cardiac focused examination and/or evaluation, educating and counselling the patient and family, independently interpreting results and diagnostic tests and communicating that information to patient and family, documenting information in the medical record, etc. E/M time spent excludes any time spent on other reported services.    Electronically signed by Christy Zuniga MD, 11/08/23, 12:15 PM EST.  .

## 2023-11-09 RX ORDER — ONDANSETRON 4 MG/1
4 TABLET, FILM COATED ORAL EVERY 8 HOURS PRN
Qty: 30 TABLET | Refills: 0 | Status: SHIPPED | OUTPATIENT
Start: 2023-11-09

## 2023-11-09 NOTE — TELEPHONE ENCOUNTER
Caller: Bradley Crane    Relationship: Self    Best call back number: [phone]    Requested Prescriptions:   Requested Prescriptions     Pending Prescriptions Disp Refills    ondansetron (ZOFRAN) 4 MG tablet       Sig: Take 1 tablet by mouth Every 8 (Eight) Hours As Needed for Nausea or Vomiting.        Pharmacy where request should be sent: [id number] - [place], [place] - [address]  AT [facility] & [address] - [phone] [facility] [phone] FX     Last office visit with prescribing clinician: 10/20/2023   Last telemedicine visit with prescribing clinician: Visit date not found   Next office visit with prescribing clinician: 11/20/2023     Additional details provided by patient: PATIENT IS OUT OF THIS MEDICATION.HE IS WANTING TO PICK THIS UP TODAY, 11/09/2023, FROM PHARMACY AS HE DOES NOT DRIVE AND WILL HAVE RIDE TODAY.    Does the patient have less than a 3 day supply:  [x] Yes  [] No    Would you like a call back once the refill request has been completed: [] Yes [x] No    If the office needs to give you a call back, can they leave a voicemail: [] Yes [x] No    Sandra Wyatt Rep   11/09/23 11:19 EST

## 2023-11-14 ENCOUNTER — READMISSION MANAGEMENT (OUTPATIENT)
Dept: CALL CENTER | Facility: HOSPITAL | Age: 68
End: 2023-11-14
Payer: MEDICARE

## 2023-11-14 NOTE — OUTREACH NOTE
CHF Week 3 Survey      Flowsheet Row Responses   [facility] patient discharged from? [facility]   Does the patient have one of the following disease processes/diagnoses(primary or secondary)? CHF   Week 3 attempt successful? Yes   Call start time 1724   Call end time 1730   Discharge diagnosis Acute exacerbation of CHF   Person spoke with today (if not patient) and relationship Patient   Meds reviewed with patient/caregiver? Yes   Does the patient have all medications ordered at discharge? Yes   Is the patient taking all medications as directed (includes completed medication regime)? Yes   Does the patient have a primary care provider?  Yes   Does the patient have an appointment with their PCP within 7 days of discharge? Greater than 7 days   Comments regarding PCP Julio, SRIKANTH Taylor PCP 11/20/2023 11:45 AM   Nursing Interventions Verified appointment date/time/provider   Has the patient kept scheduled appointments due by today? Yes   Comments CARDIOLOGY 11/22/2023 10:00 AM   What is the Home health agency?  Patient reports that he had nurse call today and planning to come to house tomorrow.   Pulse Ox monitoring None   Psychosocial issues? No   What is the patient's perception of their health status since discharge? Worsening  [Patient reports that he thinks he needs oxygen.]   Nursing interventions Nurse provided patient education, Advised patient to call provider   Is the patient able to teach back signs and symptoms of worsening condition? (i.e. weight gain, shortness of air, etc.) Yes   Is the patient/caregiver able to teach back the hierarchy of who to call/visit for symptoms/problems? PCP, Specialist, Home health nurse, Urgent Care, ED, 911 Yes   CHF Zone this Call Yellow Zone   Yellow Zone Not feeling as good as usual, Worsening shortness of breath with activity   Yellow Zone Interventions Consider contacting your doctor or health care team, Consider asking your health care team about a change in  medications   CHF Week 3 call completed? Yes   Graduated Yes   Is the patient interested in additional calls from an ambulatory ? No   Would this patient benefit from a Referral to [facility] Social Work? No   Wrap up additional comments Encouraged to take meds as prescribed, keep appts.   Call end time 1730            CARMEN LANZA - Registered Nurse

## 2023-11-15 ENCOUNTER — TELEPHONE (OUTPATIENT)
Dept: FAMILY MEDICINE CLINIC | Facility: CLINIC | Age: 68
End: 2023-11-15
Payer: MEDICARE

## 2023-11-15 NOTE — TELEPHONE ENCOUNTER
Caller: JOHANNALOUIS    Relationship: Emergency Contact    Best call back number: [phone]     What medication are you requesting: DIZZINESS MEDICATION    What are your current symptoms: DIZZINESS    How long have you been experiencing symptoms: THREE DAYS    Have you had these symptoms before:    [] Yes  [x] No    Have you been treated for these symptoms before:   [] Yes  [x] No    If a prescription is needed, what is your preferred pharmacy and phone number: [id number] - [place], [place] - [address]  AT [facility] [address] - [phone] [facility] [phone] FX

## 2023-11-16 RX ORDER — MECLIZINE HYDROCHLORIDE 25 MG/1
25 TABLET ORAL 3 TIMES DAILY PRN
Qty: 90 TABLET | Refills: 3 | Status: SHIPPED | OUTPATIENT
Start: 2023-11-16

## 2023-11-20 ENCOUNTER — OFFICE VISIT (OUTPATIENT)
Dept: FAMILY MEDICINE CLINIC | Facility: CLINIC | Age: 68
End: 2023-11-20
Payer: MEDICARE

## 2023-11-20 VITALS
BODY MASS INDEX: 26.66 KG/M2 | TEMPERATURE: 98.3 F | HEIGHT: 72 IN | WEIGHT: 196.8 LBS | DIASTOLIC BLOOD PRESSURE: 77 MMHG | SYSTOLIC BLOOD PRESSURE: 106 MMHG | OXYGEN SATURATION: 100 % | HEART RATE: 96 BPM

## 2023-11-20 DIAGNOSIS — F10.982 ALCOHOL-INDUCED INSOMNIA: ICD-10-CM

## 2023-11-20 DIAGNOSIS — R60.1 GENERALIZED EDEMA: ICD-10-CM

## 2023-11-20 DIAGNOSIS — G89.29 CHRONIC BILATERAL LOW BACK PAIN WITH RIGHT-SIDED SCIATICA: ICD-10-CM

## 2023-11-20 DIAGNOSIS — I50.23 ACUTE ON CHRONIC SYSTOLIC CONGESTIVE HEART FAILURE: ICD-10-CM

## 2023-11-20 DIAGNOSIS — I25.10 CAD, MULTIPLE VESSEL: ICD-10-CM

## 2023-11-20 DIAGNOSIS — S32.020S COMPRESSION FRACTURE OF L2 VERTEBRA, SEQUELA: ICD-10-CM

## 2023-11-20 DIAGNOSIS — F17.200 SMOKER: ICD-10-CM

## 2023-11-20 DIAGNOSIS — I50.23 ACUTE ON CHRONIC HFREF (HEART FAILURE WITH REDUCED EJECTION FRACTION): ICD-10-CM

## 2023-11-20 DIAGNOSIS — F41.9 ANXIETY: Primary | ICD-10-CM

## 2023-11-20 DIAGNOSIS — M54.41 CHRONIC BILATERAL LOW BACK PAIN WITH RIGHT-SIDED SCIATICA: ICD-10-CM

## 2023-11-20 PROBLEM — F10.930 ALCOHOL WITHDRAWAL SYNDROME WITHOUT COMPLICATION: Status: ACTIVE | Noted: 2023-11-20

## 2023-11-20 LAB
ALBUMIN SERPL-MCNC: 4.3 G/DL (ref 3.5–5.2)
ALBUMIN/GLOB SERPL: 2 G/DL
ALP SERPL-CCNC: 150 U/L (ref 39–117)
ALT SERPL W P-5'-P-CCNC: 12 U/L (ref 1–41)
ANION GAP SERPL CALCULATED.3IONS-SCNC: 12.4 MMOL/L (ref 5–15)
AST SERPL-CCNC: 23 U/L (ref 1–40)
BASOPHILS # BLD AUTO: 0.07 10*3/MM3 (ref 0–0.2)
BASOPHILS NFR BLD AUTO: 1.1 % (ref 0–1.5)
BILIRUB SERPL-MCNC: 1.1 MG/DL (ref 0–1.2)
BUN SERPL-MCNC: 13 MG/DL (ref 8–23)
BUN/CREAT SERPL: 10.7 (ref 7–25)
CALCIUM SPEC-SCNC: 9.7 MG/DL (ref 8.6–10.5)
CHLORIDE SERPL-SCNC: 99 MMOL/L (ref 98–107)
CO2 SERPL-SCNC: 22.6 MMOL/L (ref 22–29)
CREAT SERPL-MCNC: 1.21 MG/DL (ref 0.76–1.27)
DEPRECATED RDW RBC AUTO: 42.8 FL (ref 37–54)
DIGOXIN SERPL-MCNC: 0.72 NG/ML (ref 0.6–1.2)
EGFRCR SERPLBLD CKD-EPI 2021: 66 ML/MIN/1.73
EOSINOPHIL # BLD AUTO: 0.13 10*3/MM3 (ref 0–0.4)
EOSINOPHIL NFR BLD AUTO: 2 % (ref 0.3–6.2)
ERYTHROCYTE [DISTWIDTH] IN BLOOD BY AUTOMATED COUNT: 12.2 % (ref 12.3–15.4)
GLOBULIN UR ELPH-MCNC: 2.1 GM/DL
GLUCOSE SERPL-MCNC: 94 MG/DL (ref 65–99)
HCT VFR BLD AUTO: 32.1 % (ref 37.5–51)
HGB BLD-MCNC: 11 G/DL (ref 13–17.7)
IMM GRANULOCYTES # BLD AUTO: 0.02 10*3/MM3 (ref 0–0.05)
IMM GRANULOCYTES NFR BLD AUTO: 0.3 % (ref 0–0.5)
LYMPHOCYTES # BLD AUTO: 0.95 10*3/MM3 (ref 0.7–3.1)
LYMPHOCYTES NFR BLD AUTO: 14.3 % (ref 19.6–45.3)
MAGNESIUM SERPL-MCNC: 2.3 MG/DL (ref 1.6–2.4)
MCH RBC QN AUTO: 33.4 PG (ref 26.6–33)
MCHC RBC AUTO-ENTMCNC: 34.3 G/DL (ref 31.5–35.7)
MCV RBC AUTO: 97.6 FL (ref 79–97)
MONOCYTES # BLD AUTO: 0.71 10*3/MM3 (ref 0.1–0.9)
MONOCYTES NFR BLD AUTO: 10.7 % (ref 5–12)
NEUTROPHILS NFR BLD AUTO: 4.78 10*3/MM3 (ref 1.7–7)
NEUTROPHILS NFR BLD AUTO: 71.6 % (ref 42.7–76)
NRBC BLD AUTO-RTO: 0 /100 WBC (ref 0–0.2)
NT-PROBNP SERPL-MCNC: 7994 PG/ML (ref 0–900)
PLATELET # BLD AUTO: 250 10*3/MM3 (ref 140–450)
PMV BLD AUTO: 10.1 FL (ref 6–12)
POTASSIUM SERPL-SCNC: 3.8 MMOL/L (ref 3.5–5.2)
PROT SERPL-MCNC: 6.4 G/DL (ref 6–8.5)
RBC # BLD AUTO: 3.29 10*6/MM3 (ref 4.14–5.8)
SODIUM SERPL-SCNC: 134 MMOL/L (ref 136–145)
WBC NRBC COR # BLD AUTO: 6.66 10*3/MM3 (ref 3.4–10.8)

## 2023-11-20 PROCEDURE — 83735 ASSAY OF MAGNESIUM: CPT | Performed by: INTERNAL MEDICINE

## 2023-11-20 PROCEDURE — 80162 ASSAY OF DIGOXIN TOTAL: CPT | Performed by: INTERNAL MEDICINE

## 2023-11-20 PROCEDURE — 85025 COMPLETE CBC W/AUTO DIFF WBC: CPT | Performed by: NURSE PRACTITIONER

## 2023-11-20 PROCEDURE — 80053 COMPREHEN METABOLIC PANEL: CPT | Performed by: NURSE PRACTITIONER

## 2023-11-20 PROCEDURE — 83880 ASSAY OF NATRIURETIC PEPTIDE: CPT | Performed by: NURSE PRACTITIONER

## 2023-11-20 RX ORDER — POTASSIUM CHLORIDE 750 MG/1
10 TABLET, FILM COATED, EXTENDED RELEASE ORAL 2 TIMES DAILY
Qty: 180 TABLET | OUTPATIENT
Start: 2023-11-20

## 2023-11-20 RX ORDER — ALPRAZOLAM 1 MG/1
1 TABLET ORAL 3 TIMES DAILY PRN
Qty: 90 TABLET | Refills: 0 | Status: SHIPPED | OUTPATIENT
Start: 2023-11-20

## 2023-11-20 RX ORDER — HYDROCODONE BITARTRATE AND ACETAMINOPHEN 5; 325 MG/1; MG/1
2 TABLET ORAL EVERY 6 HOURS PRN
Qty: 240 TABLET | Refills: 0 | Status: SHIPPED | OUTPATIENT
Start: 2023-11-20

## 2023-11-20 RX ORDER — MIRTAZAPINE 15 MG/1
30 TABLET, FILM COATED ORAL NIGHTLY
Qty: 30 TABLET | Refills: 1 | Status: SHIPPED | OUTPATIENT
Start: 2023-11-20

## 2023-11-20 RX ORDER — FUROSEMIDE 40 MG/1
40 TABLET ORAL 2 TIMES DAILY
Qty: 60 TABLET | Refills: 0 | Status: SHIPPED | OUTPATIENT
Start: 2023-11-20 | End: 2023-11-22

## 2023-11-20 RX ORDER — POTASSIUM CHLORIDE 750 MG/1
10 TABLET, FILM COATED, EXTENDED RELEASE ORAL 2 TIMES DAILY
Qty: 60 TABLET | Refills: 0 | Status: SHIPPED | OUTPATIENT
Start: 2023-11-20 | End: 2023-11-20

## 2023-11-20 NOTE — PROGRESS NOTES
Chief Complaint  Tailbone Pain (1mo f/u) and Insomnia (Klonopin not helping)    Subjective          Bradley Crane is a 68 y.o. male who presents to [facility]    Insomnia    Complains of shortness of breath for 2 months, Has to sit straight up to breath, if he eats a lot it feels like the food is sitting on his stomach which is pressing on his lungs. Bilateral legs are swelling up again. Has a history of ascites from ETOH abuse.    Insomnia/Anxiety: Klonopin not helping with sleep or taking the edge off.  Cool weather has caused his back to hurt worse. He ran out of hydrocodone a few days ago. He's been sleeping in a chair with his feet up.    PHQ-2 Total Score:     PHQ-9 Total Score:       Review of Systems   Psychiatric/Behavioral:  The patient has insomnia.           Medical History: has a past medical history of CHF (congestive heart failure) and Hypertension.     Surgical History: has a past surgical history that includes Cardiac surgery; Cardiac catheterization (N/A, 09/05/2023); and Cardiac defibrillator placement.     Family History: family history is not on file.     Social History: reports that he has been smoking cigarettes. He has been smoking an average of .5 packs per day. He does not have any smokeless tobacco history on file. He reports that he does not currently use alcohol after a past usage of about 3.0 standard drinks of alcohol per week. He reports that he does not use drugs.    Allergies: Patient has no known allergies.      Health Maintenance Due   Topic Date Due    COLORECTAL CANCER SCREENING  Never done    COVID-19 Vaccine (1) Never done    Pneumococcal Vaccine 65+ (1 - PCV) Never done    TDAP/TD VACCINES (1 - Tdap) Never done    ZOSTER VACCINE (1 of 2) Never done    Hepatitis B (1 of 3 - Risk 3-dose series) Never done    INFLUENZA VACCINE  Never done    ANNUAL WELLNESS VISIT  Never done            Current Outpatient Medications:     albuterol sulfate HFA  108 (90 Base) MCG/ACT inhaler, Inhale 2 puffs Every 4 (Four) Hours As Needed for Wheezing., Disp: 18 g, Rfl: 1    atorvastatin (LIPITOR) 80 MG tablet, Take 1 tablet by mouth Daily., Disp: 90 tablet, Rfl: 0    digoxin (LANOXIN) 125 MCG tablet, Take 1 tablet by mouth Daily., Disp: 90 tablet, Rfl: 3    empagliflozin (Jardiance) 10 MG tablet tablet, Take 1 tablet by mouth Daily., Disp: 30 tablet, Rfl: 3    empagliflozin (Jardiance) 10 MG tablet tablet, Take 1 tablet by mouth Daily. Indications: Cardiac Failure, [id number]  exp 1/30/2025, Disp: 14 tablet, Rfl: 0    HYDROcodone-acetaminophen (NORCO) 5-325 MG per tablet, Take 2 tablets by mouth Every 6 (Six) Hours As Needed for Moderate Pain., Disp: 240 tablet, Rfl: 0    meclizine (ANTIVERT) 25 MG tablet, Take 1 tablet by mouth 3 (Three) Times a Day As Needed for Dizziness., Disp: 90 tablet, Rfl: 3    Melatonin 10 MG tablet, Take 1 tablet by mouth At Night As Needed., Disp: , Rfl:     midodrine (PROAMATINE) 2.5 MG tablet, Take 2 tablets by mouth 2 (Two) Times a Day As Needed (for systolic blood pressure less than 95)., Disp: 90 tablet, Rfl: 0    ondansetron (ZOFRAN) 4 MG tablet, Take 1 tablet by mouth Every 8 (Eight) Hours As Needed for Nausea or Vomiting., Disp: 30 tablet, Rfl: 0    spironolactone (ALDACTONE) 25 MG tablet, TAKE 1 TABLET BY MOUTH DAILY, Disp: 90 tablet, Rfl: 0    ticagrelor (BRILINTA) 90 MG tablet tablet, Take 1 tablet by mouth 2 (Two) Times a Day., Disp: 60 tablet, Rfl: 11    tiotropium (Spiriva HandiHaler) 18 MCG per inhalation capsule, Place 1 capsule into inhaler and inhale Daily., Disp: 28 capsule, Rfl: 0    Vericiguat (Verquvo) 2.5 MG tablet, Take 1 tablet by mouth Daily., Disp: 90 tablet, Rfl: 3    Vericiguat (Verquvo) 2.5 MG tablet, Take 1 tablet by mouth Daily., Disp: 28 tablet, Rfl: 0    ALPRAZolam (Xanax) 1 MG tablet, Take 1 tablet by mouth 3 (Three) Times a Day As Needed for Anxiety., Disp: 90 tablet, Rfl: 0    furosemide (LASIX) 40 MG tablet,  "Take 1 tablet by mouth 2 (Two) Times a Day., Disp: 60 tablet, Rfl: 0    mirtazapine (REMERON) 15 MG tablet, Take 2 tablets by mouth Every Night., Disp: 30 tablet, Rfl: 1    torsemide (DEMADEX) 20 MG tablet, Take 2 tablets by mouth Daily for 30 days., Disp: 60 tablet, Rfl: 0        There is no immunization history on file for this patient.      Objective       Vitals:    11/20/23 1120   BP: 106/77   BP Location: Left arm   Patient Position: Sitting   Cuff Size: Adult   Pulse: 96   Temp: 98.3 °F (36.8 °C)   TempSrc: Temporal   SpO2: 100%   Weight: 89.3 kg (196 lb 12.8 oz)   Height: 182.9 cm (72\")   PainSc:   8      Body mass index is 26.69 kg/m².   Wt Readings from Last 3 Encounters:   11/20/23 89.3 kg (196 lb 12.8 oz)   11/08/23 88.5 kg (195 lb)   10/25/23 89.1 kg (196 lb 6.4 oz)      BP Readings from Last 3 Encounters:   11/20/23 106/77   11/08/23 104/68   10/25/23 118/80                Physical Exam  Vitals reviewed.   Constitutional:       Appearance: Normal appearance.   HENT:      Head: Normocephalic and atraumatic.   Eyes:      Conjunctiva/sclera: Conjunctivae normal.      Pupils: Pupils are equal, round, and reactive to light.   Cardiovascular:      Rate and Rhythm: Normal rate and regular rhythm.      Pulses: Normal pulses.      Heart sounds: Normal heart sounds.   Pulmonary:      Effort: Pulmonary effort is normal.      Breath sounds: Normal breath sounds.   Abdominal:      General: Bowel sounds are normal. There is distension.      Palpations: Abdomen is soft.   Musculoskeletal:      Right lower leg: Edema present.      Left lower leg: Edema present.   Skin:     General: Skin is warm and dry.   Neurological:      Mental Status: He is alert and oriented to person, place, and time.   Psychiatric:         Mood and Affect: Mood normal.         Behavior: Behavior normal.         Thought Content: Thought content normal.         Judgment: Judgment normal.             Result Review :       Common labs          " 10/18/2023    05:10 10/19/2023    05:46 10/25/2023    12:09   Common Labs   Glucose 104  100  100    BUN 23  25  27    Creatinine 0.83  1.07  1.22    Sodium 138  137  135    Potassium 3.2  3.7  4.2    Chloride 100  99  96    Calcium 9.3  9.4  9.2    Albumin   4.3    Total Bilirubin   0.9    Alkaline Phosphatase   140    AST (SGOT)   25    ALT (SGPT)   10    WBC 5.51  6.40  7.63    Hemoglobin 9.0  9.9  10.1    Hematocrit 26.3  29.1  31.0    Platelets 257  282  307               Assessment and Plan        Diagnoses and all orders for this visit:    1. Anxiety (Primary)  Comments:  Stop Klonopin and start Xanax 1 mg tid prn  Orders:  -     ALPRAZolam (Xanax) 1 MG tablet; Take 1 tablet by mouth 3 (Three) Times a Day As Needed for Anxiety.  Dispense: 90 tablet; Refill: 0    2. Compression fracture of L2 vertebra, sequela  -     HYDROcodone-acetaminophen (NORCO) 5-325 MG per tablet; Take 2 tablets by mouth Every 6 (Six) Hours As Needed for Moderate Pain.  Dispense: 240 tablet; Refill: 0    3. Smoker  Comments:  Encouraged to quit smoking    4. Chronic bilateral low back pain with right-sided sciatica  Comments:  Herber and ALEXA up to date.  Orders:  -     HYDROcodone-acetaminophen (NORCO) 5-325 MG per tablet; Take 2 tablets by mouth Every 6 (Six) Hours As Needed for Moderate Pain.  Dispense: 240 tablet; Refill: 0    5. Alcohol-induced insomnia  -     mirtazapine (REMERON) 15 MG tablet; Take 2 tablets by mouth Every Night.  Dispense: 30 tablet; Refill: 1    6. Generalized edema  Comments:  Limit fluid intake to 1800 ml a day  Orders:  -     furosemide (LASIX) 40 MG tablet; Take 1 tablet by mouth 2 (Two) Times a Day.  Dispense: 60 tablet; Refill: 0  -     Discontinue: potassium chloride 10 MEQ CR tablet; Take 1 tablet by mouth 2 (Two) Times a Day.  Dispense: 60 tablet; Refill: 0    7. Acute on chronic systolic congestive heart failure  Comments:  Advised to lower salt intake 2300 mg a day, increase exercise by walking, add  more protein to diet  grams a day.  Add Ensure Complete bid  Orders:  -     CBC & Differential  -     Comprehensive Metabolic Panel  -     proBNP    8. Acute on chronic HFrEF (heart failure with reduced ejection fraction)  -     Cancel: CBC & Differential  -     Cancel: Comprehensive Metabolic Panel  -     Digoxin Level  -     Cancel: proBNP  -     Magnesium    9. CAD, multiple vessel  -     Cancel: CBC & Differential  -     Cancel: Comprehensive Metabolic Panel  -     Digoxin Level  -     Cancel: proBNP  -     Magnesium      Advised patient to go to the ER  to have is ascitics drained at the hospital today but he doesn't want to go. States if the furosemide doesn't help with his swelling within in a few days he will go to the ER.  States he is seeing Dr Zuniga on Wednesday.      Follow Up     Return in about 2 months (around 1/20/2024).    Patient was given instructions and counseling regarding his condition or for health maintenance advice. Please see specific information pulled into the AVS if appropriate.     SRIKANTH Myers

## 2023-11-22 ENCOUNTER — TELEPHONE (OUTPATIENT)
Dept: CARDIOLOGY | Facility: CLINIC | Age: 68
End: 2023-11-22
Payer: MEDICARE

## 2023-11-22 ENCOUNTER — TELEPHONE (OUTPATIENT)
Dept: FAMILY MEDICINE CLINIC | Facility: CLINIC | Age: 68
End: 2023-11-22
Payer: MEDICARE

## 2023-11-22 ENCOUNTER — OFFICE VISIT (OUTPATIENT)
Dept: CARDIOLOGY | Facility: CLINIC | Age: 68
End: 2023-11-22
Payer: MEDICARE

## 2023-11-22 VITALS
SYSTOLIC BLOOD PRESSURE: 127 MMHG | HEART RATE: 62 BPM | HEIGHT: 72 IN | DIASTOLIC BLOOD PRESSURE: 70 MMHG | BODY MASS INDEX: 26.68 KG/M2 | WEIGHT: 197 LBS

## 2023-11-22 DIAGNOSIS — F17.200 SMOKER: ICD-10-CM

## 2023-11-22 DIAGNOSIS — E78.5 HYPERLIPIDEMIA LDL GOAL <70: ICD-10-CM

## 2023-11-22 DIAGNOSIS — I25.10 CAD, MULTIPLE VESSEL: ICD-10-CM

## 2023-11-22 DIAGNOSIS — J44.9 COPD MIXED TYPE: ICD-10-CM

## 2023-11-22 DIAGNOSIS — E87.1 ACUTE HYPONATREMIA: ICD-10-CM

## 2023-11-22 DIAGNOSIS — I50.23 ACUTE ON CHRONIC HFREF (HEART FAILURE WITH REDUCED EJECTION FRACTION): Primary | ICD-10-CM

## 2023-11-22 RX ORDER — TORSEMIDE 20 MG/1
40 TABLET ORAL DAILY
Qty: 90 TABLET | Refills: 3 | Status: SHIPPED | OUTPATIENT
Start: 2023-11-22 | End: 2024-05-20

## 2023-11-22 RX ORDER — VERICIGUAT 5 MG/1
1 TABLET, FILM COATED ORAL DAILY
Qty: 28 TABLET | Refills: 0 | COMMUNITY
Start: 2023-11-22

## 2023-11-22 RX ORDER — POTASSIUM CHLORIDE 1.5 G/1.58G
20 POWDER, FOR SOLUTION ORAL 2 TIMES DAILY
COMMUNITY

## 2023-11-22 RX ORDER — VERICIGUAT 5 MG/1
1 TABLET, FILM COATED ORAL DAILY
Qty: 90 TABLET | Refills: 3 | Status: SHIPPED | OUTPATIENT
Start: 2023-11-22

## 2023-11-22 RX ORDER — CARVEDILOL 3.12 MG/1
3.12 TABLET ORAL 2 TIMES DAILY
Qty: 180 TABLET | Refills: 3 | Status: SHIPPED | OUTPATIENT
Start: 2023-11-22

## 2023-11-22 RX ORDER — TIOTROPIUM BROMIDE 18 UG/1
1 CAPSULE ORAL; RESPIRATORY (INHALATION)
Qty: 28 CAPSULE | Refills: 0 | COMMUNITY
Start: 2023-11-22

## 2023-11-22 NOTE — ASSESSMENT & PLAN NOTE
Bradley has a history of MI status post PCI of the LAD and left main.  He denies any symptoms associated with angina.  His heart rate and blood pressure are stable with the current SGLT2 inhibitor, Verquvo and digoxin.  I will start a low-dose carvedilol.  I have requested that he do a heart rate blood pressure and daily weight log.

## 2023-11-22 NOTE — PATIENT INSTRUCTIONS
1.  Discontinue the Lasix and restart torsemide 20 mg take 2 tablets once daily.  Take 1 extra tablet of torsemide for 2 days then return to 40 mg once a day.  2.  Discontinue the potassium.  3.  Increase the Verquvo 2.5 mg take 2 tablets every evening until the current supply is complete.  The new prescription will be for Verquvo 5 mg take 1 tablet at night.  4.  Do a heart rate blood pressure and daily weight log.  5.  Do blood work 1 to 2 days prior to next visit.  6.  Do a low sodium, 1500 fluid restriction.  7.  Start carvedilol 3.125 mg take 1 tablet twice daily.

## 2023-11-22 NOTE — TELEPHONE ENCOUNTER
Caller: LOUIS SPENCER    Relationship: Emergency Contact    Best call back number: [phone]    What medication are you requesting: RESPIMAT INHALER 2.5MCG    What are your current symptoms: ORIGINALLY PRESCRIBED IN THE HOSPITAL FOR SHORTNESS OF AIR    How long have you been experiencing symptoms: N/A    Have you had these symptoms before:    [x] Yes  [] No    Have you been treated for these symptoms before:   [x] Yes  [] No    If a prescription is needed, what is your preferred pharmacy and phone number: [id number] [place], [place] - [address] AT [facility] & [address] - [phone]  - [phone] FX     Additional notes: PATIENT NEEDING REFILL ON INHALER ORIGINALLY PRESCRIBED BY HOSPITAL. PATIENTS CARDIOLOGIST SAID THE REACH OUT TO PATIENTS PCP FOR REFILL.

## 2023-11-22 NOTE — ASSESSMENT & PLAN NOTE
Bradley has heart failure with reduced ejection fraction.  He continues to experience short of air and edema both his abdomen and his bilateral lower extremities.  I will make the following changes to his heart failure medications:    1.  Discontinue the Lasix and restart torsemide 20 mg take 2 tablets once daily.  Take 1 extra tablet of torsemide for 2 days then return to 40 mg once a day.  2.  Discontinue the potassium.  3.  Increase the Verquvo 2.5 mg take 2 tablets every evening until the current supply is complete.  The new prescription will be for Verquvo 5 mg take 1 tablet at night.  4.  Do a heart rate blood pressure and daily weight log.  5.  Do blood work 1 to 2 days prior to next visit.  6.  Do a low sodium, 1500 fluid restriction.

## 2023-11-22 NOTE — ASSESSMENT & PLAN NOTE
Johns sodium is slightly low at 134.  I have requested that he abide by a 1500 fluid restriction.  Will recheck his labs to next visit.

## 2023-11-22 NOTE — ASSESSMENT & PLAN NOTE
Bradley has smoked cigarettes for a very long time.  He has cut down but is having difficulty quitting.  I have offered him patches at today's visit.  He said he only wants them if he can get them for free, but he still has to pay a co-pay.  He is no longer allowed to smoke in the house which limits the amount of time he can smoke.  Smoking sensation provided for 3 to 5 minutes.

## 2023-11-22 NOTE — TELEPHONE ENCOUNTER
BRILINTA- PAP APPROVED     NEEDS VERQUVO AND JARDIANCE PAP- BRINGING TAX FORMS,    SCRIPT SENT FOR BRILINTA

## 2023-11-28 ENCOUNTER — TELEPHONE (OUTPATIENT)
Dept: CASE MANAGEMENT | Facility: OTHER | Age: 68
End: 2023-11-28
Payer: MEDICARE

## 2023-11-28 NOTE — TELEPHONE ENCOUNTER
Unable to contact by phone since 10/3/23. Chart reviewed. He has been contacting office medical assistants for any questions/needs.

## 2023-12-07 ENCOUNTER — TELEPHONE (OUTPATIENT)
Dept: FAMILY MEDICINE CLINIC | Facility: CLINIC | Age: 68
End: 2023-12-07
Payer: MEDICARE

## 2023-12-07 DIAGNOSIS — F17.200 SMOKER: ICD-10-CM

## 2023-12-07 DIAGNOSIS — J44.9 COPD MIXED TYPE: ICD-10-CM

## 2023-12-07 RX ORDER — TIOTROPIUM BROMIDE 18 UG/1
1 CAPSULE ORAL; RESPIRATORY (INHALATION)
Qty: 28 CAPSULE | Refills: 0 | COMMUNITY
Start: 2023-12-07

## 2023-12-07 RX ORDER — ALBUTEROL SULFATE 90 UG/1
2 AEROSOL, METERED RESPIRATORY (INHALATION) EVERY 4 HOURS PRN
Qty: 18 G | Refills: 1 | Status: SHIPPED | OUTPATIENT
Start: 2023-12-07

## 2023-12-07 NOTE — TELEPHONE ENCOUNTER
Caller: Bradley Crane    Relationship: Self    Best call back number: [phone]     Requested Prescriptions:   Requested Prescriptions     Pending Prescriptions Disp Refills    albuterol sulfate  (90 Base) MCG/ACT inhaler 18 g 1     Sig: Inhale 2 puffs Every 4 (Four) Hours As Needed for Wheezing.    tiotropium (Spiriva HandiHaler) 18 MCG per inhalation capsule 28 capsule 0     Sig: Place 1 capsule into inhaler and inhale Daily.        Pharmacy where request should be sent: [id number] - [place], [place] - [address]  AT [facility] & [address] - [phone]  - [phone] FX     Last office visit with prescribing clinician: 11/20/2023   Last telemedicine visit with prescribing clinician: Visit date not found   Next office visit with prescribing clinician: 1/22/2024     Does the patient have less than a 3 day supply:  [x] Yes  [] No    Would you like a call back once the refill request has been completed: [] Yes [x] No    If the office needs to give you a call back, can they leave a voicemail: [] Yes [x] No    Silvia Allen, PCT   12/07/23 10:16 EST

## 2023-12-11 ENCOUNTER — TELEPHONE (OUTPATIENT)
Dept: CARDIOLOGY | Facility: CLINIC | Age: 68
End: 2023-12-11
Payer: MEDICARE

## 2023-12-11 NOTE — TELEPHONE ENCOUNTER
Spoke with pt to remind of appointment on Wednesday 12/13 @ 11:00, to bring medications and to go get lab work done today or tomorrow.

## 2023-12-13 ENCOUNTER — OFFICE VISIT (OUTPATIENT)
Dept: CARDIOLOGY | Facility: CLINIC | Age: 68
End: 2023-12-13
Payer: MEDICARE

## 2023-12-13 VITALS
HEART RATE: 52 BPM | HEIGHT: 72 IN | DIASTOLIC BLOOD PRESSURE: 70 MMHG | WEIGHT: 197 LBS | SYSTOLIC BLOOD PRESSURE: 98 MMHG | BODY MASS INDEX: 26.68 KG/M2

## 2023-12-13 DIAGNOSIS — E78.5 HYPERLIPIDEMIA LDL GOAL <70: ICD-10-CM

## 2023-12-13 DIAGNOSIS — F17.200 SMOKER: ICD-10-CM

## 2023-12-13 DIAGNOSIS — E87.1 ACUTE HYPONATREMIA: ICD-10-CM

## 2023-12-13 DIAGNOSIS — I50.23 ACUTE ON CHRONIC HFREF (HEART FAILURE WITH REDUCED EJECTION FRACTION): Primary | ICD-10-CM

## 2023-12-13 PROBLEM — I50.9 ACUTE EXACERBATION OF CHF (CONGESTIVE HEART FAILURE): Status: RESOLVED | Noted: 2023-10-12 | Resolved: 2023-12-13

## 2023-12-13 PROBLEM — I50.20 SYSTOLIC HEART FAILURE: Status: RESOLVED | Noted: 2023-10-09 | Resolved: 2023-12-13

## 2023-12-13 LAB
ALBUMIN SERPL-MCNC: 4.1 G/DL (ref 3.5–5.2)
ALBUMIN/GLOB SERPL: 1.2 G/DL
ALP SERPL-CCNC: 152 U/L (ref 39–117)
ALT SERPL W P-5'-P-CCNC: 6 U/L (ref 1–41)
ANION GAP SERPL CALCULATED.3IONS-SCNC: 15.6 MMOL/L (ref 5–15)
AST SERPL-CCNC: 22 U/L (ref 1–40)
BASOPHILS # BLD AUTO: 0.06 10*3/MM3 (ref 0–0.2)
BASOPHILS NFR BLD AUTO: 1.1 % (ref 0–1.5)
BILIRUB SERPL-MCNC: 1.4 MG/DL (ref 0–1.2)
BUN SERPL-MCNC: 17 MG/DL (ref 8–23)
BUN/CREAT SERPL: 16 (ref 7–25)
CALCIUM SPEC-SCNC: 9.5 MG/DL (ref 8.6–10.5)
CHLORIDE SERPL-SCNC: 96 MMOL/L (ref 98–107)
CO2 SERPL-SCNC: 23.4 MMOL/L (ref 22–29)
CREAT SERPL-MCNC: 1.06 MG/DL (ref 0.76–1.27)
DEPRECATED RDW RBC AUTO: 51.9 FL (ref 37–54)
DIGOXIN SERPL-MCNC: 0.94 NG/ML (ref 0.6–1.2)
EGFRCR SERPLBLD CKD-EPI 2021: 77.4 ML/MIN/1.73
EOSINOPHIL # BLD AUTO: 0.14 10*3/MM3 (ref 0–0.4)
EOSINOPHIL NFR BLD AUTO: 2.6 % (ref 0.3–6.2)
ERYTHROCYTE [DISTWIDTH] IN BLOOD BY AUTOMATED COUNT: 14.2 % (ref 12.3–15.4)
GLOBULIN UR ELPH-MCNC: 3.3 GM/DL
GLUCOSE SERPL-MCNC: 88 MG/DL (ref 65–99)
HCT VFR BLD AUTO: 35.1 % (ref 37.5–51)
HGB BLD-MCNC: 11.3 G/DL (ref 13–17.7)
IMM GRANULOCYTES # BLD AUTO: 0.01 10*3/MM3 (ref 0–0.05)
IMM GRANULOCYTES NFR BLD AUTO: 0.2 % (ref 0–0.5)
LYMPHOCYTES # BLD AUTO: 0.97 10*3/MM3 (ref 0.7–3.1)
LYMPHOCYTES NFR BLD AUTO: 17.7 % (ref 19.6–45.3)
MAGNESIUM SERPL-MCNC: 2.1 MG/DL (ref 1.6–2.4)
MCH RBC QN AUTO: 32 PG (ref 26.6–33)
MCHC RBC AUTO-ENTMCNC: 32.2 G/DL (ref 31.5–35.7)
MCV RBC AUTO: 99.4 FL (ref 79–97)
MONOCYTES # BLD AUTO: 0.63 10*3/MM3 (ref 0.1–0.9)
MONOCYTES NFR BLD AUTO: 11.5 % (ref 5–12)
NEUTROPHILS NFR BLD AUTO: 3.68 10*3/MM3 (ref 1.7–7)
NEUTROPHILS NFR BLD AUTO: 66.9 % (ref 42.7–76)
NRBC BLD AUTO-RTO: 0 /100 WBC (ref 0–0.2)
NT-PROBNP SERPL-MCNC: 7741 PG/ML (ref 0–900)
PLATELET # BLD AUTO: 271 10*3/MM3 (ref 140–450)
PMV BLD AUTO: 10.3 FL (ref 6–12)
POTASSIUM SERPL-SCNC: 3.5 MMOL/L (ref 3.5–5.2)
PROT SERPL-MCNC: 7.4 G/DL (ref 6–8.5)
RBC # BLD AUTO: 3.53 10*6/MM3 (ref 4.14–5.8)
SODIUM SERPL-SCNC: 135 MMOL/L (ref 136–145)
WBC NRBC COR # BLD AUTO: 5.49 10*3/MM3 (ref 3.4–10.8)

## 2023-12-13 PROCEDURE — 80053 COMPREHEN METABOLIC PANEL: CPT

## 2023-12-13 PROCEDURE — 83735 ASSAY OF MAGNESIUM: CPT

## 2023-12-13 PROCEDURE — 85025 COMPLETE CBC W/AUTO DIFF WBC: CPT

## 2023-12-13 PROCEDURE — 83880 ASSAY OF NATRIURETIC PEPTIDE: CPT

## 2023-12-13 PROCEDURE — 80162 ASSAY OF DIGOXIN TOTAL: CPT

## 2023-12-13 RX ORDER — ASPIRIN 81 MG/1
81 TABLET ORAL DAILY
COMMUNITY

## 2023-12-13 RX ORDER — VERICIGUAT 10 MG/1
1 TABLET, FILM COATED ORAL DAILY
Qty: 90 TABLET | Refills: 3 | Status: SHIPPED | OUTPATIENT
Start: 2023-12-13

## 2023-12-13 NOTE — ASSESSMENT & PLAN NOTE
Mr. Blakely has heart failure with reduced ejection fraction.  It is quite challenging to determine any progress due to his negative responses.  He does not have any edema of his lower extremities but his abdomen remains firm.  Just prior to last visit his PCP recommended a thoracentesis but he had declined.  Per patient the ascites is much better, although he remains symptomatic.  I will make the following changes to optimize his heart failure medications and address his fluid retention:    1.  Increase the Verquvo 2.5 mg take 2 tablets at night until your current supply is gone new prescription will be for Verquvo 10 mg take 1 tablet nightly.  2.  Increase Jardiance 10 mg take 2-1/2 tablets every morning.  The new prescription will be for Jardiance 25 mg take 1 tablet daily.  3.  Do a heart rate blood pressure and daily weight log  4.  Do blood work 1 to 2 days prior to next visit.

## 2023-12-13 NOTE — PATIENT INSTRUCTIONS
1.  Increase the Verquvo 2.5 mg take 2 tablets at night until your current supply is gone new prescription will be for Verquvo 10 mg take 1 tablet nightly.  2.  Increase Jardiance 10 mg take 2-1/2 tablets every morning.  The new prescription will be for Jardiance 25 mg take 1 tablet daily.  3.  Do a heart rate blood pressure and daily weight log  4.  Do blood work 1 to 2 days prior to next visit.

## 2023-12-13 NOTE — ASSESSMENT & PLAN NOTE
Mr. Blakely has smoked cigarettes for multiple years.  He does not have any intention on quitting at this time.  He has had to cut back on due to unable to smoke in the house.  We have discussed smoking cessation for about 3 minutes

## 2023-12-13 NOTE — PROGRESS NOTES
Office Visit    Chief Complaint  Acute on chronic HFrEF     Subjective            Bradley Crane presents to [facility] CARDIOLOGY  History of Present Illness  Mr. Blakely is a 68-year-old male that presented to the office today for follow-up for to heart failure with reduced ejection fraction, ischemic cardiomyopathy, status post PCI to the LAD and left main.  He reports that he is about the same.  He thinks the medication is what is making him feel bad.  He did not start his carvedilol and wishes to discontinue medications if possible.  We have discussed the importance of him taking each medication.  Tasha (significant other) tries to help and ensure that he takes the proper medication.  He has not experiencing any pedal edema since last visit.  Bradley does tend to accumulate fluid in his abdomen.  He reports that it is better although his abdomen remains firm and he does complain of early satiety.  Bradley does not perform much activity other than ambulating to the bathroom and back.  He did not have his labs drawn prior to this visit.  He initially refused but after some persuasion by Dr. Zuniga he reluctantly agreed to have his labs drawn.  He denies any chest pain or syncopal episodes.      Past Medical History:   Diagnosis Date    CHF (congestive heart failure)     Hypertension        No Known Allergies     Past Surgical History:   Procedure Laterality Date    CARDIAC CATHETERIZATION N/A 09/05/2023    Procedure: Right and LHC with possible coronary intevention;  Surgeon: Merly Ji MD;  Location: [facility] INVASIVE LOCATION;  Service: Cardiology;  Laterality: N/A;    CARDIAC DEFIBRILLATOR PLACEMENT      CARDIAC SURGERY          Social History     Tobacco Use    Smoking status: Every Day     Packs/day: .5     Types: Cigarettes   Vaping Use    Vaping Use: Never used   Substance Use Topics    Alcohol use: Not Currently     Alcohol/week: 3.0 standard drinks of alcohol     Types: 3 Cans of  beer per week     Comment: LAST DRINK 2 WEEKS AGO    Drug use: Never       History reviewed. No pertinent family history.     Prior to Admission medications    Medication Sig Start Date End Date Taking? Authorizing Provider   albuterol sulfate  (90 Base) MCG/ACT inhaler Inhale 2 puffs Every 4 (Four) Hours As Needed for Wheezing. 12/7/23   Rosa Isela Kim APRN   ALPRAZolam (Xanax) 1 MG tablet Take 1 tablet by mouth 3 (Three) Times a Day As Needed for Anxiety. 11/20/23   Rosa Isela Kim APRN   atorvastatin (LIPITOR) 80 MG tablet Take 1 tablet by mouth Daily. 10/19/23   Angel Roy MD   carvedilol (COREG) 3.125 MG tablet Take 1 tablet by mouth 2 (Two) Times a Day. 11/22/23   Beulah Dong APRN   digoxin (LANOXIN) 125 MCG tablet Take 1 tablet by mouth Daily. 11/8/23   Christy Zuniga MD   empagliflozin (Jardiance) 10 MG tablet tablet Take 1 tablet by mouth Daily. 11/8/23   Christy Zuniga MD   empagliflozin (Jardiance) 10 MG tablet tablet Take 1 tablet by mouth Daily. Indications: Cardiac Failure, LOT: [id number] EXP: 01- 11/22/23   Beulah Dong APRN   HYDROcodone-acetaminophen (NORCO) 5-325 MG per tablet Take 2 tablets by mouth Every 6 (Six) Hours As Needed for Moderate Pain. 11/20/23   Rosa Isela Kim APRN   meclizine (ANTIVERT) 25 MG tablet Take 1 tablet by mouth 3 (Three) Times a Day As Needed for Dizziness. 11/16/23   Rosa Isela Kim APRN   Melatonin 10 MG tablet Take 1 tablet by mouth At Night As Needed.    Provider, MD Joshua   midodrine (PROAMATINE) 2.5 MG tablet Take 2 tablets by mouth 2 (Two) Times a Day As Needed (for systolic blood pressure less than 95). 11/8/23   Christy Zuniga MD   mirtazapine (REMERON) 15 MG tablet Take 2 tablets by mouth Every Night. 11/20/23   Rosa Isela Kim APRN   ondansetron (ZOFRAN) 4 MG tablet Take 1 tablet by mouth Every 8 (Eight) Hours As Needed for Nausea or Vomiting. 11/9/23   Rosa Isela Kim APRN   potassium chloride  "(KLOR-CON) 20 MEQ packet Take 20 mEq by mouth 2 (Two) Times a Day.    Provider, MD Joshua   spironolactone (ALDACTONE) 25 MG tablet TAKE 1 TABLET BY MOUTH DAILY 11/8/23   Christy Zuniga MD   tiotropium (Spiriva HandiHaler) 18 MCG per inhalation capsule Place 1 capsule into inhaler and inhale Daily. 12/7/23   Rosa Isela Kim APRN   torsemide (DEMADEX) 20 MG tablet Take 2 tablets by mouth Daily for 180 days. 11/22/23 5/20/24  Beulah Dong APRN   Vericiguat (Verquvo) 5 MG tablet Take 1 tablet by mouth Daily. 11/22/23   Beulah Dong APRN   Vericiguat (Verquvo) 5 MG tablet Take 1 tablet by mouth Daily. Indications: [id number] EXP: 06/17/2025 11/22/23   Beulah Dong APRN        Review of Systems   Constitutional:  Positive for fatigue.   Respiratory:  Positive for cough and shortness of breath.    Cardiovascular:  Positive for leg swelling. Negative for chest pain and palpitations.   Neurological:  Positive for dizziness.        Symptom Course: Been Unchanged    Weight Trend: Stable     Objective     BP 98/70   Pulse 52   Ht 182.9 cm (72\")   Wt 89.4 kg (197 lb)   BMI 26.72 kg/m²       Physical Exam  Constitutional:       General: He is awake.      Appearance: Normal appearance.   Neck:      Thyroid: No thyromegaly.      Vascular: No carotid bruit or JVD.   Cardiovascular:      Rate and Rhythm: Normal rate and regular rhythm.      Chest Wall: PMI is not displaced.      Pulses: Normal pulses.      Heart sounds: Normal heart sounds, S1 normal and S2 normal. No murmur heard.     No friction rub. No gallop. No S3 or S4 sounds.   Pulmonary:      Effort: Pulmonary effort is normal.      Breath sounds: Normal breath sounds and air entry. No wheezing, rhonchi or rales.   Abdominal:      General: Bowel sounds are normal.      Palpations: Abdomen is soft. There is no mass.      Tenderness: There is no abdominal tenderness.   Musculoskeletal:      Cervical back: Neck supple.      Right lower leg: No edema.    " "  Left lower leg: No edema.   Neurological:      Mental Status: He is alert and oriented to person, place, and time.   Psychiatric:         Mood and Affect: Mood normal.         Behavior: Behavior is cooperative.           Result Review :                      Lab Results   Component Value Date    PROBNP 7,994.0 (H) 11/20/2023    PROBNP 6,591.0 (H) 10/25/2023    PROBNP 9,863.0 (H) 10/11/2023     CMP          10/19/2023    05:46 10/25/2023    12:09 11/20/2023    12:29   CMP   Glucose 100  100  94    BUN 25  27  13    Creatinine 1.07  1.22  1.21    EGFR 76.5  65.4  66.0    Sodium 137  135  134    Potassium 3.7  4.2  3.8    Chloride 99  96  99    Calcium 9.4  9.2  9.7    Total Protein  7.3  6.4    Albumin  4.3  4.3    Globulin  3.0  2.1    Total Bilirubin  0.9  1.1    Alkaline Phosphatase  140  150    AST (SGOT)  25  23    ALT (SGPT)  10  12    Albumin/Globulin Ratio  1.4  2.0    BUN/Creatinine Ratio 23.4  22.1  10.7    Anion Gap 14.8  13.9  12.4      CBC w/diff          10/19/2023    05:46 10/25/2023    12:09 11/20/2023    12:29   CBC w/Diff   WBC 6.40  7.63  6.66    RBC 2.89  3.00  3.29    Hemoglobin 9.9  10.1  11.0    Hematocrit 29.1  31.0  32.1    .7  103.3  97.6    MCH 34.3  33.7  33.4    MCHC 34.0  32.6  34.3    RDW 14.6  14.8  12.2    Platelets 282  307  250    Neutrophil Rel %  72.3  71.6    Immature Granulocyte Rel %  0.3  0.3    Lymphocyte Rel %  12.7  14.3    Monocyte Rel %  12.2  10.7    Eosinophil Rel %  1.6  2.0    Basophil Rel %  0.9  1.1       Lipid Panel          9/6/2023    04:34   Lipid Panel   Total Cholesterol 96    Triglycerides 44    HDL Cholesterol 43    VLDL Cholesterol 12    LDL Cholesterol  41    LDL/HDL Ratio 1.03       Lab Results   Component Value Date    TSH 2.960 10/25/2023    TSH 2.040 08/29/2023      Lab Results   Component Value Date    FREET4 1.70 10/25/2023      No results found for: \"DDIMERQUANT\"  Magnesium   Date Value Ref Range Status   11/20/2023 2.3 1.6 - 2.4 mg/dL Final "      Digoxin   Date Value Ref Range Status   11/20/2023 0.72 0.60 - 1.20 ng/mL Final      A1C Last 3 Results          9/6/2023    04:34   HGBA1C Last 3 Results   Hemoglobin A1C 5.70           Results for orders placed during the hospital encounter of 08/29/23    Adult Transthoracic Echo Complete w/ Color, Spectral and Contrast if necessary per protocol    Interpretation Summary    Left ventricular ejection fraction appears to be 26 - 30%.    The left ventricular cavity is mildly dilated.    Left ventricular diastolic function was indeterminate.    The right ventricular cavity is mild to moderately dilated.    The left atrial cavity is moderately dilated.    The right atrial cavity is moderately  dilated.    Moderate tricuspid valve regurgitation is present.    Estimated right ventricular systolic pressure from tricuspid regurgitation is normal (<35 mmHg).    Aortic valve appears sclerotic with reduced excursion.  Max/mean pressure gradient was 20/11 mmHg.  Aortic valve area was 0.93 cm².  Could be consistent with low-flow, low gradient AAS.        Assessment and Plan        Diagnoses and all orders for this visit:    1. Acute on chronic HFrEF (heart failure with reduced ejection fraction) (Primary)  Assessment & Plan:  Mr. Blakely has heart failure with reduced ejection fraction.  It is quite challenging to determine any progress due to his negative responses.  He does not have any edema of his lower extremities but his abdomen remains firm.  Just prior to last visit his PCP recommended a thoracentesis but he had declined.  Per patient the ascites is much better, although he remains symptomatic.  I will make the following changes to optimize his heart failure medications and address his fluid retention:    1.  Increase the Verquvo 2.5 mg take 2 tablets at night until your current supply is gone new prescription will be for Verquvo 10 mg take 1 tablet nightly.  2.  Increase Jardiance 10 mg take 2-1/2 tablets every  morning.  The new prescription will be for Jardiance 25 mg take 1 tablet daily.  3.  Do a heart rate blood pressure and daily weight log  4.  Do blood work 1 to 2 days prior to next visit.    Orders:  -     CBC & Differential  -     Comprehensive Metabolic Panel  -     proBNP  -     Digoxin Level  -     Magnesium  -     Comprehensive Metabolic Panel; Future  -     Magnesium; Future  -     Digoxin Level; Future  -     proBNP; Future  -     CBC & Differential; Future    2. Hyperlipidemia LDL goal <70  Assessment & Plan:  His cholesterol is very well-controlled with the current dose of atorvastatin.  Will continue the same.      3. Acute hyponatremia  Assessment & Plan:  At last visit 6 weeks ago he was slightly hyponatremic.  I had requested that he repeat labs but he never did so.  We will repeat them today and we will call with results.  I requested that he remain on a 1500 fluid restriction.    Orders:  -     CBC & Differential  -     Comprehensive Metabolic Panel  -     proBNP  -     Digoxin Level  -     Magnesium    4. Smoker  Assessment & Plan:  Mr. Blakely has smoked cigarettes for multiple years.  He does not have any intention on quitting at this time.  He has had to cut back on due to unable to smoke in the house.  We have discussed smoking cessation for about 3 minutes      Other orders  -     Vericiguat (Verquvo) 10 MG tablet; Take 1 tablet by mouth Daily.  Dispense: 90 tablet; Refill: 3  -     empagliflozin (Jardiance) 25 MG tablet tablet; Take 1 tablet by mouth Daily.  Dispense: 30 tablet; Refill: 3            Follow Up     Return in about 6 weeks (around 1/24/2024) for 5 to 6 weeks.    Patient was given instructions and counseling regarding his condition or for health maintenance advice. Please see specific information pulled into the AVS if appropriate.     Electronically signed by SRIKANTH Hall, 12/13/23, 12:33 PM EST.

## 2023-12-13 NOTE — ASSESSMENT & PLAN NOTE
At last visit 6 weeks ago he was slightly hyponatremic.  I had requested that he repeat labs but he never did so.  We will repeat them today and we will call with results.  I requested that he remain on a 1500 fluid restriction.

## 2023-12-15 ENCOUNTER — APPOINTMENT (OUTPATIENT)
Dept: CT IMAGING | Facility: HOSPITAL | Age: 68
End: 2023-12-15
Payer: MEDICARE

## 2023-12-15 ENCOUNTER — HOSPITAL ENCOUNTER (EMERGENCY)
Facility: HOSPITAL | Age: 68
Discharge: HOME | End: 2023-12-15
Attending: EMERGENCY MEDICINE
Payer: MEDICARE

## 2023-12-15 ENCOUNTER — APPOINTMENT (OUTPATIENT)
Dept: GENERAL RADIOLOGY | Facility: HOSPITAL | Age: 68
End: 2023-12-15
Payer: MEDICARE

## 2023-12-15 ENCOUNTER — TELEPHONE (OUTPATIENT)
Dept: FAMILY MEDICINE CLINIC | Facility: CLINIC | Age: 68
End: 2023-12-15
Payer: MEDICARE

## 2023-12-15 VITALS
HEART RATE: 92 BPM | DIASTOLIC BLOOD PRESSURE: 79 MMHG | TEMPERATURE: 97.9 F | SYSTOLIC BLOOD PRESSURE: 102 MMHG | WEIGHT: 201.06 LBS | OXYGEN SATURATION: 93 % | RESPIRATION RATE: 18 BRPM | HEIGHT: 73 IN | BODY MASS INDEX: 26.65 KG/M2

## 2023-12-15 DIAGNOSIS — R18.8 OTHER ASCITES: ICD-10-CM

## 2023-12-15 DIAGNOSIS — R14.0 ABDOMINAL DISTENTION: ICD-10-CM

## 2023-12-15 DIAGNOSIS — R10.9 ABDOMINAL PAIN, UNSPECIFIED ABDOMINAL LOCATION: Primary | ICD-10-CM

## 2023-12-15 LAB
ALBUMIN SERPL-MCNC: 4.4 G/DL (ref 3.5–5.2)
ALBUMIN/GLOB SERPL: 1.4 G/DL
ALP SERPL-CCNC: 167 U/L (ref 39–117)
ALT SERPL W P-5'-P-CCNC: 7 U/L (ref 1–41)
ANION GAP SERPL CALCULATED.3IONS-SCNC: 15.8 MMOL/L (ref 5–15)
AST SERPL-CCNC: 24 U/L (ref 1–40)
BASOPHILS # BLD AUTO: 0.07 10*3/MM3 (ref 0–0.2)
BASOPHILS NFR BLD AUTO: 1 % (ref 0–1.5)
BILIRUB SERPL-MCNC: 1.3 MG/DL (ref 0–1.2)
BILIRUB UR QL STRIP: NEGATIVE
BUN SERPL-MCNC: 14 MG/DL (ref 8–23)
BUN/CREAT SERPL: 14.3 (ref 7–25)
CALCIUM SPEC-SCNC: 9.4 MG/DL (ref 8.6–10.5)
CHLORIDE SERPL-SCNC: 99 MMOL/L (ref 98–107)
CLARITY UR: CLEAR
CO2 SERPL-SCNC: 23.2 MMOL/L (ref 22–29)
COLOR UR: YELLOW
CREAT SERPL-MCNC: 0.98 MG/DL (ref 0.76–1.27)
D-LACTATE SERPL-SCNC: 1.9 MMOL/L (ref 0.5–2)
DEPRECATED RDW RBC AUTO: 51.6 FL (ref 37–54)
DIGOXIN SERPL-MCNC: 0.81 NG/ML (ref 0.6–1.2)
EGFRCR SERPLBLD CKD-EPI 2021: 85 ML/MIN/1.73
EOSINOPHIL # BLD AUTO: 0.1 10*3/MM3 (ref 0–0.4)
EOSINOPHIL NFR BLD AUTO: 1.4 % (ref 0.3–6.2)
ERYTHROCYTE [DISTWIDTH] IN BLOOD BY AUTOMATED COUNT: 14.3 % (ref 12.3–15.4)
GLOBULIN UR ELPH-MCNC: 3.2 GM/DL
GLUCOSE SERPL-MCNC: 84 MG/DL (ref 65–99)
GLUCOSE UR STRIP-MCNC: ABNORMAL MG/DL
HCT VFR BLD AUTO: 35.3 % (ref 37.5–51)
HGB BLD-MCNC: 11.7 G/DL (ref 13–17.7)
HGB UR QL STRIP.AUTO: NEGATIVE
HOLD SPECIMEN: NORMAL
HOLD SPECIMEN: NORMAL
IMM GRANULOCYTES # BLD AUTO: 0.01 10*3/MM3 (ref 0–0.05)
IMM GRANULOCYTES NFR BLD AUTO: 0.1 % (ref 0–0.5)
KETONES UR QL STRIP: NEGATIVE
LEUKOCYTE ESTERASE UR QL STRIP.AUTO: NEGATIVE
LIPASE SERPL-CCNC: 8 U/L (ref 13–60)
LYMPHOCYTES # BLD AUTO: 1.1 10*3/MM3 (ref 0.7–3.1)
LYMPHOCYTES NFR BLD AUTO: 15.6 % (ref 19.6–45.3)
MCH RBC QN AUTO: 32.4 PG (ref 26.6–33)
MCHC RBC AUTO-ENTMCNC: 33.1 G/DL (ref 31.5–35.7)
MCV RBC AUTO: 97.8 FL (ref 79–97)
MONOCYTES # BLD AUTO: 0.63 10*3/MM3 (ref 0.1–0.9)
MONOCYTES NFR BLD AUTO: 8.9 % (ref 5–12)
NEUTROPHILS NFR BLD AUTO: 5.13 10*3/MM3 (ref 1.7–7)
NEUTROPHILS NFR BLD AUTO: 73 % (ref 42.7–76)
NITRITE UR QL STRIP: NEGATIVE
NRBC BLD AUTO-RTO: 0 /100 WBC (ref 0–0.2)
PH UR STRIP.AUTO: 7.5 [PH] (ref 5–8)
PLATELET # BLD AUTO: 270 10*3/MM3 (ref 140–450)
PMV BLD AUTO: 9.8 FL (ref 6–12)
POTASSIUM SERPL-SCNC: 3.4 MMOL/L (ref 3.5–5.2)
PROT SERPL-MCNC: 7.6 G/DL (ref 6–8.5)
PROT UR QL STRIP: NEGATIVE
RBC # BLD AUTO: 3.61 10*6/MM3 (ref 4.14–5.8)
SODIUM SERPL-SCNC: 138 MMOL/L (ref 136–145)
SP GR UR STRIP: >=1.03 (ref 1–1.03)
UROBILINOGEN UR QL STRIP: ABNORMAL
WBC NRBC COR # BLD AUTO: 7.04 10*3/MM3 (ref 3.4–10.8)
WHOLE BLOOD HOLD COAG: NORMAL
WHOLE BLOOD HOLD SPECIMEN: NORMAL

## 2023-12-15 PROCEDURE — 25510000001 IOPAMIDOL PER 1 ML: Performed by: EMERGENCY MEDICINE

## 2023-12-15 PROCEDURE — 99285 EMERGENCY DEPT VISIT HI MDM: CPT

## 2023-12-15 PROCEDURE — 85025 COMPLETE CBC W/AUTO DIFF WBC: CPT | Performed by: EMERGENCY MEDICINE

## 2023-12-15 PROCEDURE — 74177 CT ABD & PELVIS W/CONTRAST: CPT

## 2023-12-15 PROCEDURE — 80162 ASSAY OF DIGOXIN TOTAL: CPT | Performed by: EMERGENCY MEDICINE

## 2023-12-15 PROCEDURE — 83690 ASSAY OF LIPASE: CPT | Performed by: EMERGENCY MEDICINE

## 2023-12-15 PROCEDURE — 81003 URINALYSIS AUTO W/O SCOPE: CPT | Performed by: EMERGENCY MEDICINE

## 2023-12-15 PROCEDURE — 80053 COMPREHEN METABOLIC PANEL: CPT | Performed by: EMERGENCY MEDICINE

## 2023-12-15 PROCEDURE — 71045 X-RAY EXAM CHEST 1 VIEW: CPT

## 2023-12-15 PROCEDURE — 83605 ASSAY OF LACTIC ACID: CPT | Performed by: EMERGENCY MEDICINE

## 2023-12-15 RX ORDER — SODIUM CHLORIDE 0.9 % (FLUSH) 0.9 %
10 SYRINGE (ML) INJECTION AS NEEDED
Status: DISCONTINUED | OUTPATIENT
Start: 2023-12-15 | End: 2023-12-15 | Stop reason: HOSPADM

## 2023-12-15 RX ORDER — SPIRONOLACTONE 25 MG/1
25 TABLET ORAL DAILY
Qty: 90 TABLET | Refills: 3 | Status: SHIPPED | OUTPATIENT
Start: 2023-12-15

## 2023-12-15 RX ADMIN — IOPAMIDOL 100 ML: 755 INJECTION, SOLUTION INTRAVENOUS at 15:27

## 2023-12-15 NOTE — ED PROVIDER NOTES
"Time: 2:26 PM EST  Date of encounter:  12/15/2023  Independent Historian/Clinical History and Information was obtained by:   Patient    History is limited by: N/A    Chief Complaint   Patient presents with    Abdominal Pain         History of Present Illness:  Patient is a 68 y.o. year old male who presents to the emergency department for evaluation of diffuse abdominal pain with bloating that began 2 weeks ago per patient.  Patient states this has happened once before and he required \"a needle to be drained.\"  Patient denies nausea/vomiting.  Patient denies fever.    Patient is a 68-year-old male who presents with complaints of bloating that has been ongoing for the last couple weeks.  He also describes some pain due to the bloating.  Denies any history of cirrhosis but does have a history of CHF and hypertension.  Does report that he was a heavy drinker previously.    Patient Care Team  Primary Care Provider: Rosa Isela Kim APRN    Past Medical History:     No Known Allergies  Past Medical History:   Diagnosis Date    CHF (congestive heart failure)     Hypertension      Past Surgical History:   Procedure Laterality Date    CARDIAC CATHETERIZATION N/A 09/05/2023    Procedure: Right and LHC with possible coronary intevention;  Surgeon: Merly Ji MD;  Location: [facility] INVASIVE LOCATION;  Service: Cardiology;  Laterality: N/A;    CARDIAC DEFIBRILLATOR PLACEMENT      CARDIAC SURGERY       History reviewed. No pertinent family history.    Home Medications:  Prior to Admission medications    Medication Sig Start Date End Date Taking? Authorizing Provider   albuterol sulfate  (90 Base) MCG/ACT inhaler Inhale 2 puffs Every 4 (Four) Hours As Needed for Wheezing. 12/7/23   Rosa Isela Kim APRN   ALPRAZolam (Xanax) 1 MG tablet Take 1 tablet by mouth 3 (Three) Times a Day As Needed for Anxiety. 11/20/23   Rosa Isela Kim APRN   aspirin 81 MG EC tablet Take 1 tablet by mouth Daily.    Provider, " MD Joshua   atorvastatin (LIPITOR) 80 MG tablet Take 1 tablet by mouth Daily. 10/19/23   Angel Roy MD   digoxin (LANOXIN) 125 MCG tablet Take 1 tablet by mouth Daily. 11/8/23   Christy Zuniga MD   empagliflozin (Jardiance) 25 MG tablet tablet Take 1 tablet by mouth Daily. 12/13/23   Beulah Dong APRN   HYDROcodone-acetaminophen (NORCO) 5-325 MG per tablet Take 2 tablets by mouth Every 6 (Six) Hours As Needed for Moderate Pain. 11/20/23   Rosa Isela Kim APRN   meclizine (ANTIVERT) 25 MG tablet Take 1 tablet by mouth 3 (Three) Times a Day As Needed for Dizziness. 11/16/23   Rosa Isela Kim APRN   Melatonin 10 MG tablet Take 1 tablet by mouth At Night As Needed.    Joshua Connors MD   midodrine (PROAMATINE) 2.5 MG tablet Take 2 tablets by mouth 2 (Two) Times a Day As Needed (for systolic blood pressure less than 95). 11/8/23   Christy Zuniga MD   mirtazapine (REMERON) 15 MG tablet Take 2 tablets by mouth Every Night. 11/20/23   Rosa Isela Kim APRN   ondansetron (ZOFRAN) 4 MG tablet Take 1 tablet by mouth Every 8 (Eight) Hours As Needed for Nausea or Vomiting. 11/9/23   Rosa Isela Kim APRN   potassium chloride (KLOR-CON) 20 MEQ packet Take 20 mEq by mouth 2 (Two) Times a Day.    Joshua Connors MD   spironolactone (ALDACTONE) 25 MG tablet TAKE 1 TABLET BY MOUTH DAILY 12/15/23   Christy Zuniga MD   ticagrelor (BRILINTA) 90 MG tablet tablet Take 1 tablet by mouth 2 (Two) Times a Day.    Joshua Connors MD   tiotropium (Spiriva HandiHaler) 18 MCG per inhalation capsule Place 1 capsule into inhaler and inhale Daily. 12/7/23   Rosa Isela Kim APRN   torsemide (DEMADEX) 20 MG tablet Take 2 tablets by mouth Daily for 180 days. 11/22/23 5/20/24  Beulah Dong APRN   Vericiguat (Verquvo) 10 MG tablet Take 1 tablet by mouth Daily. 12/13/23   Beulah Dong APRN   spironolactone (ALDACTONE) 25 MG tablet TAKE 1 TABLET BY MOUTH DAILY 11/8/23 12/15/23  Christy Zuniga,  "MD        Social History:   Social History     Tobacco Use    Smoking status: Every Day     Packs/day: .5     Types: Cigarettes   Vaping Use    Vaping Use: Never used   Substance Use Topics    Alcohol use: Not Currently     Alcohol/week: 3.0 standard drinks of alcohol     Types: 3 Cans of beer per week     Comment: LAST DRINK 2 WEEKS AGO    Drug use: Never         Review of Systems:  Review of Systems   Gastrointestinal:  Positive for abdominal distention and abdominal pain.        Physical Exam:  /84   Pulse 90   Temp 97.9 °F (36.6 °C) (Oral)   Resp 18   Ht 185.4 cm (73\")   Wt 91.2 kg (201 lb 1 oz)   SpO2 (!) 83%   BMI 26.53 kg/m²         Physical Exam  Vitals and nursing note reviewed.   Constitutional:       Appearance: Normal appearance.   HENT:      Head: Normocephalic and atraumatic.   Eyes:      General: No scleral icterus.  Cardiovascular:      Rate and Rhythm: Normal rate and regular rhythm.      Heart sounds: Normal heart sounds.   Pulmonary:      Effort: Pulmonary effort is normal.      Breath sounds: Normal breath sounds.   Abdominal:      General: There is distension.      Palpations: Abdomen is soft.      Tenderness: There is no abdominal tenderness.   Musculoskeletal:         General: Normal range of motion.      Cervical back: Normal range of motion.   Skin:     Findings: No rash.   Neurological:      General: No focal deficit present.      Mental Status: He is alert.        \              Procedures:  Procedures      Medical Decision Making:      Comorbidities that affect care:    Congestive Heart Failure, Hypertension    External Notes reviewed:    Reviewed office visit from 12/13/2023      The following orders were placed and all results were independently analyzed by me:  Orders Placed This Encounter   Procedures    CT Abdomen Pelvis With Contrast    XR Chest 1 View    Artemas Draw    Comprehensive Metabolic Panel    Lipase    Urinalysis With Microscopic If Indicated (No Culture) - " Urine, Clean Catch    Lactic Acid, Plasma    CBC Auto Differential    Digoxin Level    Insert Peripheral IV    CBC & Differential    Green Top (Gel)    Lavender Top    Gold Top - SST    Light Blue Top       Medications Given in the Emergency Department:  Medications   sodium chloride 0.9 % flush 10 mL (has no administration in time range)   iopamidol (ISOVUE-370) 76 % injection 100 mL (100 mL Intravenous Given 12/15/23 1527)        ED Course:    The patient was initially evaluated in the triage area where orders were placed. The patient was later dispositioned by Benjamin Solis MD.      The patient was advised to stay for completion of workup which includes but is not limited to communication of labs and radiological results, reassessment and plan. The patient was advised that leaving prior to disposition by a provider could result in critical findings that are not communicated to the patient.     ED Course as of 12/15/23 1937   Fri Dec 15, 2023   1423 Provider In Triage: Patient was evaluated by me in triage, Abrahan Chapman PA-C. Orders were placed and the patient is currently awaiting final results and disposition.   [SK]      ED Course User Index  [SK] Abrahan Chapman PA-C       Labs:    Lab Results (last 24 hours)       Procedure Component Value Units Date/Time    CBC & Differential [[id number]]  (Abnormal) Collected: 12/15/23 1431    Specimen: Blood from Arm, Right Updated: 12/15/23 1442    Narrative:      The following orders were created for panel order CBC & Differential.  Procedure                               Abnormality         Status                     ---------                               -----------         ------                     CBC Auto Differential[[id number]]        Abnormal            Final result                 Please view results for these tests on the individual orders.    Comprehensive Metabolic Panel [[id number]]  (Abnormal) Collected: 12/15/23 1431    Specimen: Blood from Arm,  Right Updated: 12/15/23 1459     Glucose 84 mg/dL      BUN 14 mg/dL      Creatinine 0.98 mg/dL      Sodium 138 mmol/L      Potassium 3.4 mmol/L      Comment: Slight hemolysis detected by analyzer. Result may be falsely elevated.        Chloride 99 mmol/L      CO2 23.2 mmol/L      Calcium 9.4 mg/dL      Total Protein 7.6 g/dL      Albumin 4.4 g/dL      ALT (SGPT) 7 U/L      AST (SGOT) 24 U/L      Comment: Slight hemolysis detected by analyzer. Result may be falsely elevated.        Alkaline Phosphatase 167 U/L      Total Bilirubin 1.3 mg/dL      Globulin 3.2 gm/dL      A/G Ratio 1.4 g/dL      BUN/Creatinine Ratio 14.3     Anion Gap 15.8 mmol/L      eGFR 85.0 mL/min/1.73     Narrative:      GFR Normal >60  Chronic Kidney Disease <60  Kidney Failure <15      Lipase [[id number]]  (Abnormal) Collected: 12/15/23 1431    Specimen: Blood from Arm, Right Updated: 12/15/23 1458     Lipase 8 U/L     Lactic Acid, Plasma [[id number]]  (Normal) Collected: 12/15/23 1431    Specimen: Blood from Arm, Right Updated: 12/15/23 1456     Lactate 1.9 mmol/L     CBC Auto Differential [[id number]]  (Abnormal) Collected: 12/15/23 1431    Specimen: Blood from Arm, Right Updated: 12/15/23 1442     WBC 7.04 10*3/mm3      RBC 3.61 10*6/mm3      Hemoglobin 11.7 g/dL      Hematocrit 35.3 %      MCV 97.8 fL      MCH 32.4 pg      MCHC 33.1 g/dL      RDW 14.3 %      RDW-SD 51.6 fl      MPV 9.8 fL      Platelets 270 10*3/mm3      Neutrophil % 73.0 %      Lymphocyte % 15.6 %      Monocyte % 8.9 %      Eosinophil % 1.4 %      Basophil % 1.0 %      Immature Grans % 0.1 %      Neutrophils, Absolute 5.13 10*3/mm3      Lymphocytes, Absolute 1.10 10*3/mm3      Monocytes, Absolute 0.63 10*3/mm3      Eosinophils, Absolute 0.10 10*3/mm3      Basophils, Absolute 0.07 10*3/mm3      Immature Grans, Absolute 0.01 10*3/mm3      nRBC 0.0 /100 WBC     Digoxin Level [[id number]]  (Normal) Collected: 12/15/23 5367    Specimen: Blood from Arm, Right Updated: 12/15/23 3570      Digoxin 0.81 ng/mL     Urinalysis With Microscopic If Indicated (No Culture) - Urine, Clean Catch [[id number]]  (Abnormal) Collected: 12/15/23 1656    Specimen: Urine, Clean Catch Updated: 12/15/23 1736     Color, UA Yellow     Appearance, UA Clear     pH, UA 7.5     Specific Gravity, UA >=1.030     Glucose,  mg/dL (1+)     Ketones, UA Negative     Bilirubin, UA Negative     Blood, UA Negative     Protein, UA Negative     Leuk Esterase, UA Negative     Nitrite, UA Negative     Urobilinogen, UA 1.0 E.U./dL    Narrative:      Urine microscopic not indicated.             Imaging:    XR Chest 1 View    Result Date: 12/15/2023  PROCEDURE: XR CHEST 1 VW  COMPARISON: [facility], CT, CT ABDOMEN PELVIS W CONTRAST, 12/15/2023, 15:26.  [facility], CR, XR CHEST 1 VW, 10/14/2023, 9:19.  INDICATIONS: Effusions  FINDINGS:  The heart remains enlarged.  The pulmonary vascularity is prominent centrally.  Interstitial pulmonary edema is unchanged.  Bilateral pleural effusions are probably not significantly changed.  Dual lead AICD is in unchanged position.  Healed or healing right lower rib fractures are again seen.        No significant change in comparison to 10/14/2023.       LORENA GARNICA MD       Electronically Signed and Approved By: LORENA GARNICA MD on 12/15/2023 at 17:46             CT Abdomen Pelvis With Contrast    Result Date: 12/15/2023  PROCEDURE: CT ABDOMEN PELVIS W CONTRAST  COMPARISON: [facility], CR, XR CHEST 1 VW, 10/14/2023, 9:19.  INDICATIONS: abdominal bloating/pain  TECHNIQUE: CT images were created with non-ionic intravenous contrast material.   PROTOCOL:   Standard imaging protocol performed    RADIATION:   DLP: 750.9 mGy*cm   Automated exposure control was utilized to minimize radiation dose. CONTRAST: 100 cc Isovue 370 I.V. LABS:   eGFR: >60 ml/min/1.73m2  FINDINGS:  Large right and moderate left pleural effusion is evident.  Compressive atelectasis is seen at  the lung bases.   The liver is relatively small, and has a nodular contour suggesting cirrhosis.  The liver is of diffusely diminished density consistent with mild fatty infiltration.  The spleen is of normal size.  A moderate amount of ascites is evident.   The gallbladder is not abnormally distended.  No pancreatic or adrenal mass is evident.  The kidneys enhance   bilaterall   bilaterally.  No renal or ureteral stones are seen.  There is no evidence of hydronephrosis.  The urinary bladder is not abnormally distended.  The prostate gland measures 4.7 cm in greatest transverse dimension.  Bowel loops are not abnormally dilated.  No significant stool is evident.  No vascular abnormality is evident.  Arterial structures are relatively well opacified.  An inferior vena cava filter is evident.  Plate and screws in the superior pubic rami are evident.  Degenerative changes are seen in the lower thoracic and lumbar spine.  Mild compression of the superior endplate of L2 probably represents an old compression fracture.  CONTINUED ON NEXT PAGE...  Small left inguinal hernia contains peritoneal fluid.        CT scan of the abdomen and pelvis with IV contrast demonstrating large right and moderate left pleural effusions.  Compressive atelectasis is seen at the lung bases.  Nodular hepatic contour suggesting cirrhosis.  Moderate ascites.     LORENA GARNICA MD       Electronically Signed and Approved By: LORENA GARNICA MD on 12/15/2023 at 15:53                Differential Diagnosis and Discussion:      Abdominal Pain: Based on the patient's signs and symptoms, I considered abdominal aortic aneurysm, small bowel obstruction, pancreatitis, acute cholecystitis, acute appendecitis, peptic ulcer disease, gastritis, colitis, endocrine disorders, irritable bowel syndrome and other differential diagnosis an etiology of the patient's abdominal pain.    All labs were reviewed and interpreted by me.  All X-rays impressions were  independently interpreted by me.  EKG was interpreted by me.  CT scan radiology impression was interpreted by me.    MDM     Amount and/or Complexity of Data Reviewed  Clinical lab tests: reviewed  Tests in the radiology section of CPT®: reviewed  Decide to obtain previous medical records or to obtain history from someone other than the patient: yes       Patient is a 68-year-old male who presents with complaints of abdominal pain and bloating.  Reports has been ongoing for at least a couple weeks.  States he has had issues with bloating in the past and has had to have fluid taken off his belly.  Appears to be more ascites from alcoholic cirrhosis.  He also has chronic pleural effusions noted.  He states he is really just having belly distention.  He denies any shortness of breath, nausea, vomiting.  Labs and imaging here show pleural effusions as well as abdominal ascites.  His oxygen is 93 to 95% in the room.  He is wishing to go home at this time.  I recommend that he see his primary care doctor for scheduled possible paracentesis.  Will DC.          Patient Care Considerations:          Consultants/Shared Management Plan:    None    Social Determinants of Health:    Patient is independent, reliable, and has access to care.       Disposition and Care Coordination:    Discharged: I considered escalation of care by admitting this patient for observation, however the patient has improved and is suitable and  stable for discharge.    I have explained the patient´s condition, diagnoses and treatment plan based on the information available to me at this time. I have answered questions and addressed any concerns. The patient has a good  understanding of the patient´s diagnosis, condition, and treatment plan as can be expected at this point. The vital signs have been stable. The patient´s condition is stable and appropriate for discharge from the emergency department.      The patient will pursue further outpatient  evaluation with the primary care physician or other designated or consulting physician as outlined in the discharge instructions. They are agreeable to this plan of care and follow-up instructions have been explained in detail. The patient has received these instructions in written format and have expressed an understanding of the discharge instructions. The patient is aware that any significant change in condition or worsening of symptoms should prompt an immediate return to this or the closest emergency department or call to 911.      Final diagnoses:   Abdominal pain, unspecified abdominal location   Abdominal distention   Other ascites        ED Disposition       ED Disposition   Discharge    Condition   Stable    Comment   --               This medical record created using voice recognition software.             Benjamin Solis MD  12/15/23 5156

## 2023-12-15 NOTE — TELEPHONE ENCOUNTER
Pt called office and complained of fluid build up on abdomen and stated Rosa Isela had spoke about having a paracentesis to remove this and I made him aware that Rosa Isela had left for the day and I would call her on her personal number. Called and spoke with Rosa Isela and she stated that with his current health status he needed to go to the ER due to this being an urgent concern and if we placed the order it would be likely it wouldn't get scheduled until next week. Called pt back and made him aware and he gave verbal understanding and will be proceeding to the ER for further evaluation.

## 2023-12-19 ENCOUNTER — OFFICE VISIT (OUTPATIENT)
Dept: FAMILY MEDICINE CLINIC | Facility: CLINIC | Age: 68
End: 2023-12-19
Payer: MEDICARE

## 2023-12-19 VITALS
TEMPERATURE: 97.3 F | HEIGHT: 73 IN | DIASTOLIC BLOOD PRESSURE: 67 MMHG | WEIGHT: 194.4 LBS | HEART RATE: 72 BPM | SYSTOLIC BLOOD PRESSURE: 97 MMHG | BODY MASS INDEX: 25.76 KG/M2 | OXYGEN SATURATION: 96 %

## 2023-12-19 DIAGNOSIS — K70.31 ASCITES DUE TO ALCOHOLIC CIRRHOSIS: Primary | ICD-10-CM

## 2023-12-19 DIAGNOSIS — M54.41 CHRONIC BILATERAL LOW BACK PAIN WITH RIGHT-SIDED SCIATICA: ICD-10-CM

## 2023-12-19 DIAGNOSIS — K70.31 ASCITES DUE TO ALCOHOLIC CIRRHOSIS: ICD-10-CM

## 2023-12-19 DIAGNOSIS — S32.020S COMPRESSION FRACTURE OF L2 VERTEBRA, SEQUELA: ICD-10-CM

## 2023-12-19 DIAGNOSIS — R42 VERTIGO: ICD-10-CM

## 2023-12-19 DIAGNOSIS — G89.29 CHRONIC BILATERAL LOW BACK PAIN WITH RIGHT-SIDED SCIATICA: ICD-10-CM

## 2023-12-19 DIAGNOSIS — J44.9 COPD MIXED TYPE: Primary | ICD-10-CM

## 2023-12-19 DIAGNOSIS — F41.9 ANXIETY: ICD-10-CM

## 2023-12-19 DIAGNOSIS — I50.9 ACUTE ON CHRONIC CONGESTIVE HEART FAILURE, UNSPECIFIED HEART FAILURE TYPE: ICD-10-CM

## 2023-12-19 DIAGNOSIS — I25.10 CAD, MULTIPLE VESSEL: ICD-10-CM

## 2023-12-19 DIAGNOSIS — F17.200 SMOKER: ICD-10-CM

## 2023-12-19 RX ORDER — MECLIZINE HYDROCHLORIDE 25 MG/1
25 TABLET ORAL 3 TIMES DAILY PRN
Qty: 90 TABLET | Refills: 3 | Status: SHIPPED | OUTPATIENT
Start: 2023-12-19

## 2023-12-19 RX ORDER — ALPRAZOLAM 1 MG/1
1 TABLET ORAL 3 TIMES DAILY PRN
Qty: 90 TABLET | Refills: 0 | Status: SHIPPED | OUTPATIENT
Start: 2023-12-19

## 2023-12-19 RX ORDER — HYDROCODONE BITARTRATE AND ACETAMINOPHEN 5; 325 MG/1; MG/1
2 TABLET ORAL EVERY 6 HOURS PRN
Qty: 240 TABLET | Refills: 0 | Status: SHIPPED | OUTPATIENT
Start: 2023-12-19

## 2023-12-19 RX ORDER — TIOTROPIUM BROMIDE INHALATION SPRAY 3.12 UG/1
2 SPRAY, METERED RESPIRATORY (INHALATION)
Qty: 4 G | Refills: 6 | Status: SHIPPED | OUTPATIENT
Start: 2023-12-19

## 2023-12-19 NOTE — PROGRESS NOTES
Chief Complaint  Hospital Follow Up Visit    Subjective          Bradley Crane is a 68 y.o. male who presents to [facility]    History of Present Illness    XR Chest  12/15/23  FINDINGS:          The heart remains enlarged.  The pulmonary vascularity is prominent centrally.   Interstitial pulmonary edema is unchanged.   Bilateral pleural effusions are probably not significantly changed.   Dual lead AICD is in unchanged position.   Healed or healing right lower rib fractures are again seen.   IMPRESSION:       No significant change in comparison to 10/14/2023.    CT Abdomen Pelvis With Contrast  12/15/23  CT scan of the abdomen and pelvis with IV contrast demonstrating large right and moderate left   pleural effusions.  Compressive atelectasis is seen at the lung bases.  Nodular hepatic contour suggesting cirrhosis.  Moderate ascites.      States he is having a hard time breathing because his abdomen is so big, can't get comfortable at night to sleep. States abdomen sitting on his legs and making them go numb and ache.    PHQ-2 Total Score:     PHQ-9 Total Score:          Review of Systems   Constitutional:  Negative for chills, fatigue and fever.   Respiratory:  Positive for cough and shortness of breath.    Cardiovascular:  Negative for chest pain and palpitations.   Gastrointestinal:  Positive for abdominal distention. Negative for constipation, diarrhea, nausea and vomiting.   Musculoskeletal:  Negative for back pain and neck pain.   Skin:  Negative for rash.   Neurological:  Negative for dizziness and headaches.          Medical History: has a past medical history of CHF (congestive heart failure) and Hypertension.     Surgical History: has a past surgical history that includes Cardiac surgery; Cardiac catheterization (N/A, 09/05/2023); and Cardiac defibrillator placement.     Family History: family history is not on file.     Social History: reports that he has been smoking  cigarettes. He has been smoking an average of .5 packs per day. He does not have any smokeless tobacco history on file. He reports that he does not currently use alcohol after a past usage of about 3.0 standard drinks of alcohol per week. He reports that he does not use drugs.    Allergies: Patient has no known allergies.      Health Maintenance Due   Topic Date Due    COLORECTAL CANCER SCREENING  Never done    COVID-19 Vaccine (1) Never done    Pneumococcal Vaccine 65+ (1 - PCV) Never done    TDAP/TD VACCINES (1 - Tdap) Never done    ZOSTER VACCINE (1 of 2) Never done    Hepatitis B (1 of 3 - Risk 3-dose series) Never done    INFLUENZA VACCINE  Never done    ANNUAL WELLNESS VISIT  Never done            Current Outpatient Medications:     albuterol sulfate  (90 Base) MCG/ACT inhaler, Inhale 2 puffs Every 4 (Four) Hours As Needed for Wheezing., Disp: 18 g, Rfl: 1    ALPRAZolam (Xanax) 1 MG tablet, Take 1 tablet by mouth 3 (Three) Times a Day As Needed for Anxiety., Disp: 90 tablet, Rfl: 0    aspirin 81 MG EC tablet, Take 1 tablet by mouth Daily., Disp: , Rfl:     atorvastatin (LIPITOR) 80 MG tablet, Take 1 tablet by mouth Daily., Disp: 90 tablet, Rfl: 0    digoxin (LANOXIN) 125 MCG tablet, Take 1 tablet by mouth Daily., Disp: 90 tablet, Rfl: 3    empagliflozin (Jardiance) 25 MG tablet tablet, Take 1 tablet by mouth Daily., Disp: 30 tablet, Rfl: 3    HYDROcodone-acetaminophen (NORCO) 5-325 MG per tablet, Take 2 tablets by mouth Every 6 (Six) Hours As Needed for Moderate Pain., Disp: 240 tablet, Rfl: 0    meclizine (ANTIVERT) 25 MG tablet, Take 1 tablet by mouth 3 (Three) Times a Day As Needed for Dizziness., Disp: 90 tablet, Rfl: 3    Melatonin 10 MG tablet, Take 1 tablet by mouth At Night As Needed., Disp: , Rfl:     midodrine (PROAMATINE) 2.5 MG tablet, Take 2 tablets by mouth 2 (Two) Times a Day As Needed (for systolic blood pressure less than 95)., Disp: 90 tablet, Rfl: 0    mirtazapine (REMERON) 15 MG  "tablet, Take 2 tablets by mouth Every Night., Disp: 30 tablet, Rfl: 1    ondansetron (ZOFRAN) 4 MG tablet, Take 1 tablet by mouth Every 8 (Eight) Hours As Needed for Nausea or Vomiting., Disp: 30 tablet, Rfl: 0    potassium chloride (KLOR-CON) 20 MEQ packet, Take 20 mEq by mouth 2 (Two) Times a Day., Disp: , Rfl:     spironolactone (ALDACTONE) 25 MG tablet, TAKE 1 TABLET BY MOUTH DAILY, Disp: 90 tablet, Rfl: 3    ticagrelor (BRILINTA) 90 MG tablet tablet, Take 1 tablet by mouth 2 (Two) Times a Day., Disp: , Rfl:     torsemide (DEMADEX) 20 MG tablet, Take 2 tablets by mouth Daily for 180 days., Disp: 90 tablet, Rfl: 3    Vericiguat (Verquvo) 10 MG tablet, Take 1 tablet by mouth Daily., Disp: 90 tablet, Rfl: 3    tiotropium bromide monohydrate (Spiriva Respimat) 2.5 MCG/ACT aerosol solution inhaler, Inhale 2 puffs Daily. Samples given Lot [id number], [id number] X2, Disp: 4 g, Rfl: 6        There is no immunization history on file for this patient.      Objective       Vitals:    12/19/23 0911   BP: 97/67   BP Location: Left arm   Patient Position: Sitting   Cuff Size: Adult   Pulse: 72   Temp: 97.3 °F (36.3 °C)   TempSrc: Temporal   SpO2: 96%   Weight: 88.2 kg (194 lb 6.4 oz)   Height: 185.4 cm (73\")   PainSc:   8   PainLoc: Abdomen      Body mass index is 25.65 kg/m².   Wt Readings from Last 3 Encounters:   12/19/23 88.2 kg (194 lb 6.4 oz)   12/15/23 91.2 kg (201 lb 1 oz)   12/13/23 89.4 kg (197 lb)      BP Readings from Last 3 Encounters:   12/19/23 97/67   12/15/23 102/79   12/13/23 98/70                Physical Exam  Vitals reviewed.   Constitutional:       Appearance: Normal appearance.   HENT:      Head: Normocephalic and atraumatic.   Eyes:      Conjunctiva/sclera: Conjunctivae normal.      Pupils: Pupils are equal, round, and reactive to light.   Cardiovascular:      Rate and Rhythm: Normal rate and regular rhythm.      Pulses: Normal pulses.      Heart sounds: Normal heart sounds.   Pulmonary:      Effort: " Pulmonary effort is normal.      Breath sounds: Normal breath sounds.   Abdominal:      General: Abdomen is protuberant. Bowel sounds are normal. There is distension.      Palpations: Abdomen is soft.      Tenderness: generalized    Musculoskeletal:      Cervical back: Normal range of motion.   Skin:     General: Skin is warm and dry.   Neurological:      Mental Status: He is alert and oriented to person, place, and time.   Psychiatric:         Mood and Affect: Mood normal.         Behavior: Behavior normal.         Thought Content: Thought content normal.         Judgment: Judgment normal.             Result Review :       Common labs          11/20/2023    12:29 12/13/2023    11:47 12/15/2023    14:31   Common Labs   Glucose 94  88  84    BUN 13  17  14    Creatinine 1.21  1.06  0.98    Sodium 134  135  138    Potassium 3.8  3.5  3.4    Chloride 99  96  99    Calcium 9.7  9.5  9.4    Albumin 4.3  4.1  4.4    Total Bilirubin 1.1  1.4  1.3    Alkaline Phosphatase 150  152  167    AST (SGOT) 23  22  24    ALT (SGPT) 12  6  7    WBC 6.66  5.49  7.04    Hemoglobin 11.0  11.3  11.7    Hematocrit 32.1  35.1  35.3    Platelets 250  271  270                       Assessment and Plan        Diagnoses and all orders for this visit:    1. COPD mixed type (Primary)  Comments:  Start Spiriva  Orders:  -     Ambulatory Referral to Chronic Care Management Medication Adherence, High Risk for ED/Readmission, Care Coordination, Barriers to Care  -     tiotropium bromide monohydrate (Spiriva Respimat) 2.5 MCG/ACT aerosol solution inhaler; Inhale 2 puffs Daily. Samples given Lot [id number], [id number]  Dispense: 4 g; Refill: 6    2. Chronic bilateral low back pain with right-sided sciatica  -     Ambulatory Referral to Chronic Care Management Medication Adherence, High Risk for ED/Readmission, Care Coordination, Barriers to Care  -     HYDROcodone-acetaminophen (NORCO) 5-325 MG per tablet; Take 2 tablets by mouth Every 6 (Six) Hours As  Needed for Moderate Pain.  Dispense: 240 tablet; Refill: 0    3. CAD, multiple vessel  -     Ambulatory Referral to Chronic Care Management Medication Adherence, High Risk for ED/Readmission, Care Coordination, Barriers to Care    4. Acute on chronic congestive heart failure, unspecified heart failure type  -     Ambulatory Referral to Chronic Care Management Medication Adherence, High Risk for ED/Readmission, Care Coordination, Barriers to Care    5. Ascites due to alcoholic cirrhosis  Comments:  Will order US guided paracentesis  Orders:  -     No Paracentesis Labs Needed; Standing  -     US Paracentesis; Standing  -     Paracentesis Albumin Orders Entered Via Therapy Plan    6. Compression fracture of L2 vertebra, sequela  -     HYDROcodone-acetaminophen (NORCO) 5-325 MG per tablet; Take 2 tablets by mouth Every 6 (Six) Hours As Needed for Moderate Pain.  Dispense: 240 tablet; Refill: 0    7. Chronic bilateral low back pain with right-sided sciatica  Comments:  Herber and ALEXA up to date.  Orders:  -     Ambulatory Referral to Chronic Care Management Medication Adherence, High Risk for ED/Readmission, Care Coordination, Barriers to Care  -     HYDROcodone-acetaminophen (NORCO) 5-325 MG per tablet; Take 2 tablets by mouth Every 6 (Six) Hours As Needed for Moderate Pain.  Dispense: 240 tablet; Refill: 0    8. Vertigo  Comments:  Managed on meclizine  Orders:  -     meclizine (ANTIVERT) 25 MG tablet; Take 1 tablet by mouth 3 (Three) Times a Day As Needed for Dizziness.  Dispense: 90 tablet; Refill: 3    9. Smoker  Comments:  Encouraged to stop smoking  Orders:  -     tiotropium bromide monohydrate (Spiriva Respimat) 2.5 MCG/ACT aerosol solution inhaler; Inhale 2 puffs Daily. Samples given Lot [id number], [id number] X2  Dispense: 4 g; Refill: 6    10. Anxiety  Comments:  Stable on  Xanax 1 mg tid prn  Orders:  -     ALPRAZolam (Xanax) 1 MG tablet; Take 1 tablet by mouth 3 (Three) Times a Day As Needed for Anxiety.   Dispense: 90 tablet; Refill: 0          Follow Up     Return in about 4 weeks (around 1/16/2024).    Patient was given instructions and counseling regarding his condition or for health maintenance advice. Please see specific information pulled into the AVS if appropriate.     Rosa Isela Kim, APRN

## 2023-12-26 ENCOUNTER — TELEPHONE (OUTPATIENT)
Dept: GASTROENTEROLOGY | Facility: CLINIC | Age: 68
End: 2023-12-26
Payer: MEDICARE

## 2023-12-28 NOTE — TELEPHONE ENCOUNTER
"Contacted the patient and verified his information, I advised that I was calling in regards to a referral we received from Rosa Isela Kim for \"Ascites due to alcoholic cirrhosis\", patient did not know why we had received the referral and I advised that it looked like his PCM was wanting him to be seen due to his liver, patient agreed to schedule an appointment since it was for his liver. I have scheduled the patient for the next available new patient appointment with [facility].  "
Attempted to contact patient about urgent referral from Rosa Isela Kim, per [facility] this is an ER follow up wanted to see if [facility] had any urgents slots.  [facility] does not however [facility] has new patients currently on 01/15/2024. Left message to call the office back to get appointment scheduled.   
20-Apr-2020 06:35

## 2023-12-29 ENCOUNTER — HOSPITAL ENCOUNTER (OUTPATIENT)
Dept: INTERVENTIONAL RADIOLOGY/VASCULAR | Facility: HOSPITAL | Age: 68
Discharge: HOME | End: 2023-12-29
Payer: MEDICARE

## 2024-01-19 DIAGNOSIS — Z79.899 ENCOUNTER FOR LONG-TERM (CURRENT) USE OF HIGH-RISK MEDICATION: Primary | ICD-10-CM

## 2024-01-19 DIAGNOSIS — S32.020S COMPRESSION FRACTURE OF L2 VERTEBRA, SEQUELA: ICD-10-CM

## 2024-01-19 DIAGNOSIS — F41.9 ANXIETY: ICD-10-CM

## 2024-01-19 DIAGNOSIS — G89.29 CHRONIC BILATERAL LOW BACK PAIN WITH RIGHT-SIDED SCIATICA: ICD-10-CM

## 2024-01-19 DIAGNOSIS — M54.41 CHRONIC BILATERAL LOW BACK PAIN WITH RIGHT-SIDED SCIATICA: ICD-10-CM

## 2024-01-19 RX ORDER — ALPRAZOLAM 1 MG/1
1 TABLET ORAL 3 TIMES DAILY PRN
Qty: 90 TABLET | Refills: 0 | Status: SHIPPED | OUTPATIENT
Start: 2024-01-19

## 2024-01-19 RX ORDER — HYDROCODONE BITARTRATE AND ACETAMINOPHEN 5; 325 MG/1; MG/1
2 TABLET ORAL EVERY 6 HOURS PRN
Qty: 240 TABLET | Refills: 0 | Status: SHIPPED | OUTPATIENT
Start: 2024-01-19

## 2024-01-19 NOTE — TELEPHONE ENCOUNTER
Caller: Bradley Crane    Relationship: Self    Best call back number: [phone]     Requested Prescriptions:   Requested Prescriptions     Pending Prescriptions Disp Refills    ticagrelor (BRILINTA) 90 MG tablet tablet 60 tablet      Sig: Take 1 tablet by mouth 2 (Two) Times a Day.    ALPRAZolam (Xanax) 1 MG tablet 90 tablet 0     Sig: Take 1 tablet by mouth 3 (Three) Times a Day As Needed for Anxiety.    HYDROcodone-acetaminophen (NORCO) 5-325 MG per tablet 240 tablet 0     Sig: Take 2 tablets by mouth Every 6 (Six) Hours As Needed for Moderate Pain.        Pharmacy where request should be sent: [id number] - [place], [place] - [address] AT [facility] [address] - [phone] [facility] [phone] [place]     Last office visit with prescribing clinician: 12/19/2023   Last telemedicine visit with prescribing clinician: Visit date not found   Next office visit with prescribing clinician: 1/22/2024     Does the patient have less than a 3 day supply:  [x] Yes  [] No    Sandra Bradford Rep   01/19/24 09:55 EST

## 2024-01-22 ENCOUNTER — OFFICE VISIT (OUTPATIENT)
Dept: FAMILY MEDICINE CLINIC | Facility: CLINIC | Age: 69
End: 2024-01-22
Payer: MEDICARE

## 2024-01-22 ENCOUNTER — TELEPHONE (OUTPATIENT)
Dept: CARDIOLOGY | Facility: CLINIC | Age: 69
End: 2024-01-22
Payer: MEDICARE

## 2024-01-22 VITALS
HEIGHT: 73 IN | HEART RATE: 95 BPM | DIASTOLIC BLOOD PRESSURE: 71 MMHG | SYSTOLIC BLOOD PRESSURE: 113 MMHG | BODY MASS INDEX: 27.75 KG/M2 | TEMPERATURE: 97.5 F | OXYGEN SATURATION: 98 % | WEIGHT: 209.4 LBS

## 2024-01-22 DIAGNOSIS — J44.9 COPD MIXED TYPE: Primary | ICD-10-CM

## 2024-01-22 DIAGNOSIS — K70.31 ASCITES DUE TO ALCOHOLIC CIRRHOSIS: ICD-10-CM

## 2024-01-22 DIAGNOSIS — I50.23 ACUTE ON CHRONIC SYSTOLIC CONGESTIVE HEART FAILURE: ICD-10-CM

## 2024-01-22 PROCEDURE — 1160F RVW MEDS BY RX/DR IN RCRD: CPT | Performed by: NURSE PRACTITIONER

## 2024-01-22 PROCEDURE — 1159F MED LIST DOCD IN RCRD: CPT | Performed by: NURSE PRACTITIONER

## 2024-01-22 PROCEDURE — 99214 OFFICE O/P EST MOD 30 MIN: CPT | Performed by: NURSE PRACTITIONER

## 2024-01-22 RX ORDER — METOPROLOL SUCCINATE 25 MG/1
12.5 TABLET, EXTENDED RELEASE ORAL DAILY
Qty: 90 TABLET | Refills: 3 | Status: SHIPPED | OUTPATIENT
Start: 2024-01-22

## 2024-01-22 RX ORDER — METOPROLOL SUCCINATE 25 MG/1
0.5 TABLET, EXTENDED RELEASE ORAL DAILY
COMMUNITY
Start: 2023-12-18 | End: 2024-01-22 | Stop reason: SDUPTHER

## 2024-01-22 RX ORDER — TORSEMIDE 20 MG/1
40 TABLET ORAL DAILY
Qty: 90 TABLET | Refills: 3 | Status: SHIPPED | OUTPATIENT
Start: 2024-01-22 | End: 2024-01-24 | Stop reason: SDUPTHER

## 2024-01-22 RX ORDER — AZITHROMYCIN 250 MG/1
TABLET, FILM COATED ORAL
Qty: 6 TABLET | Refills: 0 | Status: SHIPPED | OUTPATIENT
Start: 2024-01-22

## 2024-01-22 RX ORDER — DIGOXIN 125 MCG
125 TABLET ORAL DAILY
Qty: 90 TABLET | Refills: 3 | Status: SHIPPED | OUTPATIENT
Start: 2024-01-22

## 2024-01-22 NOTE — PROGRESS NOTES
Chief Complaint  Follow-up and COPD    Subjective          Bradley Crane is a 68 y.o. male who presents to [facility]    COPD  He complains of cough and shortness of breath.     Missed his last paracentesis because he was never contacted from [facility].  Has gained 15 pounds since last visit. States its making it hard to breath and hard to eat much at one time.    States he started coughing last week but its not productive.    PHQ-2 Total Score: 12   PHQ-9 Total Score: 12        Review of Systems   Respiratory:  Positive for cough and shortness of breath.    Gastrointestinal:  Positive for abdominal distention and nausea.          Medical History: has a past medical history of CHF (congestive heart failure) and Hypertension.     Surgical History: has a past surgical history that includes Cardiac surgery; Cardiac catheterization (N/A, 09/05/2023); and Cardiac defibrillator placement.     Family History: family history is not on file.     Social History: reports that he has been smoking cigarettes. He has been smoking an average of .5 packs per day. He does not have any smokeless tobacco history on file. He reports that he does not currently use alcohol after a past usage of about 3.0 standard drinks of alcohol per week. He reports that he does not use drugs.    Allergies: Patient has no known allergies.      Health Maintenance Due   Topic Date Due    COLORECTAL CANCER SCREENING  Never done    Hepatitis B (1 of 3 - Risk 3-dose series) Never done    ANNUAL WELLNESS VISIT  Never done            Current Outpatient Medications:     albuterol sulfate  (90 Base) MCG/ACT inhaler, Inhale 2 puffs Every 4 (Four) Hours As Needed for Wheezing., Disp: 18 g, Rfl: 1    ALPRAZolam (Xanax) 1 MG tablet, Take 1 tablet by mouth 3 (Three) Times a Day As Needed for Anxiety., Disp: 90 tablet, Rfl: 0    aspirin 81 MG EC tablet, Take 1 tablet by mouth Daily., Disp: , Rfl:     atorvastatin (LIPITOR) 80 MG  tablet, Take 1 tablet by mouth Daily., Disp: 90 tablet, Rfl: 0    digoxin (LANOXIN) 125 MCG tablet, Take 1 tablet by mouth Daily., Disp: 90 tablet, Rfl: 3    empagliflozin (Jardiance) 25 MG tablet tablet, Take 1 tablet by mouth Daily., Disp: 30 tablet, Rfl: 11    HYDROcodone-acetaminophen (NORCO) 5-325 MG per tablet, Take 2 tablets by mouth Every 6 (Six) Hours As Needed for Moderate Pain., Disp: 240 tablet, Rfl: 0    meclizine (ANTIVERT) 25 MG tablet, Take 1 tablet by mouth 3 (Three) Times a Day As Needed for Dizziness., Disp: 90 tablet, Rfl: 3    Melatonin 10 MG tablet, Take 1 tablet by mouth At Night As Needed., Disp: , Rfl:     metoprolol succinate XL (TOPROL-XL) 25 MG 24 hr tablet, Take 0.5 tablets by mouth Daily., Disp: 90 tablet, Rfl: 3    midodrine (PROAMATINE) 2.5 MG tablet, Take 2 tablets by mouth 2 (Two) Times a Day As Needed (for systolic blood pressure less than 95)., Disp: 90 tablet, Rfl: 0    mirtazapine (REMERON) 15 MG tablet, Take 2 tablets by mouth Every Night., Disp: 30 tablet, Rfl: 1    ondansetron (ZOFRAN) 4 MG tablet, Take 1 tablet by mouth Every 8 (Eight) Hours As Needed for Nausea or Vomiting., Disp: 30 tablet, Rfl: 0    potassium chloride (KLOR-CON) 20 MEQ packet, Take 20 mEq by mouth 2 (Two) Times a Day., Disp: , Rfl:     spironolactone (ALDACTONE) 25 MG tablet, TAKE 1 TABLET BY MOUTH DAILY, Disp: 90 tablet, Rfl: 3    ticagrelor (BRILINTA) 90 MG tablet tablet, Take 1 tablet by mouth 2 (Two) Times a Day., Disp: 60 tablet, Rfl: 3    tiotropium bromide monohydrate (Spiriva Respimat) 2.5 MCG/ACT aerosol solution inhaler, Inhale 2 puffs Daily. Samples given Lot [id number], [id number], Disp: 4 g, Rfl: 6    torsemide (DEMADEX) 20 MG tablet, Take 2 tablets by mouth Daily for 180 days., Disp: 90 tablet, Rfl: 3    Vericiguat (Verquvo) 10 MG tablet, Take 1 tablet by mouth Daily., Disp: 90 tablet, Rfl: 3    azithromycin (Zithromax Z-Phillip) 250 MG tablet, Take 2 tablets by mouth on day 1, then 1 tablet daily  "on days 2-5, Disp: 6 tablet, Rfl: 0        There is no immunization history on file for this patient.      Objective       Vitals:    01/22/24 1026   BP: 113/71   Pulse: 95   Temp: 97.5 °F (36.4 °C)   SpO2: 98%   Weight: 95 kg (209 lb 6.4 oz)   Height: 185.4 cm (73\")      Body mass index is 27.63 kg/m².   Wt Readings from Last 3 Encounters:   01/22/24 95 kg (209 lb 6.4 oz)   12/19/23 88.2 kg (194 lb 6.4 oz)   12/15/23 91.2 kg (201 lb 1 oz)      BP Readings from Last 3 Encounters:   01/22/24 113/71   12/19/23 97/67   12/15/23 102/79                Physical Exam  Vitals reviewed.   Constitutional:       Appearance: Normal appearance.   HENT:      Head: Normocephalic and atraumatic.   Eyes:      Conjunctiva/sclera: Conjunctivae normal.      Pupils: Pupils are equal, round, and reactive to light.   Cardiovascular:      Rate and Rhythm: Normal rate and regular rhythm.      Pulses: Normal pulses.      Heart sounds: Normal heart sounds.   Pulmonary:      Effort: Pulmonary effort is normal.      Breath sounds: Normal breath sounds. Examination of the right-lower field reveals decreased breath sounds. Examination of the left-lower field reveals decreased breath sounds.   Abdominal:      General: Abdomen is protuberant. Bowel sounds are normal. There is distension.      Comments: Abdomen is firm, non tender   Musculoskeletal:      Cervical back: Normal range of motion.   Skin:     General: Skin is warm and dry.   Neurological:      Mental Status: He is alert and oriented to person, place, and time.   Psychiatric:         Mood and Affect: Mood normal.         Behavior: Behavior normal.         Thought Content: Thought content normal.         Judgment: Judgment normal.             Result Review :       Common labs          11/20/2023    12:29 12/13/2023    11:47 12/15/2023    14:31   Common Labs   Glucose 94  88  84    BUN 13  17  14    Creatinine 1.21  1.06  0.98    Sodium 134  135  138    Potassium 3.8  3.5  3.4    Chloride " 99  96  99    Calcium 9.7  9.5  9.4    Albumin 4.3  4.1  4.4    Total Bilirubin 1.1  1.4  1.3    Alkaline Phosphatase 150  152  167    AST (SGOT) 23  22  24    ALT (SGPT) 12  6  7    WBC 6.66  5.49  7.04    Hemoglobin 11.0  11.3  11.7    Hematocrit 32.1  35.1  35.3    Platelets 250  271  270                       Assessment and Plan        Diagnoses and all orders for this visit:    1. COPD mixed type (Primary)  -     azithromycin (Zithromax Z-Phillip) 250 MG tablet; Take 2 tablets by mouth on day 1, then 1 tablet daily on days 2-5  Dispense: 6 tablet; Refill: 0    2. Ascites due to alcoholic cirrhosis  Comments:  Scheduled paracentesis for Thursday this week.  Orders:  -     torsemide (DEMADEX) 20 MG tablet; Take 2 tablets by mouth Daily for 180 days.  Dispense: 90 tablet; Refill: 3    3. Acute on chronic systolic congestive heart failure  Comments:  Managed on digoxin and Toprol XL  Orders:  -     digoxin (LANOXIN) 125 MCG tablet; Take 1 tablet by mouth Daily.  Dispense: 90 tablet; Refill: 3  -     metoprolol succinate XL (TOPROL-XL) 25 MG 24 hr tablet; Take 0.5 tablets by mouth Daily.  Dispense: 90 tablet; Refill: 3          Follow Up     Return in about 2 months (around 3/22/2024).    Patient was given instructions and counseling regarding his condition or for health maintenance advice. Please see specific information pulled into the AVS if appropriate.     SRIKANTH Myers

## 2024-01-22 NOTE — TELEPHONE ENCOUNTER
Left message to remind pt of appointment on Wednesday 1/24 @ 10:00, to get lab work done today or tomorrow, to bring medications and bp log to appointment.

## 2024-01-24 ENCOUNTER — OFFICE VISIT (OUTPATIENT)
Dept: CARDIOLOGY | Facility: CLINIC | Age: 69
End: 2024-01-24
Payer: MEDICARE

## 2024-01-24 VITALS
HEART RATE: 64 BPM | BODY MASS INDEX: 27.83 KG/M2 | WEIGHT: 210 LBS | DIASTOLIC BLOOD PRESSURE: 76 MMHG | HEIGHT: 73 IN | SYSTOLIC BLOOD PRESSURE: 108 MMHG

## 2024-01-24 DIAGNOSIS — I50.23 ACUTE ON CHRONIC HFREF (HEART FAILURE WITH REDUCED EJECTION FRACTION): Primary | ICD-10-CM

## 2024-01-24 DIAGNOSIS — E78.5 HYPERLIPIDEMIA LDL GOAL <70: ICD-10-CM

## 2024-01-24 DIAGNOSIS — F17.200 SMOKER: ICD-10-CM

## 2024-01-24 DIAGNOSIS — K70.31 ASCITES DUE TO ALCOHOLIC CIRRHOSIS: ICD-10-CM

## 2024-01-24 DIAGNOSIS — I25.10 CAD, MULTIPLE VESSEL: ICD-10-CM

## 2024-01-24 PROCEDURE — 1160F RVW MEDS BY RX/DR IN RCRD: CPT | Performed by: INTERNAL MEDICINE

## 2024-01-24 PROCEDURE — 1159F MED LIST DOCD IN RCRD: CPT | Performed by: INTERNAL MEDICINE

## 2024-01-24 RX ORDER — TORSEMIDE 20 MG/1
60 TABLET ORAL DAILY
Start: 2024-01-24

## 2024-01-24 RX ORDER — SPIRONOLACTONE 25 MG/1
50 TABLET ORAL DAILY
Qty: 180 TABLET | Refills: 3 | Status: SHIPPED | OUTPATIENT
Start: 2024-01-24

## 2024-01-24 NOTE — ASSESSMENT & PLAN NOTE
He has worsening congestive heart failure.  He also has cirrhosis of his liver with worsening ascites.  He is not very compliant with his medications.  He does not like blood draws.  We had to convince him to get his blood drawn today.  The following changes were made to his medications:    1.  Increase torsemide to 60 mg a day.  40 mg in the morning and 20 mg in the afternoon.  2.  Increase spironolactone to 50 mg, 2 tablets of 25 mg once a day in the morning.  3.  Stop the potassium.  4.  Restart the Verquvo.  5.  Monitor heart rate and blood pressure and bring us the log

## 2024-01-24 NOTE — PROGRESS NOTES
Office Visit    Chief Complaint  No chief complaint on file.    Subjective            Bradley Crane presents to [facility] CARDIOLOGY  History of Present Illness  Bradley is a 68 years old gentleman with coronary artery disease, previous myocardial infarction, high risk PCI of his left main and LAD who is not doing well.  He has congestive heart failure with reduced ejection fraction and is having more trouble with his ascites secondary to his cirrhosis.  He has pedal edema.  He is not very compliant with his medications and fluid restriction.  He ideally needs PCI of his circumflex but is considered too high risk for the procedure by the interventional cardiologist.  He denies any chest pain.  He still smokes.  He is scheduled to undergo thoracentesis tomorrow      Past Medical History:   Diagnosis Date    CHF (congestive heart failure)     Hypertension        No Known Allergies     Past Surgical History:   Procedure Laterality Date    CARDIAC CATHETERIZATION N/A 09/05/2023    Procedure: Right and LHC with possible coronary intevention;  Surgeon: Merly Ji MD;  Location: [facility] CATH INVASIVE LOCATION;  Service: Cardiology;  Laterality: N/A;    CARDIAC DEFIBRILLATOR PLACEMENT      CARDIAC SURGERY          Social History     Tobacco Use    Smoking status: Every Day     Packs/day: .5     Types: Cigarettes   Vaping Use    Vaping Use: Never used   Substance Use Topics    Alcohol use: Not Currently     Alcohol/week: 3.0 standard drinks of alcohol     Types: 3 Cans of beer per week     Comment: LAST DRINK 2 WEEKS AGO    Drug use: Never       History reviewed. No pertinent family history.     Prior to Admission medications    Medication Sig Start Date End Date Taking? Authorizing Provider   albuterol sulfate  (90 Base) MCG/ACT inhaler Inhale 2 puffs Every 4 (Four) Hours As Needed for Wheezing. 12/7/23  Yes Rosa Isela Kim APRN   ALPRAZolam (Xanax) 1 MG tablet Take 1 tablet by mouth 3  (Three) Times a Day As Needed for Anxiety. 1/19/24  Yes Rosa Isela Kim APRN   aspirin 81 MG EC tablet Take 1 tablet by mouth Daily.   Yes Joshua Connors MD   atorvastatin (LIPITOR) 80 MG tablet Take 1 tablet by mouth Daily. 10/19/23  Yes Angel Roy MD   digoxin (LANOXIN) 125 MCG tablet Take 1 tablet by mouth Daily. 1/22/24  Yes Rosa Isela Kim APRN   empagliflozin (Jardiance) 25 MG tablet tablet Take 1 tablet by mouth Daily. 1/12/24  Yes Christy Zuniga MD   HYDROcodone-acetaminophen (NORCO) 5-325 MG per tablet Take 2 tablets by mouth Every 6 (Six) Hours As Needed for Moderate Pain. 1/19/24  Yes Rosa Isela Kim APRN   meclizine (ANTIVERT) 25 MG tablet Take 1 tablet by mouth 3 (Three) Times a Day As Needed for Dizziness. 12/19/23  Yes Rosa Isela Kim APRN   Melatonin 10 MG tablet Take 1 tablet by mouth At Night As Needed.   Yes Joshua Connors MD   metoprolol succinate XL (TOPROL-XL) 25 MG 24 hr tablet Take 0.5 tablets by mouth Daily. 1/22/24  Yes Rosa Isela Kim APRN   midodrine (PROAMATINE) 2.5 MG tablet Take 2 tablets by mouth 2 (Two) Times a Day As Needed (for systolic blood pressure less than 95). 11/8/23  Yes Christy Zuniga MD   mirtazapine (REMERON) 15 MG tablet Take 2 tablets by mouth Every Night. 11/20/23  Yes Rosa Isela Kim APRN   ondansetron (ZOFRAN) 4 MG tablet Take 1 tablet by mouth Every 8 (Eight) Hours As Needed for Nausea or Vomiting. 11/9/23  Yes Rosa Isela Kim APRN   potassium chloride (KLOR-CON) 20 MEQ packet Take 20 mEq by mouth 2 (Two) Times a Day.   Yes Joshua Connors MD   spironolactone (ALDACTONE) 25 MG tablet TAKE 1 TABLET BY MOUTH DAILY 12/15/23  Yes Christy Zuniga MD   ticagrelor (BRILINTA) 90 MG tablet tablet Take 1 tablet by mouth 2 (Two) Times a Day. 1/19/24  Yes Julio, SRIKANTH Taylor   tiotropium bromide monohydrate (Spiriva Respimat) 2.5 MCG/ACT aerosol solution inhaler Inhale 2 puffs Daily. Samples given Lot [id number], [id number]  "X2 23  Yes Rosa Isela Kim APRN   torsemide (DEMADEX) 20 MG tablet Take 2 tablets by mouth Daily for 180 days. 24 Yes Rosa Isela Kim APRN   Vericiguat (Verquvo) 10 MG tablet Take 1 tablet by mouth Daily. 23  Yes Christy Zuniga MD   azithromycin (Zithromax Z-Phillip) 250 MG tablet Take 2 tablets by mouth on day 1, then 1 tablet daily on days 2-5  Patient not taking: Reported on 2024   Rosa Isela Kim APRN        Review of Systems   Constitutional:  Positive for fatigue.   Respiratory:  Positive for cough and shortness of breath.    Cardiovascular:  Positive for palpitations and leg swelling. Negative for chest pain.   Neurological:  Positive for dizziness.        Symptom Course: Worsened    Weight Trend: Increasing Steadily      Objective     /76   Pulse 64   Ht 185.4 cm (73\")   Wt 95.3 kg (210 lb)   BMI 27.71 kg/m²       Physical Exam  Constitutional:       General: He is awake.      Appearance: Normal appearance.   Neck:      Thyroid: No thyromegaly.      Vascular: No carotid bruit or JVD.   Cardiovascular:      Rate and Rhythm: Normal rate and regular rhythm.      Chest Wall: PMI is not displaced.      Pulses: Normal pulses.      Heart sounds: S1 normal and S2 normal. Murmur heard.      Systolic murmur is present.      No friction rub. No gallop. No S3 or S4 sounds.   Pulmonary:      Effort: Pulmonary effort is normal.      Breath sounds: Normal breath sounds and air entry. No wheezing, rhonchi or rales.   Abdominal:      General: Bowel sounds are normal. There is distension.      Palpations: Abdomen is soft. There is no mass.      Tenderness: There is no abdominal tenderness.   Musculoskeletal:      Cervical back: Neck supple.      Right lower le+ Edema present.      Left lower le+ Edema present.   Neurological:      Mental Status: He is alert and oriented to person, place, and time.   Psychiatric:         Mood and Affect: Mood normal.         " "Behavior: Behavior is cooperative.           Result Review :                      Lab Results   Component Value Date    PROBNP 7,741.0 (H) 12/13/2023    PROBNP 7,994.0 (H) 11/20/2023    PROBNP 6,591.0 (H) 10/25/2023     CMP          11/20/2023    12:29 12/13/2023    11:47 12/15/2023    14:31   CMP   Glucose 94  88  84    BUN 13  17  14    Creatinine 1.21  1.06  0.98    EGFR 66.0  77.4  85.0    Sodium 134  135  138    Potassium 3.8  3.5  3.4    Chloride 99  96  99    Calcium 9.7  9.5  9.4    Total Protein 6.4  7.4  7.6    Albumin 4.3  4.1  4.4    Globulin 2.1  3.3  3.2    Total Bilirubin 1.1  1.4  1.3    Alkaline Phosphatase 150  152  167    AST (SGOT) 23  22  24    ALT (SGPT) 12  6  7    Albumin/Globulin Ratio 2.0  1.2  1.4    BUN/Creatinine Ratio 10.7  16.0  14.3    Anion Gap 12.4  15.6  15.8      CBC w/diff          11/20/2023    12:29 12/13/2023    11:47 12/15/2023    14:31   CBC w/Diff   WBC 6.66  5.49  7.04    RBC 3.29  3.53  3.61    Hemoglobin 11.0  11.3  11.7    Hematocrit 32.1  35.1  35.3    MCV 97.6  99.4  97.8    MCH 33.4  32.0  32.4    MCHC 34.3  32.2  33.1    RDW 12.2  14.2  14.3    Platelets 250  271  270    Neutrophil Rel % 71.6  66.9  73.0    Immature Granulocyte Rel % 0.3  0.2  0.1    Lymphocyte Rel % 14.3  17.7  15.6    Monocyte Rel % 10.7  11.5  8.9    Eosinophil Rel % 2.0  2.6  1.4    Basophil Rel % 1.1  1.1  1.0       Lipid Panel          9/6/2023    04:34   Lipid Panel   Total Cholesterol 96    Triglycerides 44    HDL Cholesterol 43    VLDL Cholesterol 12    LDL Cholesterol  41    LDL/HDL Ratio 1.03       Lab Results   Component Value Date    TSH 2.960 10/25/2023    TSH 2.040 08/29/2023      Lab Results   Component Value Date    FREET4 1.70 10/25/2023      No results found for: \"DDIMERQUANT\"  Magnesium   Date Value Ref Range Status   12/13/2023 2.1 1.6 - 2.4 mg/dL Final      Digoxin   Date Value Ref Range Status   12/15/2023 0.81 0.60 - 1.20 ng/mL Final      A1C Last 3 Results          9/6/2023 "    04:34   HGBA1C Last 3 Results   Hemoglobin A1C 5.70           Results for orders placed during the hospital encounter of 08/29/23    Adult Transthoracic Echo Complete w/ Color, Spectral and Contrast if necessary per protocol    Interpretation Summary    Left ventricular ejection fraction appears to be 26 - 30%.    The left ventricular cavity is mildly dilated.    Left ventricular diastolic function was indeterminate.    The right ventricular cavity is mild to moderately dilated.    The left atrial cavity is moderately dilated.    The right atrial cavity is moderately  dilated.    Moderate tricuspid valve regurgitation is present.    Estimated right ventricular systolic pressure from tricuspid regurgitation is normal (<35 mmHg).    Aortic valve appears sclerotic with reduced excursion.  Max/mean pressure gradient was 20/11 mmHg.  Aortic valve area was 0.93 cm².  Could be consistent with low-flow, low gradient AAS.        Assessment and Plan        Diagnoses and all orders for this visit:    1. Acute on chronic HFrEF (heart failure with reduced ejection fraction) (Primary)  Assessment & Plan:  He has worsening congestive heart failure.  He also has cirrhosis of his liver with worsening ascites.  He is not very compliant with his medications.  He does not like blood draws.  We had to convince him to get his blood drawn today.  The following changes were made to his medications:    1.  Increase torsemide to 60 mg a day.  40 mg in the morning and 20 mg in the afternoon.  2.  Increase spironolactone to 50 mg, 2 tablets of 25 mg once a day in the morning.  3.  Stop the potassium.  4.  Restart the Verquvo.  5.  Monitor heart rate and blood pressure and bring us the log    Orders:  -     CBC & Differential  -     Comprehensive Metabolic Panel  -     proBNP  -     Digoxin Level  -     Magnesium  -     Lipid Panel; Future    2. Ascites due to alcoholic cirrhosis  Comments:  Scheduled paracentesis for Thursday this  week.  Orders:  -     torsemide (DEMADEX) 20 MG tablet; Take 3 tablets by mouth Daily.    3. CAD, multiple vessel  Assessment & Plan:  He has multivessel coronary artery disease disease.  He is in bad CHF class IV with cirrhosis and ascites.  He is being managed medically.  He is not very compliant with his medications.      4. Hyperlipidemia LDL goal <70  Assessment & Plan:  Our goal is to get his LDL below 55.  Will draw blood today    Orders:  -     Lipid Panel; Future    5. Smoker  Assessment & Plan:  He has been smoking cigarettes for a long time.  He is down to few cigarettes a day.  He is trying to quit and states that he is going to quit in the next 2 weeks.  Smoking cessation counseling was performed for 4-6..  Strongly urged him to completely stop smoking.  Does not want any aids.  States he can do it on his own      Other orders  -     spironolactone (ALDACTONE) 25 MG tablet; Take 2 tablets by mouth Daily.  Dispense: 180 tablet; Refill: 3            Follow Up     Return for Follow-up with Rosa 6 weeks T-F.    Patient was given instructions and counseling regarding his condition or for health maintenance advice. Please see specific information pulled into the AVS if appropriate.     Total time spent, 45 minutes.This time includes time spent by me for the following activities:  Reviewing past records including hospitalizations, performing a cardiac focused examination and/or evaluation, educating and counselling the patient and family, independently interpreting results and diagnostic tests and communicating that information to patient and family, documenting information in the medical record, etc. E/M time spent excludes any time spent on other reported services.    Electronically signed by Christy Zuniga MD, 01/24/24, 10:42 AM EST.

## 2024-01-24 NOTE — ASSESSMENT & PLAN NOTE
He has been smoking cigarettes for a long time.  He is down to few cigarettes a day.  He is trying to quit and states that he is going to quit in the next 2 weeks.  Smoking cessation counseling was performed for 4-6..  Strongly urged him to completely stop smoking.  Does not want any aids.  States he can do it on his own

## 2024-01-24 NOTE — PATIENT INSTRUCTIONS
1.  Increase torsemide to 60 mg a day.  40 mg in the morning and 20 mg in the afternoon.  2.  Increase spironolactone to 50 mg, 2 tablets of 25 mg once a day in the morning.  3.  Stop the potassium.  4.  Restart the Verquvo.  5.  Monitor heart rate and blood pressure and bring us the log

## 2024-01-24 NOTE — ASSESSMENT & PLAN NOTE
He has multivessel coronary artery disease disease.  He is in bad CHF class IV with cirrhosis and ascites.  He is being managed medically.  He is not very compliant with his medications.

## 2024-01-25 ENCOUNTER — HOSPITAL ENCOUNTER (OUTPATIENT)
Dept: INTERVENTIONAL RADIOLOGY/VASCULAR | Facility: HOSPITAL | Age: 69
Discharge: HOME | End: 2024-01-25
Payer: MEDICARE

## 2024-01-25 ENCOUNTER — LAB (OUTPATIENT)
Dept: LAB | Facility: HOSPITAL | Age: 69
End: 2024-01-25
Payer: MEDICARE

## 2024-01-25 VITALS
OXYGEN SATURATION: 96 % | HEART RATE: 72 BPM | DIASTOLIC BLOOD PRESSURE: 76 MMHG | RESPIRATION RATE: 20 BRPM | SYSTOLIC BLOOD PRESSURE: 96 MMHG

## 2024-01-25 DIAGNOSIS — I25.10 CAD, MULTIPLE VESSEL: ICD-10-CM

## 2024-01-25 DIAGNOSIS — I50.23 ACUTE ON CHRONIC HFREF (HEART FAILURE WITH REDUCED EJECTION FRACTION): ICD-10-CM

## 2024-01-25 DIAGNOSIS — E78.5 HYPERLIPIDEMIA LDL GOAL <70: ICD-10-CM

## 2024-01-25 DIAGNOSIS — K70.31 ASCITES DUE TO ALCOHOLIC CIRRHOSIS: ICD-10-CM

## 2024-01-25 LAB
ALBUMIN SERPL-MCNC: 3.8 G/DL (ref 3.5–5.2)
ALBUMIN/GLOB SERPL: 1.2 G/DL
ALP SERPL-CCNC: 168 U/L (ref 39–117)
ALT SERPL W P-5'-P-CCNC: 5 U/L (ref 1–41)
ANION GAP SERPL CALCULATED.3IONS-SCNC: 13.9 MMOL/L (ref 5–15)
APTT PPP: 35.3 SECONDS (ref 24.2–34.2)
AST SERPL-CCNC: 17 U/L (ref 1–40)
BASOPHILS # BLD AUTO: 0.05 10*3/MM3 (ref 0–0.2)
BASOPHILS NFR BLD AUTO: 0.9 % (ref 0–1.5)
BILIRUB SERPL-MCNC: 1 MG/DL (ref 0–1.2)
BUN SERPL-MCNC: 14 MG/DL (ref 8–23)
BUN/CREAT SERPL: 12.8 (ref 7–25)
CALCIUM SPEC-SCNC: 8.9 MG/DL (ref 8.6–10.5)
CHLORIDE SERPL-SCNC: 98 MMOL/L (ref 98–107)
CHOLEST SERPL-MCNC: 82 MG/DL (ref 0–200)
CO2 SERPL-SCNC: 23.1 MMOL/L (ref 22–29)
CREAT SERPL-MCNC: 1.09 MG/DL (ref 0.76–1.27)
DEPRECATED RDW RBC AUTO: 50.7 FL (ref 37–54)
DIGOXIN SERPL-MCNC: 0.66 NG/ML (ref 0.6–1.2)
EGFRCR SERPLBLD CKD-EPI 2021: 74.9 ML/MIN/1.73
EOSINOPHIL # BLD AUTO: 0.14 10*3/MM3 (ref 0–0.4)
EOSINOPHIL NFR BLD AUTO: 2.5 % (ref 0.3–6.2)
ERYTHROCYTE [DISTWIDTH] IN BLOOD BY AUTOMATED COUNT: 14.6 % (ref 12.3–15.4)
GLOBULIN UR ELPH-MCNC: 3.1 GM/DL
GLUCOSE SERPL-MCNC: 86 MG/DL (ref 65–99)
HCT VFR BLD AUTO: 31.8 % (ref 37.5–51)
HDLC SERPL-MCNC: 39 MG/DL (ref 40–60)
HGB BLD-MCNC: 10.1 G/DL (ref 13–17.7)
IMM GRANULOCYTES # BLD AUTO: 0.01 10*3/MM3 (ref 0–0.05)
IMM GRANULOCYTES NFR BLD AUTO: 0.2 % (ref 0–0.5)
INR PPP: 1.36 (ref 0.86–1.15)
LDLC SERPL CALC-MCNC: 30 MG/DL (ref 0–100)
LDLC/HDLC SERPL: 0.86 {RATIO}
LYMPHOCYTES # BLD AUTO: 1 10*3/MM3 (ref 0.7–3.1)
LYMPHOCYTES NFR BLD AUTO: 17.7 % (ref 19.6–45.3)
MAGNESIUM SERPL-MCNC: 1.9 MG/DL (ref 1.6–2.4)
MCH RBC QN AUTO: 30.6 PG (ref 26.6–33)
MCHC RBC AUTO-ENTMCNC: 31.8 G/DL (ref 31.5–35.7)
MCV RBC AUTO: 96.4 FL (ref 79–97)
MONOCYTES # BLD AUTO: 0.69 10*3/MM3 (ref 0.1–0.9)
MONOCYTES NFR BLD AUTO: 12.2 % (ref 5–12)
NEUTROPHILS NFR BLD AUTO: 3.76 10*3/MM3 (ref 1.7–7)
NEUTROPHILS NFR BLD AUTO: 66.5 % (ref 42.7–76)
NRBC BLD AUTO-RTO: 0 /100 WBC (ref 0–0.2)
NT-PROBNP SERPL-MCNC: 6436 PG/ML (ref 0–900)
PLATELET # BLD AUTO: 244 10*3/MM3 (ref 140–450)
PMV BLD AUTO: 9.5 FL (ref 6–12)
POTASSIUM SERPL-SCNC: 3.8 MMOL/L (ref 3.5–5.2)
PROT SERPL-MCNC: 6.9 G/DL (ref 6–8.5)
PROTHROMBIN TIME: 17 SECONDS (ref 11.8–14.9)
RBC # BLD AUTO: 3.3 10*6/MM3 (ref 4.14–5.8)
SODIUM SERPL-SCNC: 135 MMOL/L (ref 136–145)
TRIGL SERPL-MCNC: 48 MG/DL (ref 0–150)
VLDLC SERPL-MCNC: 13 MG/DL (ref 5–40)
WBC NRBC COR # BLD AUTO: 5.65 10*3/MM3 (ref 3.4–10.8)

## 2024-01-25 PROCEDURE — 25010000002 ALBUMIN HUMAN 25% PER 50 ML: Performed by: NURSE PRACTITIONER

## 2024-01-25 PROCEDURE — C1729 CATH, DRAINAGE: HCPCS

## 2024-01-25 PROCEDURE — 80162 ASSAY OF DIGOXIN TOTAL: CPT

## 2024-01-25 PROCEDURE — 85730 THROMBOPLASTIN TIME PARTIAL: CPT | Performed by: NURSE PRACTITIONER

## 2024-01-25 PROCEDURE — 83880 ASSAY OF NATRIURETIC PEPTIDE: CPT

## 2024-01-25 PROCEDURE — 85610 PROTHROMBIN TIME: CPT | Performed by: NURSE PRACTITIONER

## 2024-01-25 PROCEDURE — 80061 LIPID PANEL: CPT

## 2024-01-25 PROCEDURE — 76942 ECHO GUIDE FOR BIOPSY: CPT

## 2024-01-25 PROCEDURE — P9047 ALBUMIN (HUMAN), 25%, 50ML: HCPCS | Performed by: NURSE PRACTITIONER

## 2024-01-25 PROCEDURE — 80053 COMPREHEN METABOLIC PANEL: CPT

## 2024-01-25 PROCEDURE — 36415 COLL VENOUS BLD VENIPUNCTURE: CPT

## 2024-01-25 PROCEDURE — 85025 COMPLETE CBC W/AUTO DIFF WBC: CPT

## 2024-01-25 PROCEDURE — 83735 ASSAY OF MAGNESIUM: CPT

## 2024-01-25 RX ORDER — ALBUMIN (HUMAN) 12.5 G/50ML
12.5 SOLUTION INTRAVENOUS ONCE
Status: COMPLETED | OUTPATIENT
Start: 2024-01-25 | End: 2024-01-25

## 2024-01-25 RX ORDER — ALBUMIN (HUMAN) 12.5 G/50ML
25 SOLUTION INTRAVENOUS ONCE
Status: COMPLETED | OUTPATIENT
Start: 2024-01-25 | End: 2024-01-25

## 2024-01-25 RX ORDER — LIDOCAINE HYDROCHLORIDE 20 MG/ML
20 INJECTION, SOLUTION INFILTRATION; PERINEURAL ONCE
Status: COMPLETED | OUTPATIENT
Start: 2024-01-25 | End: 2024-01-25

## 2024-01-25 RX ADMIN — LIDOCAINE HYDROCHLORIDE 9 ML: 20 INJECTION, SOLUTION INFILTRATION; PERINEURAL at 10:30

## 2024-01-25 RX ADMIN — ALBUMIN (HUMAN) 25 G: 0.25 INJECTION, SOLUTION INTRAVENOUS at 11:04

## 2024-01-25 RX ADMIN — SODIUM BICARBONATE 1 ML: 84 INJECTION, SOLUTION INTRAVENOUS at 10:30

## 2024-01-25 RX ADMIN — ALBUMIN HUMAN 12.5 G: 0.25 SOLUTION INTRAVENOUS at 11:36

## 2024-01-31 ENCOUNTER — TELEPHONE (OUTPATIENT)
Dept: FAMILY MEDICINE CLINIC | Facility: CLINIC | Age: 69
End: 2024-01-31
Payer: MEDICARE

## 2024-01-31 NOTE — TELEPHONE ENCOUNTER
Caller: Bradley Crane    Relationship: Self    Best call back number: [phone]    What is the best time to reach you: ANY    Who are you requesting to speak with (clinical staff, provider,  specific staff member): CLINICAL      What was the call regarding: PATIENT STATED SRIKANTH ODONNELL SENT HIM TO THE HOSPITAL TO HAVE HIS STOMACH PUMPED TO REMOVE FLUID. HE SAID HE'S IN MORE PAIN NOW, IT HURTS TO MOVE AND HE HAS SEVERE BLOATING. HE IS UNSURE OF WHAT TO DO AND WOULD LIKE CLINICAL ADVICE.

## 2024-02-15 ENCOUNTER — TELEPHONE (OUTPATIENT)
Dept: GASTROENTEROLOGY | Facility: CLINIC | Age: 69
End: 2024-02-15

## 2024-02-16 DIAGNOSIS — G89.29 CHRONIC BILATERAL LOW BACK PAIN WITH RIGHT-SIDED SCIATICA: ICD-10-CM

## 2024-02-16 DIAGNOSIS — S32.020S COMPRESSION FRACTURE OF L2 VERTEBRA, SEQUELA: ICD-10-CM

## 2024-02-16 DIAGNOSIS — M54.41 CHRONIC BILATERAL LOW BACK PAIN WITH RIGHT-SIDED SCIATICA: ICD-10-CM

## 2024-02-16 RX ORDER — HYDROCODONE BITARTRATE AND ACETAMINOPHEN 5; 325 MG/1; MG/1
2 TABLET ORAL EVERY 6 HOURS PRN
Qty: 240 TABLET | Refills: 0 | OUTPATIENT
Start: 2024-02-16

## 2024-02-16 NOTE — TELEPHONE ENCOUNTER
Caller: Bradley Crane    Relationship: Self    Best call back number: [phone]     Requested Prescriptions:   Requested Prescriptions     Pending Prescriptions Disp Refills    HYDROcodone-acetaminophen (NORCO) 5-325 MG per tablet 240 tablet 0     Sig: Take 2 tablets by mouth Every 6 (Six) Hours As Needed for Moderate Pain.        Pharmacy where request should be sent: [id number] - [place] - [address]  [address] [facility] [address] - [phone]  - [phone] FX     Last office visit with prescribing clinician: 1/22/2024   Last telemedicine visit with prescribing clinician: Visit date not found   Next office visit with prescribing clinician: 3/22/2024     Additional details provided by patient: WILL BE OUT AFTER TODAY. PATIENT ALSO NEEDS ANY OTHER MEDICATIONS TO BE REFILLED, DOESN'T KNOW THE NAMES OF THEM AND REFUSED TO READ THEM OFF TO ME. HE STATED YOU ALL SHOULD KNOW WHICH MEDICATIONS HE IS NEEDING. PATIENT DOESN'T HAVE AN APPOINTMENT UNTIL 3/22/24    Does the patient have less than a 3 day supply:  [x] Yes  [] No    Would you like a call back once the refill request has been completed: [] Yes [] No    If the office needs to give you a call back, can they leave a voicemail: [] Yes [] No    Sandra Mondragon   02/16/24 13:08 EST

## 2024-02-18 ENCOUNTER — APPOINTMENT (OUTPATIENT)
Dept: GENERAL RADIOLOGY | Facility: HOSPITAL | Age: 69
End: 2024-02-18
Payer: MEDICARE

## 2024-02-18 ENCOUNTER — HOSPITAL ENCOUNTER (INPATIENT)
Facility: HOSPITAL | Age: 69
LOS: 2 days | Discharge: HOME HEALTH | End: 2024-02-22
Attending: EMERGENCY MEDICINE | Admitting: INTERNAL MEDICINE
Payer: MEDICARE

## 2024-02-18 ENCOUNTER — APPOINTMENT (OUTPATIENT)
Dept: CARDIOLOGY | Facility: HOSPITAL | Age: 69
End: 2024-02-18
Payer: MEDICARE

## 2024-02-18 ENCOUNTER — APPOINTMENT (OUTPATIENT)
Dept: ULTRASOUND IMAGING | Facility: HOSPITAL | Age: 69
End: 2024-02-18
Payer: MEDICARE

## 2024-02-18 DIAGNOSIS — R18.8 CIRRHOSIS OF LIVER WITH ASCITES, UNSPECIFIED HEPATIC CIRRHOSIS TYPE: ICD-10-CM

## 2024-02-18 DIAGNOSIS — J81.0 ACUTE PULMONARY EDEMA: Primary | ICD-10-CM

## 2024-02-18 DIAGNOSIS — R26.2 DIFFICULTY IN WALKING: ICD-10-CM

## 2024-02-18 DIAGNOSIS — I50.9 ACUTE ON CHRONIC CONGESTIVE HEART FAILURE, UNSPECIFIED HEART FAILURE TYPE: ICD-10-CM

## 2024-02-18 DIAGNOSIS — R26.2 DIFFICULTY WALKING: ICD-10-CM

## 2024-02-18 DIAGNOSIS — Z78.9 DECREASED ACTIVITIES OF DAILY LIVING (ADL): ICD-10-CM

## 2024-02-18 DIAGNOSIS — K74.60 CIRRHOSIS OF LIVER WITH ASCITES, UNSPECIFIED HEPATIC CIRRHOSIS TYPE: ICD-10-CM

## 2024-02-18 DIAGNOSIS — R09.02 HYPOXIA: ICD-10-CM

## 2024-02-18 LAB
ALBUMIN SERPL-MCNC: 4 G/DL (ref 3.5–5.2)
ALBUMIN/GLOB SERPL: 1.2 G/DL
ALP SERPL-CCNC: 167 U/L (ref 39–117)
ALT SERPL W P-5'-P-CCNC: 6 U/L (ref 1–41)
ANION GAP SERPL CALCULATED.3IONS-SCNC: 17.3 MMOL/L (ref 5–15)
AST SERPL-CCNC: 19 U/L (ref 1–40)
BASOPHILS # BLD AUTO: 0.04 10*3/MM3 (ref 0–0.2)
BASOPHILS NFR BLD AUTO: 1.1 % (ref 0–1.5)
BILIRUB SERPL-MCNC: 1.5 MG/DL (ref 0–1.2)
BUN SERPL-MCNC: 10 MG/DL (ref 8–23)
BUN/CREAT SERPL: 10.3 (ref 7–25)
CALCIUM SPEC-SCNC: 9.3 MG/DL (ref 8.6–10.5)
CHLORIDE SERPL-SCNC: 96 MMOL/L (ref 98–107)
CO2 SERPL-SCNC: 22.7 MMOL/L (ref 22–29)
CREAT SERPL-MCNC: 0.97 MG/DL (ref 0.76–1.27)
D-LACTATE SERPL-SCNC: 2 MMOL/L (ref 0.5–2)
DEPRECATED RDW RBC AUTO: 54.9 FL (ref 37–54)
DIGOXIN SERPL-MCNC: 0.63 NG/ML (ref 0.6–1.2)
EGFRCR SERPLBLD CKD-EPI 2021: 86.1 ML/MIN/1.73
EOSINOPHIL # BLD AUTO: 0.01 10*3/MM3 (ref 0–0.4)
EOSINOPHIL NFR BLD AUTO: 0.3 % (ref 0.3–6.2)
ERYTHROCYTE [DISTWIDTH] IN BLOOD BY AUTOMATED COUNT: 15.8 % (ref 12.3–15.4)
GEN 5 2HR TROPONIN T REFLEX: 51 NG/L
GLOBULIN UR ELPH-MCNC: 3.4 GM/DL
GLUCOSE SERPL-MCNC: 91 MG/DL (ref 65–99)
HCT VFR BLD AUTO: 34.6 % (ref 37.5–51)
HGB BLD-MCNC: 11.6 G/DL (ref 13–17.7)
HOLD SPECIMEN: NORMAL
HOLD SPECIMEN: NORMAL
IMM GRANULOCYTES # BLD AUTO: 0.01 10*3/MM3 (ref 0–0.05)
IMM GRANULOCYTES NFR BLD AUTO: 0.3 % (ref 0–0.5)
LYMPHOCYTES # BLD AUTO: 0.64 10*3/MM3 (ref 0.7–3.1)
LYMPHOCYTES NFR BLD AUTO: 18.2 % (ref 19.6–45.3)
MAGNESIUM SERPL-MCNC: 1.9 MG/DL (ref 1.6–2.4)
MCH RBC QN AUTO: 32 PG (ref 26.6–33)
MCHC RBC AUTO-ENTMCNC: 33.5 G/DL (ref 31.5–35.7)
MCV RBC AUTO: 95.3 FL (ref 79–97)
MONOCYTES # BLD AUTO: 0.77 10*3/MM3 (ref 0.1–0.9)
MONOCYTES NFR BLD AUTO: 21.9 % (ref 5–12)
NEUTROPHILS NFR BLD AUTO: 2.05 10*3/MM3 (ref 1.7–7)
NEUTROPHILS NFR BLD AUTO: 58.2 % (ref 42.7–76)
NRBC BLD AUTO-RTO: 0 /100 WBC (ref 0–0.2)
NT-PROBNP SERPL-MCNC: ABNORMAL PG/ML (ref 0–900)
PLAT MORPH BLD: NORMAL
PLATELET # BLD AUTO: 249 10*3/MM3 (ref 140–450)
PMV BLD AUTO: 9.7 FL (ref 6–12)
POTASSIUM SERPL-SCNC: 3.1 MMOL/L (ref 3.5–5.2)
PROT SERPL-MCNC: 7.4 G/DL (ref 6–8.5)
QT INTERVAL: 441 MS
QTC INTERVAL: 546 MS
RBC # BLD AUTO: 3.63 10*6/MM3 (ref 4.14–5.8)
RBC MORPH BLD: NORMAL
SODIUM SERPL-SCNC: 136 MMOL/L (ref 136–145)
TROPONIN T DELTA: -7 NG/L
TROPONIN T SERPL HS-MCNC: 58 NG/L
WBC MORPH BLD: NORMAL
WBC NRBC COR # BLD AUTO: 3.52 10*3/MM3 (ref 3.4–10.8)
WHOLE BLOOD HOLD COAG: NORMAL
WHOLE BLOOD HOLD SPECIMEN: NORMAL

## 2024-02-18 PROCEDURE — 25010000002 FUROSEMIDE PER 20 MG: Performed by: PHYSICIAN ASSISTANT

## 2024-02-18 PROCEDURE — 87040 BLOOD CULTURE FOR BACTERIA: CPT | Performed by: EMERGENCY MEDICINE

## 2024-02-18 PROCEDURE — 84484 ASSAY OF TROPONIN QUANT: CPT | Performed by: EMERGENCY MEDICINE

## 2024-02-18 PROCEDURE — 83880 ASSAY OF NATRIURETIC PEPTIDE: CPT | Performed by: EMERGENCY MEDICINE

## 2024-02-18 PROCEDURE — 93005 ELECTROCARDIOGRAM TRACING: CPT | Performed by: EMERGENCY MEDICINE

## 2024-02-18 PROCEDURE — 85007 BL SMEAR W/DIFF WBC COUNT: CPT | Performed by: EMERGENCY MEDICINE

## 2024-02-18 PROCEDURE — G0378 HOSPITAL OBSERVATION PER HR: HCPCS

## 2024-02-18 PROCEDURE — 94799 UNLISTED PULMONARY SVC/PX: CPT

## 2024-02-18 PROCEDURE — 80162 ASSAY OF DIGOXIN TOTAL: CPT | Performed by: EMERGENCY MEDICINE

## 2024-02-18 PROCEDURE — 99285 EMERGENCY DEPT VISIT HI MDM: CPT

## 2024-02-18 PROCEDURE — 71045 X-RAY EXAM CHEST 1 VIEW: CPT

## 2024-02-18 PROCEDURE — 76700 US EXAM ABDOM COMPLETE: CPT

## 2024-02-18 PROCEDURE — 80053 COMPREHEN METABOLIC PANEL: CPT | Performed by: EMERGENCY MEDICINE

## 2024-02-18 PROCEDURE — 25010000002 MAGNESIUM SULFATE IN D5W 1G/100ML (PREMIX) 1-5 GM/100ML-% SOLUTION: Performed by: PHYSICIAN ASSISTANT

## 2024-02-18 PROCEDURE — 93306 TTE W/DOPPLER COMPLETE: CPT | Performed by: INTERNAL MEDICINE

## 2024-02-18 PROCEDURE — 99223 1ST HOSP IP/OBS HIGH 75: CPT | Performed by: INTERNAL MEDICINE

## 2024-02-18 PROCEDURE — 83735 ASSAY OF MAGNESIUM: CPT | Performed by: PHYSICIAN ASSISTANT

## 2024-02-18 PROCEDURE — 85025 COMPLETE CBC W/AUTO DIFF WBC: CPT | Performed by: EMERGENCY MEDICINE

## 2024-02-18 PROCEDURE — 93306 TTE W/DOPPLER COMPLETE: CPT

## 2024-02-18 PROCEDURE — 36415 COLL VENOUS BLD VENIPUNCTURE: CPT

## 2024-02-18 PROCEDURE — 25010000002 FUROSEMIDE PER 20 MG: Performed by: EMERGENCY MEDICINE

## 2024-02-18 PROCEDURE — 83605 ASSAY OF LACTIC ACID: CPT | Performed by: EMERGENCY MEDICINE

## 2024-02-18 PROCEDURE — 94640 AIRWAY INHALATION TREATMENT: CPT

## 2024-02-18 RX ORDER — SODIUM CHLORIDE 0.9 % (FLUSH) 0.9 %
10 SYRINGE (ML) INJECTION AS NEEDED
Status: DISCONTINUED | OUTPATIENT
Start: 2024-02-18 | End: 2024-02-22 | Stop reason: HOSPADM

## 2024-02-18 RX ORDER — CHOLECALCIFEROL (VITAMIN D3) 125 MCG
5 CAPSULE ORAL NIGHTLY PRN
Status: DISCONTINUED | OUTPATIENT
Start: 2024-02-18 | End: 2024-02-22 | Stop reason: HOSPADM

## 2024-02-18 RX ORDER — DIGOXIN 125 MCG
125 TABLET ORAL
Status: DISCONTINUED | OUTPATIENT
Start: 2024-02-19 | End: 2024-02-22 | Stop reason: HOSPADM

## 2024-02-18 RX ORDER — FUROSEMIDE 10 MG/ML
40 INJECTION INTRAMUSCULAR; INTRAVENOUS
Status: DISCONTINUED | OUTPATIENT
Start: 2024-02-18 | End: 2024-02-20

## 2024-02-18 RX ORDER — IPRATROPIUM BROMIDE AND ALBUTEROL SULFATE 2.5; .5 MG/3ML; MG/3ML
3 SOLUTION RESPIRATORY (INHALATION) EVERY 6 HOURS PRN
Status: DISCONTINUED | OUTPATIENT
Start: 2024-02-18 | End: 2024-02-22 | Stop reason: HOSPADM

## 2024-02-18 RX ORDER — SODIUM CHLORIDE 9 MG/ML
40 INJECTION, SOLUTION INTRAVENOUS AS NEEDED
Status: DISCONTINUED | OUTPATIENT
Start: 2024-02-18 | End: 2024-02-22 | Stop reason: HOSPADM

## 2024-02-18 RX ORDER — POTASSIUM CHLORIDE 750 MG/1
40 CAPSULE, EXTENDED RELEASE ORAL ONCE
Status: COMPLETED | OUTPATIENT
Start: 2024-02-18 | End: 2024-02-18

## 2024-02-18 RX ORDER — TORSEMIDE 20 MG/1
40 TABLET ORAL DAILY
COMMUNITY
End: 2024-02-22 | Stop reason: HOSPADM

## 2024-02-18 RX ORDER — IPRATROPIUM BROMIDE AND ALBUTEROL SULFATE 2.5; .5 MG/3ML; MG/3ML
3 SOLUTION RESPIRATORY (INHALATION)
Status: DISCONTINUED | OUTPATIENT
Start: 2024-02-18 | End: 2024-02-18

## 2024-02-18 RX ORDER — METOPROLOL SUCCINATE 25 MG/1
12.5 TABLET, EXTENDED RELEASE ORAL DAILY
Status: DISCONTINUED | OUTPATIENT
Start: 2024-02-19 | End: 2024-02-22 | Stop reason: HOSPADM

## 2024-02-18 RX ORDER — SODIUM CHLORIDE 0.9 % (FLUSH) 0.9 %
10 SYRINGE (ML) INJECTION EVERY 12 HOURS SCHEDULED
Status: DISCONTINUED | OUTPATIENT
Start: 2024-02-18 | End: 2024-02-22 | Stop reason: HOSPADM

## 2024-02-18 RX ORDER — ARFORMOTEROL TARTRATE 15 UG/2ML
15 SOLUTION RESPIRATORY (INHALATION)
Status: DISCONTINUED | OUTPATIENT
Start: 2024-02-18 | End: 2024-02-22 | Stop reason: HOSPADM

## 2024-02-18 RX ORDER — ATORVASTATIN CALCIUM 40 MG/1
80 TABLET, FILM COATED ORAL NIGHTLY
Status: DISCONTINUED | OUTPATIENT
Start: 2024-02-18 | End: 2024-02-22 | Stop reason: HOSPADM

## 2024-02-18 RX ORDER — BUDESONIDE 0.5 MG/2ML
0.5 INHALANT ORAL
Status: DISCONTINUED | OUTPATIENT
Start: 2024-02-18 | End: 2024-02-22 | Stop reason: HOSPADM

## 2024-02-18 RX ORDER — MIDODRINE HYDROCHLORIDE 5 MG/1
5 TABLET ORAL
Status: DISCONTINUED | OUTPATIENT
Start: 2024-02-18 | End: 2024-02-19

## 2024-02-18 RX ORDER — FUROSEMIDE 10 MG/ML
80 INJECTION INTRAMUSCULAR; INTRAVENOUS ONCE
Status: COMPLETED | OUTPATIENT
Start: 2024-02-18 | End: 2024-02-18

## 2024-02-18 RX ORDER — CALCIUM CARBONATE 500 MG/1
2 TABLET, CHEWABLE ORAL 3 TIMES DAILY PRN
Status: DISCONTINUED | OUTPATIENT
Start: 2024-02-18 | End: 2024-02-22 | Stop reason: HOSPADM

## 2024-02-18 RX ORDER — SPIRONOLACTONE 25 MG/1
50 TABLET ORAL DAILY
Status: DISCONTINUED | OUTPATIENT
Start: 2024-02-19 | End: 2024-02-20

## 2024-02-18 RX ORDER — MAGNESIUM SULFATE 1 G/100ML
1 INJECTION INTRAVENOUS ONCE
Status: COMPLETED | OUTPATIENT
Start: 2024-02-18 | End: 2024-02-18

## 2024-02-18 RX ORDER — ASPIRIN 81 MG/1
81 TABLET ORAL DAILY
Status: DISCONTINUED | OUTPATIENT
Start: 2024-02-19 | End: 2024-02-22 | Stop reason: HOSPADM

## 2024-02-18 RX ORDER — HYDROCODONE BITARTRATE AND ACETAMINOPHEN 5; 325 MG/1; MG/1
2 TABLET ORAL EVERY 6 HOURS PRN
Status: DISCONTINUED | OUTPATIENT
Start: 2024-02-18 | End: 2024-02-22 | Stop reason: HOSPADM

## 2024-02-18 RX ADMIN — HYDROCODONE BITARTRATE AND ACETAMINOPHEN 2 TABLET: 5; 325 TABLET ORAL at 22:48

## 2024-02-18 RX ADMIN — MIDODRINE HYDROCHLORIDE 5 MG: 5 TABLET ORAL at 16:39

## 2024-02-18 RX ADMIN — MAGNESIUM SULFATE 1 G: 1 INJECTION INTRAVENOUS at 14:52

## 2024-02-18 RX ADMIN — FUROSEMIDE 80 MG: 10 INJECTION, SOLUTION INTRAMUSCULAR; INTRAVENOUS at 12:36

## 2024-02-18 RX ADMIN — CALCIUM CARBONATE 2 TABLET: 500 TABLET, CHEWABLE ORAL at 22:20

## 2024-02-18 RX ADMIN — Medication 10 ML: at 20:56

## 2024-02-18 RX ADMIN — Medication 5 MG: at 20:46

## 2024-02-18 RX ADMIN — POTASSIUM CHLORIDE 40 MEQ: 10 CAPSULE, COATED, EXTENDED RELEASE ORAL at 12:36

## 2024-02-18 RX ADMIN — ATORVASTATIN CALCIUM 80 MG: 40 TABLET, FILM COATED ORAL at 20:46

## 2024-02-18 RX ADMIN — ARFORMOTEROL TARTRATE 15 MCG: 15 SOLUTION RESPIRATORY (INHALATION) at 18:48

## 2024-02-18 RX ADMIN — HYDROCODONE BITARTRATE AND ACETAMINOPHEN 2 TABLET: 5; 325 TABLET ORAL at 16:39

## 2024-02-18 RX ADMIN — Medication 10 ML: at 14:53

## 2024-02-18 RX ADMIN — FUROSEMIDE 40 MG: 10 INJECTION, SOLUTION INTRAMUSCULAR; INTRAVENOUS at 17:39

## 2024-02-18 RX ADMIN — BUDESONIDE 0.5 MG: 0.5 INHALANT ORAL at 18:48

## 2024-02-18 NOTE — H&P
[facility]   HOSPITALIST HISTORY AND PHYSICAL  Date: 2024   Patient Name: Bradley Crane  : 1955  MRN: [id number]  Primary Care Physician:  Rosa Isela Kim APRN  Date of admission: 2024    Subjective   Subjective     Chief Complaint: Shortness of breath    HPI:    Bradley Crane is a 68 y.o. male multiple medical problems to include coronary artery disease status post PCI of LAD, systolic congestive heart failure ejection fraction 25 to 30%, ischemic cardiomyopathy, history of pacemaker/AICD placement,, hypertension, hyperlipidemia, cirrhosis, history of alcohol abuse, COPD, history of tobacco abuse with smoking, moderate severe pulmonary hypertension, mild aortic valve stenosis, that presents to the emergency department with 1 to 2 days of worsening shortness of air and orthopnea denies chest pains fevers or chills is unsure whether or not he has been taking his diuretic is unsure what medications he is on at all.  Was evaluated emergency department was noted to have an elevated brain natruretic peptide 13,700 evidence of CHF on chest x-ray was given 80 mg of IV Lasix has been admitted to the hospitalist service for further medical evaluation and treatment.      Personal History     Past Medical History:  Past Medical History:   Diagnosis Date   • CHF (congestive heart failure)    • Hypertension        Past Surgical History:  Past Surgical History:   Procedure Laterality Date   • CARDIAC CATHETERIZATION N/A 2023    Procedure: Right and LHC with possible coronary intevention;  Surgeon: Merly Ji MD;  Location: [facility] INVASIVE LOCATION;  Service: Cardiology;  Laterality: N/A;   • CARDIAC DEFIBRILLATOR PLACEMENT     • CARDIAC SURGERY          Family History:   History reviewed. No pertinent family history.     Social History:   Social History     Socioeconomic History   • Marital status:    Tobacco Use   • Smoking status: Every Day     Packs/day: .5     Types:  Cigarettes   Vaping Use   • Vaping Use: Never used   Substance and Sexual Activity   • Alcohol use: Not Currently     Alcohol/week: 3.0 standard drinks of alcohol     Types: 3 Cans of beer per week     Comment: LAST DRINK 2 WEEKS AGO   • Drug use: Never   • Sexual activity: Defer        Home Medications:  ALPRAZolam, HYDROcodone-acetaminophen, Vericiguat, albuterol sulfate HFA, aspirin, atorvastatin, digoxin, empagliflozin, metoprolol succinate XL, midodrine, spironolactone, ticagrelor, tiotropium bromide monohydrate, and torsemide    Allergies:  No Known Allergies    Review of Systems   All systems were reviewed and negative except for: Weakness fatigue shortness of air orthopnea    Objective   Objective     Vitals:   Temp:  [97.8 °F (36.6 °C)-98.1 °F (36.7 °C)] 98.1 °F (36.7 °C)  Heart Rate:  [84-99] 86  Resp:  [26] 26  BP: ()/(68-84) 119/83    Physical Exam    Constitutional: Awake, alert, no acute distress   Eyes: Pupils equal, sclerae anicteric, no conjunctival injection   HENT: NCAT, mucous membranes moist   Neck: Supple, no thyromegaly, no lymphadenopathy, trachea midline   Respiratory: Diminished at the bases   cardiovascular: RRR, systolic murmur    Gastrointestinal: Mildly distended but nontender musculoskeletal: No bilateral ankle edema, no clubbing or cyanosis to extremities   Psychiatric: Appropriate affect, cooperative   Neurologic: Oriented x 3, strength symmetric in all extremities, Cranial Nerves grossly intact to confrontation, speech clear       Result Review    Result Review:  I have personally reviewed the results from the time of this admission to 2/18/2024 13:24 EST and agree with these findings:  [x]  Laboratory  []  Microbiology  [x]  Radiology  [x]  EKG/Telemetry   [x]  Cardiology/Vascular   []  Pathology  [x]  Old records  []  Other:      Assessment & Plan   Assessment / Plan   Assessment:    Acute on chronic systolic congestive heart failure ejection fraction 26 to 30%  Shortness of  air and orthopnea secondary to the above  Ischemic cardiomyopathy  Severe multivessel coronary artery disease with history of PCI of the left main artery into the LAD  Chronic elevation of troponin flat this hospitalization  Moderate to severe pulmonary hypertension  Mild aortic valve stenosis  Hypokalemia  Cirrhosis  History of alcohol abuse  Medical noncompliance  COPD  History of ascites requiring paracentesis  History of previous thoracentesis  History of pacemaker AICD placement    Plan:    Patient will be admitted to the hospital to a monitored bed and will be doing the following:  Patient received Lasix 80 mg x 1 in the ED will start the patient on Lasix 40 IV twice daily given his blood pressures are on the soft side  Start the patient on 2 g salt restricted diet as well as oral fluid restriction of 1200 cc daily  Check 2D echocardiogram  Blood pressures on the soft side start midodrine 5 mg 3 times daily uptitrate as needed   Patient initial troponin elevated 58 they have been elevated in the past repeat went down to 51 patient without chest pain can monitor  Start guideline directed medical therapy for for systolic heart failure to include beta-blocker, Aldactone, Jardiance will add ACE inhibitor if blood pressures allow  Patient with history of cirrhosis previous history of ascites requiring paracentesis will get abdominal ultrasound to evaluate  Replace potassium and magnesium with goal potassium greater than 4 magnesium greater than 2  Continue dual antiplatelet therapy with aspirin Brilinta  Continue high intensity statin therapy  Start nebulizer treatments Pulmicort Jayy Us  Discussed importance of smoking cessation alcohol cessation and medicine compliance  Further treatment contingent upon his hospital course  DVT prophylaxis:  Mechanical DVT prophylaxis orders are signed and held.          CODE STATUS:         Admission Status:  I believe this patient meets observation  status    Electronically signed by LANCE Owusu, 02/18/24, 1:24 PM EST.    Attending Documentation:  Patient independently seen and evaluated, above documentation reflects plan put forth during bedside rounds.  More than 51% of the time of this patient encounter was performed by me. I discussed the care plan with MARY ELLEN Mathis PA-C, I agree with his findings and plan as documented, what I have added to the care plan and modified is as follows in my documentation and my medical decision making; 68-year-old male with multiple medical problems including alcoholic cirrhosis, CHF, CAD, pacemaker presenting with leg swelling and shortness of breath.  In the ED he was found to have increased swelling and dyspnea, was given Lasix IV 80 mg x 1.  Patient does not know if he takes any diuretics or not.  He says that his ex-wife gives him his medications and all he knows is that he takes 11 or 12 pills at a time. He is unsure about diuretics or any cardiac medications.  From what I can tell he is prescribed Aldactone and possibly torsemide.  Unclear if he takes them but he seems to suggest he takes his medications.  Also noted to be on Brilinta which has been reordered.  He may benefit from a paracentesis prior to discharge but I do not think he needs one urgently.  He no longer drinks alcohol but does continue to smoke, advised to stop smoking. Will admit for further management.   Electronically signed by Bradley Troy MD, 02/18/24, 4:36 PM EST.

## 2024-02-18 NOTE — PLAN OF CARE
Problem: Adjustment to Illness (Heart Failure)  Goal: Optimal Coping  Outcome: Ongoing, Progressing   Goal Outcome Evaluation:  Plan of Care Reviewed With: patient        Progress: no change  Outcome Evaluation: Patient arrived to our floor from the ED this afternoon, pt educated on medications and the reason for some of the orders the doctor placed. All questions and concerns addressed by staff, no other issues at this time. Will continue to monitor, call light in reach.

## 2024-02-18 NOTE — ED PROVIDER NOTES
Time: 10:08 AM EST  Date of encounter:  2/18/2024  Independent Historian/Clinical History and Information was obtained by:   Patient    History is limited by: N/A    Chief Complaint: Shortness of breath and swelling with low oxygen saturations      History of Present Illness:  Patient is a 68 y.o. year old male who presents to the emergency department for evaluation of shortness of breath and swelling with low oxygen saturations.  This patient has a history of congestive heart failure as well as cirrhosis with ascites.  The patient is followed by Dr. Zuniga at the heart failure clinic and on his note from 1/24/2024 it was noted that he has previous MI and coronary disease with PCI of his left main and LAD.  Patient has congestive heart failure with reduced ejection fraction and also has been developing worsening edema associated with his ascites and cirrhosis.  Dr. Zuniga's note indicates that the patient needs PCI of his circumflex artery however he is too high risk to have this procedure performed.  The patient continues to smoke however he does not drink alcohol at this point.  Today the patient presents to the emergency room with increasing shortness of breath and he is not on home oxygen.  His oxygen saturation was 85% upon presentation to the emergency department.  The patient denies any fever or chest pain.  He does complain of swelling to his legs and abdomen.    HPI    Patient Care Team  Primary Care Provider: Rosa Isela Kim APRN    Past Medical History:     No Known Allergies  Past Medical History:   Diagnosis Date    CHF (congestive heart failure)     Hypertension      Past Surgical History:   Procedure Laterality Date    CARDIAC CATHETERIZATION N/A 09/05/2023    Procedure: Right and LHC with possible coronary intevention;  Surgeon: Merly Ji MD;  Location: [facility] INVASIVE LOCATION;  Service: Cardiology;  Laterality: N/A;    CARDIAC DEFIBRILLATOR PLACEMENT      CARDIAC SURGERY        History reviewed. No pertinent family history.    Home Medications:  Prior to Admission medications    Medication Sig Start Date End Date Taking? Authorizing Provider   albuterol sulfate  (90 Base) MCG/ACT inhaler Inhale 2 puffs Every 4 (Four) Hours As Needed for Wheezing. 12/7/23   Rosa Isela Kim APRN   ALPRAZolam (Xanax) 1 MG tablet Take 1 tablet by mouth 3 (Three) Times a Day As Needed for Anxiety. 1/19/24   Rosa Isela Kim APRN   aspirin 81 MG EC tablet Take 1 tablet by mouth Daily.    Provider, MD Joshua   atorvastatin (LIPITOR) 80 MG tablet Take 1 tablet by mouth Daily. 10/19/23   Angel Roy MD   azithromycin (Zithromax Z-Phillip) 250 MG tablet Take 2 tablets by mouth on day 1, then 1 tablet daily on days 2-5  Patient not taking: Reported on 1/24/2024 1/22/24   Rosa Isela Kim APRN   digoxin (LANOXIN) 125 MCG tablet Take 1 tablet by mouth Daily. 1/22/24   Rosa Isela Kim APRN   empagliflozin (Jardiance) 25 MG tablet tablet Take 1 tablet by mouth Daily. 1/12/24   Christy Zuniga MD   HYDROcodone-acetaminophen (NORCO) 5-325 MG per tablet Take 2 tablets by mouth Every 6 (Six) Hours As Needed for Moderate Pain. 1/19/24   Rosa Isela Kim APRN   meclizine (ANTIVERT) 25 MG tablet Take 1 tablet by mouth 3 (Three) Times a Day As Needed for Dizziness. 12/19/23   Rosa Isela Kim APRN   Melatonin 10 MG tablet Take 1 tablet by mouth At Night As Needed.    Provider, MD Joshua   metoprolol succinate XL (TOPROL-XL) 25 MG 24 hr tablet Take 0.5 tablets by mouth Daily. 1/22/24   Rosa Isela Kim APRN   midodrine (PROAMATINE) 2.5 MG tablet Take 2 tablets by mouth 2 (Two) Times a Day As Needed (for systolic blood pressure less than 95). 11/8/23   Christy Zuniga MD   mirtazapine (REMERON) 15 MG tablet Take 2 tablets by mouth Every Night. 11/20/23   Rosa Isela Kim APRN   ondansetron (ZOFRAN) 4 MG tablet Take 1 tablet by mouth Every 8 (Eight) Hours As Needed for Nausea or  "Vomiting. 11/9/23   Rosa Isela Kim APRN   spironolactone (ALDACTONE) 25 MG tablet Take 2 tablets by mouth Daily. 1/24/24   Christy Zuniga MD   ticagrelor (BRILINTA) 90 MG tablet tablet Take 1 tablet by mouth 2 (Two) Times a Day. 1/19/24   Rosa Isela Kim APRN   tiotropium bromide monohydrate (Spiriva Respimat) 2.5 MCG/ACT aerosol solution inhaler Inhale 2 puffs Daily. Samples given Lot [id number], [id number] 12/19/23   Rosa Isela Kim APRN   torsemide (DEMADEX) 20 MG tablet Take 3 tablets by mouth Daily. 1/24/24   Christy Zuniga MD   Vericiguat (Verquvo) 10 MG tablet Take 1 tablet by mouth Daily. 12/27/23   Christy Zuniga MD        Social History:   Social History     Tobacco Use    Smoking status: Every Day     Packs/day: .5     Types: Cigarettes   Vaping Use    Vaping Use: Never used   Substance Use Topics    Alcohol use: Not Currently     Alcohol/week: 3.0 standard drinks of alcohol     Types: 3 Cans of beer per week     Comment: LAST DRINK 2 WEEKS AGO    Drug use: Never         Review of Systems:  Review of Systems   Constitutional:  Positive for fatigue. Negative for chills and fever.   HENT:  Negative for congestion, ear pain and sore throat.    Eyes:  Negative for pain.   Respiratory:  Positive for cough and shortness of breath. Negative for chest tightness.    Cardiovascular:  Positive for leg swelling. Negative for chest pain.   Gastrointestinal:  Positive for abdominal distention. Negative for abdominal pain, diarrhea, nausea and vomiting.   Genitourinary:  Negative for flank pain and hematuria.   Musculoskeletal:  Negative for joint swelling.   Skin:  Negative for pallor.   Neurological:  Positive for weakness. Negative for seizures and headaches.   All other systems reviewed and are negative.       Physical Exam:  /84   Pulse 97   Temp 97.8 °F (36.6 °C) (Oral)   Resp 26   Ht 185.4 cm (73\")   Wt 95.3 kg (210 lb 1.6 oz)   SpO2 (!) 85%   BMI 27.72 kg/m²     Physical Exam  Vitals " and nursing note reviewed.   Constitutional:       General: He is in acute distress.      Appearance: Normal appearance. He is not toxic-appearing.   HENT:      Head: Normocephalic and atraumatic.      Mouth/Throat:      Mouth: Mucous membranes are moist.   Eyes:      General: No scleral icterus.  Cardiovascular:      Rate and Rhythm: Normal rate and regular rhythm.      Pulses: Normal pulses.      Heart sounds: Normal heart sounds.   Pulmonary:      Effort: Tachypnea, accessory muscle usage and respiratory distress present.      Breath sounds: Examination of the right-middle field reveals rhonchi and rales. Examination of the left-middle field reveals rhonchi and rales. Rhonchi and rales present.   Abdominal:      General: Abdomen is flat.      Palpations: Abdomen is soft.      Tenderness: There is no abdominal tenderness.      Comments: Abdomen is distended without tenderness and there is diffuse dullness to percussion.  There is no guarding or rebound noted.   Musculoskeletal:         General: Normal range of motion.      Cervical back: Normal range of motion and neck supple.      Right lower leg: Edema present.      Left lower leg: Edema present.   Skin:     General: Skin is warm and dry.      Capillary Refill: Capillary refill takes less than 2 seconds.   Neurological:      General: No focal deficit present.      Mental Status: He is alert and oriented to person, place, and time. Mental status is at baseline.   Psychiatric:         Mood and Affect: Mood normal.         Behavior: Behavior normal.                  Procedures:  Procedures      Medical Decision Making:      Comorbidities that affect care:    Cirrhosis with ascites, Congestive Heart Failure    External Notes reviewed:    Previous Clinic Note: Heart failure clinic note.      The following orders were placed and all results were independently analyzed by me:  Orders Placed This Encounter   Procedures    Blood Culture - Blood,    Blood Culture - Blood,     XR Chest 1 View    [place] Draw    Comprehensive Metabolic Panel    BNP    Single High Sensitivity Troponin T    CBC Auto Differential    Lactic Acid, Plasma    Scan Slide    High Sensitivity Troponin T 2Hr    Digoxin Level    NPO Diet NPO Type: Strict NPO    Undress & Gown    Vital Signs    Undress & Gown    Continuous Pulse Oximetry    Vital Signs    Hospitalist (on-call MD unless specified)    Oxygen Therapy- Nasal Cannula; Titrate 1-6 LPM Per SpO2; 90 - 95%    Oxygen Therapy- Nasal Cannula; Titrate 1-6 LPM Per SpO2; 90 - 95%    ECG 12 Lead ED Triage Standing Order; SOA    Insert Peripheral IV    Insert Peripheral IV    CBC & Differential    Green Top (Gel)    Lavender Top    Gold Top - SST    Light Blue Top       Medications Given in the Emergency Department:  Medications   sodium chloride 0.9 % flush 10 mL (has no administration in time range)   sodium chloride 0.9 % flush 10 mL (has no administration in time range)   furosemide (LASIX) injection 80 mg (has no administration in time range)        ED Course:       EKG: Sinus rhythm with rate at 97 beats per  Normal P wave and VA interval  IVCD  Left axis deviation  No acute ischemic changes  Long QT interval.  Labs:    Lab Results (last 24 hours)       Procedure Component Value Units Date/Time    CBC & Differential [[id number]]  (Abnormal) Collected: 02/18/24 0954    Specimen: Blood Updated: 02/18/24 1037    Narrative:      The following orders were created for panel order CBC & Differential.  Procedure                               Abnormality         Status                     ---------                               -----------         ------                     CBC Auto Differential[[id number]]        Abnormal            Final result               Scan Slide[[id number]]                   Normal              Final result                 Please view results for these tests on the individual orders.    Comprehensive Metabolic Panel [[id number]]  (Abnormal)  Collected: 02/18/24 0954    Specimen: Blood Updated: 02/18/24 1039     Glucose 91 mg/dL      BUN 10 mg/dL      Creatinine 0.97 mg/dL      Sodium 136 mmol/L      Potassium 3.1 mmol/L      Comment: Slight hemolysis detected by analyzer. Result may be falsely elevated.        Chloride 96 mmol/L      CO2 22.7 mmol/L      Calcium 9.3 mg/dL      Total Protein 7.4 g/dL      Albumin 4.0 g/dL      ALT (SGPT) 6 U/L      AST (SGOT) 19 U/L      Alkaline Phosphatase 167 U/L      Total Bilirubin 1.5 mg/dL      Globulin 3.4 gm/dL      A/G Ratio 1.2 g/dL      BUN/Creatinine Ratio 10.3     Anion Gap 17.3 mmol/L      eGFR 86.1 mL/min/1.73     Narrative:      GFR Normal >60  Chronic Kidney Disease <60  Kidney Failure <15      BNP [[id number]]  (Abnormal) Collected: 02/18/24 0954    Specimen: Blood Updated: 02/18/24 1037     proBNP 13,716.0 pg/mL     Narrative:      This assay is used as an aid in the diagnosis of individuals suspected of having heart failure. It can be used as an aid in the diagnosis of acute decompensated heart failure (ADHF) in patients presenting with signs and symptoms of ADHF to the emergency department (ED). In addition, NT-proBNP of <300 pg/mL indicates ADHF is not likely.    Age Range Result Interpretation  NT-proBNP Concentration (pg/mL:      <50             Positive            >450                   Gray                 300-450                    Negative             <300    50-75           Positive            >900                  Gray                300-900                  Negative            <300      >75             Positive            >1800                  Gray                300-1800                  Negative            <300    Single High Sensitivity Troponin T [[id number]]  (Abnormal) Collected: 02/18/24 0954    Specimen: Blood Updated: 02/18/24 1054     HS Troponin T 58 ng/L     Narrative:      High Sensitive Troponin T Reference Range:  <14.0 ng/L- Negative Female for AMI  <22.0 ng/L- Negative Male  for AMI  >=14 - Abnormal Female indicating possible myocardial injury.  >=22 - Abnormal Male indicating possible myocardial injury.   Clinicians would have to utilize clinical acumen, EKG, Troponin, and serial changes to determine if it is an Acute Myocardial Infarction or myocardial injury due to an underlying chronic condition.         CBC Auto Differential [[id number]]  (Abnormal) Collected: 02/18/24 0954    Specimen: Blood Updated: 02/18/24 1021     WBC 3.52 10*3/mm3      RBC 3.63 10*6/mm3      Hemoglobin 11.6 g/dL      Hematocrit 34.6 %      MCV 95.3 fL      MCH 32.0 pg      MCHC 33.5 g/dL      RDW 15.8 %      RDW-SD 54.9 fl      MPV 9.7 fL      Platelets 249 10*3/mm3      Neutrophil % 58.2 %      Lymphocyte % 18.2 %      Monocyte % 21.9 %      Eosinophil % 0.3 %      Basophil % 1.1 %      Immature Grans % 0.3 %      Neutrophils, Absolute 2.05 10*3/mm3      Lymphocytes, Absolute 0.64 10*3/mm3      Monocytes, Absolute 0.77 10*3/mm3      Eosinophils, Absolute 0.01 10*3/mm3      Basophils, Absolute 0.04 10*3/mm3      Immature Grans, Absolute 0.01 10*3/mm3      nRBC 0.0 /100 WBC     Scan Slide [[id number]]  (Normal) Collected: 02/18/24 0954    Specimen: Blood Updated: 02/18/24 1037     RBC Morphology Normal     WBC Morphology Normal     Platelet Morphology Normal    Digoxin Level [[id number]] Collected: 02/18/24 0954    Specimen: Blood Updated: 02/18/24 1143    Lactic Acid, Plasma [[id number]]  (Normal) Collected: 02/18/24 1001    Specimen: Blood from Arm, Left Updated: 02/18/24 1036     Lactate 2.0 mmol/L     Blood Culture - Blood, Arm, Right [[id number]] Collected: 02/18/24 1001    Specimen: Blood from Arm, Right Updated: 02/18/24 1014    Blood Culture - Blood, Arm, Left [[id number]] Collected: 02/18/24 1001    Specimen: Blood from Arm, Left Updated: 02/18/24 1015             Imaging:    XR Chest 1 View    Result Date: 2/18/2024  PROCEDURE: XR CHEST 1 VW  COMPARISON: [facility], CR, XR CHEST 1 VW,  12/15/2023, 17:40.  INDICATIONS: SOA Triage Protocol  FINDINGS:   The cardiac silhouette is enlarged.  There appears to be bilateral pulmonary edema.  There is increasing effusion/consolidation in the lung bases.  There is a left-sided cardiac pacemaker/AICD.  IMPRESSION: Findings suggest worsening congestive heart failure.   CAROLYN LARKIN MD       Electronically Signed and Approved By: CAROLYN LARKIN MD on 2/18/2024 at 10:13                Differential Diagnosis and Discussion:    Dyspnea: Differential diagnosis includes but is not limited to metabolic acidosis, neurological disorders, psychogenic, asthma, pneumothorax, upper airway obstruction, COPD, pneumonia, noncardiogenic pulmonary edema, interstitial lung disease, anemia, congestive heart failure, and pulmonary embolism    All labs were reviewed and interpreted by me.  EKG was interpreted by me.    MDM     Amount and/or Complexity of Data Reviewed  Clinical lab tests: reviewed  Tests in the radiology section of CPT®: reviewed  Tests in the medicine section of CPT®: reviewed                 Patient Care Considerations:    CT CHEST: I considered ordering a CT scan of the chest, however the patient has obvious congestive heart failure and a CT is not necessary at this time.      Consultants/Shared Management Plan:    Hospitalist: I have discussed the case with hospitalist who agrees to accept the patient for admission.    Social Determinants of Health:    Patient is unable to carry out activities of daily life. Escalation of care is necessary.       Disposition and Care Coordination:    Admit:   Through independent evaluation of the patient's history, physical, and imperical data, the patient meets criteria for inpatient admission to the hospital.        Final diagnoses:   Acute pulmonary edema   Cirrhosis of liver with ascites, unspecified hepatic cirrhosis type   Acute on chronic congestive heart failure, unspecified heart failure type   Hypoxia        ED  Disposition       ED Disposition   Intended Admit    Condition   --    Comment   --               This medical record created using voice recognition software.             Rocael Ochoa, DO  02/18/24 1142

## 2024-02-19 LAB
ANION GAP SERPL CALCULATED.3IONS-SCNC: 14.7 MMOL/L (ref 5–15)
AORTIC DIMENSIONLESS INDEX: 0.16 (DI)
BASOPHILS # BLD AUTO: 0.07 10*3/MM3 (ref 0–0.2)
BASOPHILS NFR BLD AUTO: 1.8 % (ref 0–1.5)
BH CV ECHO MEAS - AO MAX PG: 24.5 MMHG
BH CV ECHO MEAS - AO MEAN PG: 12.4 MMHG
BH CV ECHO MEAS - AO ROOT DIAM: 3.4 CM
BH CV ECHO MEAS - AO V2 MAX: 247.6 CM/SEC
BH CV ECHO MEAS - AO V2 VTI: 42.1 CM
BH CV ECHO MEAS - AVA(I,D): 0.49 CM2
BH CV ECHO MEAS - EDV(CUBED): 185.8 ML
BH CV ECHO MEAS - EDV(MOD-SP2): 173 ML
BH CV ECHO MEAS - EDV(MOD-SP4): 137 ML
BH CV ECHO MEAS - EF(MOD-BP): 10.8 %
BH CV ECHO MEAS - EF(MOD-SP2): 11 %
BH CV ECHO MEAS - EF(MOD-SP4): 12.4 %
BH CV ECHO MEAS - ESV(CUBED): 153.8 ML
BH CV ECHO MEAS - ESV(MOD-SP2): 154 ML
BH CV ECHO MEAS - ESV(MOD-SP4): 120 ML
BH CV ECHO MEAS - FS: 6.1 %
BH CV ECHO MEAS - IVS/LVPW: 0.91 CM
BH CV ECHO MEAS - IVSD: 1.07 CM
BH CV ECHO MEAS - LA DIMENSION: 5.9 CM
BH CV ECHO MEAS - LAT PEAK E' VEL: 9.8 CM/SEC
BH CV ECHO MEAS - LV DIASTOLIC VOL/BSA (35-75): 64.1 CM2
BH CV ECHO MEAS - LV MASS(C)D: 264.7 GRAMS
BH CV ECHO MEAS - LV MAX PG: 0.95 MMHG
BH CV ECHO MEAS - LV MEAN PG: 0.46 MMHG
BH CV ECHO MEAS - LV SYSTOLIC VOL/BSA (12-30): 56.1 CM2
BH CV ECHO MEAS - LV V1 MAX: 48.8 CM/SEC
BH CV ECHO MEAS - LV V1 VTI: 6.7 CM
BH CV ECHO MEAS - LVIDD: 5.7 CM
BH CV ECHO MEAS - LVIDS: 5.4 CM
BH CV ECHO MEAS - LVOT AREA: 3.1 CM2
BH CV ECHO MEAS - LVOT DIAM: 1.97 CM
BH CV ECHO MEAS - LVPWD: 1.17 CM
BH CV ECHO MEAS - MED PEAK E' VEL: 2.8 CM/SEC
BH CV ECHO MEAS - MV DEC TIME: 0.19 SEC
BH CV ECHO MEAS - RVDD: 3.5 CM
BH CV ECHO MEAS - SI(MOD-SP2): 8.9 ML/M2
BH CV ECHO MEAS - SI(MOD-SP4): 8 ML/M2
BH CV ECHO MEAS - SV(LVOT): 20.6 ML
BH CV ECHO MEAS - SV(MOD-SP2): 19 ML
BH CV ECHO MEAS - SV(MOD-SP4): 17 ML
BH CV ECHO MEAS - TAPSE (>1.6): 0.79 CM
BH CV ECHO MEAS - TR MAX PG: 40 MMHG
BH CV ECHO MEAS - TR MAX VEL: 316.3 CM/SEC
BUN SERPL-MCNC: 16 MG/DL (ref 8–23)
BUN/CREAT SERPL: 15 (ref 7–25)
CALCIUM SPEC-SCNC: 8.9 MG/DL (ref 8.6–10.5)
CHLORIDE SERPL-SCNC: 97 MMOL/L (ref 98–107)
CO2 SERPL-SCNC: 21.3 MMOL/L (ref 22–29)
CREAT SERPL-MCNC: 1.07 MG/DL (ref 0.76–1.27)
DEPRECATED RDW RBC AUTO: 55.6 FL (ref 37–54)
EGFRCR SERPLBLD CKD-EPI 2021: 76.5 ML/MIN/1.73
EOSINOPHIL # BLD AUTO: 0.02 10*3/MM3 (ref 0–0.4)
EOSINOPHIL NFR BLD AUTO: 0.5 % (ref 0.3–6.2)
ERYTHROCYTE [DISTWIDTH] IN BLOOD BY AUTOMATED COUNT: 16 % (ref 12.3–15.4)
GLUCOSE SERPL-MCNC: 110 MG/DL (ref 65–99)
HCT VFR BLD AUTO: 33.3 % (ref 37.5–51)
HGB BLD-MCNC: 11.1 G/DL (ref 13–17.7)
IMM GRANULOCYTES # BLD AUTO: 0.01 10*3/MM3 (ref 0–0.05)
IMM GRANULOCYTES NFR BLD AUTO: 0.3 % (ref 0–0.5)
LEFT ATRIUM VOLUME INDEX: 43.9 ML/M2
LYMPHOCYTES # BLD AUTO: 1 10*3/MM3 (ref 0.7–3.1)
LYMPHOCYTES NFR BLD AUTO: 25.4 % (ref 19.6–45.3)
MCH RBC QN AUTO: 31.8 PG (ref 26.6–33)
MCHC RBC AUTO-ENTMCNC: 33.3 G/DL (ref 31.5–35.7)
MCV RBC AUTO: 95.4 FL (ref 79–97)
MONOCYTES # BLD AUTO: 1.01 10*3/MM3 (ref 0.1–0.9)
MONOCYTES NFR BLD AUTO: 25.6 % (ref 5–12)
NEUTROPHILS NFR BLD AUTO: 1.83 10*3/MM3 (ref 1.7–7)
NEUTROPHILS NFR BLD AUTO: 46.4 % (ref 42.7–76)
NRBC BLD AUTO-RTO: 0 /100 WBC (ref 0–0.2)
PLAT MORPH BLD: NORMAL
PLATELET # BLD AUTO: 239 10*3/MM3 (ref 140–450)
PMV BLD AUTO: 9.8 FL (ref 6–12)
POTASSIUM SERPL-SCNC: 3.6 MMOL/L (ref 3.5–5.2)
RBC # BLD AUTO: 3.49 10*6/MM3 (ref 4.14–5.8)
RBC MORPH BLD: NORMAL
SODIUM SERPL-SCNC: 133 MMOL/L (ref 136–145)
WBC MORPH BLD: NORMAL
WBC NRBC COR # BLD AUTO: 3.94 10*3/MM3 (ref 3.4–10.8)

## 2024-02-19 PROCEDURE — 85025 COMPLETE CBC W/AUTO DIFF WBC: CPT | Performed by: INTERNAL MEDICINE

## 2024-02-19 PROCEDURE — 94799 UNLISTED PULMONARY SVC/PX: CPT

## 2024-02-19 PROCEDURE — G0378 HOSPITAL OBSERVATION PER HR: HCPCS

## 2024-02-19 PROCEDURE — 97161 PT EVAL LOW COMPLEX 20 MIN: CPT

## 2024-02-19 PROCEDURE — 99232 SBSQ HOSP IP/OBS MODERATE 35: CPT | Performed by: INTERNAL MEDICINE

## 2024-02-19 PROCEDURE — 80048 BASIC METABOLIC PNL TOTAL CA: CPT | Performed by: INTERNAL MEDICINE

## 2024-02-19 PROCEDURE — 85007 BL SMEAR W/DIFF WBC COUNT: CPT | Performed by: INTERNAL MEDICINE

## 2024-02-19 PROCEDURE — 97165 OT EVAL LOW COMPLEX 30 MIN: CPT

## 2024-02-19 PROCEDURE — 25010000002 FUROSEMIDE PER 20 MG: Performed by: PHYSICIAN ASSISTANT

## 2024-02-19 PROCEDURE — 94664 DEMO&/EVAL PT USE INHALER: CPT

## 2024-02-19 RX ORDER — POLYETHYLENE GLYCOL 3350 17 G/17G
17 POWDER, FOR SOLUTION ORAL DAILY
Status: DISCONTINUED | OUTPATIENT
Start: 2024-02-19 | End: 2024-02-19

## 2024-02-19 RX ORDER — NICOTINE 21 MG/24HR
1 PATCH, TRANSDERMAL 24 HOURS TRANSDERMAL
Status: DISCONTINUED | OUTPATIENT
Start: 2024-02-19 | End: 2024-02-22 | Stop reason: HOSPADM

## 2024-02-19 RX ORDER — BISACODYL 10 MG
10 SUPPOSITORY, RECTAL RECTAL DAILY PRN
Status: DISCONTINUED | OUTPATIENT
Start: 2024-02-19 | End: 2024-02-22 | Stop reason: HOSPADM

## 2024-02-19 RX ORDER — POTASSIUM CHLORIDE 750 MG/1
40 CAPSULE, EXTENDED RELEASE ORAL ONCE
Status: DISCONTINUED | OUTPATIENT
Start: 2024-02-19 | End: 2024-02-20

## 2024-02-19 RX ORDER — POLYETHYLENE GLYCOL 3350 17 G/17G
17 POWDER, FOR SOLUTION ORAL DAILY PRN
Status: DISCONTINUED | OUTPATIENT
Start: 2024-02-19 | End: 2024-02-22 | Stop reason: HOSPADM

## 2024-02-19 RX ORDER — MIDODRINE HYDROCHLORIDE 10 MG/1
10 TABLET ORAL
Status: DISCONTINUED | OUTPATIENT
Start: 2024-02-19 | End: 2024-02-22 | Stop reason: HOSPADM

## 2024-02-19 RX ORDER — POLYETHYLENE GLYCOL 3350 17 G/17G
17 POWDER, FOR SOLUTION ORAL ONCE
Status: COMPLETED | OUTPATIENT
Start: 2024-02-19 | End: 2024-02-19

## 2024-02-19 RX ORDER — BISACODYL 5 MG/1
5 TABLET, DELAYED RELEASE ORAL DAILY PRN
Status: DISCONTINUED | OUTPATIENT
Start: 2024-02-19 | End: 2024-02-22 | Stop reason: HOSPADM

## 2024-02-19 RX ADMIN — BUDESONIDE 0.5 MG: 0.5 INHALANT ORAL at 06:49

## 2024-02-19 RX ADMIN — ARFORMOTEROL TARTRATE 15 MCG: 15 SOLUTION RESPIRATORY (INHALATION) at 19:08

## 2024-02-19 RX ADMIN — TICAGRELOR 90 MG: 90 TABLET ORAL at 09:48

## 2024-02-19 RX ADMIN — EMPAGLIFLOZIN 10 MG: 10 TABLET, FILM COATED ORAL at 11:11

## 2024-02-19 RX ADMIN — HYDROCODONE BITARTRATE AND ACETAMINOPHEN 2 TABLET: 5; 325 TABLET ORAL at 04:53

## 2024-02-19 RX ADMIN — Medication 10 ML: at 20:47

## 2024-02-19 RX ADMIN — MIDODRINE HYDROCHLORIDE 5 MG: 5 TABLET ORAL at 06:46

## 2024-02-19 RX ADMIN — MIDODRINE HYDROCHLORIDE 5 MG: 5 TABLET ORAL at 11:11

## 2024-02-19 RX ADMIN — ASPIRIN 81 MG: 81 TABLET, COATED ORAL at 09:48

## 2024-02-19 RX ADMIN — FUROSEMIDE 40 MG: 10 INJECTION, SOLUTION INTRAMUSCULAR; INTRAVENOUS at 09:51

## 2024-02-19 RX ADMIN — HYDROCODONE BITARTRATE AND ACETAMINOPHEN 2 TABLET: 5; 325 TABLET ORAL at 20:47

## 2024-02-19 RX ADMIN — Medication 5 MG: at 20:47

## 2024-02-19 RX ADMIN — MIDODRINE HYDROCHLORIDE 10 MG: 10 TABLET ORAL at 17:28

## 2024-02-19 RX ADMIN — ARFORMOTEROL TARTRATE 15 MCG: 15 SOLUTION RESPIRATORY (INHALATION) at 06:49

## 2024-02-19 RX ADMIN — TICAGRELOR 90 MG: 90 TABLET ORAL at 20:47

## 2024-02-19 RX ADMIN — ATORVASTATIN CALCIUM 80 MG: 40 TABLET, FILM COATED ORAL at 20:47

## 2024-02-19 RX ADMIN — NICOTINE 1 PATCH: 21 PATCH, EXTENDED RELEASE TRANSDERMAL at 23:23

## 2024-02-19 RX ADMIN — Medication 10 ML: at 09:52

## 2024-02-19 RX ADMIN — CALCIUM CARBONATE 2 TABLET: 500 TABLET, CHEWABLE ORAL at 20:55

## 2024-02-19 RX ADMIN — HYDROCODONE BITARTRATE AND ACETAMINOPHEN 2 TABLET: 5; 325 TABLET ORAL at 13:59

## 2024-02-19 RX ADMIN — DIGOXIN 125 MCG: 125 TABLET ORAL at 11:11

## 2024-02-19 RX ADMIN — POLYETHYLENE GLYCOL 3350 17 G: 17 POWDER, FOR SOLUTION ORAL at 15:08

## 2024-02-19 RX ADMIN — BUDESONIDE 0.5 MG: 0.5 INHALANT ORAL at 19:08

## 2024-02-19 RX ADMIN — METOPROLOL SUCCINATE 12.5 MG: 25 TABLET, EXTENDED RELEASE ORAL at 09:51

## 2024-02-19 RX ADMIN — SPIRONOLACTONE 50 MG: 25 TABLET ORAL at 09:51

## 2024-02-19 NOTE — NURSING NOTE
"Patient was irritated and unhappy with this nurse as she was educating about medications. Patient stated he was feeling dizzy and had blurry vision and he felt bad. This RN attempted to help figure out how to help patients symtoms. Patient attributes it to medications schdule being different than what he takes at home. This RN tried to investigate to help patient have some normal regimine like at home while in the hospital, but when RN did this patient stated \"I dont know what the names are, i just take them'. I explained its important to know what the medication is and what it does when it goes into your body. Patient stated 'I dont know and dont care, I didn't go to school for that you did. You tell me, I was a '. Patient then began rasing his voice and usuing inappropriate language. This RN stopped him and stated that is not tolerated. Patient then stated to 'get out of his room and he didn't want this RN back, his food was getting cold'. This RN agreed and notified charge Shivani Kim RN.   "

## 2024-02-19 NOTE — PLAN OF CARE
"Goal Outcome Evaluation:  Plan of Care Reviewed With: patient        Progress: no change  Outcome Evaluation: Pt reports he is basically at baseline and PT will be discharged at patient request \"I don't need any damn therapy or a walker\". Educated on importance to stay up and moving through the day and continue walking as tolerated. PT order discharged from caseload.      Anticipated Discharge Disposition (PT): home, home with home health (Home health evaluation to ensure safety in the home environment.)                        "

## 2024-02-19 NOTE — THERAPY EVALUATION
Acute Care - Physical Therapy Initial Evaluation   Clif     Patient Name: Bradley Crane  : 1955  MRN: [id number]  Today's Date: 2024      Visit Dx:     ICD-10-CM ICD-9-CM   1. Acute pulmonary edema  J81.0 518.4   2. Cirrhosis of liver with ascites, unspecified hepatic cirrhosis type  K74.60 571.5    R18.8    3. Acute on chronic congestive heart failure, unspecified heart failure type  I50.9 428.0   4. Hypoxia  R09.02 799.02   5. Decreased activities of daily living (ADL)  Z78.9 V49.89   6. Difficulty walking  R26.2 719.7     Patient Active Problem List   Diagnosis    CHF exacerbation    Chest pain, atypical    CAD, multiple vessel    Abdominal ascites    Bilateral pleural effusion    Chronic back pain    Compression fracture of L2 lumbar vertebra    Acute on chronic HFrEF (heart failure with reduced ejection fraction)    Anxiety    Smoker    Vitamin D deficiency    Hyperlipidemia LDL goal <70    Alcohol withdrawal syndrome without complication    Alcohol-induced insomnia    Generalized edema    Acute hyponatremia    CHF (congestive heart failure)     Past Medical History:   Diagnosis Date    CHF (congestive heart failure)     Hypertension      Past Surgical History:   Procedure Laterality Date    CARDIAC CATHETERIZATION N/A 2023    Procedure: Right and LHC with possible coronary intevention;  Surgeon: Merly Ji MD;  Location: [facility] INVASIVE LOCATION;  Service: Cardiology;  Laterality: N/A;    CARDIAC DEFIBRILLATOR PLACEMENT      CARDIAC SURGERY       PT Assessment (last 12 hours)       PT Evaluation and Treatment       Row Name 24 1140          Physical Therapy Time and Intention    Subjective Information --  Pt complaining of weakness due to the fact he is on fluid restrictions.  -CS     Document Type evaluation  -CS     Mode of Treatment individual therapy;physical therapy  -CS     Patient Effort poor  -CS     Symptoms Noted During/After Treatment none  -CS       Row Name  "02/19/24 1140          General Information    Patient Profile Reviewed yes  -CS     Patient Observations poorly cooperative  -CS     Prior Level of Function independent:;all household mobility;gait;transfer;bed mobility;ADL's  Pt reports occasional use of single tip cane. Patient reports he is able to take care of himself.  Patient not willing to answer many questions about prior level of function.  -CS     Equipment Currently Used at Home cane, straight  -CS     Existing Precautions/Restrictions fall;other (see comments)  Fluid restriction  -CS     Barriers to Rehab uncooperative  -CS       Row Name 02/19/24 1140          Living Environment    Current Living Arrangements home  -CS     Home Accessibility stairs to enter home  -CS     People in Home other (see comments)  Pt would not answer the question.  -CS     Primary Care Provided by self  -CS       Row Name 02/19/24 1140          Home Main Entrance    Number of Stairs, Main Entrance none  -CS       Row Name 02/19/24 1140          Cognition    Affect/Mental Status (Cognition) agitated;confused  -CS     Behavioral Issues (Cognition) inappropriate language;uncooperative;verbal outbursts  Pt making statements of \"I am about to walk out of this place\" multiple times during session.  -CS     Orientation Status (Cognition) oriented to;person  -CS       Row Name 02/19/24 1140          Range of Motion Comprehensive    General Range of Motion bilateral lower extremity ROM WFL  -CS       Row Name 02/19/24 1140          Strength Comprehensive (MMT)    General Manual Muscle Testing (MMT) Assessment no strength deficits identified  Pt did not allow for a formal manual muscle testing. He reported \"I have no strength issues\".  -CS       Row Name 02/19/24 1140          Bed Mobility    Bed Mobility bed mobility (all) activities  -CS     All Activities, [place] (Bed Mobility) independent  -CS       Row Name 02/19/24 1140          Transfers    Comment, (Transfers) Pt adamantly " "refusing to get out of bed or demonstrate his independence in the room. He reports he has gotten up and said \"when you have no fuel, how do they expect you to get around much\". Nursing staff verified he is up ad [place] in the room at this time using STC as needed.  -       Row Name 02/19/24 1140          Safety Issues, Functional Mobility    Impairments Affecting Function (Mobility) endurance/activity tolerance  -       Row Name 02/19/24 1140          Balance    Comment, Balance Unable to be tested due to patient refusal  -       Row Name 02/19/24 1211          Plan of Care Review    Plan of Care Reviewed With patient  -CS     Progress no change  -CS     Outcome Evaluation Pt reports he is basically at baseline and PT will be discharged at patient request \"I don't need any damn therapy or a walker\". Educated on importance to stay up and moving through the day and continue walking as tolerated. PT order discharged from caseload.  -       Row Name 02/19/24 1211          Positioning and Restraints    Pre-Treatment Position in bed  -CS     Post Treatment Position bed  -CS     In Bed sitting;call light within reach;encouraged to call for assist  -       Row Name 02/19/24 1211          Therapy Assessment/Plan (PT)    Criteria for Skilled Interventions Met (PT) patient/family refuse skilled intervention at this time  -CS     Therapy Frequency (PT) evaluation only  -       Row Name 02/19/24 1211          PT Evaluation Complexity    History, PT Evaluation Complexity 1-2 personal factors and/or comorbidities  -CS     Examination of Body Systems (PT Eval Complexity) total of 4 or more elements  -CS     Clinical Presentation (PT Evaluation Complexity) stable  -CS     Clinical Decision Making (PT Evaluation Complexity) low complexity  -CS     Overall Complexity (PT Evaluation Complexity) low complexity  -       Row Name 02/19/24 1211          Therapy Plan Review/Discharge Plan (PT)    Therapy Plan Review (PT) " "evaluation/treatment results reviewed;patient  -CS               User Key  (r) = Recorded By, (t) = Taken By, (c) = Cosigned By      Initials Name Provider Type    CS Christy Angel PT Physical Therapist                    Physical Therapy Education       Title: PT OT SLP Therapies (In Progress)       Topic: Physical Therapy (In Progress)       Point: Mobility training (In Progress)       Learning Progress Summary             Patient Nonacceptance, E, RT by  at 2/19/2024 1216                         Point: Home exercise program (Not Started)       Learner Progress:  Not documented in this visit.              Point: Body mechanics (Not Started)       Learner Progress:  Not documented in this visit.              Point: Precautions (Not Started)       Learner Progress:  Not documented in this visit.                              User Key       Initials Effective Dates Name Provider Type Discipline     04/25/21 -  Christy Angel PT Physical Therapist PT                  PT Recommendation and Plan  Anticipated Discharge Disposition (PT): home, home with home health (Home health evaluation to ensure safety in the home environment.)  Therapy Frequency (PT): evaluation only  Plan of Care Reviewed With: patient  Progress: no change  Outcome Evaluation: Pt reports he is basically at baseline and PT will be discharged at patient request \"I don't need any damn therapy or a walker\". Educated on importance to stay up and moving through the day and continue walking as tolerated. PT order discharged from caseload.   Outcome Measures       Row Name 02/19/24 0672             How much help from another person do you currently need...    Turning from your back to your side while in flat bed without using bedrails? 4  -CS      Moving from lying on back to sitting on the side of a flat bed without bedrails? 4  -CS      Moving to and from a bed to a chair (including a wheelchair)? 4  -CS      Standing up from a chair using your " arms (e.g., wheelchair, bedside chair)? 4  -CS      Climbing 3-5 steps with a railing? 4  -CS      To walk in hospital room? 4  -CS      AM-PAC 6 Clicks Score (PT) 24  -CS      Highest Level of Mobility Goal 8 --> Walked 250 feet or more  -CS         Functional Assessment    Outcome Measure Options AM-PAC 6 Clicks Basic Mobility (PT)  -CS                User Key  (r) = Recorded By, (t) = Taken By, (c) = Cosigned By      Initials Name Provider Type    Christy Ortega, CARLOS Physical Therapist                     Time Calculation:    PT Charges       Row Name 02/19/24 1157             Time Calculation    PT Received On 02/19/24  -CS         Untimed Charges    PT Eval/Re-eval Minutes 32  -CS         Total Minutes    Untimed Charges Total Minutes 32  -CS       Total Minutes 32  -CS                User Key  (r) = Recorded By, (t) = Taken By, (c) = Cosigned By      Initials Name Provider Type    Christy Ortega PT Physical Therapist                  Therapy Charges for Today       Code Description Service Date Service Provider Modifiers Qty    98144680236 HC PT EVAL LOW COMPLEXITY 3 2/19/2024 Christy Angel, PT GP 1            PT G-Codes  Outcome Measure Options: AM-PAC 6 Clicks Basic Mobility (PT)  AM-PAC 6 Clicks Score (PT): 24  AM-PAC 6 Clicks Score (OT): 24    Christy Angel PT  2/19/2024

## 2024-02-19 NOTE — PLAN OF CARE
"Goal Outcome Evaluation:  Plan of Care Reviewed With: patient        Progress: no change  Outcome Evaluation: Noted he has history of medical non-compliance, leaving AMA 10/10/2023. Will discharge OT at patient request, \"dont come back\".  He may benefit from home health PT/OT evaluation to determine safety with functional tasks within home.      Anticipated Discharge Disposition (OT): home with home health                        "

## 2024-02-19 NOTE — PLAN OF CARE
Goal Outcome Evaluation:  Plan of Care Reviewed With: patient        Progress: no change  Outcome Evaluation: Pt remained A&Ox4 this shift. Also, pt remained on room air maintaining stable oxygen saturation. Pt was medicated with PRN Norco to help achieve an acceptable pain tolerance level. Also, pt was medicated with a one time dose of Miralax to help facilitate a BM. All other vital signs remained stable.

## 2024-02-19 NOTE — TELEPHONE ENCOUNTER
Called and spoke with Bradley Crane. Pt has been made aware. Pt is currently in the hospital but will complete this once he is out.

## 2024-02-19 NOTE — NURSING NOTE
Patient a&ox4. Very frustrated and generally unhappy with personal life, current state of health, and current plan of care. Frequently educated about medications given, plan of care, and fluid restriction. Patient refused am lab draws this shift. Medicated for c/o pain prn per MAR.

## 2024-02-19 NOTE — THERAPY EVALUATION
Patient Name: Bradley Crane  : 1955    MRN: [id number]                              Today's Date: 2024       Admit Date: 2024    Visit Dx:     ICD-10-CM ICD-9-CM   1. Acute pulmonary edema  J81.0 518.4   2. Cirrhosis of liver with ascites, unspecified hepatic cirrhosis type  K74.60 571.5    R18.8    3. Acute on chronic congestive heart failure, unspecified heart failure type  I50.9 428.0   4. Hypoxia  R09.02 799.02   5. Decreased activities of daily living (ADL)  Z78.9 V49.89     Patient Active Problem List   Diagnosis    CHF exacerbation    Chest pain, atypical    CAD, multiple vessel    Abdominal ascites    Bilateral pleural effusion    Chronic back pain    Compression fracture of L2 lumbar vertebra    Acute on chronic HFrEF (heart failure with reduced ejection fraction)    Anxiety    Smoker    Vitamin D deficiency    Hyperlipidemia LDL goal <70    Alcohol withdrawal syndrome without complication    Alcohol-induced insomnia    Generalized edema    Acute hyponatremia    CHF (congestive heart failure)     Past Medical History:   Diagnosis Date    CHF (congestive heart failure)     Hypertension      Past Surgical History:   Procedure Laterality Date    CARDIAC CATHETERIZATION N/A 2023    Procedure: Right and LHC with possible coronary intevention;  Surgeon: Merly Ji MD;  Location: [facility] CATH INVASIVE LOCATION;  Service: Cardiology;  Laterality: N/A;    CARDIAC DEFIBRILLATOR PLACEMENT      CARDIAC SURGERY        General Information       Row Name 24 1128          OT Time and Intention    Document Type evaluation  -AV     Mode of Treatment individual therapy;occupational therapy  -AV       Row Name 24 1128          General Information    Patient Profile Reviewed yes  -AV     Prior Level of Function independent:;ADL's;transfer;all household mobility  Stands to shower (tub).  Stands at sink to groom.  Ambulates with STC.  No home oxygen.  -AV     Existing  "Precautions/Restrictions fall  Fluid restrictions (\"pisses me off\")  -AV     Barriers to Rehab uncooperative  -AV       Row Name 02/19/24 1128          Occupational Profile    Reason for Services/Referral (Occupational Profile) Patient is a 68 year old male admitted to [facility] on February 18, 2024 with shortness of air.  He is currently on 4NT/room air.   OT consulted to evaluate for ADL needs.  No previous OT services for current condition.  -AV       Row Name 02/19/24 1128          Living Environment    People in Home --  in a loud voice, \"my family, ok!?\"  -AV       Row Name 02/19/24 1128          Home Main Entrance    Number of Stairs, Main Entrance none  -AV       Row Name 02/19/24 1128          Cognition    Orientation Status (Cognition) --  unncooperative. verbalized to OT that \"everyone here\" is trying to \"hack his bank accounts\" by asking questions & was not receptive to OT's attempts to ease his mind. able to follow commands when desired. impaired attention to task, safety awareness.  -AV               User Key  (r) = Recorded By, (t) = Taken By, (c) = Cosigned By      Initials Name Provider Type    Hamlet Sullivan OT Occupational Therapist                     Mobility/ADL's       Row Name 02/19/24 1137          Transfers    Comment, (Transfers) adamantly declined all transfers. has been up ad [place] with [facility] per nursing.  -AV       Row Name 02/19/24 1137          Activities of Daily Living    BADL Assessment/Intervention --  Patient reports independence with all ADLs. declines demonstration of any functional task at this time despite encouragement. \"I dont want to bust your balls\", \"this is insulting\".  -AV               User Key  (r) = Recorded By, (t) = Taken By, (c) = Cosigned By      Initials Name Provider Type    Hamlet Sullivan OT Occupational Therapist                   Obj/Interventions       Row Name 02/19/24 1139          Sensory Assessment (Somatosensory)    Sensory Assessment " "(Somatosensory) UE sensation intact  -AV       Row Name 02/19/24 1139          Vision Assessment/Intervention    Visual Impairment/Limitations WFL  -AV     Vision Assessment Comment Glasses  -AV       Row Name 02/19/24 1139          Range of Motion Comprehensive    Comment, General Range of Motion Elbow, wrist and hand AROM WFL.  Declined testing of shoulders.  -AV       Row Name 02/19/24 1139          Strength Comprehensive (MMT)    Comment, General Manual Muscle Testing (MMT) Assessment 5/5 bilateral biceps, triceps.  Declined further testing  -AV       Row Name 02/19/24 1139          Motor Skills    Motor Skills coordination;functional endurance  -AV     Coordination WFL  -AV     Functional Endurance Fair  -AV       Row Name 02/19/24 1139          Balance    Comment, Balance Declined testing.  Up ad [place]. with cane per nursing.  -AV               User Key  (r) = Recorded By, (t) = Taken By, (c) = Cosigned By      Initials Name Provider Type    Hamlet Sullivan, OT Occupational Therapist                   Goals/Plan    No documentation.                  Clinical Impression       Row Name 02/19/24 1140          Pain Assessment    Pretreatment Pain Rating 0/10 - no pain  -AV     Posttreatment Pain Rating 0/10 - no pain  -AV       Row Name 02/19/24 1140          Plan of Care Review    Plan of Care Reviewed With patient  -AV     Progress no change  -AV     Outcome Evaluation Noted he has history of medical non-compliance, leaving AMA 10/10/2023. Will discharge OT at patient request, \"dont come back\".  He may benefit from home health PT/OT evaluation to determine safety with functional tasks within home.  -AV       Row Name 02/19/24 1140          Therapy Assessment/Plan (OT)    Patient/Family Therapy Goal Statement (OT) NA  -AV     Rehab Potential (OT) --  NA  -AV     Criteria for Skilled Therapeutic Interventions Met (OT) patient/family refuse skilled intervention at this time  -AV     Therapy Frequency (OT) " "evaluation only  -AV       Row Name 02/19/24 1140          Therapy Plan Review/Discharge Plan (OT)    Equipment Needs Upon Discharge (OT) tub bench  -AV     Anticipated Discharge Disposition (OT) home with home health  -AV       Row Name 02/19/24 1140          Vital Signs    O2 Delivery Pre Treatment room air  -AV     O2 Delivery Intra Treatment room air  -AV     O2 Delivery Post Treatment room air  -AV               User Key  (r) = Recorded By, (t) = Taken By, (c) = Cosigned By      Initials Name Provider Type    Hamlet Sullivan OT Occupational Therapist                   Outcome Measures       Row Name 02/19/24 1143          How much help from another is currently needed...    Putting on and taking off regular lower body clothing? 4  -AV     Bathing (including washing, rinsing, and drying) 4  -AV     Toileting (which includes using toilet bed pan or urinal) 4  -AV     Putting on and taking off regular upper body clothing 4  -AV     Taking care of personal grooming (such as brushing teeth) 4  -AV     Eating meals 4  -AV     AM-PAC 6 Clicks Score (OT) 24  -AV       Row Name 02/19/24 1143          Functional Assessment    Outcome Measure Options AM-PAC 6 Clicks Daily Activity (OT);Optimal Instrument  -AV               User Key  (r) = Recorded By, (t) = Taken By, (c) = Cosigned By      Initials Name Provider Type    Hamlet Sullivan OT Occupational Therapist                      OT Recommendation and Plan  Therapy Frequency (OT): evaluation only  Plan of Care Review  Plan of Care Reviewed With: patient  Progress: no change  Outcome Evaluation: Noted he has history of medical non-compliance, leaving AMA 10/10/2023. Will discharge OT at patient request, \"dont come back\".  He may benefit from home health PT/OT evaluation to determine safety with functional tasks within home.     Time Calculation:   Evaluation Complexity (OT)  Review Occupational Profile/Medical/Therapy History Complexity: expanded/moderate " complexity  Assessment, Occupational Performance/Identification of Deficit Complexity: 1-3 performance deficits  Clinical Decision Making Complexity (OT): problem focused assessment/low complexity  Overall Complexity of Evaluation (OT): low complexity     Time Calculation- OT       Row Name 02/19/24 1144             Time Calculation- OT    OT Received On 02/19/24  -AV         Untimed Charges    OT Eval/Re-eval Minutes 35  -AV         Total Minutes    Untimed Charges Total Minutes 35  -AV       Total Minutes 35  -AV                User Key  (r) = Recorded By, (t) = Taken By, (c) = Cosigned By      Initials Name Provider Type    AV Hamlet Mustafa OT Occupational Therapist                  Therapy Charges for Today       Code Description Service Date Service Provider Modifiers Qty    01123119974 HC OT EVAL LOW COMPLEXITY 3 2/19/2024 Hamlet Mustafa OT GO 1                 Hamlet Mustafa OT  2/19/2024

## 2024-02-19 NOTE — PROGRESS NOTES
"Hospital Medicine Team    LOS 1 days      Patient Care Team:  Rosa Isela Kim APRN as PCP - General (Nurse Practitioner)      Subjective       Chief Complaint:  f/u volume overload  Summary:  Bradley Crane is a 68 y.o. male multiple medical problems to include coronary artery disease status post PCI of LAD, systolic congestive heart failure ejection fraction 25 to 30%, ischemic cardiomyopathy, history of pacemaker/AICD placement,, hypertension, hyperlipidemia, cirrhosis, history of alcohol abuse, COPD, history of tobacco abuse with smoking, moderate severe pulmonary hypertension, mild aortic valve stenosis, that presents to the emergency department with 1 to 2 days of worsening shortness of air and orthopnea denies chest pains fevers or chills is unsure whether or not he has been taking his diuretic is unsure what medications he is on at all.  Was evaluated emergency department was noted to have an elevated brain natruretic peptide 13,700 evidence of CHF on chest x-ray was given 80 mg of IV Lasix . Was admitted    Subjective    Somewhat cantankerous today.  Reports he feels constipated and has continued shortness of breath and dizziness with ambulation.    Objective       Vital Signs  Temp:  [97.3 °F (36.3 °C)-99 °F (37.2 °C)] 97.5 °F (36.4 °C)  Heart Rate:  [67-87] 78  Resp:  [18] 18  BP: ()/(56-84) 91/66  Oxygen Therapy  SpO2: 95 %  Pulse Oximetry Type: Intermittent  Device (Oxygen Therapy): room air  Flowsheet Rows      Flowsheet Row First Filed Value   Admission Height 185.4 cm (73\") Documented at 02/18/2024 0942   Admission Weight 95.3 kg (210 lb 1.6 oz) Documented at 02/18/2024 0952                Physical Exam:  Physical Exam  Vitals reviewed.   Cardiovascular:      Rate and Rhythm: Normal rate.   Pulmonary:      Effort: No respiratory distress.   Abdominal:      General: There is distension.      Tenderness: There is no abdominal tenderness.   Musculoskeletal:      Right lower leg: Edema present. "      Left lower leg: Edema present.   Skin:     Comments: Chronic venous stasis changes bilateral lower extremities   Neurological:      Mental Status: He is alert.           Results Review:    I reviewed the patient's new clinical results.    [x]  Laboratory  [x]  Microbiology  [x]  Radiology  []  EKG/Telemetry   []  Cardiology/Vascular   []  Pathology  []  Old records  []  Other:      X-rays, labs reviewed personally by physician.    Medication Review:   I have reviewed the patient's current medication list    Scheduled Meds  arformoterol, 15 mcg, Nebulization, BID - RT  aspirin, 81 mg, Oral, Daily  atorvastatin, 80 mg, Oral, Nightly  budesonide, 0.5 mg, Nebulization, BID - RT  digoxin, 125 mcg, Oral, Daily  empagliflozin, 10 mg, Oral, Daily  furosemide, 40 mg, Intravenous, BID  metoprolol succinate XL, 12.5 mg, Oral, Daily  midodrine, 10 mg, Oral, TID AC  potassium chloride, 40 mEq, Oral, Once  sodium chloride, 10 mL, Intravenous, Q12H  spironolactone, 50 mg, Oral, Daily  ticagrelor, 90 mg, Oral, BID        Meds Infusions       Meds PRN  •  polyethylene glycol **AND** bisacodyl **AND** bisacodyl  •  calcium carbonate  •  HYDROcodone-acetaminophen  •  ipratropium-albuterol  •  melatonin  •  sodium chloride  •  sodium chloride  •  sodium chloride  •  sodium chloride        Assessment / Plan       Active Hospital Problems:  Active Hospital Problems    Diagnosis  POA   • **CHF (congestive heart failure) [I50.9]  Yes      Resolved Hospital Problems   No resolved problems to display.     Acute on chronic systolic congestive heart failure ejection fraction 26 to 30% d/t ischemic cardiomyopathy/AICD placement  Continue IV Lasix twice daily  Follow volume status  Acute respiratory distress d/t above   Continue pulmonary toilet and treatment above  Severe multivessel coronary artery disease with history of PCI of the left main artery into the LAD  Chronic  Continue beta-blocker, Brilinta, aspirin  Ascites, cirrhosis    suspect ascites cardiogenic in nature  Monitor with response to diuresis before considering paracentesis, patient reports he wants to avoid if possible  Continue chronic midodrine but increase dose  Chronic elevation of troponin flat this hospitalization  Denies chest pain  Moderate to severe pulmonary hypertension  Complicating above  Hypokalemia  Follow BMP and replace as needed  Medical noncompliance  Encourage compliance  COPD  Chronic no exacerbation  Continue Pulmicort and Brovana while here      DVT ppx-SCD      Plan for disposition:once more stable poss a few more days    Electronically signed by Gilberto Medel DO, 02/19/24, 16:08 EST.      Note disclaimer: At [facility], we believe that sharing information builds trust and better relationships. You are receiving this note because you recently visited [facility]. It is possible you will see health information before a provider has talked with you about it. This kind of information can be easy to misunderstand. To help you fully understand what it means for your health, we urge you to discuss this note with your provider.

## 2024-02-20 LAB
MAGNESIUM SERPL-MCNC: 1.9 MG/DL (ref 1.6–2.4)
WHOLE BLOOD HOLD SPECIMEN: NORMAL

## 2024-02-20 PROCEDURE — 94799 UNLISTED PULMONARY SVC/PX: CPT

## 2024-02-20 PROCEDURE — 25010000002 FUROSEMIDE PER 20 MG: Performed by: PHYSICIAN ASSISTANT

## 2024-02-20 PROCEDURE — 94761 N-INVAS EAR/PLS OXIMETRY MLT: CPT

## 2024-02-20 PROCEDURE — 25010000002 FUROSEMIDE PER 20 MG: Performed by: INTERNAL MEDICINE

## 2024-02-20 PROCEDURE — 99232 SBSQ HOSP IP/OBS MODERATE 35: CPT | Performed by: INTERNAL MEDICINE

## 2024-02-20 PROCEDURE — 83735 ASSAY OF MAGNESIUM: CPT | Performed by: INTERNAL MEDICINE

## 2024-02-20 RX ORDER — ALPRAZOLAM 1 MG/1
1 TABLET ORAL 3 TIMES DAILY PRN
Status: DISCONTINUED | OUTPATIENT
Start: 2024-02-20 | End: 2024-02-22 | Stop reason: HOSPADM

## 2024-02-20 RX ORDER — SPIRONOLACTONE 25 MG/1
100 TABLET ORAL DAILY
Status: DISCONTINUED | OUTPATIENT
Start: 2024-02-21 | End: 2024-02-22 | Stop reason: HOSPADM

## 2024-02-20 RX ORDER — FUROSEMIDE 10 MG/ML
60 INJECTION INTRAMUSCULAR; INTRAVENOUS EVERY 12 HOURS
Status: DISCONTINUED | OUTPATIENT
Start: 2024-02-20 | End: 2024-02-21

## 2024-02-20 RX ADMIN — BUDESONIDE 0.5 MG: 0.5 INHALANT ORAL at 06:56

## 2024-02-20 RX ADMIN — MIDODRINE HYDROCHLORIDE 10 MG: 10 TABLET ORAL at 17:12

## 2024-02-20 RX ADMIN — EMPAGLIFLOZIN 10 MG: 10 TABLET, FILM COATED ORAL at 08:58

## 2024-02-20 RX ADMIN — DIGOXIN 125 MCG: 125 TABLET ORAL at 11:17

## 2024-02-20 RX ADMIN — FUROSEMIDE 40 MG: 10 INJECTION, SOLUTION INTRAMUSCULAR; INTRAVENOUS at 08:58

## 2024-02-20 RX ADMIN — MIDODRINE HYDROCHLORIDE 10 MG: 10 TABLET ORAL at 11:17

## 2024-02-20 RX ADMIN — BISACODYL 5 MG: 5 TABLET, COATED ORAL at 01:10

## 2024-02-20 RX ADMIN — ARFORMOTEROL TARTRATE 15 MCG: 15 SOLUTION RESPIRATORY (INHALATION) at 06:56

## 2024-02-20 RX ADMIN — HYDROCODONE BITARTRATE AND ACETAMINOPHEN 2 TABLET: 5; 325 TABLET ORAL at 09:01

## 2024-02-20 RX ADMIN — Medication 5 MG: at 20:12

## 2024-02-20 RX ADMIN — ASPIRIN 81 MG: 81 TABLET, COATED ORAL at 08:58

## 2024-02-20 RX ADMIN — BUDESONIDE 0.5 MG: 0.5 INHALANT ORAL at 19:13

## 2024-02-20 RX ADMIN — Medication 10 ML: at 21:27

## 2024-02-20 RX ADMIN — SPIRONOLACTONE 50 MG: 25 TABLET ORAL at 08:58

## 2024-02-20 RX ADMIN — ALPRAZOLAM 1 MG: 1 TABLET ORAL at 01:53

## 2024-02-20 RX ADMIN — Medication 10 ML: at 08:57

## 2024-02-20 RX ADMIN — ATORVASTATIN CALCIUM 80 MG: 40 TABLET, FILM COATED ORAL at 20:12

## 2024-02-20 RX ADMIN — HYDROCODONE BITARTRATE AND ACETAMINOPHEN 2 TABLET: 5; 325 TABLET ORAL at 15:13

## 2024-02-20 RX ADMIN — ARFORMOTEROL TARTRATE 15 MCG: 15 SOLUTION RESPIRATORY (INHALATION) at 19:13

## 2024-02-20 RX ADMIN — FUROSEMIDE 60 MG: 10 INJECTION, SOLUTION INTRAMUSCULAR; INTRAVENOUS at 20:12

## 2024-02-20 RX ADMIN — TICAGRELOR 90 MG: 90 TABLET ORAL at 20:12

## 2024-02-20 RX ADMIN — METOPROLOL SUCCINATE 12.5 MG: 25 TABLET, EXTENDED RELEASE ORAL at 08:58

## 2024-02-20 RX ADMIN — MIDODRINE HYDROCHLORIDE 10 MG: 10 TABLET ORAL at 06:31

## 2024-02-20 RX ADMIN — TICAGRELOR 90 MG: 90 TABLET ORAL at 08:58

## 2024-02-20 RX ADMIN — HYDROCODONE BITARTRATE AND ACETAMINOPHEN 2 TABLET: 5; 325 TABLET ORAL at 22:42

## 2024-02-20 NOTE — PROGRESS NOTES
"Hospital Medicine Team    LOS 1 days      Patient Care Team:  Rosa Isela Kim APRN as PCP - General (Nurse Practitioner)      Subjective       Chief Complaint:  f/u volume overload  Summary:  Bradley Crane is a 68 y.o. male multiple medical problems to include coronary artery disease status post PCI of LAD, systolic congestive heart failure ejection fraction 25 to 30%, ischemic cardiomyopathy, history of pacemaker/AICD placement,, hypertension, hyperlipidemia, cirrhosis, history of alcohol abuse, COPD, history of tobacco abuse with smoking, moderate severe pulmonary hypertension, mild aortic valve stenosis, that presents to the emergency department with 1 to 2 days of worsening shortness of air and orthopnea denies chest pains fevers or chills is unsure whether or not he has been taking his diuretic is unsure what medications he is on at all.  Was evaluated emergency department was noted to have an elevated brain natruretic peptide 13,700 evidence of CHF on chest x-ray was given 80 mg of IV Lasix . Was admitted    Subjective    Still feeling slightly short of breath with BOOTH.  Still with abdominal distention and LE edema    Objective       Vital Signs  Temp:  [97.4 °F (36.3 °C)-97.8 °F (36.6 °C)] 97.5 °F (36.4 °C)  Heart Rate:  [63-79] 63  Resp:  [18] 18  BP: ()/(59-85) 96/59  Oxygen Therapy  SpO2: 97 %  Pulse Oximetry Type: Continuous  Device (Oxygen Therapy): nasal cannula  Flow (L/min): 1  Flowsheet Rows      Flowsheet Row First Filed Value   Admission Height 185.4 cm (73\") Documented at 02/18/2024 0942   Admission Weight 95.3 kg (210 lb 1.6 oz) Documented at 02/18/2024 0952                Physical Exam:  Physical Exam  Vitals reviewed.   Cardiovascular:      Rate and Rhythm: Normal rate.   Pulmonary:      Effort: No respiratory distress.   Abdominal:      General: There is no distension.      Tenderness: There is no abdominal tenderness.   Musculoskeletal:      Right lower leg: Edema present.      Left " lower leg: Edema present.   Skin:     Comments: Chronic venous stasis changes bilateral lower extremities   Neurological:      Mental Status: He is alert.           Results Review:    I reviewed the patient's new clinical results.    [x]  Laboratory  []  Microbiology  [x]  Radiology  []  EKG/Telemetry   []  Cardiology/Vascular   []  Pathology  []  Old records  []  Other:      X-rays, labs reviewed personally by physician.    Medication Review:   I have reviewed the patient's current medication list    Scheduled Meds  arformoterol, 15 mcg, Nebulization, BID - RT  aspirin, 81 mg, Oral, Daily  atorvastatin, 80 mg, Oral, Nightly  budesonide, 0.5 mg, Nebulization, BID - RT  digoxin, 125 mcg, Oral, Daily  empagliflozin, 10 mg, Oral, Daily  furosemide, 40 mg, Intravenous, BID  metoprolol succinate XL, 12.5 mg, Oral, Daily  midodrine, 10 mg, Oral, TID AC  nicotine, 1 patch, Transdermal, Q24H  potassium chloride, 40 mEq, Oral, Once  sodium chloride, 10 mL, Intravenous, Q12H  spironolactone, 50 mg, Oral, Daily  ticagrelor, 90 mg, Oral, BID        Meds Infusions       Meds PRN  •  [Held by provider] ALPRAZolam  •  polyethylene glycol **AND** bisacodyl **AND** bisacodyl  •  calcium carbonate  •  HYDROcodone-acetaminophen  •  ipratropium-albuterol  •  melatonin  •  sodium chloride  •  sodium chloride  •  sodium chloride  •  sodium chloride        Assessment / Plan       Active Hospital Problems:  Active Hospital Problems    Diagnosis  POA   • **CHF (congestive heart failure) [I50.9]  Yes   • Acute on chronic HFrEF (heart failure with reduced ejection fraction) [I50.23]  Yes      Resolved Hospital Problems   No resolved problems to display.     Acute on chronic systolic congestive heart failure ejection fraction 26 to 30% d/t ischemic cardiomyopathy/AICD placement  Increase IV Lasix dose  Follow volume status  Acute respiratory distress d/t above   Continue pulmonary toilet and treatment above  Severe multivessel coronary artery  disease with history of PCI of the left main artery into the LAD  Chronic  Continue beta-blocker, Brilinta, aspirin  Ascites, cirrhosis   suspect ascites cardiogenic in nature  Wants to avoid paracentesis if possible  Continue chronic midodrine but increase dose  Increased dose of Aldactone  Chronic elevation of troponin flat this hospitalization  Denies chest pain  Moderate to severe pulmonary hypertension  Complicating above  Hypokalemia  Follow BMP and replace as needed  Medical noncompliance  Encourage compliance  COPD  Chronic no exacerbation  Continue Pulmicort and Brovana      DVT ppx-SCD      Plan for disposition: Possibly in the next 1 to 2 days depending on diuresis    Electronically signed by Gilberto Medel DO, 02/20/24, 16:57 EST.      Note disclaimer: At [facility], we believe that sharing information builds trust and better relationships. You are receiving this note because you recently visited [facility]. It is possible you will see health information before a provider has talked with you about it. This kind of information can be easy to misunderstand. To help you fully understand what it means for your health, we urge you to discuss this note with your provider.

## 2024-02-20 NOTE — PLAN OF CARE
Goal Outcome Evaluation:  Plan of Care Reviewed With: patient        Progress: no change  Outcome Evaluation: Pt remained A&Ox4 this shift. Also, pt was on 2L via nasal cannula maintaining stable oxygen saturation. Pt was medicated with PRN Norco to help achieve an acceptable pain tolernace level. Pt stated he was able to have a BM this shift. All other vital signs remained stable.

## 2024-02-20 NOTE — PLAN OF CARE
Goal Outcome Evaluation:  Progress: no change  Outcome Evaluation: Patient continues to be a&ox4, continues to be frustrated. Patient expressed his current home life involving inadequate support, and how this is the source of his current frustration and grief while here, emotional support offered, asked patient to expound on his current goals at the hospital, patient expressed hopelessness. Patient wanting to ambulate in halls, educated patient about safety and fall prevention which patient stated understanding and proceeded to walk around with an unsteady gait but no other issues otherwise. Medicated for c/o pain prn per MAR. Patient unhappy and restless from lack of sleep, requesting his xanax home med to be reordered, see orders. Administered per mar; applied continuous pulse ox per order by Kelsy LUCAS. Patient finally resting, but experiencing sleep apnea episodes lasting 30 secs every other minute or so, with desats to 80% then recovers back to mid 90s once breathing resumes. Awoke patient and explained what was happening, applied 2.5L o2 nc to supplement oxygen, sats staying in mid 90s now. Notified Tarun LUCAS, xanax now on hold until situation evaluated. Encouraged patient to be open to interventions today if presented to him regarding this issue. No other issues or acute events thus far this shift.

## 2024-02-21 LAB
ANION GAP SERPL CALCULATED.3IONS-SCNC: 14.6 MMOL/L (ref 5–15)
BUN SERPL-MCNC: 23 MG/DL (ref 8–23)
BUN/CREAT SERPL: 21.3 (ref 7–25)
CALCIUM SPEC-SCNC: 9 MG/DL (ref 8.6–10.5)
CHLORIDE SERPL-SCNC: 98 MMOL/L (ref 98–107)
CO2 SERPL-SCNC: 23.4 MMOL/L (ref 22–29)
CREAT SERPL-MCNC: 1.08 MG/DL (ref 0.76–1.27)
EGFRCR SERPLBLD CKD-EPI 2021: 75.7 ML/MIN/1.73
GLUCOSE SERPL-MCNC: 94 MG/DL (ref 65–99)
HOLD SPECIMEN: NORMAL
MAGNESIUM SERPL-MCNC: 1.9 MG/DL (ref 1.6–2.4)
POTASSIUM SERPL-SCNC: 3.4 MMOL/L (ref 3.5–5.2)
QT INTERVAL: 463 MS
QTC INTERVAL: 499 MS
SODIUM SERPL-SCNC: 136 MMOL/L (ref 136–145)

## 2024-02-21 PROCEDURE — 80048 BASIC METABOLIC PNL TOTAL CA: CPT

## 2024-02-21 PROCEDURE — 25010000002 PROCHLORPERAZINE 10 MG/2ML SOLUTION

## 2024-02-21 PROCEDURE — 94799 UNLISTED PULMONARY SVC/PX: CPT

## 2024-02-21 PROCEDURE — 99232 SBSQ HOSP IP/OBS MODERATE 35: CPT | Performed by: INTERNAL MEDICINE

## 2024-02-21 PROCEDURE — 25010000002 DIPHENHYDRAMINE PER 50 MG

## 2024-02-21 PROCEDURE — 93005 ELECTROCARDIOGRAM TRACING: CPT

## 2024-02-21 PROCEDURE — 93010 ELECTROCARDIOGRAM REPORT: CPT | Performed by: SPECIALIST

## 2024-02-21 PROCEDURE — 25010000002 FUROSEMIDE PER 20 MG: Performed by: INTERNAL MEDICINE

## 2024-02-21 PROCEDURE — 83735 ASSAY OF MAGNESIUM: CPT

## 2024-02-21 PROCEDURE — 94664 DEMO&/EVAL PT USE INHALER: CPT

## 2024-02-21 PROCEDURE — 97161 PT EVAL LOW COMPLEX 20 MIN: CPT

## 2024-02-21 RX ORDER — PROCHLORPERAZINE EDISYLATE 5 MG/ML
5 INJECTION INTRAMUSCULAR; INTRAVENOUS ONCE
Status: COMPLETED | OUTPATIENT
Start: 2024-02-21 | End: 2024-02-21

## 2024-02-21 RX ORDER — POTASSIUM CHLORIDE 750 MG/1
20 CAPSULE, EXTENDED RELEASE ORAL ONCE
Status: COMPLETED | OUTPATIENT
Start: 2024-02-21 | End: 2024-02-21

## 2024-02-21 RX ORDER — FUROSEMIDE 10 MG/ML
80 INJECTION INTRAMUSCULAR; INTRAVENOUS EVERY 12 HOURS
Status: DISCONTINUED | OUTPATIENT
Start: 2024-02-21 | End: 2024-02-22 | Stop reason: HOSPADM

## 2024-02-21 RX ORDER — FOLIC ACID 1 MG/1
500 TABLET ORAL DAILY
Status: DISCONTINUED | OUTPATIENT
Start: 2024-02-21 | End: 2024-02-22 | Stop reason: HOSPADM

## 2024-02-21 RX ORDER — MECLIZINE HCL 12.5 MG/1
12.5 TABLET ORAL 3 TIMES DAILY PRN
Status: DISCONTINUED | OUTPATIENT
Start: 2024-02-21 | End: 2024-02-22 | Stop reason: HOSPADM

## 2024-02-21 RX ORDER — ACETAMINOPHEN 325 MG/1
650 TABLET ORAL ONCE
Status: COMPLETED | OUTPATIENT
Start: 2024-02-21 | End: 2024-02-21

## 2024-02-21 RX ORDER — PROCHLORPERAZINE EDISYLATE 5 MG/ML
5 INJECTION INTRAMUSCULAR; INTRAVENOUS EVERY 6 HOURS PRN
Status: DISCONTINUED | OUTPATIENT
Start: 2024-02-21 | End: 2024-02-22 | Stop reason: HOSPADM

## 2024-02-21 RX ORDER — DIPHENHYDRAMINE HYDROCHLORIDE 50 MG/ML
25 INJECTION INTRAMUSCULAR; INTRAVENOUS ONCE
Status: COMPLETED | OUTPATIENT
Start: 2024-02-21 | End: 2024-02-21

## 2024-02-21 RX ADMIN — DIPHENHYDRAMINE HYDROCHLORIDE 25 MG: 50 INJECTION, SOLUTION INTRAMUSCULAR; INTRAVENOUS at 01:23

## 2024-02-21 RX ADMIN — EMPAGLIFLOZIN 10 MG: 10 TABLET, FILM COATED ORAL at 08:27

## 2024-02-21 RX ADMIN — ARFORMOTEROL TARTRATE 15 MCG: 15 SOLUTION RESPIRATORY (INHALATION) at 07:37

## 2024-02-21 RX ADMIN — MIDODRINE HYDROCHLORIDE 10 MG: 10 TABLET ORAL at 17:47

## 2024-02-21 RX ADMIN — BUDESONIDE 0.5 MG: 0.5 INHALANT ORAL at 19:24

## 2024-02-21 RX ADMIN — FUROSEMIDE 80 MG: 10 INJECTION, SOLUTION INTRAMUSCULAR; INTRAVENOUS at 08:26

## 2024-02-21 RX ADMIN — FOLIC ACID 500 MCG: 1 TABLET ORAL at 10:12

## 2024-02-21 RX ADMIN — HYDROCODONE BITARTRATE AND ACETAMINOPHEN 2 TABLET: 5; 325 TABLET ORAL at 22:08

## 2024-02-21 RX ADMIN — HYDROCODONE BITARTRATE AND ACETAMINOPHEN 2 TABLET: 5; 325 TABLET ORAL at 15:10

## 2024-02-21 RX ADMIN — MIDODRINE HYDROCHLORIDE 10 MG: 10 TABLET ORAL at 08:27

## 2024-02-21 RX ADMIN — DIGOXIN 125 MCG: 125 TABLET ORAL at 13:14

## 2024-02-21 RX ADMIN — MIDODRINE HYDROCHLORIDE 10 MG: 10 TABLET ORAL at 13:14

## 2024-02-21 RX ADMIN — Medication 10 ML: at 22:09

## 2024-02-21 RX ADMIN — ARFORMOTEROL TARTRATE 15 MCG: 15 SOLUTION RESPIRATORY (INHALATION) at 19:24

## 2024-02-21 RX ADMIN — PROCHLORPERAZINE EDISYLATE 5 MG: 5 INJECTION INTRAMUSCULAR; INTRAVENOUS at 01:23

## 2024-02-21 RX ADMIN — NICOTINE 1 PATCH: 21 PATCH, EXTENDED RELEASE TRANSDERMAL at 08:26

## 2024-02-21 RX ADMIN — Medication 10 ML: at 08:29

## 2024-02-21 RX ADMIN — CALCIUM CARBONATE 2 TABLET: 500 TABLET, CHEWABLE ORAL at 22:09

## 2024-02-21 RX ADMIN — TICAGRELOR 90 MG: 90 TABLET ORAL at 08:27

## 2024-02-21 RX ADMIN — TICAGRELOR 90 MG: 90 TABLET ORAL at 22:08

## 2024-02-21 RX ADMIN — Medication 5 MG: at 22:09

## 2024-02-21 RX ADMIN — Medication 100 MG: at 10:13

## 2024-02-21 RX ADMIN — MECLIZINE 12.5 MG: 12.5 TABLET ORAL at 22:48

## 2024-02-21 RX ADMIN — FUROSEMIDE 80 MG: 10 INJECTION, SOLUTION INTRAMUSCULAR; INTRAVENOUS at 22:09

## 2024-02-21 RX ADMIN — POTASSIUM CHLORIDE 20 MEQ: 10 CAPSULE, COATED, EXTENDED RELEASE ORAL at 10:12

## 2024-02-21 RX ADMIN — ATORVASTATIN CALCIUM 80 MG: 40 TABLET, FILM COATED ORAL at 22:08

## 2024-02-21 RX ADMIN — ASPIRIN 81 MG: 81 TABLET, COATED ORAL at 08:27

## 2024-02-21 RX ADMIN — BUDESONIDE 0.5 MG: 0.5 INHALANT ORAL at 07:37

## 2024-02-21 RX ADMIN — ACETAMINOPHEN 650 MG: 325 TABLET ORAL at 01:23

## 2024-02-21 RX ADMIN — BISACODYL 5 MG: 5 TABLET, COATED ORAL at 22:08

## 2024-02-21 NOTE — PLAN OF CARE
Goal Outcome Evaluation:  Patient a&ox4, continues with frustration. Medicated for c/o pain, headache, and nausea prn per MAR. Patient refuses bed alarm and assistance with ambulating from staff. Patient experienced vtach and svt this shift, see flowsheets. Notified Kelsy LUCAS, see orders.

## 2024-02-21 NOTE — PROGRESS NOTES
"Hospital Medicine Team    LOS 2 days      Patient Care Team:  Rosa Isela Kim APRN as PCP - General (Nurse Practitioner)      Subjective       Chief Complaint:  f/u volume overload  Summary:  Bradley Crane is a 68 y.o. male multiple medical problems to include coronary artery disease status post PCI of LAD, systolic congestive heart failure ejection fraction 25 to 30%, ischemic cardiomyopathy, history of pacemaker/AICD placement,, hypertension, hyperlipidemia, cirrhosis, history of alcohol abuse, COPD, history of tobacco abuse with smoking, moderate severe pulmonary hypertension, mild aortic valve stenosis, that presents to the emergency department with 1 to 2 days of worsening shortness of air and orthopnea denies chest pains fevers or chills is unsure whether or not he has been taking his diuretic is unsure what medications he is on at all.  Was evaluated emergency department was noted to have an elevated brain natruretic peptide 13,700 evidence of CHF on chest x-ray was given 80 mg of IV Lasix . Was admitted    Subjective    Still with some BOOTH and lower extremity edema but slightly improved.    Objective       Vital Signs  Temp:  [97.2 °F (36.2 °C)-97.5 °F (36.4 °C)] 97.2 °F (36.2 °C)  Heart Rate:  [62-77] 77  Resp:  [18] 18  BP: ()/(65-77) 100/65  Oxygen Therapy  SpO2: 97 %  Pulse Oximetry Type: Intermittent  Device (Oxygen Therapy): room air  Flow (L/min): 1  Flowsheet Rows      Flowsheet Row First Filed Value   Admission Height 185.4 cm (73\") Documented at 02/18/2024 0942   Admission Weight 95.3 kg (210 lb 1.6 oz) Documented at 02/18/2024 0952                Physical Exam:  Physical Exam  Vitals reviewed.   Cardiovascular:      Rate and Rhythm: Normal rate.   Pulmonary:      Effort: No respiratory distress.   Abdominal:      General: There is no distension.      Tenderness: There is no abdominal tenderness.   Musculoskeletal:      Right lower leg: Edema present.      Left lower leg: Edema present. "   Skin:     Comments: Chronic venous stasis changes bilateral lower extremities   Neurological:      Mental Status: He is alert.           Results Review:    I reviewed the patient's new clinical results.    [x]  Laboratory  []  Microbiology  [x]  Radiology  []  EKG/Telemetry   []  Cardiology/Vascular   []  Pathology  []  Old records  []  Other:      X-rays, labs reviewed personally by physician.    Medication Review:   I have reviewed the patient's current medication list    Scheduled Meds  arformoterol, 15 mcg, Nebulization, BID - RT  aspirin, 81 mg, Oral, Daily  atorvastatin, 80 mg, Oral, Nightly  budesonide, 0.5 mg, Nebulization, BID - RT  digoxin, 125 mcg, Oral, Daily  empagliflozin, 10 mg, Oral, Daily  folic acid, 500 mcg, Oral, Daily  furosemide, 80 mg, Intravenous, Q12H  metoprolol succinate XL, 12.5 mg, Oral, Daily  midodrine, 10 mg, Oral, TID AC  nicotine, 1 patch, Transdermal, Q24H  sodium chloride, 10 mL, Intravenous, Q12H  spironolactone, 100 mg, Oral, Daily  thiamine, 100 mg, Oral, Daily  ticagrelor, 90 mg, Oral, BID        Meds Infusions       Meds PRN    [Held by provider] ALPRAZolam    polyethylene glycol **AND** bisacodyl **AND** bisacodyl    calcium carbonate    HYDROcodone-acetaminophen    ipratropium-albuterol    meclizine    melatonin    [COMPLETED] prochlorperazine **FOLLOWED BY** prochlorperazine    sodium chloride    sodium chloride    sodium chloride    sodium chloride        Assessment / Plan       Active Hospital Problems:  Active Hospital Problems    Diagnosis  POA    **CHF (congestive heart failure) [I50.9]  Yes    Acute on chronic HFrEF (heart failure with reduced ejection fraction) [I50.23]  Yes      Resolved Hospital Problems   No resolved problems to display.     Acute on chronic systolic congestive heart failure ejection fraction 26 to 30% d/t ischemic cardiomyopathy/AICD placement  Increase Lasix dose again today  Follow volume status, still generous  Acute respiratory distress  d/t above   Continue pulmonary toilet and treatment above  Severe multivessel coronary artery disease with history of PCI of the left main artery into the LAD  Chronic  Continue beta-blocker, Brilinta, aspirin  Ascites, cirrhosis   suspect ascites cardiogenic in nature  Wants to avoid paracentesis if possible  Continue chronic midodrine 10 mg 3 times daily  Continue increased Aldactone  Chronic elevation of troponin flat this hospitalization  Denies chest pain  Moderate to severe pulmonary hypertension  Complicating above  Hypokalemia  Follow BMP and replace as needed  Medical noncompliance  Encourage compliance  COPD  Chronic no exacerbation  Continue Pulmicort and Brovana      DVT ppx-SCD      Plan for disposition: Pending diuresis for possibly 1 day if improves    Electronically signed by Gilberto Medel DO, 02/21/24, 16:14 EST.      Note disclaimer: At [facility], we believe that sharing information builds trust and better relationships. You are receiving this note because you recently visited [facility]. It is possible you will see health information before a provider has talked with you about it. This kind of information can be easy to misunderstand. To help you fully understand what it means for your health, we urge you to discuss this note with your provider.

## 2024-02-21 NOTE — THERAPY EVALUATION
Acute Care - Physical Therapy Initial Evaluation   Clif     Patient Name: Bradley Crane  : 1955  MRN: [id number]  Today's Date: 2024 Admit date: 2024     Referring Physician: Gilberto Medel DO     Surgery Date:* No surgery found *          Visit Dx:     ICD-10-CM ICD-9-CM   1. Acute pulmonary edema  J81.0 518.4   2. Cirrhosis of liver with ascites, unspecified hepatic cirrhosis type  K74.60 571.5    R18.8    3. Acute on chronic congestive heart failure, unspecified heart failure type  I50.9 428.0   4. Hypoxia  R09.02 799.02   5. Decreased activities of daily living (ADL)  Z78.9 V49.89   6. Difficulty walking  R26.2 719.7   7. Difficulty in walking  R26.2 719.7     Patient Active Problem List   Diagnosis    CHF exacerbation    Chest pain, atypical    CAD, multiple vessel    Abdominal ascites    Bilateral pleural effusion    Chronic back pain    Compression fracture of L2 lumbar vertebra    Acute on chronic HFrEF (heart failure with reduced ejection fraction)    Anxiety    Smoker    Vitamin D deficiency    Hyperlipidemia LDL goal <70    Alcohol withdrawal syndrome without complication    Alcohol-induced insomnia    Generalized edema    Acute hyponatremia    CHF (congestive heart failure)     Past Medical History:   Diagnosis Date    Ascites     CAD (coronary artery disease)     CHF (congestive heart failure)     Cirrhosis     Hypertension     MI (myocardial infarction)      Past Surgical History:   Procedure Laterality Date    CARDIAC CATHETERIZATION N/A 2023    Procedure: Right and LHC with possible coronary intevention;  Surgeon: Merly Ji MD;  Location: [facility] CATH INVASIVE LOCATION;  Service: Cardiology;  Laterality: N/A;    CARDIAC DEFIBRILLATOR PLACEMENT      CARDIAC SURGERY       PT Assessment (last 12 hours)       PT Evaluation and Treatment       Row Name 24 1056          Physical Therapy Time and Intention    Subjective Information no complaints (P)   -NM      Document Type evaluation (P)   -NM     Mode of Treatment individual therapy;physical therapy (P)   -NM     Patient Effort adequate (P)   -NM     Symptoms Noted During/After Treatment none (P)   -NM       Row Name 02/21/24 1056          General Information    Patient Profile Reviewed yes (P)   -NM     Patient Observations alert;cooperative;agree to therapy (P)   -NM     Prior Level of Function independent: (P)   -NM     Equipment Currently Used at Home cane, straight (P)   -NM     Existing Precautions/Restrictions fall (P)   -NM     Barriers to Rehab none identified (P)   -NM       Row Name 02/21/24 1056          Living Environment    Current Living Arrangements home (P)   -NM     People in Home spouse (P)   -NM     Primary Care Provided by self (P)   -NM       Row Name 02/21/24 1056          Home Main Entrance    Number of Stairs, Main Entrance none (P)   -NM       Row Name 02/21/24 1056          Home Use of Assistive/Adaptive Equipment    Equipment Currently Used at Home cane, straight (P)   -NM       Row Name 02/21/24 1056          Pain    Pretreatment Pain Rating 0/10 - no pain (P)   -NM     Posttreatment Pain Rating 0/10 - no pain (P)   -NM       Row Name 02/21/24 1056          Range of Motion (ROM)    Range of Motion bilateral lower extremities;ROM is WFL (P)   -NM       Row Name 02/21/24 1056          Strength (Manual Muscle Testing)    Strength (Manual Muscle Testing) bilateral lower extremities (P)   5/5, ankle DF/PF not assessed pt redused due to pain  -NM       Row Name 02/21/24 1056          Bed Mobility    Bed Mobility bed mobility (all) activities (P)   -NM     All Activities, [place] (Bed Mobility) independent (P)   -NM       Row Name 02/21/24 1056          Transfers    Transfers sit-stand transfer;stand-sit transfer;stand pivot/stand step transfer (P)   -NM       Row Name 02/21/24 1056          Sit-Stand Transfer    Sit-Stand [place] (Transfers) standby assist (P)   -NM       Row Name  02/21/24 1056          Stand-Sit Transfer    Stand-Sit [place] (Transfers) standby assist (P)   -NM       Row Name 02/21/24 1056          Stand Pivot/Stand Step Transfer    Stand Pivot/Stand Step [place] (Transfers) standby assist (P)   -NM       [facility] Name 02/21/24 1056          Gait/Stairs (Locomotion)    Gait/Stairs Locomotion gait/ambulation independence (P)   -NM     [place] Level (Gait) standby assist (P)   -NM     Patient was able to Ambulate yes (P)   -NM     Distance in Feet (Gait) 20 (P)   -NM     Pattern (Gait) step-through (P)   -NM     Deviations/Abnormal Patterns (Gait) bilateral deviations;base of support, wide;[place] decreased;festinating/shuffling;gait speed decreased;weight shifting decreased (P)   -NM     Bilateral Gait Deviations weight shift ability decreased;decreased arm swing;forward flexed posture;heel strike decreased (P)   -NM       [facility] Name 02/21/24 1056          Safety Issues, Functional Mobility    Impairments Affecting Function (Mobility) endurance/activity tolerance (P)   -NM       [facility] Name 02/21/24 1056          Balance    Balance Assessment standing dynamic balance (P)   -NM     Dynamic Standing Balance standby assist (P)   -NM       Row Name 02/21/24 1056          Plan of Care Review    Plan of Care Reviewed With patient (P)   -NM     Progress improving (P)   -NM     Outcome Evaluation Pt presents to therapy today with some difficulty and safety issues with ambulation and transfers. Pt will require skilled PT to address above deficits. (P)   -NM       [facility] Name 02/21/24 1056          Positioning and Restraints    Pre-Treatment Position in bed (P)   -NM     Post Treatment Position bed (P)   -NM     In Bed sitting EOB;call light within reach;encouraged to call for assist;exit alarm on (P)   -NM       Row Name 02/21/24 1056          Therapy Assessment/Plan (PT)    Patient/Family Therapy Goals Statement (PT) pt wants to be able to ambulate independently again (P)   -NM      Rehab Potential (PT) good, to achieve stated therapy goals (P)   -NM     Therapy Frequency (PT) daily (P)   -NM     Predicted Duration of Therapy Intervention (PT) 10 days (P)   -NM     Problem List (PT) problems related to;balance (P)   -NM     Activity Limitations Related to Problem List (PT) unable to ambulate safely;unable to transfer safely (P)   -NM       Row Name 02/21/24 1056          PT Evaluation Complexity    History, PT Evaluation Complexity 3 or more personal factors and/or comorbidities (P)   -NM     Examination of Body Systems (PT Eval Complexity) total of 4 or more elements (P)   -NM     Clinical Presentation (PT Evaluation Complexity) stable (P)   -NM     Clinical Decision Making (PT Evaluation Complexity) low complexity (P)   -NM     Overall Complexity (PT Evaluation Complexity) low complexity (P)   -NM       Row Name 02/21/24 1056          Therapy Plan Review/Discharge Plan (PT)    Therapy Plan Review (PT) evaluation/treatment results reviewed;care plan/treatment goals reviewed (P)   -NM       Row Name 02/21/24 1056          Physical Therapy Goals    Gait Training Goal Selection (PT) gait training, PT goal 1 (P)   -NM       Row Name 02/21/24 1056          Gait Training Goal 1 (PT)    Activity/Assistive Device (Gait Training Goal 1, PT) gait (walking locomotion);assistive device use;walker, rolling (P)   -NM     [place] Level (Gait Training Goal 1, PT) modified independence (P)   -NM     Distance (Gait Training Goal 1, PT) 300 ft (P)   -NM     Time Frame (Gait Training Goal 1, PT) 10 days (P)   -NM               User Key  (r) = Recorded By, (t) = Taken By, (c) = Cosigned By      Initials Name Provider Type    Garret Hemphill, PT Student PT Student                    Physical Therapy Education       Title: PT OT SLP Therapies (Done)       Topic: Physical Therapy (Done)       Point: Mobility training (Done)       Learning Progress Summary             Patient Acceptance, E,TB, VU by NM at  2/21/2024 1106    Nonacceptance, E, RT by  at 2/19/2024 1216                         Point: Home exercise program (Done)       Learning Progress Summary             Patient Acceptance, E,TB, VU by NM at 2/21/2024 1106                         Point: Body mechanics (Done)       Learning Progress Summary             Patient Acceptance, E,TB, VU by NM at 2/21/2024 1106                         Point: Precautions (Done)       Learning Progress Summary             Patient Acceptance, E,TB, VU by NM at 2/21/2024 1106                                         User Key       Initials Effective Dates Name Provider Type Discipline     04/25/21 -  Christy Angel, PT Physical Therapist PT    NM 01/31/24 -  Garret Tai, PT Student PT Student PT                  PT Recommendation and Plan  Anticipated Discharge Disposition (PT): (P) home with home health  Planned Therapy Interventions (PT): (P) balance training, gait training, transfer training  Therapy Frequency (PT): (P) daily  Plan of Care Reviewed With: (P) patient  Progress: (P) improving  Outcome Evaluation: (P) Pt presents to therapy today with some difficulty and safety issues with ambulation and transfers. Pt will require skilled PT to address above deficits.   Outcome Measures       Row Name 02/21/24 1000 02/19/24 1157          How much help from another person do you currently need...    Turning from your back to your side while in flat bed without using bedrails? 4 (P)   -NM 4  -CS     Moving from lying on back to sitting on the side of a flat bed without bedrails? 4 (P)   -NM 4  -CS     Moving to and from a bed to a chair (including a wheelchair)? 4 (P)   -NM 4  -CS     Standing up from a chair using your arms (e.g., wheelchair, bedside chair)? 4 (P)   -NM 4  -CS     Climbing 3-5 steps with a railing? 4 (P)   -NM 4  -CS     To walk in hospital room? 4 (P)   -NM 4  -CS     AM-PAC 6 Clicks Score (PT) 24 (P)   -NM 24  -CS     Highest Level of Mobility Goal 8 -->  Walked 250 feet or more (P)   -NM 8 --> Walked 250 feet or more  -CS        Functional Assessment    Outcome Measure Options -- AM-PAC 6 Clicks Basic Mobility (PT)  -CS               User Key  (r) = Recorded By, (t) = Taken By, (c) = Cosigned By      Initials Name Provider Type    CS Christy Angel, PT Physical Therapist    NM Garret Tai, PT Student PT Student                     Time Calculation:    PT Charges       Row Name 02/21/24 1055             Time Calculation    PT Received On 02/21/24 (P)   -NM      PT Goal Re-Cert Due Date 03/01/24 (P)   -NM         Untimed Charges    PT Eval/Re-eval Minutes 27 (P)   -NM         Total Minutes    Untimed Charges Total Minutes 27 (P)   -NM       Total Minutes 27 (P)   -NM                User Key  (r) = Recorded By, (t) = Taken By, (c) = Cosigned By      Initials Name Provider Type    NM Garret Tai, PT Student PT Student                  Therapy Charges for Today       Code Description Service Date Service Provider Modifiers Qty    99513082428 HC PT EVAL LOW COMPLEXITY 2 2/21/2024 Garret Tai, PT Student GP 1            PT G-Codes  Outcome Measure Options: AM-PAC 6 Clicks Basic Mobility (PT)  AM-PAC 6 Clicks Score (PT): (P) 24  AM-PAC 6 Clicks Score (OT): 24    Garret Tai PT Student  2/21/2024

## 2024-02-21 NOTE — PLAN OF CARE
Goal Outcome Evaluation:  Plan of Care Reviewed With: (P) patient        Progress: (P) improving  Outcome Evaluation: (P) Pt presents to therapy today with some difficulty and safety issues with ambulation and transfers. Pt will require skilled PT to address above deficits.      Anticipated Discharge Disposition (PT): (P) home with home health

## 2024-02-22 ENCOUNTER — READMISSION MANAGEMENT (OUTPATIENT)
Dept: CALL CENTER | Facility: HOSPITAL | Age: 69
End: 2024-02-22
Payer: MEDICARE

## 2024-02-22 ENCOUNTER — CLINICAL SUPPORT (OUTPATIENT)
Dept: FAMILY MEDICINE CLINIC | Facility: CLINIC | Age: 69
End: 2024-02-22
Payer: MEDICARE

## 2024-02-22 VITALS
SYSTOLIC BLOOD PRESSURE: 102 MMHG | WEIGHT: 189.82 LBS | OXYGEN SATURATION: 96 % | HEIGHT: 73 IN | DIASTOLIC BLOOD PRESSURE: 78 MMHG | RESPIRATION RATE: 18 BRPM | HEART RATE: 73 BPM | BODY MASS INDEX: 25.16 KG/M2 | TEMPERATURE: 97.9 F

## 2024-02-22 DIAGNOSIS — M54.41 CHRONIC BILATERAL LOW BACK PAIN WITH RIGHT-SIDED SCIATICA: ICD-10-CM

## 2024-02-22 DIAGNOSIS — G89.29 CHRONIC BILATERAL LOW BACK PAIN WITH RIGHT-SIDED SCIATICA: ICD-10-CM

## 2024-02-22 DIAGNOSIS — S32.020S COMPRESSION FRACTURE OF L2 VERTEBRA, SEQUELA: ICD-10-CM

## 2024-02-22 DIAGNOSIS — Z79.899 MEDICATION MANAGEMENT: Primary | ICD-10-CM

## 2024-02-22 LAB
AMPHET+METHAMPHET UR QL: NEGATIVE
AMPHETAMINE INTERNAL CONTROL: ABNORMAL
AMPHETAMINES UR QL: NEGATIVE
BARBITURATE INTERNAL CONTROL: ABNORMAL
BARBITURATES UR QL SCN: NEGATIVE
BENZODIAZ UR QL SCN: NEGATIVE
BENZODIAZEPINE INTERNAL CONTROL: ABNORMAL
BUPRENORPHINE INTERNAL CONTROL: ABNORMAL
BUPRENORPHINE SERPL-MCNC: NEGATIVE NG/ML
CANNABINOIDS SERPL QL: POSITIVE
COCAINE INTERNAL CONTROL: ABNORMAL
COCAINE UR QL: NEGATIVE
EXPIRATION DATE: ABNORMAL
Lab: ABNORMAL
MDMA (ECSTASY) INTERNAL CONTROL: ABNORMAL
MDMA UR QL SCN: NEGATIVE
METHADONE INTERNAL CONTROL: ABNORMAL
METHADONE UR QL SCN: NEGATIVE
METHAMPHETAMINE INTERNAL CONTROL: ABNORMAL
MORPHINE INTERNAL CONTROL: ABNORMAL
MORPHINE/OPIATES SCREEN, URINE: POSITIVE
OXYCODONE INTERNAL CONTROL: ABNORMAL
OXYCODONE UR QL SCN: NEGATIVE
PCP UR QL SCN: NEGATIVE
PHENCYCLIDINE INTERNAL CONTROL: ABNORMAL
THC INTERNAL CONTROL: ABNORMAL

## 2024-02-22 PROCEDURE — 94799 UNLISTED PULMONARY SVC/PX: CPT

## 2024-02-22 PROCEDURE — 99239 HOSP IP/OBS DSCHRG MGMT >30: CPT | Performed by: INTERNAL MEDICINE

## 2024-02-22 PROCEDURE — 97530 THERAPEUTIC ACTIVITIES: CPT

## 2024-02-22 PROCEDURE — 94664 DEMO&/EVAL PT USE INHALER: CPT

## 2024-02-22 PROCEDURE — 25010000002 FUROSEMIDE PER 20 MG: Performed by: INTERNAL MEDICINE

## 2024-02-22 PROCEDURE — 80305 DRUG TEST PRSMV DIR OPT OBS: CPT | Performed by: NURSE PRACTITIONER

## 2024-02-22 RX ORDER — HYDROCODONE BITARTRATE AND ACETAMINOPHEN 5; 325 MG/1; MG/1
2 TABLET ORAL EVERY 6 HOURS PRN
Qty: 240 TABLET | Refills: 0 | Status: SHIPPED | OUTPATIENT
Start: 2024-02-22

## 2024-02-22 RX ORDER — SPIRONOLACTONE 100 MG/1
50 TABLET, FILM COATED ORAL DAILY
Qty: 30 TABLET | Refills: 0 | Status: SHIPPED | OUTPATIENT
Start: 2024-02-23

## 2024-02-22 RX ORDER — FUROSEMIDE 40 MG/1
40 TABLET ORAL 2 TIMES DAILY
Qty: 60 TABLET | Refills: 0 | Status: SHIPPED | OUTPATIENT
Start: 2024-02-22 | End: 2024-03-23

## 2024-02-22 RX ORDER — MIDODRINE HYDROCHLORIDE 10 MG/1
10 TABLET ORAL
Qty: 30 TABLET | Refills: 0 | Status: SHIPPED | OUTPATIENT
Start: 2024-02-22

## 2024-02-22 RX ADMIN — EMPAGLIFLOZIN 10 MG: 10 TABLET, FILM COATED ORAL at 08:32

## 2024-02-22 RX ADMIN — Medication 100 MG: at 08:32

## 2024-02-22 RX ADMIN — HYDROCODONE BITARTRATE AND ACETAMINOPHEN 2 TABLET: 5; 325 TABLET ORAL at 06:24

## 2024-02-22 RX ADMIN — ASPIRIN 81 MG: 81 TABLET, COATED ORAL at 08:31

## 2024-02-22 RX ADMIN — ARFORMOTEROL TARTRATE 15 MCG: 15 SOLUTION RESPIRATORY (INHALATION) at 06:54

## 2024-02-22 RX ADMIN — FOLIC ACID 500 MCG: 1 TABLET ORAL at 08:32

## 2024-02-22 RX ADMIN — NICOTINE 1 PATCH: 21 PATCH, EXTENDED RELEASE TRANSDERMAL at 08:33

## 2024-02-22 RX ADMIN — MIDODRINE HYDROCHLORIDE 10 MG: 10 TABLET ORAL at 07:38

## 2024-02-22 RX ADMIN — SPIRONOLACTONE 100 MG: 25 TABLET ORAL at 08:31

## 2024-02-22 RX ADMIN — BUDESONIDE 0.5 MG: 0.5 INHALANT ORAL at 06:54

## 2024-02-22 RX ADMIN — TICAGRELOR 90 MG: 90 TABLET ORAL at 08:31

## 2024-02-22 RX ADMIN — Medication 10 ML: at 08:38

## 2024-02-22 RX ADMIN — METOPROLOL SUCCINATE 12.5 MG: 25 TABLET, EXTENDED RELEASE ORAL at 08:27

## 2024-02-22 RX ADMIN — FUROSEMIDE 80 MG: 10 INJECTION, SOLUTION INTRAMUSCULAR; INTRAVENOUS at 08:26

## 2024-02-22 NOTE — DISCHARGE SUMMARY
[facility] Medicine Services  DISCHARGE SUMMARY        Date of Admission: 2/18/2024    Date of Discharge:  2/22/2024    Length of stay:  LOS: 3 days     Presenting Problem:   CHF (congestive heart failure) [I50.9]  Acute pulmonary edema [J81.0]  Hypoxia [R09.02]  Cirrhosis of liver with ascites, unspecified hepatic cirrhosis type [K74.60, R18.8]  Acute on chronic congestive heart failure, unspecified heart failure type [I50.9]  Acute on chronic HFrEF (heart failure with reduced ejection fraction) [I50.23]    Hospital Course     Active Diagnosis During Hospital Stay/Discharge Diagnoses/Course by Diagnoses:    Acute on chronic systolic congestive heart failure ejection fraction 26 to 30% d/t ischemic cardiomyopathy/AICD placement  Home on BID PO diuretics   Follow volume status, still generous  Acute respiratory distress d/t above   Continue pulmonary toilet and treatment above  Severe multivessel coronary artery disease with history of PCI of the left main artery into the LAD  Chronic  Continue beta-blocker, Brilinta, aspirin  Ascites, cirrhosis   suspect ascites cardiogenic in nature  Did not want paracentesis  Continue midodrine but increased dose to 10 mg 3 times daily  Continue increased Aldactone at d/c   Chronic elevation of troponin flat this hospitalization  Denies chest pain  Moderate to severe pulmonary hypertension  Complicating above  Hypokalemia  Follow BMP and replace as needed  Medical noncompliance  Encourage compliance, however he was even refusing home treatments while here  COPD  Chronic no exacerbation  Continue Pulmicort and Brovana    Active Hospital Problems    Diagnosis  POA   • **CHF (congestive heart failure) [I50.9]  Yes   • Acute on chronic HFrEF (heart failure with reduced ejection fraction) [I50.23]  Yes      Resolved Hospital Problems   No resolved problems to display.         Hospital Course  Bradley Crane is a 68 y.o. male multiple medical problems to include coronary  artery disease status post PCI of LAD, systolic congestive heart failure ejection fraction 25 to 30%, ischemic cardiomyopathy, history of pacemaker/AICD placement,, hypertension, hyperlipidemia, cirrhosis, history of alcohol abuse, COPD, history of tobacco abuse with smoking, moderate severe pulmonary hypertension, mild aortic valve stenosis, that presents to the emergency department with 1 to 2 days of worsening shortness of air and orthopnea denies chest pains fevers or chills is unsure whether or not he has been taking his diuretic is unsure what medications he is on at all.  Was evaluated emergency department was noted to have an elevated brain natruretic peptide 13,700 evidence of CHF on chest x-ray was given 80 mg of IV Lasix . Was admitted     Responded well to diuresis. However did have some continued apparent ascites but he refused paracentesis. By the end of the hospitalization he was refusing labs and also some other treatments. He was wanting to go home. PT did eval and rec HH which fortunately he was agreeable. He will be d/c home with med changes as noted above but is high risk of re-admission given his medical non-compliance to some treatments.     Procedures Performed:as noted          Consults:   Consults       Date and Time Order Name Status Description    2/18/2024 11:41 AM Hospitalist (on-call MD unless specified)                Pertinent  and/or Most Recent Results       Pertinent Test Results:     Results from last 7 days   Lab Units 02/19/24  1717 02/18/24  0954   WBC 10*3/mm3 3.94 3.52   HEMOGLOBIN g/dL 11.1* 11.6*   HEMATOCRIT % 33.3* 34.6*   PLATELETS 10*3/mm3 239 249     Results from last 7 days   Lab Units 02/21/24  0316 02/19/24  1717 02/18/24  0954   SODIUM mmol/L 136 133* 136   POTASSIUM mmol/L 3.4* 3.6 3.1*   CHLORIDE mmol/L 98 97* 96*   CO2 mmol/L 23.4 21.3* 22.7   BUN mg/dL 23 16 10   CREATININE mg/dL 1.08 1.07 0.97   CALCIUM mg/dL 9.0 8.9 9.3   BILIRUBIN mg/dL  --   --  1.5*   ALK  PHOS U/L  --   --  167*   ALT (SGPT) U/L  --   --  6   AST (SGOT) U/L  --   --  19   GLUCOSE mg/dL 94 110* 91       Microbiology Results (last 10 days)       Procedure Component Value - Date/Time    Blood Culture - Blood, Arm, Right [[id number]]  (Normal) Collected: 02/18/24 1001    Lab Status: Preliminary result Specimen: Blood from Arm, Right Updated: 02/22/24 1015     Blood Culture No growth at 4 days    Blood Culture - Blood, Arm, Left [[id number]]  (Normal) Collected: 02/18/24 1001    Lab Status: Preliminary result Specimen: Blood from Arm, Left Updated: 02/22/24 1031     Blood Culture No growth at 4 days            Results for orders placed during the hospital encounter of 02/18/24    Adult Transthoracic Echo Complete W/ Cont if Necessary Per Protocol    Interpretation Summary  •  Left ventricular ejection fraction appears to be less than 20%.  •  The left ventricular cavity is mildly dilated.  •  Left ventricular diastolic function was indeterminate.  •  Moderately reduced right ventricular systolic function noted.  •  The right ventricular cavity is mildly dilated.  •  The left atrial cavity is mild to moderately dilated.  •  The right atrial cavity is mild to moderately  dilated.  •  Aortic valve is very sclerotic.  The max/mean pressure gradient is only 25/12 mmHg.  This could be a low flow/low gradient due to depressed ejection fraction.  Could consider dobutamine echo.  •  Mild to moderate mitral and tricuspid regurgitation.  •  Pleural effusion is noted.      Imaging Results (All)       Procedure Component Value Units Date/Time    US Abdomen Complete [[id number]] Collected: 02/18/24 1642     Updated: 02/18/24 1645    Narrative:      PROCEDURE: US ABDOMEN COMPLETE     COMPARISON: [facility], CT, CT ABDOMEN PELVIS W CONTRAST, 12/15/2023, 15:26.  [facility], CR, XR CHEST 1 VW, 12/15/2023, 17:40.  [facility], US, US ABDOMEN   COMPLETE, 8/31/2023, 19:09.      INDICATIONS: Assess for ascites     TECHNIQUE: Limited abdominal ultrasound examination to evaluate for ascites.       FINDINGS:      Limited images of the right upper quadrant, left upper quadrant and lower quadrants of the abdomen   demonstrate a moderate amount of ascites.     IMPRESSION:  Moderate amount of abdominal and pelvic ascites.       CAROLYN LARKIN MD         Electronically Signed and Approved By: CAROLYN LARKIN MD on 2/18/2024 at 16:42                     XR Chest 1 View [[id number]] Collected: 02/18/24 1013     Updated: 02/18/24 1017    Narrative:      PROCEDURE: XR CHEST 1 VW     COMPARISON: [facility], CR, XR CHEST 1 VW, 12/15/2023, 17:40.     INDICATIONS: SOA Triage Protocol     FINDINGS:      The cardiac silhouette is enlarged.  There appears to be bilateral pulmonary edema.  There is   increasing effusion/consolidation in the lung bases.  There is a left-sided cardiac pacemaker/AICD.     IMPRESSION:  Findings suggest worsening congestive heart failure.       CAROLYN LARKIN MD         Electronically Signed and Approved By: CAROLYN LARKIN MD on 2/18/2024 at 10:13                               Day of Discharge       Condition on Discharge:  stable but chronially ill     Vital Signs  Temp:  [97.2 °F (36.2 °C)-97.9 °F (36.6 °C)] 97.2 °F (36.2 °C)  Heart Rate:  [63-83] 65  Resp:  [16-20] 20  BP: ()/(64-81) 110/75    Physical Exam:  Physical Exam  Vitals reviewed.   Constitutional:       Comments: Chronically ill appearing   Pulmonary:      Effort: No respiratory distress.   Abdominal:      General: There is distension.      Tenderness: There is no abdominal tenderness.   Musculoskeletal:      Right lower leg: Edema present.      Left lower leg: Edema present.   Neurological:      Mental Status: He is alert. Mental status is at baseline.               Discharge Disposition  Home-Health Care Svc    Discharge Medications     Discharge Medications        New Medications         Instructions Start Date   furosemide 40 MG tablet  Commonly known as: Lasix   40 mg, Oral, 2 Times Daily             Changes to Medications        Instructions Start Date   midodrine 2.5 MG tablet  Commonly known as: PROAMATINE  What changed: Another medication with the same name was added. Make sure you understand how and when to take each.   5 mg, Oral, 2 Times Daily PRN      midodrine 10 MG tablet  Commonly known as: PROAMATINE  What changed: You were already taking a medication with the same name, and this prescription was added. Make sure you understand how and when to take each.   10 mg, Oral, 3 Times Daily Before Meals      spironolactone 100 MG tablet  Commonly known as: ALDACTONE  What changed: medication strength   50 mg, Oral, Daily   Start Date: February 23, 2024            Continue These Medications        Instructions Start Date   albuterol sulfate  (90 Base) MCG/ACT inhaler  Commonly known as: PROVENTIL HFA;VENTOLIN HFA;PROAIR HFA   2 puffs, Inhalation, Every 4 Hours PRN      ALPRAZolam 1 MG tablet  Commonly known as: Xanax   1 mg, Oral, 3 Times Daily PRN      aspirin 81 MG EC tablet   81 mg, Oral, Daily      atorvastatin 80 MG tablet  Commonly known as: LIPITOR   80 mg, Oral, Daily      digoxin 125 MCG tablet  Commonly known as: LANOXIN   125 mcg, Oral, Daily      empagliflozin 25 MG tablet tablet  Commonly known as: Jardiance   25 mg, Oral, Daily      HYDROcodone-acetaminophen 5-325 MG per tablet  Commonly known as: NORCO   2 tablets, Oral, Every 6 Hours PRN      metoprolol succinate XL 25 MG 24 hr tablet  Commonly known as: TOPROL-XL   12.5 mg, Oral, Daily      Spiriva Respimat 2.5 MCG/ACT aerosol solution inhaler  Generic drug: tiotropium bromide monohydrate   2 puffs, Inhalation, Daily - RT, Samples given Lot [id number], [id number]      ticagrelor 90 MG tablet tablet  Commonly known as: BRILINTA   90 mg, Oral, 2 Times Daily      Verquvo 10 MG tablet  Generic drug: Vericiguat   10 mg, Oral,  Daily             Stop These Medications      torsemide 20 MG tablet  Commonly known as: DEMADEX              Discharge Diet:   Diet Instructions       Diet: Cardiac Diets; Low Sodium (2g); Thin (IDDSI 0)      Discharge Diet: Cardiac Diets    Cardiac Diet: Low Sodium (2g)    Fluid Consistency: Thin (IDDSI 0)            Activity at Discharge:   Activity Instructions       Activity as Tolerated              Follow-up Appointments  Future Appointments   Date Time Provider Department Center   3/7/2024 10:30 AM [facility] IR US 1 [facility] IR [facility]   3/8/2024 11:00 AM Beulah Dong APRN [facility] None   3/22/2024  7:00 AM Rosa Isela Kim APRN [facility] HFC [facility]   4/18/2024  8:30 AM [facility] IR US 1  [facility]   7/3/2024 10:00 AM Dulce Maria Pascual APRN [facility] GE ETWH [facility]     Additional Instructions for the Follow-ups that You Need to Schedule       Discharge Follow-up with PCP   As directed       Currently Documented PCP:    Rosa Isela Kim APRN    PCP Phone Number:    [phone]     Follow Up Details: one week                Test Results Pending at Discharge  Pending Labs       Order Current Status    Blood Culture - Blood, Arm, Left Preliminary result    Blood Culture - Blood, Arm, Right Preliminary result             Risk for Readmission (LACE) Score: 15 (2/22/2024  6:00 AM)        Time: Discharge 34 min with face-to-face history exam, writing of prescriptions, and documenting discharge data including care coordination with the nursing staff.      Electronically signed by Gilberto Medel DO, 02/22/24, 11:13 EST.      Note disclaimer: At [facility], we believe that sharing information builds trust and better relationships. You are receiving this note because you recently visited [facility]. It is possible you will see health information before a provider has talked with you about it. This kind of information can be easy to misunderstand. To help you fully understand what it means for your health, we urge you to  discuss this note with your provider.

## 2024-02-22 NOTE — OUTREACH NOTE
Prep Survey      Flowsheet Row Responses   [facility] patient discharged from? [facility]   Is LACE score < 7 ? No   Eligibility [facility]   Hospital  [facility]   Date of Admission 02/18/24   Date of Discharge 02/22/24   Discharge Disposition Home-Health Care Svc   Discharge diagnosis (congestive heart failure   Does the patient have one of the following disease processes/diagnoses(primary or secondary)? CHF   Does the patient have Home health ordered? Yes   What is the Home health agency?  [facility]  Considering   Is there a DME ordered? No   Prep survey completed? Yes            CARMEN CRUZ - Registered Nurse

## 2024-02-22 NOTE — THERAPY TREATMENT NOTE
Acute Care - Physical Therapy Treatment Note   Clif     Patient Name: Bradley Crane  : 1955  MRN: [id number]  Today's Date: 2024      Visit Dx:     ICD-10-CM ICD-9-CM   1. Acute pulmonary edema  J81.0 518.4   2. Cirrhosis of liver with ascites, unspecified hepatic cirrhosis type  K74.60 571.5    R18.8    3. Acute on chronic congestive heart failure, unspecified heart failure type  I50.9 428.0   4. Hypoxia  R09.02 799.02   5. Decreased activities of daily living (ADL)  Z78.9 V49.89   6. Difficulty walking  R26.2 719.7   7. Difficulty in walking  R26.2 719.7     Patient Active Problem List   Diagnosis    CHF exacerbation    Chest pain, atypical    CAD, multiple vessel    Abdominal ascites    Bilateral pleural effusion    Chronic back pain    Compression fracture of L2 lumbar vertebra    Acute on chronic HFrEF (heart failure with reduced ejection fraction)    Anxiety    Smoker    Vitamin D deficiency    Hyperlipidemia LDL goal <70    Alcohol withdrawal syndrome without complication    Alcohol-induced insomnia    Generalized edema    Acute hyponatremia    CHF (congestive heart failure)     Past Medical History:   Diagnosis Date    Ascites     CAD (coronary artery disease)     CHF (congestive heart failure)     Cirrhosis     Hypertension     MI (myocardial infarction)      Past Surgical History:   Procedure Laterality Date    CARDIAC CATHETERIZATION N/A 2023    Procedure: Right and LHC with possible coronary intevention;  Surgeon: Merly Ji MD;  Location: [facility] INVASIVE LOCATION;  Service: Cardiology;  Laterality: N/A;    CARDIAC DEFIBRILLATOR PLACEMENT      CARDIAC SURGERY       PT Assessment (last 12 hours)       PT Evaluation and Treatment       Row Name 24 1120          Physical Therapy Time and Intention    Subjective Information complains of;weakness;fatigue;pain  -DK     Document Type therapy note (daily note)  -DK     Mode of Treatment individual therapy;physical therapy   -DK     Patient Effort poor  -DK     Symptoms Noted During/After Treatment increased pain  -DK     Comment Pt was very rude and argumentative throughout attempt at treatment.  Pt refused to walk, stating the slipper socks hurt his feet, then stated he could not wear the shoes he brought in.  With an attempt at exercises, pt performed 3-5 reps of 2 exercises, then declined to do more. He now has orders for discharge.  -       Row Name 02/22/24 1120          Pain    Pretreatment Pain Rating 5/10  -DK     Posttreatment Pain Rating 5/10  -DK     Pain Location - Side/Orientation Bilateral  -DK     Pain Location generalized  -DK     Pain Location - foot  -DK     Pain Intervention(s) Distraction;Therapeutic presence  -       Row Name 02/22/24 1120          Cognition    Affect/Mental Status (Cognition) agitated  -DK     Behavioral Issues (Cognition) overwhelmed easily;difficulty managing stress;uncooperative;verbal outbursts  -DK     Orientation Status (Cognition) oriented x 4  -DK     Follows Commands (Cognition) WNL  -DK     Cognitive Function WNL  -DK     Personal Safety Interventions nonskid shoes/slippers when out of bed;supervised activity;gait belt  -       Row Name 02/22/24 1120          Balance    Balance Assessment sitting static balance;sitting dynamic balance  -DK     Static Sitting Balance supervision  -DK     Dynamic Sitting Balance supervision  -DK     Position, Sitting Balance unsupported;sitting edge of bed  -DK     Balance Interventions sitting;static;dynamic  -       Row Name 02/22/24 1120          Motor Skills    Motor Skills --  therapeutic exercises  -DK     Coordination WFL  -DK     Therapeutic Exercise ankle;knee  -DK     Additional Documentation --  Pt refused gait, refused to perform more than 5 reps of 2 exercises.  -       Row Name 02/22/24 1120          Knee (Therapeutic Exercise)    Knee (Therapeutic Exercise) AROM (active range of motion)  -DK     Knee AROM (Therapeutic Exercise)  bilateral;flexion;extension;LAQ (long arc quad);sitting;5 repetitions  -       Row Name 02/22/24 1120          Ankle (Therapeutic Exercise)    Ankle (Therapeutic Exercise) AROM (active range of motion)  -     Ankle AROM (Therapeutic Exercise) bilateral;dorsiflexion;plantarflexion;sitting;5 repetitions  -       Row Name 02/22/24 1120          Plan of Care Review    Plan of Care Reviewed With patient  -     Progress no change  -       Row Name 02/22/24 1120          Positioning and Restraints    Pre-Treatment Position in bed  -DK     Post Treatment Position bed  -DK     In Bed sitting EOB;call light within reach;encouraged to call for assist;side rails up x2  -       Row Name 02/22/24 1120          Therapy Assessment/Plan (PT)    Rehab Potential (PT) fair, will monitor progress closely  -     Criteria for Skilled Interventions Met (PT) skilled treatment is necessary  -     Therapy Frequency (PT) daily  -     Problem List (PT) problems related to;cognition;mobility;strength;pain;range of motion (ROM)  -     Activity Limitations Related to Problem List (PT) unable to ambulate safely  -       Row Name 02/22/24 1120          Progress Summary (PT)    Progress Toward Functional Goals (PT) progress toward functional goals is gradual  -               User Key  (r) = Recorded By, (t) = Taken By, (c) = Cosigned By      Initials Name Provider Type    Monserrat Gomez PTA Physical Therapist Assistant                    Physical Therapy Education       Title: PT OT SLP Therapies (Resolved)       Topic: Physical Therapy (Resolved)       Point: Mobility training (Resolved)       Learning Progress Summary             Patient Acceptance, E,TB, VU by NM at 2/21/2024 1106    Nonacceptance, E, RT by CS at 2/19/2024 1216                         Point: Home exercise program (Resolved)       Learning Progress Summary             Patient Acceptance, E,TB, VU by NM at 2/21/2024 1106                         Point:  Body mechanics (Resolved)       Learning Progress Summary             Patient Acceptance, E,TB, VU by NM at 2/21/2024 1106                         Point: Precautions (Resolved)       Learning Progress Summary             Patient Acceptance, E,TB, VU by NM at 2/21/2024 1106                                         User Key       Initials Effective Dates Name Provider Type Discipline    CS 04/25/21 -  Christy Angel, PT Physical Therapist PT    NM 01/31/24 -  Garret Tai, PT Student PT Student PT                  PT Recommendation and Plan  Planned Therapy Interventions (PT): balance training, bed mobility training, gait training, home exercise program, strengthening, transfer training  Therapy Frequency (PT): daily  Progress Summary (PT)  Progress Toward Functional Goals (PT): progress toward functional goals is gradual  Plan of Care Reviewed With: patient  Progress: no change   Outcome Measures       Row Name 02/22/24 1118 02/21/24 1000 02/19/24 1157       How much help from another person do you currently need...    Turning from your back to your side while in flat bed without using bedrails? 4  -DK 4  -MATTIE (r) NM (t) MATTIE (c) 4  -CS    Moving from lying on back to sitting on the side of a flat bed without bedrails? 4  -DK 4  -MATTIE (r) NM (t) MATTIE (c) 4  -CS    Moving to and from a bed to a chair (including a wheelchair)? 3  -DK 4  -MATTIE (r) NM (t) MATTIE (c) 4  -CS    Standing up from a chair using your arms (e.g., wheelchair, bedside chair)? 3  -DK 4  -MATTIE (r) NM (t) MATTIE (c) 4  -CS    Climbing 3-5 steps with a railing? 2  -DK 4  -MATTIE (r) NM (t) MATTIE (c) 4  -CS    To walk in hospital room? 3  -DK 4  -MATTIE (r) NM (t) MATTIE (c) 4  -CS    AM-PAC 6 Clicks Score (PT) 19  -DK 24  -MATTIE (r) NM (t) 24  -CS    Highest Level of Mobility Goal 6 --> Walk 10 steps or more  -DK 8 --> Walked 250 feet or more  -MATTIE (r) NM (t) 8 --> Walked 250 feet or more  -CS       Functional Assessment    Outcome Measure Options AM-PAC 6 Clicks Basic Mobility  (PT)  -DK -- AM-PAC 6 Clicks Basic Mobility (PT)  -CS              User Key  (r) = Recorded By, (t) = Taken By, (c) = Cosigned By      Initials Name Provider Type    Monserrat Gomez PTA Physical Therapist Assistant    Elmer Vines, PT Physical Therapist    Christy Ortega, PT Physical Therapist    NM Garret Tai, PT Student PT Student                     Time Calculation:    PT Charges       Row Name 02/22/24 1130             Time Calculation    PT Received On 02/22/24  -DK      PT Goal Re-Cert Due Date 03/01/24  -DK         Timed Charges    12881 - PT Therapeutic Exercise Minutes 6  -DK      16781 - PT Therapeutic Activity Minutes 10  -DK         Total Minutes    Timed Charges Total Minutes 16  -DK       Total Minutes 16  -DK                User Key  (r) = Recorded By, (t) = Taken By, (c) = Cosigned By      Initials Name Provider Type    Monserrat Gomez PTA Physical Therapist Assistant                  Therapy Charges for Today       Code Description Service Date Service Provider Modifiers Qty    [id number]  PT THERAPEUTIC ACT EA 15 MIN 2/22/2024 Monserrat Marks PTA GP 1            PT G-Codes  Outcome Measure Options: AM-PAC 6 Clicks Basic Mobility (PT)  AM-PAC 6 Clicks Score (PT): 19  AM-PAC 6 Clicks Score (OT): 24    Monserrat Marks PTA  2/22/2024

## 2024-02-22 NOTE — PLAN OF CARE
Goal Outcome Evaluation:        Patient a&ox4, less frustrated and more cooperative this shift. Medicated for complaints prn per MAR. Patient having good urine output

## 2024-02-23 ENCOUNTER — TRANSITIONAL CARE MANAGEMENT TELEPHONE ENCOUNTER (OUTPATIENT)
Dept: CALL CENTER | Facility: HOSPITAL | Age: 69
End: 2024-02-23
Payer: MEDICARE

## 2024-02-23 ENCOUNTER — TELEPHONE (OUTPATIENT)
Dept: CARDIOLOGY | Facility: CLINIC | Age: 69
End: 2024-02-23
Payer: MEDICARE

## 2024-02-23 DIAGNOSIS — F41.9 ANXIETY: ICD-10-CM

## 2024-02-23 LAB
BACTERIA SPEC AEROBE CULT: NORMAL
BACTERIA SPEC AEROBE CULT: NORMAL

## 2024-02-23 NOTE — TELEPHONE ENCOUNTER
Caller: LOUIS SPENCER (ON  [place])    Relationship: Emergency Contact    Best call back number:     [phone]       Requested Prescriptions:   Requested Prescriptions     Pending Prescriptions Disp Refills    ALPRAZolam (Xanax) 1 MG tablet 90 tablet 0     Sig: Take 1 tablet by mouth 3 (Three) Times a Day As Needed for Anxiety.        Pharmacy where request should be sent: [id number] - [place] - [address]  AT [facility] [address] - [phone] [facility] [phone] FX     Last office visit with prescribing clinician: 1/22/2024   Last telemedicine visit with prescribing clinician: Visit date not found   Next office visit with prescribing clinician: 3/5/2024     Additional details provided by patient: COMPLETELY OUT    Does the patient have less than a 3 day supply:  [x] Yes  [] No    Would you like a call back once the refill request has been completed: [x] Yes [] No    If the office needs to give you a call back, can they leave a voicemail: [x] Yes [] No    Sandra Mondragon Rep   02/23/24 11:19 EST

## 2024-02-23 NOTE — OUTREACH NOTE
Call Center TCM Note      Flowsheet Row Responses   [facility] patient discharged from? [facility]   Does the patient have one of the following disease processes/diagnoses(primary or secondary)? CHF   TCM attempt successful? No   Unsuccessful attempts Attempt 1  [Reached patient but he requested a callback]             Chela Paul RN    2/23/2024, 14:44 EST

## 2024-02-23 NOTE — TELEPHONE ENCOUNTER
Patient SO called stating he was discharged from the hospital and they told him to take midodrine 10 mg 3 times daily. Please advise on home dosing of medication.

## 2024-02-23 NOTE — OUTREACH NOTE
Call Center TCM Note      Flowsheet Row Responses   [facility] patient discharged from? [facility]   Does the patient have one of the following disease processes/diagnoses(primary or secondary)? CHF   TCM attempt successful? Yes   Call start time 1607   Call end time 1616   Discharge diagnosis A/C CHF, severe CAD, Cirrhosis Ascites   Is patient permission given to speak with other caregiver? Yes   Person spoke with today (if not patient) and relationship patient and spouse Tasha   Meds reviewed with patient/caregiver? Yes   Does the patient have all medications ordered at discharge? No   What is keeping the patient from filling the prescriptions? --  [Reports still has one to  from the pharmacy when ready]   Nursing Interventions No intervention needed   Is the patient taking all medications as directed (includes completed medication regime)? No   What is preventing the patient from taking all medications as directed? Other  [see above.]   Medication comments Patient has already contacted office for PCP to refill his Xanax med. Message routed to office.   Comments PCP Rosa [facility]. Hospital follow up appt in place for 3/5/24  1130am.   Does the patient have an appointment with their PCP within 7-14 days of discharge? Yes   What is the Home health agency?  [facility]   Has home health visited the patient within 72 hours of discharge? Call prior to 72 hours   Pulse Ox monitoring None   Psychosocial issues? No   Did the patient receive a copy of their discharge instructions? Yes   Nursing interventions Reviewed instructions with patient   What is the patient's perception of their health status since discharge? Same   Is the patient able to teach back signs and symptoms of worsening condition? (i.e. weight gain, shortness of air, etc.) Yes   Is the patient/caregiver able to teach back the hierarchy of who to call/visit for symptoms/problems? PCP, Specialist, Home health nurse, Urgent Care, ED, 911 Yes    Notified Case Management --  [Reinforced need to do daily weight monitoring. Contact cardiology with gain 3# or more overnight or 5# in a week.]   TCM call completed? Yes   Wrap up additional comments Paracentesis 3/7, Cardiology 3/8   Call end time 1616   Would this patient benefit from a Referral to [facility] Social Work? No   Is the patient interested in additional calls from an ambulatory ? No             Chela Paul RN    2/23/2024, 16:21 EST

## 2024-02-25 RX ORDER — ALPRAZOLAM 1 MG/1
1 TABLET ORAL 3 TIMES DAILY PRN
Qty: 90 TABLET | Refills: 0 | Status: SHIPPED | OUTPATIENT
Start: 2024-02-25

## 2024-02-26 DIAGNOSIS — J44.9 COPD MIXED TYPE: ICD-10-CM

## 2024-02-26 DIAGNOSIS — F17.200 SMOKER: ICD-10-CM

## 2024-02-26 RX ORDER — ALBUTEROL SULFATE 90 UG/1
2 AEROSOL, METERED RESPIRATORY (INHALATION) EVERY 4 HOURS PRN
Qty: 18 G | Refills: 1 | Status: SHIPPED | OUTPATIENT
Start: 2024-02-26

## 2024-02-26 NOTE — TELEPHONE ENCOUNTER
Caller: JOHANNALOUIS    Relationship: Emergency Contact    Best call back number: [phone]     Requested Prescriptions:   Requested Prescriptions     Pending Prescriptions Disp Refills    albuterol sulfate  (90 Base) MCG/ACT inhaler 18 g 1     Sig: Inhale 2 puffs Every 4 (Four) Hours As Needed for Wheezing.        Pharmacy where request should be sent: [id number] - [place], [place] - [address]  AT [facility] [address] - [phone] [facility] [phone]      Last office visit with prescribing clinician: 1/22/2024   Last telemedicine visit with prescribing clinician: Visit date not found   Next office visit with prescribing clinician: 3/5/2024       Does the patient have less than a 3 day supply:  [] Yes  [] No    Would you like a call back once the refill request has been completed: [] Yes [] No    If the office needs to give you a call back, can they leave a voicemail: [] Yes [] No    Sandra Terrazas Rep   02/26/24 12:21 EST

## 2024-03-04 ENCOUNTER — TELEPHONE (OUTPATIENT)
Dept: FAMILY MEDICINE CLINIC | Facility: CLINIC | Age: 69
End: 2024-03-04
Payer: MEDICARE

## 2024-03-04 NOTE — TELEPHONE ENCOUNTER
[facility] called and stated OT will be picking pt up for 1 time weekly for next 4 weeks. I gave verbal ok to begin this plan of care.

## 2024-03-05 ENCOUNTER — READMISSION MANAGEMENT (OUTPATIENT)
Dept: CALL CENTER | Facility: HOSPITAL | Age: 69
End: 2024-03-05
Payer: MEDICARE

## 2024-03-05 NOTE — OUTREACH NOTE
CHF Week 2 Survey      Flowsheet Row Responses   [facility] facility patient discharged from? [facility]   Does the patient have one of the following disease processes/diagnoses(primary or secondary)? CHF   Week 2 attempt successful? No   Unsuccessful attempts Attempt 2   Discharge diagnosis A/C CHF, severe CAD, Cirrhosis Ascites            ERMIAS H - Registered Nurse

## 2024-03-06 ENCOUNTER — TELEPHONE (OUTPATIENT)
Dept: FAMILY MEDICINE CLINIC | Facility: CLINIC | Age: 69
End: 2024-03-06
Payer: MEDICARE

## 2024-03-06 NOTE — TELEPHONE ENCOUNTER
Tamika from [facility] called states patient has not taken his medicine for 3 days.  Swelling in both feet 1+ edema. Patient refused to take off his socks to let her look at his feet for any redness.

## 2024-03-07 ENCOUNTER — HOSPITAL ENCOUNTER (OUTPATIENT)
Dept: INTERVENTIONAL RADIOLOGY/VASCULAR | Facility: HOSPITAL | Age: 69
Discharge: HOME | End: 2024-03-07
Payer: MEDICARE

## 2024-03-07 ENCOUNTER — LAB (OUTPATIENT)
Dept: LAB | Facility: HOSPITAL | Age: 69
End: 2024-03-07
Payer: MEDICARE

## 2024-03-07 VITALS — DIASTOLIC BLOOD PRESSURE: 56 MMHG | SYSTOLIC BLOOD PRESSURE: 97 MMHG | OXYGEN SATURATION: 95 % | HEART RATE: 77 BPM

## 2024-03-07 DIAGNOSIS — E78.5 HYPERLIPIDEMIA LDL GOAL <70: ICD-10-CM

## 2024-03-07 DIAGNOSIS — I50.23 ACUTE ON CHRONIC HFREF (HEART FAILURE WITH REDUCED EJECTION FRACTION): ICD-10-CM

## 2024-03-07 DIAGNOSIS — K70.31 ASCITES DUE TO ALCOHOLIC CIRRHOSIS: ICD-10-CM

## 2024-03-07 LAB
ANION GAP SERPL CALCULATED.3IONS-SCNC: 12.2 MMOL/L (ref 5–15)
APTT PPP: 40 SECONDS (ref 24.2–34.2)
BUN SERPL-MCNC: 17 MG/DL (ref 8–23)
BUN/CREAT SERPL: 16.7 (ref 7–25)
CALCIUM SPEC-SCNC: 8.8 MG/DL (ref 8.6–10.5)
CHLORIDE SERPL-SCNC: 99 MMOL/L (ref 98–107)
CO2 SERPL-SCNC: 25.8 MMOL/L (ref 22–29)
CREAT SERPL-MCNC: 1.02 MG/DL (ref 0.76–1.27)
DIGOXIN SERPL-MCNC: 0.88 NG/ML (ref 0.6–1.2)
EGFRCR SERPLBLD CKD-EPI 2021: 81.1 ML/MIN/1.73
GLUCOSE SERPL-MCNC: 80 MG/DL (ref 65–99)
INR PPP: 1.41 (ref 0.86–1.15)
MAGNESIUM SERPL-MCNC: 2 MG/DL (ref 1.6–2.4)
PLATELET # BLD AUTO: 225 10*3/MM3 (ref 140–450)
POTASSIUM SERPL-SCNC: 3.6 MMOL/L (ref 3.5–5.2)
PROTHROMBIN TIME: 17.5 SECONDS (ref 11.8–14.9)
SODIUM SERPL-SCNC: 137 MMOL/L (ref 136–145)

## 2024-03-07 PROCEDURE — 80048 BASIC METABOLIC PNL TOTAL CA: CPT

## 2024-03-07 PROCEDURE — 83735 ASSAY OF MAGNESIUM: CPT

## 2024-03-07 PROCEDURE — 85730 THROMBOPLASTIN TIME PARTIAL: CPT | Performed by: NURSE PRACTITIONER

## 2024-03-07 PROCEDURE — 85610 PROTHROMBIN TIME: CPT | Performed by: NURSE PRACTITIONER

## 2024-03-07 PROCEDURE — 76942 ECHO GUIDE FOR BIOPSY: CPT

## 2024-03-07 PROCEDURE — 36415 COLL VENOUS BLD VENIPUNCTURE: CPT

## 2024-03-07 PROCEDURE — 80162 ASSAY OF DIGOXIN TOTAL: CPT

## 2024-03-07 PROCEDURE — 85049 AUTOMATED PLATELET COUNT: CPT | Performed by: NURSE PRACTITIONER

## 2024-03-07 RX ORDER — LIDOCAINE HYDROCHLORIDE 20 MG/ML
10 INJECTION, SOLUTION INFILTRATION; PERINEURAL ONCE
Status: COMPLETED | OUTPATIENT
Start: 2024-03-07 | End: 2024-03-07

## 2024-03-07 RX ADMIN — LIDOCAINE HYDROCHLORIDE 9 ML: 20 INJECTION, SOLUTION INFILTRATION; PERINEURAL at 11:50

## 2024-03-07 RX ADMIN — SODIUM BICARBONATE 1 ML: 84 INJECTION, SOLUTION INTRAVENOUS at 11:50

## 2024-03-08 ENCOUNTER — TELEPHONE (OUTPATIENT)
Dept: CARDIOLOGY | Facility: CLINIC | Age: 69
End: 2024-03-08

## 2024-03-08 ENCOUNTER — OFFICE VISIT (OUTPATIENT)
Dept: CARDIOLOGY | Facility: CLINIC | Age: 69
End: 2024-03-08
Payer: MEDICARE

## 2024-03-08 VITALS
BODY MASS INDEX: 24.78 KG/M2 | HEIGHT: 73 IN | WEIGHT: 187 LBS | HEART RATE: 67 BPM | DIASTOLIC BLOOD PRESSURE: 56 MMHG | SYSTOLIC BLOOD PRESSURE: 96 MMHG

## 2024-03-08 DIAGNOSIS — I25.10 CAD, MULTIPLE VESSEL: ICD-10-CM

## 2024-03-08 DIAGNOSIS — F17.200 SMOKER: ICD-10-CM

## 2024-03-08 DIAGNOSIS — I50.22 CHRONIC SYSTOLIC CONGESTIVE HEART FAILURE: Primary | ICD-10-CM

## 2024-03-08 DIAGNOSIS — K70.31 ASCITES DUE TO ALCOHOLIC CIRRHOSIS: ICD-10-CM

## 2024-03-08 DIAGNOSIS — E78.5 HYPERLIPIDEMIA LDL GOAL <70: ICD-10-CM

## 2024-03-08 PROBLEM — I50.23 ACUTE ON CHRONIC HFREF (HEART FAILURE WITH REDUCED EJECTION FRACTION): Status: RESOLVED | Noted: 2023-10-18 | Resolved: 2024-03-08

## 2024-03-08 NOTE — ASSESSMENT & PLAN NOTE
Bradley has an abdominal ascites of which he has a paracentesis performed every 6 weeks.  He does follow-up with GI in July due to cirrhosis of the liver.

## 2024-03-08 NOTE — TELEPHONE ENCOUNTER
ACCOUNT #: [id number] DEVICE: Device was Medtronic, model DQEZ5R5, serial #[id number]. The right atrial lead was Medtronic, model 5076-52, serial #[id number]. The right ventricular lead was Medtronic, model 6947, serial #[id number]. T

## 2024-03-08 NOTE — PATIENT INSTRUCTIONS
1.  Increase Lasix 40 mg take 1-1/2 tablets in the morning and 1 in the afternoon for 3 days then return to 40 mg twice daily.  2.  Continue all other medication as prescribed.  3.  Do heart rate blood pressure and daily weight log and bring it to next appointment.  4.  Do blood work 1 to 2 days prior to next visit.

## 2024-03-08 NOTE — ASSESSMENT & PLAN NOTE
Mr. Huerta lipids were well-controlled with his most recent panel.  LDL was 30.  Will continue atorvastatin 80 mg at the current dose.

## 2024-03-08 NOTE — ASSESSMENT & PLAN NOTE
Mr. Huerta has multivessel disease, with previous MI resulting in PCI to the LAD.  He does need a staged PCI to the circumflex but is considered too high risk.  Will treat medically.

## 2024-03-08 NOTE — ASSESSMENT & PLAN NOTE
Mr. Huerta has smoked cigarettes for multiple years.  He has tried to cut down but he is not willing to quit at this time.  I have stressed the importance of him stopping smoking.  Smoking cessation was provided for 3 to 5 minutes.

## 2024-03-08 NOTE — ASSESSMENT & PLAN NOTE
Mr. Huerta has heart failure with reduced ejection fraction who appears to be doing a little better since the underwent a paracentesis yesterday.  He does have bilateral pedal edema.  His torsemide was changed to furosemide 40 mg twice daily.  I will have him take 1-1/2 tablets in the morning and 1 tablet in the afternoon for 3 days to help with the edema of his ankles.  Patient does have an AICD that was placed last year in [place].  We will make an appointment for cardiology to check his device with our next visit.    1.  Increase Lasix 40 mg take 1-1/2 tablets in the morning and 1 in the afternoon for 3 days then return to 40 mg twice daily.  2.  Continue all other medication as prescribed.  3.  Do heart rate blood pressure and daily weight log and bring it to next appointment.  4.  Do blood work 1 to 2 days prior to next visit.

## 2024-03-08 NOTE — PROGRESS NOTES
Office Visit    Chief Complaint  Acute on chronic HFrEF (heart failure with reduced ejection     Subjective            Bradley Crane presents to [facility] CARDIOLOGY  History of Present Illness  Mr. Crane is a 69 y/o male that presented to the office today for follow-up due to heart failure with reduced ejection fraction, CAD, previous MI with high risk PCI to the LAD who is doing a little better.  He recently underwent a paracentesis yesterday.  He has been receiving them approximately every 6 weeks.  The decreased fluid in his abdomen helps him to feel much better.  He continues to experience pedal edema and short of air.      Past Medical History:   Diagnosis Date    Ascites     CAD (coronary artery disease)     CHF (congestive heart failure)     Cirrhosis     Hypertension     MI (myocardial infarction)        No Known Allergies     Past Surgical History:   Procedure Laterality Date    CARDIAC CATHETERIZATION N/A 09/05/2023    Procedure: Right and LHC with possible coronary intevention;  Surgeon: Merly Ji MD;  Location: [facility] CATH INVASIVE LOCATION;  Service: Cardiology;  Laterality: N/A;    CARDIAC DEFIBRILLATOR PLACEMENT      CARDIAC SURGERY          Social History     Tobacco Use    Smoking status: Every Day     Current packs/day: 0.50     Types: Cigarettes   Vaping Use    Vaping status: Never Used   Substance Use Topics    Alcohol use: Not Currently    Drug use: Never       History reviewed. No pertinent family history.     Prior to Admission medications    Medication Sig Start Date End Date Taking? Authorizing Provider   albuterol sulfate  (90 Base) MCG/ACT inhaler Inhale 2 puffs Every 4 (Four) Hours As Needed for Wheezing. 2/26/24  Yes Rosa Isela Kim APRN   ALPRAZolam (Xanax) 1 MG tablet Take 1 tablet by mouth 3 (Three) Times a Day As Needed for Anxiety. 2/25/24  Yes Rosa Isela Kim APRN   atorvastatin (LIPITOR) 80 MG tablet Take 1 tablet by mouth Daily.  10/19/23  Yes Angel Roy MD   digoxin (LANOXIN) 125 MCG tablet Take 1 tablet by mouth Daily. 1/22/24  Yes Rosa Isela Kim APRN   empagliflozin (Jardiance) 25 MG tablet tablet Take 1 tablet by mouth Daily. 1/12/24  Yes Christy Zuniga MD   furosemide (Lasix) 40 MG tablet Take 1 tablet by mouth 2 (Two) Times a Day for 30 days. 2/22/24 3/23/24 Yes Gilberto Medel,    HYDROcodone-acetaminophen (NORCO) 5-325 MG per tablet Take 2 tablets by mouth Every 6 (Six) Hours As Needed for Moderate Pain. 2/22/24  Yes Rosa Isela Kim APRN   metoprolol succinate XL (TOPROL-XL) 25 MG 24 hr tablet Take 0.5 tablets by mouth Daily. 1/22/24  Yes Rosa Isela Kim APRN   midodrine (PROAMATINE) 10 MG tablet Take 1 tablet by mouth 3 (Three) Times a Day Before Meals. 2/22/24  Yes Gilberto Medel,    spironolactone (ALDACTONE) 100 MG tablet Take 0.5 tablets by mouth Daily. 2/23/24  Yes Gilberto Medel, DO   ticagrelor (BRILINTA) 90 MG tablet tablet Take 1 tablet by mouth 2 (Two) Times a Day. 1/19/24  Yes Rosa Isela Kim APRN   tiotropium bromide monohydrate (Spiriva Respimat) 2.5 MCG/ACT aerosol solution inhaler Inhale 2 puffs Daily. Samples given Lot [id number], [id number] 12/19/23  Yes Rosa Isela Kim APRN   Vericiguat (Verquvo) 10 MG tablet Take 1 tablet by mouth Daily. 12/27/23  Yes Christy Zuniga MD   aspirin 81 MG EC tablet Take 1 tablet by mouth Daily.  Patient not taking: Reported on 3/8/2024    ProviderJoshua MD   midodrine (PROAMATINE) 2.5 MG tablet Take 2 tablets by mouth 2 (Two) Times a Day As Needed (for systolic blood pressure less than 95). 11/8/23   Christy Zuniga MD        Review of Systems   Constitutional:  Positive for fatigue.   Respiratory:  Positive for cough and shortness of breath.    Cardiovascular:  Positive for leg swelling. Negative for chest pain and palpitations.   Neurological:  Positive for dizziness.        Symptom Course: Been Stable    Weight Trend: Stable     Objective     BP 96/56   " Pulse 67   Ht 185.4 cm (73\")   Wt 84.8 kg (187 lb)   BMI 24.67 kg/m²       Physical Exam  Constitutional:       General: He is awake.      Appearance: Normal appearance.   Neck:      Thyroid: No thyromegaly.      Vascular: No carotid bruit or JVD.   Cardiovascular:      Rate and Rhythm: Normal rate and regular rhythm.      Chest Wall: PMI is not displaced.      Pulses: Normal pulses.      Heart sounds: Normal heart sounds, S1 normal and S2 normal. No murmur heard.     No friction rub. No gallop. No S3 or S4 sounds.   Pulmonary:      Effort: Pulmonary effort is normal.      Breath sounds: Normal breath sounds and air entry. No wheezing, rhonchi or rales.   Abdominal:      General: Bowel sounds are normal.      Palpations: Abdomen is soft. There is no mass.      Tenderness: There is no abdominal tenderness.   Musculoskeletal:      Cervical back: Neck supple.      Right lower leg: Edema present.      Left lower leg: Edema present.   Neurological:      Mental Status: He is alert and oriented to person, place, and time.   Psychiatric:         Mood and Affect: Mood normal.         Behavior: Behavior is cooperative.           Result Review :                  Lab Results   Component Value Date    PROBNP 13,716.0 (H) 02/18/2024    PROBNP 6,436.0 (H) 01/25/2024    PROBNP 7,741.0 (H) 12/13/2023     CMP          2/19/2024    17:17 2/21/2024    03:16 3/7/2024    10:43   CMP   Glucose 110  94  80    BUN 16  23  17    Creatinine 1.07  1.08  1.02    EGFR 76.5  75.7  81.1    Sodium 133  136  137    Potassium 3.6  3.4  3.6    Chloride 97  98  99    Calcium 8.9  9.0  8.8    BUN/Creatinine Ratio 15.0  21.3  16.7    Anion Gap 14.7  14.6  12.2      CBC w/diff          2/18/2024    09:54 2/19/2024    17:17 3/7/2024    10:43   CBC w/Diff   WBC 3.52  3.94     RBC 3.63  3.49     Hemoglobin 11.6  11.1     Hematocrit 34.6  33.3     MCV 95.3  95.4     MCH 32.0  31.8     MCHC 33.5  33.3     RDW 15.8  16.0     Platelets 249  239  225  " "  Neutrophil Rel % 58.2  46.4     Immature Granulocyte Rel % 0.3  0.3     Lymphocyte Rel % 18.2  25.4     Monocyte Rel % 21.9  25.6     Eosinophil Rel % 0.3  0.5     Basophil Rel % 1.1  1.8        Lipid Panel          9/6/2023    04:34 1/25/2024    09:22   Lipid Panel   Total Cholesterol 96  82    Triglycerides 44  48    HDL Cholesterol 43  39    VLDL Cholesterol 12  13    LDL Cholesterol  41  30    LDL/HDL Ratio 1.03  0.86       Lab Results   Component Value Date    TSH 2.960 10/25/2023    TSH 2.040 08/29/2023      Lab Results   Component Value Date    FREET4 1.70 10/25/2023      No results found for: \"DDIMERQUANT\"  Magnesium   Date Value Ref Range Status   03/07/2024 2.0 1.6 - 2.4 mg/dL Final      Digoxin   Date Value Ref Range Status   03/07/2024 0.88 0.60 - 1.20 ng/mL Final      A1C Last 3 Results          9/6/2023    04:34   HGBA1C Last 3 Results   Hemoglobin A1C 5.70           Results for orders placed during the hospital encounter of 02/18/24    Adult Transthoracic Echo Complete W/ Cont if Necessary Per Protocol    Interpretation Summary    Left ventricular ejection fraction appears to be less than 20%.    The left ventricular cavity is mildly dilated.    Left ventricular diastolic function was indeterminate.    Moderately reduced right ventricular systolic function noted.    The right ventricular cavity is mildly dilated.    The left atrial cavity is mild to moderately dilated.    The right atrial cavity is mild to moderately  dilated.    Aortic valve is very sclerotic.  The max/mean pressure gradient is only 25/12 mmHg.  This could be a low flow/low gradient due to depressed ejection fraction.  Could consider dobutamine echo.    Mild to moderate mitral and tricuspid regurgitation.    Pleural effusion is noted.     ECG 12 Lead    Date/Time: 3/8/2024 12:09 PM  Performed by: Beulah Dong APRN    Authorized by: Beulah Dong APRN  Comments: Sinus rhythm nonspecific intraventricular conduction delay.  " Low voltage, nonspecific repolarization abnormality.  Compared to prior EKG the rate is lower.              Assessment and Plan        Diagnoses and all orders for this visit:    1. Chronic systolic congestive heart failure (Primary)  Assessment & Plan:  Mr. Huerta has heart failure with reduced ejection fraction who appears to be doing a little better since the underwent a paracentesis yesterday.  He does have bilateral pedal edema.  His torsemide was changed to furosemide 40 mg twice daily.  I will have him take 1-1/2 tablets in the morning and 1 tablet in the afternoon for 3 days to help with the edema of his ankles.    1.  Increase Lasix 40 mg take 1-1/2 tablets in the morning and 1 in the afternoon for 3 days then return to 40 mg twice daily.  2.  Continue all other medication as prescribed.  3.  Do heart rate blood pressure and daily weight log and bring it to next appointment.  4.  Do blood work 1 to 2 days prior to next visit.    Orders:  -     Magnesium; Future  -     CBC & Differential; Future  -     Comprehensive Metabolic Panel; Future  -     Digoxin Level; Future  -     proBNP; Future    2. Ascites due to alcoholic cirrhosis  Assessment & Plan:  Bradley has an abdominal ascites of which he has a paracentesis performed every 6 weeks.  He does follow-up with GI in July due to cirrhosis of the liver.      3. CAD, multiple vessel  Assessment & Plan:  Mr. Huerta has multivessel disease, with previous MI resulting in PCI to the LAD.  He does need a staged PCI to the circumflex but is considered too high risk.  Will treat medically.    Orders:  -     Magnesium; Future  -     CBC & Differential; Future  -     Comprehensive Metabolic Panel; Future  -     Digoxin Level; Future  -     proBNP; Future    4. Smoker  Assessment & Plan:  Mr. Huerta has smoked cigarettes for multiple years.  He has tried to cut down but he is not willing to quit at this time.  I have stressed the importance of him stopping smoking.  Smoking  cessation was provided for 3 to 5 minutes.      5. Hyperlipidemia LDL goal <70  Assessment & Plan:  Mr. Huerta lipids were well-controlled with his most recent panel.  LDL was 30.  Will continue atorvastatin 80 mg at the current dose.      Other orders  -     ECG 12 Lead            Follow Up     No follow-ups on file.    Patient was given instructions and counseling regarding his condition or for health maintenance advice. Please see specific information pulled into the AVS if appropriate.     Electronically signed by SRIKANTH Hall, 03/08/24, 12:09 PM EST.

## 2024-03-12 ENCOUNTER — TELEPHONE (OUTPATIENT)
Dept: FAMILY MEDICINE CLINIC | Facility: CLINIC | Age: 69
End: 2024-03-12
Payer: MEDICARE

## 2024-03-12 DIAGNOSIS — R42 VERTIGO: ICD-10-CM

## 2024-03-12 RX ORDER — MECLIZINE HYDROCHLORIDE 25 MG/1
25 TABLET ORAL 3 TIMES DAILY PRN
Qty: 90 TABLET | Refills: 3 | Status: SHIPPED | OUTPATIENT
Start: 2024-03-12

## 2024-03-12 NOTE — TELEPHONE ENCOUNTER
Caller: JOHANNALOUIS    Relationship: Emergency Contact    Best call back number: [phone]     What medication are you requesting: MECLIZINE 25 MG 1 TABLET 3 TIMES DAILY FOR DIZZINESS    What are your current symptoms: DIZZINESS    Have you had these symptoms before:    [] Yes  [] No    Have you been treated for these symptoms before:   [] Yes  [] No    If a prescription is needed, what is your preferred pharmacy and phone number: [id number]  [place] [id number]  AT [facility] [address] - [phone] [facility] [phone] [place]     Additional notes: PATIENT PREVIOUSLY PRESCRIBED MEDICATION FOR DIZZINESS BUT PATIENT IS COMPLETELY OUT OF MEDICATION.

## 2024-03-13 ENCOUNTER — READMISSION MANAGEMENT (OUTPATIENT)
Dept: CALL CENTER | Facility: HOSPITAL | Age: 69
End: 2024-03-13
Payer: MEDICARE

## 2024-03-13 NOTE — OUTREACH NOTE
"CHF Week 3 Survey      Flowsheet Row Responses   [facility] patient discharged from? [facility]   Does the patient have one of the following disease processes/diagnoses(primary or secondary)? CHF   Week 3 attempt successful? Yes   Call start time 1821   Call end time 1822   Discharge diagnosis A/C CHF, severe CAD, Cirrhosis Ascites   Is the patient taking all medications as directed (includes completed medication regime)? Yes   What is the Home health agency?  [facility]   Has home health visited the patient within 72 hours of discharge? Yes   What is the patient's perception of their health status since discharge? Improving   CHF Zone this Call Green Zone   [facility] Patient reports doing well   CHF Week 3 call completed? Yes   Graduated/Revoked comments Call was brief \"you woke me up\".  States he is doing well.   Call end time 1822            Kathryn CHOE - Licensed Nurse  "

## 2024-03-16 LAB
QT INTERVAL: 441 MS
QTC INTERVAL: 546 MS

## 2024-03-22 DIAGNOSIS — S32.020S COMPRESSION FRACTURE OF L2 VERTEBRA, SEQUELA: ICD-10-CM

## 2024-03-22 DIAGNOSIS — M54.41 CHRONIC BILATERAL LOW BACK PAIN WITH RIGHT-SIDED SCIATICA: ICD-10-CM

## 2024-03-22 DIAGNOSIS — F41.9 ANXIETY: ICD-10-CM

## 2024-03-22 DIAGNOSIS — G89.29 CHRONIC BILATERAL LOW BACK PAIN WITH RIGHT-SIDED SCIATICA: ICD-10-CM

## 2024-03-22 RX ORDER — HYDROCODONE BITARTRATE AND ACETAMINOPHEN 5; 325 MG/1; MG/1
2 TABLET ORAL EVERY 6 HOURS PRN
Qty: 240 TABLET | Refills: 0 | Status: SHIPPED | OUTPATIENT
Start: 2024-03-22

## 2024-03-22 RX ORDER — ALPRAZOLAM 1 MG/1
1 TABLET ORAL 3 TIMES DAILY PRN
Qty: 90 TABLET | Refills: 0 | Status: SHIPPED | OUTPATIENT
Start: 2024-03-22

## 2024-03-25 NOTE — PROGRESS NOTES
Office Visit    Chief Complaint  Acute on chronic HFrEF    Subjective            Bradley Crane presents to [facility] CARDIOLOGY  History of Present Illness  Mr. Crane is a 68-year-old male who presented to the office today for follow-up due to heart failure with reduced ejection fraction, ischemic cardiomyopathy, status post PCI to the LAD and left main who reports that he is feeling about the same.  He continues to experience short of air with early satiety and bilateral pedal edema.  He did not bring his medication bottles with him at today's visit although he reports that he has not been taking his torsemide.  His PCP started him on Lasix for the volume overload.  Per patient his PCP would like him to go get a thoracentesis but he has declined.  We did discuss following a fluid restriction of 1500 mL to address not only the volume overload but to address the low sodium.  He denies any chest pain or syncopal episodes.      Past Medical History:   Diagnosis Date    CHF (congestive heart failure)     Hypertension        No Known Allergies     Past Surgical History:   Procedure Laterality Date    CARDIAC CATHETERIZATION N/A 09/05/2023    Procedure: Right and LHC with possible coronary intevention;  Surgeon: Merly Ji MD;  Location: [facility] INVASIVE LOCATION;  Service: Cardiology;  Laterality: N/A;    CARDIAC DEFIBRILLATOR PLACEMENT      CARDIAC SURGERY          Social History     Tobacco Use    Smoking status: Every Day     Packs/day: .5     Types: Cigarettes   Vaping Use    Vaping Use: Never used   Substance Use Topics    Alcohol use: Not Currently     Alcohol/week: 3.0 standard drinks of alcohol     Types: 3 Cans of beer per week     Comment: LAST DRINK 2 WEEKS AGO    Drug use: Never       History reviewed. No pertinent family history.     Prior to Admission medications    Medication Sig Start Date End Date Taking? Authorizing Provider   albuterol sulfate  (90 Base) MCG/ACT  inhaler Inhale 2 puffs Every 4 (Four) Hours As Needed for Wheezing. 9/20/23   Rosa Isela Kim APRN   ALPRAZolam (Xanax) 1 MG tablet Take 1 tablet by mouth 3 (Three) Times a Day As Needed for Anxiety. 11/20/23   Rosa Isela Kim APRN   atorvastatin (LIPITOR) 80 MG tablet Take 1 tablet by mouth Daily. 10/19/23   Angel Ryo MD   digoxin (LANOXIN) 125 MCG tablet Take 1 tablet by mouth Daily. 11/8/23   Christy Zuniga MD   empagliflozin (Jardiance) 10 MG tablet tablet Take 1 tablet by mouth Daily. 11/8/23   Christy Zuniga MD   empagliflozin (Jardiance) 10 MG tablet tablet Take 1 tablet by mouth Daily. Indications: Cardiac Failure, [id number]  exp 1/30/2025 11/8/23   Christy Zuniga MD   furosemide (LASIX) 40 MG tablet Take 1 tablet by mouth 2 (Two) Times a Day. 11/20/23   Rosa Isela Kim APRN   HYDROcodone-acetaminophen (NORCO) 5-325 MG per tablet Take 2 tablets by mouth Every 6 (Six) Hours As Needed for Moderate Pain. 11/20/23   Rosa Isela Kim APRN   meclizine (ANTIVERT) 25 MG tablet Take 1 tablet by mouth 3 (Three) Times a Day As Needed for Dizziness. 11/16/23   Rosa Isela Kim APRN   Melatonin 10 MG tablet Take 1 tablet by mouth At Night As Needed.    Provider, MD Joshua   midodrine (PROAMATINE) 2.5 MG tablet Take 2 tablets by mouth 2 (Two) Times a Day As Needed (for systolic blood pressure less than 95). 11/8/23   Christy Zuniga MD   mirtazapine (REMERON) 15 MG tablet Take 2 tablets by mouth Every Night. 11/20/23   Rosa Isela Kim APRN   ondansetron (ZOFRAN) 4 MG tablet Take 1 tablet by mouth Every 8 (Eight) Hours As Needed for Nausea or Vomiting. 11/9/23   Rosa Isela Kim APRN   spironolactone (ALDACTONE) 25 MG tablet TAKE 1 TABLET BY MOUTH DAILY 11/8/23   Christy Zuniga MD   ticagrelor (BRILINTA) 90 MG tablet tablet Take 1 tablet by mouth 2 (Two) Times a Day. 10/19/23   Angel Roy MD   tiotropium (Spiriva HandiHaler) 18 MCG per inhalation capsule Place 1 capsule into inhaler  "and inhale Daily. 9/20/23   Rosa Isela Kim APRN   torsemide (DEMADEX) 20 MG tablet Take 2 tablets by mouth Daily for 30 days. 10/19/23 11/18/23  Angel Roy MD   Vericiguat (Verquvo) 2.5 MG tablet Take 1 tablet by mouth Daily. 11/8/23   Christy Zuniga MD   Vericiguat (Verquvo) 2.5 MG tablet Take 1 tablet by mouth Daily. 11/8/23   Christy Zuniga MD        Review of Systems   Constitutional:  Positive for fatigue.   Respiratory:  Positive for cough and shortness of breath.    Cardiovascular:  Positive for palpitations and leg swelling. Negative for chest pain.   Neurological:  Positive for dizziness.        Symptom Course: Improved    Weight Trend: Stable     Objective     /70   Pulse 62   Ht 182.9 cm (72\")   Wt 89.4 kg (197 lb)   BMI 26.72 kg/m²       Physical Exam  Constitutional:       General: He is awake.      Appearance: Normal appearance.   Neck:      Thyroid: No thyromegaly.      Vascular: No carotid bruit or JVD.   Cardiovascular:      Rate and Rhythm: Normal rate and regular rhythm.      Chest Wall: PMI is not displaced.      Pulses: Normal pulses.      Heart sounds: Normal heart sounds, S1 normal and S2 normal. No murmur heard.     No friction rub. No gallop. No S3 or S4 sounds.   Pulmonary:      Effort: Pulmonary effort is normal.      Breath sounds: Normal breath sounds and air entry. No wheezing, rhonchi or rales.   Abdominal:      General: Bowel sounds are normal.      Palpations: Abdomen is soft. There is no mass.      Tenderness: There is no abdominal tenderness.   Musculoskeletal:      Cervical back: Neck supple.      Right lower leg: Edema present.      Left lower leg: Edema present.   Neurological:      Mental Status: He is alert and oriented to person, place, and time.   Psychiatric:         Mood and Affect: Mood normal.         Behavior: Behavior is cooperative.           Result Review :                     Lab Results   Component Value Date    PROBNP 7,994.0 (H) 11/20/2023    " "PROBNP 6,591.0 (H) 10/25/2023    PROBNP 9,863.0 (H) 10/11/2023     CMP          10/19/2023    05:46 10/25/2023    12:09 11/20/2023    12:29   CMP   Glucose 100  100  94    BUN 25  27  13    Creatinine 1.07  1.22  1.21    EGFR 76.5  65.4  66.0    Sodium 137  135  134    Potassium 3.7  4.2  3.8    Chloride 99  96  99    Calcium 9.4  9.2  9.7    Total Protein  7.3  6.4    Albumin  4.3  4.3    Globulin  3.0  2.1    Total Bilirubin  0.9  1.1    Alkaline Phosphatase  140  150    AST (SGOT)  25  23    ALT (SGPT)  10  12    Albumin/Globulin Ratio  1.4  2.0    BUN/Creatinine Ratio 23.4  22.1  10.7    Anion Gap 14.8  13.9  12.4      CBC w/diff          10/19/2023    05:46 10/25/2023    12:09 11/20/2023    12:29   CBC w/Diff   WBC 6.40  7.63  6.66    RBC 2.89  3.00  3.29    Hemoglobin 9.9  10.1  11.0    Hematocrit 29.1  31.0  32.1    .7  103.3  97.6    MCH 34.3  33.7  33.4    MCHC 34.0  32.6  34.3    RDW 14.6  14.8  12.2    Platelets 282  307  250    Neutrophil Rel %  72.3  71.6    Immature Granulocyte Rel %  0.3  0.3    Lymphocyte Rel %  12.7  14.3    Monocyte Rel %  12.2  10.7    Eosinophil Rel %  1.6  2.0    Basophil Rel %  0.9  1.1       Lipid Panel          9/6/2023    04:34   Lipid Panel   Total Cholesterol 96    Triglycerides 44    HDL Cholesterol 43    VLDL Cholesterol 12    LDL Cholesterol  41    LDL/HDL Ratio 1.03       Lab Results   Component Value Date    TSH 2.960 10/25/2023    TSH 2.040 08/29/2023      Lab Results   Component Value Date    FREET4 1.70 10/25/2023      No results found for: \"DDIMERQUANT\"  Magnesium   Date Value Ref Range Status   11/20/2023 2.3 1.6 - 2.4 mg/dL Final      Digoxin   Date Value Ref Range Status   11/20/2023 0.72 0.60 - 1.20 ng/mL Final      A1C Last 3 Results          9/6/2023    04:34   HGBA1C Last 3 Results   Hemoglobin A1C 5.70           Results for orders placed during the hospital encounter of 08/29/23    Adult Transthoracic Echo Complete w/ Color, Spectral and Contrast if " necessary per protocol    Interpretation Summary    Left ventricular ejection fraction appears to be 26 - 30%.    The left ventricular cavity is mildly dilated.    Left ventricular diastolic function was indeterminate.    The right ventricular cavity is mild to moderately dilated.    The left atrial cavity is moderately dilated.    The right atrial cavity is moderately  dilated.    Moderate tricuspid valve regurgitation is present.    Estimated right ventricular systolic pressure from tricuspid regurgitation is normal (<35 mmHg).    Aortic valve appears sclerotic with reduced excursion.  Max/mean pressure gradient was 20/11 mmHg.  Aortic valve area was 0.93 cm².  Could be consistent with low-flow, low gradient AAS.           Assessment and Plan        Diagnoses and all orders for this visit:    1. Acute on chronic HFrEF (heart failure with reduced ejection fraction) (Primary)  Assessment & Plan:  Bradley has heart failure with reduced ejection fraction.  He continues to experience short of air and edema both his abdomen and his bilateral lower extremities.  I will make the following changes to his heart failure medications:    1.  Discontinue the Lasix and restart torsemide 20 mg take 2 tablets once daily.  Take 1 extra tablet of torsemide for 2 days then return to 40 mg once a day.  2.  Discontinue the potassium.  3.  Increase the Verquvo 2.5 mg take 2 tablets every evening until the current supply is complete.  The new prescription will be for Verquvo 5 mg take 1 tablet at night.  4.  Do a heart rate blood pressure and daily weight log.  5.  Do blood work 1 to 2 days prior to next visit.  6.  Do a low sodium, 1500 fluid restriction.    Orders:  -     Cancel: Comprehensive Metabolic Panel; Future  -     Cancel: Magnesium; Future  -     Cancel: CBC & Differential; Future  -     Cancel: proBNP; Future  -     CBC & Differential; Future  -     Comprehensive Metabolic Panel; Future  -     Magnesium; Future  -     proBNP;  Future    2. CAD, multiple vessel  Assessment & Plan:  Bradley has a history of MI status post PCI of the LAD and left main.  He denies any symptoms associated with angina.  His heart rate and blood pressure are stable with the current SGLT2 inhibitor, Verquvo and digoxin.  I will start a low-dose carvedilol.  I have requested that he do a heart rate blood pressure and daily weight log.      3. Hyperlipidemia LDL goal <70  Assessment & Plan:  His most recent lipid panel is normal we will continue the current dose of atorvastatin      4. Smoker  Assessment & Plan:  Bradley has smoked cigarettes for a very long time.  He has cut down but is having difficulty quitting.  I have offered him patches at today's visit.  He said he only wants them if he can get them for free, but he still has to pay a co-pay.  He is no longer allowed to smoke in the house which limits the amount of time he can smoke.  Smoking sensation provided for 3 to 5 minutes.      5. Acute hyponatremia  Assessment & Plan:  Johns sodium is slightly low at 134.  I have requested that he abide by a 1500 fluid restriction.  Will recheck his labs to next visit.      Other orders  -     torsemide (DEMADEX) 20 MG tablet; Take 2 tablets by mouth Daily for 180 days.  Dispense: 90 tablet; Refill: 3  -     Vericiguat (Verquvo) 5 MG tablet; Take 1 tablet by mouth Daily.  Dispense: 90 tablet; Refill: 3  -     Vericiguat (Verquvo) 5 MG tablet; Take 1 tablet by mouth Daily. Indications: [id number] EXP: 06/17/2025  Dispense: 28 tablet; Refill: 0  -     ticagrelor (Brilinta) 90 MG tablet tablet; Take 1 tablet by mouth 1 (One) Time for 1 dose. Indications: LOT: [id number] EXP: 06-  Dispense: 1 each; Refill: 0  -     empagliflozin (Jardiance) 10 MG tablet tablet; Take 1 tablet by mouth Daily. Indications: Cardiac Failure, LOT: [id number] EXP: 01-  Dispense: 21 tablet; Refill: 0  -     ticagrelor (Brilinta) 90 MG tablet tablet; Take 1 tablet by mouth 1 (One) Time for 1 dose.   Dispense: 90 tablet; Refill: 3  -     carvedilol (COREG) 3.125 MG tablet; Take 1 tablet by mouth 2 (Two) Times a Day.  Dispense: 180 tablet; Refill: 3            Follow Up     Return in about 2 weeks (around 12/6/2023) for with Dr Zuniga.    Patient was given instructions and counseling regarding his condition or for health maintenance advice. Please see specific information pulled into the AVS if appropriate.     Electronically signed by SRIKANTH Hall, 11/22/23, 11:40 AM EST.    Patient/Caregiver provided printed discharge information.

## 2024-03-27 ENCOUNTER — OFFICE VISIT (OUTPATIENT)
Dept: FAMILY MEDICINE CLINIC | Facility: CLINIC | Age: 69
End: 2024-03-27
Payer: MEDICARE

## 2024-03-27 VITALS
WEIGHT: 201.4 LBS | HEIGHT: 73 IN | DIASTOLIC BLOOD PRESSURE: 66 MMHG | HEART RATE: 86 BPM | SYSTOLIC BLOOD PRESSURE: 120 MMHG | TEMPERATURE: 98.2 F | OXYGEN SATURATION: 96 % | BODY MASS INDEX: 26.69 KG/M2

## 2024-03-27 DIAGNOSIS — S32.020S COMPRESSION FRACTURE OF L2 VERTEBRA, SEQUELA: ICD-10-CM

## 2024-03-27 DIAGNOSIS — G89.29 CHRONIC LEFT-SIDED LOW BACK PAIN WITH LEFT-SIDED SCIATICA: ICD-10-CM

## 2024-03-27 DIAGNOSIS — M54.42 CHRONIC LEFT-SIDED LOW BACK PAIN WITH LEFT-SIDED SCIATICA: ICD-10-CM

## 2024-03-27 DIAGNOSIS — I50.22 CHRONIC SYSTOLIC CONGESTIVE HEART FAILURE: ICD-10-CM

## 2024-03-27 DIAGNOSIS — F41.9 ANXIETY: Primary | ICD-10-CM

## 2024-03-27 DIAGNOSIS — Z79.899 ENCOUNTER FOR LONG-TERM (CURRENT) USE OF HIGH-RISK MEDICATION: ICD-10-CM

## 2024-03-27 DIAGNOSIS — F33.1 MODERATE EPISODE OF RECURRENT MAJOR DEPRESSIVE DISORDER: ICD-10-CM

## 2024-03-27 RX ORDER — GABAPENTIN 300 MG/1
300 CAPSULE ORAL 3 TIMES DAILY
Qty: 90 CAPSULE | Refills: 5 | Status: SHIPPED | OUTPATIENT
Start: 2024-03-27

## 2024-03-27 RX ORDER — DULOXETIN HYDROCHLORIDE 60 MG/1
60 CAPSULE, DELAYED RELEASE ORAL DAILY
Qty: 90 CAPSULE | Refills: 1 | Status: SHIPPED | OUTPATIENT
Start: 2024-03-27

## 2024-03-27 NOTE — PROGRESS NOTES
Chief Complaint  COPD (2mo f/u)    Subjective          Bradley Crane is a 68 y.o. male who presents to [facility]    History of Present Illness    Anxiety/depression: doing well on xanax, helping with anxiety  Low back pain with radiculopathy: hydrocodone is helping with pain but doesn't last that long.      Having trouble using spiriva inhaler device, would like something else    PHQ-2 Total Score:     PHQ-9 Total Score:          Review of Systems   Constitutional:  Negative for chills, fatigue and fever.   Respiratory:  Positive for shortness of breath. Negative for cough.    Cardiovascular:  Negative for chest pain and palpitations.   Gastrointestinal:  Negative for constipation, diarrhea, nausea and vomiting.   Musculoskeletal:  Negative for back pain and neck pain.   Skin:  Negative for rash.   Neurological:  Negative for dizziness and headaches.          Medical History: has a past medical history of Ascites, CAD (coronary artery disease), CHF (congestive heart failure), Cirrhosis, Hypertension, and MI (myocardial infarction).     Surgical History: has a past surgical history that includes Cardiac surgery; Cardiac catheterization (N/A, 09/05/2023); and Cardiac defibrillator placement.     Family History: family history is not on file.     Social History: reports that he has been smoking cigarettes. He does not have any smokeless tobacco history on file. He reports that he does not currently use alcohol. He reports that he does not use drugs.    Allergies: Patient has no known allergies.      Health Maintenance Due   Topic Date Due    COLORECTAL CANCER SCREENING  Never done    ZOSTER VACCINE (1 of 2) Never done    Hepatitis B (1 of 3 - Risk 3-dose series) Never done    RSV Vaccine - Adults (1 - 1-dose 60+ series) Never done    ANNUAL WELLNESS VISIT  Never done    COVID-19 Vaccine (1 - 2023-24 season) Never done            Current Outpatient Medications:     albuterol sulfate   (90 Base) MCG/ACT inhaler, Inhale 2 puffs Every 4 (Four) Hours As Needed for Wheezing., Disp: 18 g, Rfl: 1    ALPRAZolam (Xanax) 1 MG tablet, Take 1 tablet by mouth 3 (Three) Times a Day As Needed for Anxiety., Disp: 90 tablet, Rfl: 0    aspirin 81 MG EC tablet, Take 1 tablet by mouth Daily., Disp: , Rfl:     atorvastatin (LIPITOR) 80 MG tablet, Take 1 tablet by mouth Daily., Disp: 90 tablet, Rfl: 0    digoxin (LANOXIN) 125 MCG tablet, Take 1 tablet by mouth Daily., Disp: 90 tablet, Rfl: 3    empagliflozin (Jardiance) 25 MG tablet tablet, Take 1 tablet by mouth Daily., Disp: 30 tablet, Rfl: 11    HYDROcodone-acetaminophen (NORCO) 5-325 MG per tablet, Take 2 tablets by mouth Every 6 (Six) Hours As Needed for Moderate Pain., Disp: 240 tablet, Rfl: 0    meclizine (ANTIVERT) 25 MG tablet, Take 1 tablet by mouth 3 (Three) Times a Day As Needed for Dizziness., Disp: 90 tablet, Rfl: 3    metoprolol succinate XL (TOPROL-XL) 25 MG 24 hr tablet, Take 0.5 tablets by mouth Daily., Disp: 90 tablet, Rfl: 3    midodrine (PROAMATINE) 10 MG tablet, Take 1 tablet by mouth 3 (Three) Times a Day Before Meals., Disp: 30 tablet, Rfl: 0    spironolactone (ALDACTONE) 100 MG tablet, Take 0.5 tablets by mouth Daily., Disp: 30 tablet, Rfl: 0    ticagrelor (BRILINTA) 90 MG tablet tablet, Take 1 tablet by mouth 2 (Two) Times a Day., Disp: 60 tablet, Rfl: 3    Vericiguat (Verquvo) 10 MG tablet, Take 1 tablet by mouth Daily., Disp: 90 tablet, Rfl: 3    DULoxetine (CYMBALTA) 60 MG capsule, Take 1 capsule by mouth Daily., Disp: 90 capsule, Rfl: 1    furosemide (Lasix) 40 MG tablet, Take 1 tablet by mouth 2 (Two) Times a Day for 30 days., Disp: 60 tablet, Rfl: 0    gabapentin (NEURONTIN) 300 MG capsule, Take 1 capsule by mouth 3 (Three) Times a Day., Disp: 90 capsule, Rfl: 5    ipratropium (ATROVENT HFA) 17 MCG/ACT inhaler, Inhale 2 puffs 2 (Two) Times a Day., Disp: 12.9 g, Rfl: 11        There is no immunization history on file for this  "patient.      Objective       Vitals:    03/27/24 0820   BP: 120/66   BP Location: Left arm   Patient Position: Sitting   Cuff Size: Adult   Pulse: 86   Temp: 98.2 °F (36.8 °C)   TempSrc: Temporal   SpO2: 96%   Weight: 91.4 kg (201 lb 6.4 oz)   Height: 185.4 cm (73\")   PainSc: 10-Worst pain ever      Body mass index is 26.57 kg/m².   Wt Readings from Last 3 Encounters:   03/27/24 91.4 kg (201 lb 6.4 oz)   03/08/24 84.8 kg (187 lb)   02/22/24 86.1 kg (189 lb 13.1 oz)      BP Readings from Last 3 Encounters:   03/27/24 120/66   03/08/24 96/56   03/07/24 97/56                Physical Exam  Vitals reviewed.   Constitutional:       Appearance: Normal appearance.   HENT:      Head: Normocephalic and atraumatic.   Cardiovascular:      Rate and Rhythm: Normal rate and regular rhythm.      Pulses: Normal pulses.      Heart sounds: Normal heart sounds.   Pulmonary:      Effort: Pulmonary effort is normal.      Breath sounds: Normal breath sounds. Examination of the right-lower field reveals decreased breath sounds. Examination of the left-lower field reveals decreased breath sounds.   Skin:     General: Skin is warm and dry.   Neurological:      Mental Status: He is alert and oriented to person, place, and time.   Psychiatric:         Mood and Affect: Mood normal.         Behavior: Behavior normal.         Thought Content: Thought content normal.         Judgment: Judgment normal.             Result Review :       Common labs          2/19/2024    17:17 2/21/2024    03:16 3/7/2024    10:43   Common Labs   Glucose 110  94  80    BUN 16  23  17    Creatinine 1.07  1.08  1.02    Sodium 133  136  137    Potassium 3.6  3.4  3.6    Chloride 97  98  99    Calcium 8.9  9.0  8.8    WBC 3.94      Hemoglobin 11.1      Hematocrit 33.3      Platelets 239   225                       Assessment and Plan        Diagnoses and all orders for this visit:    1. Anxiety (Primary)  Comments:  Contnue Xanax 1 mg prn and add cymbalta 60 mg " daily  Orders:  -     DULoxetine (CYMBALTA) 60 MG capsule; Take 1 capsule by mouth Daily.  Dispense: 90 capsule; Refill: 1    2. Moderate episode of recurrent major depressive disorder  -     DULoxetine (CYMBALTA) 60 MG capsule; Take 1 capsule by mouth Daily.  Dispense: 90 capsule; Refill: 1    3. Encounter for long-term (current) use of high-risk medication  Comments:  KIERA reviewed and UDS up to date  Orders:  -     gabapentin (NEURONTIN) 300 MG capsule; Take 1 capsule by mouth 3 (Three) Times a Day.  Dispense: 90 capsule; Refill: 5    4. Compression fracture of L2 vertebra, sequela  -     gabapentin (NEURONTIN) 300 MG capsule; Take 1 capsule by mouth 3 (Three) Times a Day.  Dispense: 90 capsule; Refill: 5    5. Chronic left-sided low back pain with left-sided sciatica  -     gabapentin (NEURONTIN) 300 MG capsule; Take 1 capsule by mouth 3 (Three) Times a Day.  Dispense: 90 capsule; Refill: 5    6. Chronic systolic congestive heart failure  Comments:  Encouraged to get some from of exercise daily even if it is only for 10 minutes and just a gentle walk and slowly increase as tolerated  Assessment & Plan:  Stop Spiriva because Bradley has trouble with inhalation device and tart Ipratropium.        Orders:  -     ipratropium (ATROVENT HFA) 17 MCG/ACT inhaler; Inhale 2 puffs 2 (Two) Times a Day.  Dispense: 12.9 g; Refill: 11          Follow Up     Return in about 2 months (around 5/27/2024).    Patient was given instructions and counseling regarding his condition or for health maintenance advice. Please see specific information pulled into the AVS if appropriate. Patient understands the importance of having any ordered tests to be performed in a timely fashion.        SRIKANTH Myers

## 2024-04-17 ENCOUNTER — CLINICAL SUPPORT NO REQUIREMENTS (OUTPATIENT)
Dept: CARDIOLOGY | Facility: CLINIC | Age: 69
End: 2024-04-17
Payer: MEDICARE

## 2024-04-17 ENCOUNTER — TELEPHONE (OUTPATIENT)
Dept: FAMILY MEDICINE CLINIC | Facility: CLINIC | Age: 69
End: 2024-04-17
Payer: MEDICARE

## 2024-04-17 ENCOUNTER — OFFICE VISIT (OUTPATIENT)
Dept: CARDIOLOGY | Facility: CLINIC | Age: 69
End: 2024-04-17
Payer: MEDICARE

## 2024-04-17 VITALS
HEIGHT: 73 IN | SYSTOLIC BLOOD PRESSURE: 103 MMHG | HEART RATE: 76 BPM | WEIGHT: 180 LBS | DIASTOLIC BLOOD PRESSURE: 69 MMHG | BODY MASS INDEX: 23.86 KG/M2

## 2024-04-17 VITALS
DIASTOLIC BLOOD PRESSURE: 69 MMHG | SYSTOLIC BLOOD PRESSURE: 103 MMHG | HEIGHT: 73 IN | HEART RATE: 76 BPM | BODY MASS INDEX: 23.86 KG/M2 | WEIGHT: 180 LBS

## 2024-04-17 DIAGNOSIS — Z98.61 CAD S/P PERCUTANEOUS CORONARY ANGIOPLASTY: Primary | ICD-10-CM

## 2024-04-17 DIAGNOSIS — I25.10 CAD, MULTIPLE VESSEL: Primary | ICD-10-CM

## 2024-04-17 DIAGNOSIS — I50.22 CHRONIC SYSTOLIC CONGESTIVE HEART FAILURE: Primary | ICD-10-CM

## 2024-04-17 DIAGNOSIS — E78.5 HYPERLIPIDEMIA LDL GOAL <70: ICD-10-CM

## 2024-04-17 DIAGNOSIS — I25.10 CAD S/P PERCUTANEOUS CORONARY ANGIOPLASTY: Primary | ICD-10-CM

## 2024-04-17 DIAGNOSIS — F17.200 SMOKER: ICD-10-CM

## 2024-04-17 DIAGNOSIS — I25.5 ISCHEMIC CARDIOMYOPATHY: ICD-10-CM

## 2024-04-17 DIAGNOSIS — I25.10 CAD, MULTIPLE VESSEL: ICD-10-CM

## 2024-04-17 DIAGNOSIS — K70.31 ASCITES DUE TO ALCOHOLIC CIRRHOSIS: ICD-10-CM

## 2024-04-17 DIAGNOSIS — I10 ESSENTIAL HYPERTENSION: ICD-10-CM

## 2024-04-17 DIAGNOSIS — Z95.810 ICD (IMPLANTABLE CARDIOVERTER-DEFIBRILLATOR), DUAL, IN SITU: ICD-10-CM

## 2024-04-17 DIAGNOSIS — Z95.810 ICD (IMPLANTABLE CARDIOVERTER-DEFIBRILLATOR), SINGLE, IN SITU: ICD-10-CM

## 2024-04-17 DIAGNOSIS — I50.23 ACUTE ON CHRONIC HFREF (HEART FAILURE WITH REDUCED EJECTION FRACTION): ICD-10-CM

## 2024-04-17 DIAGNOSIS — F17.200 SMOKING: ICD-10-CM

## 2024-04-17 DIAGNOSIS — E78.2 MIXED DYSLIPIDEMIA: ICD-10-CM

## 2024-04-17 PROCEDURE — 93283 PRGRMG EVAL IMPLANTABLE DFB: CPT | Performed by: INTERNAL MEDICINE

## 2024-04-17 RX ORDER — METOPROLOL SUCCINATE 25 MG/1
25 TABLET, EXTENDED RELEASE ORAL NIGHTLY
Qty: 90 TABLET | Refills: 3 | Status: SHIPPED | OUTPATIENT
Start: 2024-04-17

## 2024-04-17 NOTE — PROGRESS NOTES
Chief Complaint  Acute on chronic HFrEF (heart failure with reduced ejection and Chronic systolic congestive heart failure    Subjective      Patient is here for follow-up on coronary artery disease and congestive heart failure.  He severe multivessel coronary artery disease and severely reduced LV systolic dysfunction.  He is status post high risk PCI of the left main/LAD at [facility] several months ago.  He has residual  of the right coronary artery and severe stenosis of obtuse marginal branch.  He denies chest pain.  He has chronic shortness of breath which has been stable.  He has no palpitations, dizziness, presyncope or syncope.  He is undergoing regular paracentesis for ascites.    Past Medical History:   Diagnosis Date    Ascites     CAD (coronary artery disease)     CHF (congestive heart failure)     Cirrhosis     Hypertension     MI (myocardial infarction)          Current Outpatient Medications:     albuterol sulfate  (90 Base) MCG/ACT inhaler, Inhale 2 puffs Every 4 (Four) Hours As Needed for Wheezing., Disp: 18 g, Rfl: 1    ALPRAZolam (Xanax) 1 MG tablet, Take 1 tablet by mouth 3 (Three) Times a Day As Needed for Anxiety., Disp: 90 tablet, Rfl: 0    aspirin 81 MG EC tablet, Take 1 tablet by mouth Daily., Disp: , Rfl:     atorvastatin (LIPITOR) 80 MG tablet, Take 1 tablet by mouth Daily., Disp: 90 tablet, Rfl: 0    digoxin (LANOXIN) 125 MCG tablet, Take 1 tablet by mouth Daily., Disp: 90 tablet, Rfl: 3    DULoxetine (CYMBALTA) 60 MG capsule, Take 1 capsule by mouth Daily., Disp: 90 capsule, Rfl: 1    empagliflozin (Jardiance) 25 MG tablet tablet, Take 1 tablet by mouth Daily., Disp: 30 tablet, Rfl: 11    furosemide (Lasix) 40 MG tablet, Take 1 tablet by mouth 2 (Two) Times a Day for 30 days., Disp: 60 tablet, Rfl: 0    gabapentin (NEURONTIN) 300 MG capsule, Take 1 capsule by mouth 3 (Three) Times a Day., Disp: 90 capsule, Rfl: 5    HYDROcodone-acetaminophen (NORCO) 5-325 MG per tablet,  "Take 2 tablets by mouth Every 6 (Six) Hours As Needed for Moderate Pain., Disp: 240 tablet, Rfl: 0    ipratropium (ATROVENT HFA) 17 MCG/ACT inhaler, Inhale 2 puffs 2 (Two) Times a Day., Disp: 12.9 g, Rfl: 11    meclizine (ANTIVERT) 25 MG tablet, Take 1 tablet by mouth 3 (Three) Times a Day As Needed for Dizziness., Disp: 90 tablet, Rfl: 3    metoprolol succinate XL (TOPROL-XL) 25 MG 24 hr tablet, Take 0.5 tablets by mouth Daily., Disp: 90 tablet, Rfl: 3    midodrine (PROAMATINE) 10 MG tablet, Take 1 tablet by mouth 3 (Three) Times a Day Before Meals., Disp: 30 tablet, Rfl: 0    spironolactone (ALDACTONE) 100 MG tablet, Take 0.5 tablets by mouth Daily., Disp: 30 tablet, Rfl: 0    ticagrelor (BRILINTA) 90 MG tablet tablet, Take 1 tablet by mouth 2 (Two) Times a Day., Disp: 60 tablet, Rfl: 3    Vericiguat (Verquvo) 10 MG tablet, Take 1 tablet by mouth Daily., Disp: 90 tablet, Rfl: 3    There are no discontinued medications.  No Known Allergies     Social History     Tobacco Use    Smoking status: Every Day     Current packs/day: 0.50     Types: Cigarettes   Vaping Use    Vaping status: Never Used   Substance Use Topics    Alcohol use: Not Currently    Drug use: Never       History reviewed. No pertinent family history.     Objective     /69   Pulse 76   Ht 185.4 cm (72.99\")   Wt 81.6 kg (180 lb)   BMI 23.75 kg/m²       Physical Exam    General Appearance:   no acute distress  Alert and oriented x3  HENT:   lips not cyanotic  Atraumatic  Neck:  No jvd   supple  Respiratory:  no respiratory distress  normal breath sounds  no rales  Cardiovascular:  no S3, no S4   no murmur  no rub  Extremities  No cyanosis  lower extremity edema: none    Skin:   warm, dry  No rashes      Result Review :     proBNP   Date Value Ref Range Status   02/18/2024 13,716.0 (H) 0.0 - 900.0 pg/mL Final     CMP          2/19/2024    17:17 2/21/2024    03:16 3/7/2024    10:43   CMP   Glucose 110  94  80    BUN 16  23  17    Creatinine 1.07 " " 1.08  1.02    EGFR 76.5  75.7  81.1    Sodium 133  136  137    Potassium 3.6  3.4  3.6    Chloride 97  98  99    Calcium 8.9  9.0  8.8    BUN/Creatinine Ratio 15.0  21.3  16.7    Anion Gap 14.7  14.6  12.2      CBC w/diff          2/18/2024    09:54 2/19/2024    17:17 3/7/2024    10:43   CBC w/Diff   WBC 3.52  3.94     RBC 3.63  3.49     Hemoglobin 11.6  11.1     Hematocrit 34.6  33.3     MCV 95.3  95.4     MCH 32.0  31.8     MCHC 33.5  33.3     RDW 15.8  16.0     Platelets 249  239  225    Neutrophil Rel % 58.2  46.4     Immature Granulocyte Rel % 0.3  0.3     Lymphocyte Rel % 18.2  25.4     Monocyte Rel % 21.9  25.6     Eosinophil Rel % 0.3  0.5     Basophil Rel % 1.1  1.8        Lab Results   Component Value Date    TSH 2.960 10/25/2023      Lab Results   Component Value Date    FREET4 1.70 10/25/2023      No results found for: \"DDIMERQUANT\"  Magnesium   Date Value Ref Range Status   03/07/2024 2.0 1.6 - 2.4 mg/dL Final      Digoxin   Date Value Ref Range Status   03/07/2024 0.88 0.60 - 1.20 ng/mL Final      Lab Results   Component Value Date    TROPONINT 51 (H) 02/18/2024           Lipid Panel          9/6/2023    04:34 1/25/2024    09:22   Lipid Panel   Total Cholesterol 96  82    Triglycerides 44  48    HDL Cholesterol 43  39    VLDL Cholesterol 12  13    LDL Cholesterol  41  30    LDL/HDL Ratio 1.03  0.86      No results found for: \"POCTROP\"    Results for orders placed during the hospital encounter of 02/18/24    Adult Transthoracic Echo Complete W/ Cont if Necessary Per Protocol    Interpretation Summary    Left ventricular ejection fraction appears to be less than 20%.    The left ventricular cavity is mildly dilated.    Left ventricular diastolic function was indeterminate.    Moderately reduced right ventricular systolic function noted.    The right ventricular cavity is mildly dilated.    The left atrial cavity is mild to moderately dilated.    The right atrial cavity is mild to moderately  dilated.    " Aortic valve is very sclerotic.  The max/mean pressure gradient is only 25/12 mmHg.  This could be a low flow/low gradient due to depressed ejection fraction.  Could consider dobutamine echo.    Mild to moderate mitral and tricuspid regurgitation.    Pleural effusion is noted.                 Diagnoses and all orders for this visit:    1. CAD S/P percutaneous coronary angioplasty (Primary)    2. Ischemic cardiomyopathy    3. Essential hypertension    4. Mixed dyslipidemia    5. Smoking    6. ICD (implantable cardioverter-defibrillator), dual, in situ      Assessment:    CAD: He has heavily calcified coronary arteries with severe multivessel coronary artery disease, status post high risk PCI of the left main artery/LAD at [facility] several months ago.  He has residual severe stenosis of the obtuse marginal branch and  of the right coronary artery which are being treated medically.  He has no angina.  Continue current medical therapy including aspirin, Brilinta, metoprolol and statin therapy.    Severe ischemic cardiomyopathy, LVEF less than 20% by recent echocardiogram.  He also has dilated right ventricle with right ventricular systolic dysfunction.  He has ascites on today's exam but no other signs of fluid overload.  He is planned to undergo paracentesis tomorrow.  He is being followed by the heart failure clinic.  He is not on Entresto due to labile blood pressure.  Continue current medical therapy.    Status post ICD implantation: Continue routine device interrogation.    Essential hypertension: Stable on current regimen.  Continue the same.    Mixed dyslipidemia: On high intensity statin.  Recent lipid profile was noted and was unremarkable.  LDL is 30.  Continue current medical therapy.    Smoking: The importance of smoking cessation was discussed with him again today.  He expressed understanding.  He was encouraged to quit.      Follow Up     Return in about 6 months (around 10/17/2024) for With  Cheryl DUKE.        Patient was given instructions and counseling regarding his condition or for health maintenance advice. Please see specific information pulled into the AVS if appropriate.

## 2024-04-17 NOTE — PATIENT INSTRUCTIONS
1.  Increase metoprolol 25 mg take a full tablet at bedtime.  2.  Increase Lasix 40 mg take 1-1/2 tablet in the morning and 1 in the afternoon for 3 days followed by 1 tablet twice daily.  3.  Do heart rate blood pressure and daily weight log and bring it to next appointment.  4.  Do blood work 1 to 2 days prior to next visit.

## 2024-04-17 NOTE — PROGRESS NOTES
Office Visit    Chief Complaint  Acute on chronic HFrEF`    Subjective            Bradley Crane presents to [facility] CARDIOLOGY  History of Present Illness  Mr. Bradley Crane is a 68-year-old male that presented to the office today for follow-up due to heart failure with reduced ejection fraction, CAD, previous MI with high risk PCI to the LAD who is doing much better.  He is short of air has improved, but pedal edema is still present.  He has been receiving paracentesis on a regular basis his next is scheduled for tomorrow.  Bradley reports that he is now able to do more around the house.  He denies any chest pain, dizziness, or syncopal episodes.  Bradley has had significant improvement since starting mood stabilizers.      Past Medical History:   Diagnosis Date    Ascites     CAD (coronary artery disease)     CHF (congestive heart failure)     Cirrhosis     Hypertension     MI (myocardial infarction)        No Known Allergies     Past Surgical History:   Procedure Laterality Date    CARDIAC CATHETERIZATION N/A 09/05/2023    Procedure: Right and LHC with possible coronary intevention;  Surgeon: Merly Ji MD;  Location: [facility] INVASIVE LOCATION;  Service: Cardiology;  Laterality: N/A;    CARDIAC DEFIBRILLATOR PLACEMENT      CARDIAC SURGERY          Social History     Tobacco Use    Smoking status: Every Day     Current packs/day: 0.50     Types: Cigarettes   Vaping Use    Vaping status: Never Used   Substance Use Topics    Alcohol use: Not Currently    Drug use: Never       History reviewed. No pertinent family history.     Prior to Admission medications    Medication Sig Start Date End Date Taking? Authorizing Provider   albuterol sulfate  (90 Base) MCG/ACT inhaler Inhale 2 puffs Every 4 (Four) Hours As Needed for Wheezing. 2/26/24  Yes Rosa Isela Kim APRN   ALPRAZolam (Xanax) 1 MG tablet Take 1 tablet by mouth 3 (Three) Times a Day As Needed for Anxiety. 3/22/24  Yes Julio  SRIKANTH Taylor   aspirin 81 MG EC tablet Take 1 tablet by mouth Daily.   Yes Provider, MD Joshua   atorvastatin (LIPITOR) 80 MG tablet Take 1 tablet by mouth Daily. 10/19/23  Yes Angel Roy MD   digoxin (LANOXIN) 125 MCG tablet Take 1 tablet by mouth Daily. 1/22/24  Yes Rosa Isela Kim APRN   DULoxetine (CYMBALTA) 60 MG capsule Take 1 capsule by mouth Daily. 3/27/24  Yes Rosa Isela Kim APRN   empagliflozin (Jardiance) 25 MG tablet tablet Take 1 tablet by mouth Daily. 1/12/24  Yes Christy Zuniga MD   furosemide (Lasix) 40 MG tablet Take 1 tablet by mouth 2 (Two) Times a Day for 30 days. 2/22/24 4/17/24 Yes Gilberto Medel,    gabapentin (NEURONTIN) 300 MG capsule Take 1 capsule by mouth 3 (Three) Times a Day. 3/27/24  Yes Rosa Isela Kim APRN   HYDROcodone-acetaminophen (NORCO) 5-325 MG per tablet Take 2 tablets by mouth Every 6 (Six) Hours As Needed for Moderate Pain. 3/22/24  Yes Rosa Isela Kim APRN   ipratropium (ATROVENT HFA) 17 MCG/ACT inhaler Inhale 2 puffs 2 (Two) Times a Day. 3/27/24  Yes Rosa Isela Kim APRN   meclizine (ANTIVERT) 25 MG tablet Take 1 tablet by mouth 3 (Three) Times a Day As Needed for Dizziness. 3/12/24  Yes Rosa Isela Kim APRN   metoprolol succinate XL (TOPROL-XL) 25 MG 24 hr tablet Take 0.5 tablets by mouth Daily. 1/22/24  Yes Rosa Isela Kim APRN   midodrine (PROAMATINE) 10 MG tablet Take 1 tablet by mouth 3 (Three) Times a Day Before Meals. 2/22/24  Yes Gilberto Medel,    spironolactone (ALDACTONE) 100 MG tablet Take 0.5 tablets by mouth Daily. 2/23/24  Yes Gilberto Medel,    ticagrelor (BRILINTA) 90 MG tablet tablet Take 1 tablet by mouth 2 (Two) Times a Day. 1/19/24  Yes Rosa Isela Kim APRN   Vericiguat (Verquvo) 10 MG tablet Take 1 tablet by mouth Daily. 12/27/23  Yes Christy Zuniga MD        Review of Systems   Constitutional:  Negative for fatigue.   Respiratory:  Positive for cough and shortness of breath.    Cardiovascular:   "Positive for leg swelling. Negative for chest pain and palpitations.   Neurological:  Negative for dizziness.        Symptom Course: Improved    Weight Trend: Stable     Objective     /69   Pulse 76   Ht 185.4 cm (73\")   Wt 81.6 kg (180 lb)   BMI 23.75 kg/m²       Physical Exam  Constitutional:       General: He is awake.      Appearance: Normal appearance.   Neck:      Thyroid: No thyromegaly.      Vascular: No carotid bruit or JVD.   Cardiovascular:      Rate and Rhythm: Normal rate and regular rhythm.      Chest Wall: PMI is not displaced.      Pulses: Normal pulses.      Heart sounds: Normal heart sounds, S1 normal and S2 normal. No murmur heard.     No friction rub. No gallop. No S3 or S4 sounds.   Pulmonary:      Effort: Pulmonary effort is normal.      Breath sounds: Normal breath sounds and air entry. No wheezing, rhonchi or rales.   Abdominal:      General: Bowel sounds are normal.      Palpations: Abdomen is soft. There is no mass.      Tenderness: There is no abdominal tenderness.   Musculoskeletal:      Cervical back: Neck supple.      Right lower leg: Edema present.      Left lower leg: Edema present.   Neurological:      Mental Status: He is alert and oriented to person, place, and time.   Psychiatric:         Mood and Affect: Mood normal.         Behavior: Behavior is cooperative.           Result Review :                      Lab Results   Component Value Date    PROBNP 6,424.0 (H) 04/18/2024    PROBNP 13,716.0 (H) 02/18/2024    PROBNP 6,436.0 (H) 01/25/2024     CMP          2/21/2024    03:16 3/7/2024    10:43 4/18/2024    07:56   CMP   Glucose 94  80  104    BUN 23  17  16    Creatinine 1.08  1.02  0.80    EGFR 75.7  81.1  97.6    Sodium 136  137  139    Potassium 3.4  3.6  3.2    Chloride 98  99  100    Calcium 9.0  8.8  8.9    Total Protein   7.0    Albumin   3.7    Globulin   3.3    Total Bilirubin   1.7    Alkaline Phosphatase   166    AST (SGOT)   15    ALT (SGPT)   7  " "  Albumin/Globulin Ratio   1.1    BUN/Creatinine Ratio 21.3  16.7  20.0    Anion Gap 14.6  12.2  11.4      CBC w/diff          2/19/2024    17:17 3/7/2024    10:43 4/18/2024    07:56   CBC w/Diff   WBC 3.94   5.28    RBC 3.49   3.68    Hemoglobin 11.1   11.8    Hematocrit 33.3   35.4    MCV 95.4   96.2    MCH 31.8   32.1    MCHC 33.3   33.3    RDW 16.0   15.3    Platelets 239  225  241    Neutrophil Rel % 46.4   64.3    Immature Granulocyte Rel % 0.3   0.2    Lymphocyte Rel % 25.4   21.6    Monocyte Rel % 25.6   10.8    Eosinophil Rel % 0.5   2.3    Basophil Rel % 1.8   0.8       Lipid Panel          9/6/2023    04:34 1/25/2024    09:22   Lipid Panel   Total Cholesterol 96  82    Triglycerides 44  48    HDL Cholesterol 43  39    VLDL Cholesterol 12  13    LDL Cholesterol  41  30    LDL/HDL Ratio 1.03  0.86       Lab Results   Component Value Date    TSH 2.960 10/25/2023    TSH 2.040 08/29/2023      Lab Results   Component Value Date    FREET4 1.70 10/25/2023      No results found for: \"DDIMERQUANT\"  Magnesium   Date Value Ref Range Status   04/18/2024 1.7 1.6 - 2.4 mg/dL Final      Digoxin   Date Value Ref Range Status   04/18/2024 0.60 0.60 - 1.20 ng/mL Final      A1C Last 3 Results          9/6/2023    04:34   HGBA1C Last 3 Results   Hemoglobin A1C 5.70           Results for orders placed during the hospital encounter of 02/18/24    Adult Transthoracic Echo Complete W/ Cont if Necessary Per Protocol    Interpretation Summary    Left ventricular ejection fraction appears to be less than 20%.    The left ventricular cavity is mildly dilated.    Left ventricular diastolic function was indeterminate.    Moderately reduced right ventricular systolic function noted.    The right ventricular cavity is mildly dilated.    The left atrial cavity is mild to moderately dilated.    The right atrial cavity is mild to moderately  dilated.    Aortic valve is very sclerotic.  The max/mean pressure gradient is only 25/12 mmHg.  " This could be a low flow/low gradient due to depressed ejection fraction.  Could consider dobutamine echo.    Mild to moderate mitral and tricuspid regurgitation.    Pleural effusion is noted.        Assessment and Plan        Diagnoses and all orders for this visit:    1. Chronic systolic congestive heart failure (Primary)  Assessment & Plan:  Mr. Huerta has heart failure with reduced ejection fraction who appears to doing a little better.  He continues to experience pedal edema, although it has improved.  He is scheduled for paracentesis tomorrow.  I will make the following changes to his heart failure medications.    1.  Increase metoprolol 25 mg take a full tablet at bedtime.  2.  Increase Lasix 40 mg take 1-1/2 tablet in the morning and 1 in the afternoon for 3 days followed by 1 tablet twice daily.  3.  Do heart rate blood pressure and daily weight log and bring it to next appointment.  4.  Do blood work 1 to 2 days prior to next visit.    Orders:  -     metoprolol succinate XL (TOPROL-XL) 25 MG 24 hr tablet; Take 1 tablet by mouth Every Night.  Dispense: 90 tablet; Refill: 3  -     Magnesium; Future  -     CBC & Differential; Future  -     Comprehensive Metabolic Panel; Future  -     proBNP; Future  -     Digoxin Level; Future    2. Hyperlipidemia LDL goal <70  Assessment & Plan:  Bradley's lipids are controlled with the current dose of atorvastatin I will continue the same.      3. CAD, multiple vessel  Assessment & Plan:  Bradley has history of CAD with PCI to the LAD.  He does need a staged PCI to the circumflex but is considered too high risk.  Will continue to treat medically.      4. Ascites due to alcoholic cirrhosis  Assessment & Plan:  Mr. Huerta continues to have fluid on his abdomen.  He is scheduled for a paracentesis tomorrow.  He will continue them every 6 weeks as needed.  Continue to follow with GI for cirrhosis.      5. Smoker  Assessment & Plan:  Mr. Huerta has smoked for quite some time.  He has tried  to cut back but is unwilling to quit at this time.  We have discussed the effects of smoking can handle on all body systems.  I have offered devices to assist with smoking cessation.  Education has been provided for approximately 3 to 5 minutes on smoking cessation.              Follow Up     Return in about 6 weeks (around 5/29/2024).    Patient was given instructions and counseling regarding his condition or for health maintenance advice. Please see specific information pulled into the AVS if appropriate.     Electronically signed by SRIKANTH Hall, 04/18/24, 9:07 AM EDT.

## 2024-04-18 ENCOUNTER — HOSPITAL ENCOUNTER (OUTPATIENT)
Dept: INTERVENTIONAL RADIOLOGY/VASCULAR | Facility: HOSPITAL | Age: 69
Discharge: HOME | End: 2024-04-18
Payer: MEDICARE

## 2024-04-18 ENCOUNTER — LAB (OUTPATIENT)
Dept: LAB | Facility: HOSPITAL | Age: 69
End: 2024-04-18
Payer: MEDICARE

## 2024-04-18 ENCOUNTER — TELEPHONE (OUTPATIENT)
Dept: CARDIOLOGY | Facility: CLINIC | Age: 69
End: 2024-04-18
Payer: MEDICARE

## 2024-04-18 VITALS — DIASTOLIC BLOOD PRESSURE: 65 MMHG | OXYGEN SATURATION: 98 % | SYSTOLIC BLOOD PRESSURE: 87 MMHG | HEART RATE: 62 BPM

## 2024-04-18 DIAGNOSIS — F41.9 ANXIETY: ICD-10-CM

## 2024-04-18 DIAGNOSIS — M54.41 CHRONIC BILATERAL LOW BACK PAIN WITH RIGHT-SIDED SCIATICA: ICD-10-CM

## 2024-04-18 DIAGNOSIS — K70.31 ASCITES DUE TO ALCOHOLIC CIRRHOSIS: ICD-10-CM

## 2024-04-18 DIAGNOSIS — I25.10 CAD, MULTIPLE VESSEL: ICD-10-CM

## 2024-04-18 DIAGNOSIS — G89.29 CHRONIC BILATERAL LOW BACK PAIN WITH RIGHT-SIDED SCIATICA: ICD-10-CM

## 2024-04-18 DIAGNOSIS — I50.22 CHRONIC SYSTOLIC CONGESTIVE HEART FAILURE: ICD-10-CM

## 2024-04-18 DIAGNOSIS — S32.020S COMPRESSION FRACTURE OF L2 VERTEBRA, SEQUELA: ICD-10-CM

## 2024-04-18 LAB
ALBUMIN SERPL-MCNC: 3.7 G/DL (ref 3.5–5.2)
ALBUMIN/GLOB SERPL: 1.1 G/DL
ALP SERPL-CCNC: 166 U/L (ref 39–117)
ALT SERPL W P-5'-P-CCNC: 7 U/L (ref 1–41)
ANION GAP SERPL CALCULATED.3IONS-SCNC: 11.4 MMOL/L (ref 5–15)
APTT PPP: 35.4 SECONDS (ref 24.2–34.2)
AST SERPL-CCNC: 15 U/L (ref 1–40)
BASOPHILS # BLD AUTO: 0.04 10*3/MM3 (ref 0–0.2)
BASOPHILS NFR BLD AUTO: 0.8 % (ref 0–1.5)
BILIRUB SERPL-MCNC: 1.7 MG/DL (ref 0–1.2)
BUN SERPL-MCNC: 16 MG/DL (ref 8–23)
BUN/CREAT SERPL: 20 (ref 7–25)
CALCIUM SPEC-SCNC: 8.9 MG/DL (ref 8.6–10.5)
CHLORIDE SERPL-SCNC: 100 MMOL/L (ref 98–107)
CO2 SERPL-SCNC: 27.6 MMOL/L (ref 22–29)
CREAT SERPL-MCNC: 0.8 MG/DL (ref 0.76–1.27)
DEPRECATED RDW RBC AUTO: 54.6 FL (ref 37–54)
DIGOXIN SERPL-MCNC: 0.6 NG/ML (ref 0.6–1.2)
EGFRCR SERPLBLD CKD-EPI 2021: 97.6 ML/MIN/1.73
EOSINOPHIL # BLD AUTO: 0.12 10*3/MM3 (ref 0–0.4)
EOSINOPHIL NFR BLD AUTO: 2.3 % (ref 0.3–6.2)
ERYTHROCYTE [DISTWIDTH] IN BLOOD BY AUTOMATED COUNT: 15.3 % (ref 12.3–15.4)
GLOBULIN UR ELPH-MCNC: 3.3 GM/DL
GLUCOSE SERPL-MCNC: 104 MG/DL (ref 65–99)
HCT VFR BLD AUTO: 35.4 % (ref 37.5–51)
HGB BLD-MCNC: 11.8 G/DL (ref 13–17.7)
IMM GRANULOCYTES # BLD AUTO: 0.01 10*3/MM3 (ref 0–0.05)
IMM GRANULOCYTES NFR BLD AUTO: 0.2 % (ref 0–0.5)
INR PPP: 1.38 (ref 0.86–1.15)
LYMPHOCYTES # BLD AUTO: 1.14 10*3/MM3 (ref 0.7–3.1)
LYMPHOCYTES NFR BLD AUTO: 21.6 % (ref 19.6–45.3)
MAGNESIUM SERPL-MCNC: 1.7 MG/DL (ref 1.6–2.4)
MCH RBC QN AUTO: 32.1 PG (ref 26.6–33)
MCHC RBC AUTO-ENTMCNC: 33.3 G/DL (ref 31.5–35.7)
MCV RBC AUTO: 96.2 FL (ref 79–97)
MONOCYTES # BLD AUTO: 0.57 10*3/MM3 (ref 0.1–0.9)
MONOCYTES NFR BLD AUTO: 10.8 % (ref 5–12)
NEUTROPHILS NFR BLD AUTO: 3.4 10*3/MM3 (ref 1.7–7)
NEUTROPHILS NFR BLD AUTO: 64.3 % (ref 42.7–76)
NRBC BLD AUTO-RTO: 0 /100 WBC (ref 0–0.2)
NT-PROBNP SERPL-MCNC: 6424 PG/ML (ref 0–900)
PLATELET # BLD AUTO: 241 10*3/MM3 (ref 140–450)
PMV BLD AUTO: 9.7 FL (ref 6–12)
POTASSIUM SERPL-SCNC: 3.2 MMOL/L (ref 3.5–5.2)
PROT SERPL-MCNC: 7 G/DL (ref 6–8.5)
PROTHROMBIN TIME: 17.2 SECONDS (ref 11.8–14.9)
RBC # BLD AUTO: 3.68 10*6/MM3 (ref 4.14–5.8)
SODIUM SERPL-SCNC: 139 MMOL/L (ref 136–145)
WBC NRBC COR # BLD AUTO: 5.28 10*3/MM3 (ref 3.4–10.8)

## 2024-04-18 PROCEDURE — 36415 COLL VENOUS BLD VENIPUNCTURE: CPT

## 2024-04-18 PROCEDURE — 80162 ASSAY OF DIGOXIN TOTAL: CPT

## 2024-04-18 PROCEDURE — 85610 PROTHROMBIN TIME: CPT | Performed by: NURSE PRACTITIONER

## 2024-04-18 PROCEDURE — 85025 COMPLETE CBC W/AUTO DIFF WBC: CPT

## 2024-04-18 PROCEDURE — 83735 ASSAY OF MAGNESIUM: CPT

## 2024-04-18 PROCEDURE — 83880 ASSAY OF NATRIURETIC PEPTIDE: CPT

## 2024-04-18 PROCEDURE — 80053 COMPREHEN METABOLIC PANEL: CPT

## 2024-04-18 PROCEDURE — 85730 THROMBOPLASTIN TIME PARTIAL: CPT | Performed by: NURSE PRACTITIONER

## 2024-04-18 PROCEDURE — 76705 ECHO EXAM OF ABDOMEN: CPT

## 2024-04-18 RX ORDER — POTASSIUM CHLORIDE 750 MG/1
30 TABLET, FILM COATED, EXTENDED RELEASE ORAL DAILY
Qty: 9 TABLET | Refills: 0 | Status: SHIPPED | OUTPATIENT
Start: 2024-04-18 | End: 2024-04-21

## 2024-04-18 NOTE — ASSESSMENT & PLAN NOTE
Mr. Huerta continues to have fluid on his abdomen.  He is scheduled for a paracentesis tomorrow.  He will continue them every 6 weeks as needed.  Continue to follow with GI for cirrhosis.

## 2024-04-18 NOTE — ASSESSMENT & PLAN NOTE
Mr. Huerta has smoked for quite some time.  He has tried to cut back but is unwilling to quit at this time.  We have discussed the effects of smoking can handle on all body systems.  I have offered devices to assist with smoking cessation.  Education has been provided for approximately 3 to 5 minutes on smoking cessation.

## 2024-04-18 NOTE — TELEPHONE ENCOUNTER
Caller: Bradley Crane    Relationship: Self    Best call back number:  [phone]    Requested Prescriptions:   Requested Prescriptions     Pending Prescriptions Disp Refills    HYDROcodone-acetaminophen (NORCO) 5-325 MG per tablet 240 tablet 0     Sig: Take 2 tablets by mouth Every 6 (Six) Hours As Needed for Moderate Pain.    ALPRAZolam (Xanax) 1 MG tablet 90 tablet 0     Sig: Take 1 tablet by mouth 3 (Three) Times a Day As Needed for Anxiety.        Pharmacy where request should be sent: [id number] - [place], [place] - [address]  AT [facility] [address] - [phone] [facility] [phone] FX     Last office visit with prescribing clinician: 3/27/2024   Last telemedicine visit with prescribing clinician: Visit date not found   Next office visit with prescribing clinician: 5/28/2024     Does the patient have less than a 3 day supply:  [x] Yes  [] No

## 2024-04-18 NOTE — ASSESSMENT & PLAN NOTE
Bradley has history of CAD with PCI to the LAD.  He does need a staged PCI to the circumflex but is considered too high risk.  Will continue to treat medically.

## 2024-04-19 RX ORDER — ALPRAZOLAM 1 MG/1
1 TABLET ORAL 3 TIMES DAILY PRN
Qty: 90 TABLET | Refills: 0 | Status: SHIPPED | OUTPATIENT
Start: 2024-04-19

## 2024-04-19 RX ORDER — HYDROCODONE BITARTRATE AND ACETAMINOPHEN 5; 325 MG/1; MG/1
2 TABLET ORAL EVERY 6 HOURS PRN
Qty: 240 TABLET | Refills: 0 | Status: SHIPPED | OUTPATIENT
Start: 2024-04-19

## 2024-04-23 NOTE — PROGRESS NOTES
Normal VVI ICD  Device Interrogation and Device Testing.  Normal evaluation of device function and lead measurements.  No optimization was needed of parameters or maximization of device longevity.  Patient is moving to [place] in two weeks, he will find a new Cardiologist.

## 2024-05-03 ENCOUNTER — TELEPHONE (OUTPATIENT)
Dept: FAMILY MEDICINE CLINIC | Facility: CLINIC | Age: 69
End: 2024-05-03
Payer: MEDICARE

## 2024-05-03 RX ORDER — BUDESONIDE, GLYCOPYRROLATE, AND FORMOTEROL FUMARATE 160; 9; 4.8 UG/1; UG/1; UG/1
2 AEROSOL, METERED RESPIRATORY (INHALATION) 2 TIMES DAILY
Qty: 30 EACH | Refills: 5 | Status: SHIPPED | OUTPATIENT
Start: 2024-05-03

## 2024-05-03 NOTE — TELEPHONE ENCOUNTER
Caller: HAYDER    Relationship to patient: EZ    Best call back number: [phone]     Patient is needing: HAYDER IS CALLING IN TO INQUIRE ABOUT A FAX THAT WAS SENT TO THE OFFICE IN REGARDS TO PATIENT'S SPIRIVA RESPIMAT INHALER. HAYDER STATES THAT THIS IS VERY DIFFICULT FOR HIM TO USE. HAYDER IS SUGGESTING TRELEGY OR BREZTRI. PATIENT IS REQUESTING [facility]  ON [address]. PLEASE SEND NEW PRESCRIPTION OR CALL TO ADVISE.

## 2024-05-20 DIAGNOSIS — S32.020S COMPRESSION FRACTURE OF L2 VERTEBRA, SEQUELA: ICD-10-CM

## 2024-05-20 DIAGNOSIS — G89.29 CHRONIC BILATERAL LOW BACK PAIN WITH RIGHT-SIDED SCIATICA: ICD-10-CM

## 2024-05-20 DIAGNOSIS — M54.41 CHRONIC BILATERAL LOW BACK PAIN WITH RIGHT-SIDED SCIATICA: ICD-10-CM

## 2024-05-20 DIAGNOSIS — F41.9 ANXIETY: ICD-10-CM

## 2024-05-20 RX ORDER — HYDROCODONE BITARTRATE AND ACETAMINOPHEN 5; 325 MG/1; MG/1
2 TABLET ORAL EVERY 6 HOURS PRN
Qty: 240 TABLET | Refills: 0 | Status: SHIPPED | OUTPATIENT
Start: 2024-05-20

## 2024-05-20 RX ORDER — ALPRAZOLAM 1 MG/1
1 TABLET ORAL 3 TIMES DAILY PRN
Qty: 90 TABLET | Refills: 0 | Status: SHIPPED | OUTPATIENT
Start: 2024-05-20

## 2024-05-20 NOTE — TELEPHONE ENCOUNTER
Caller: LOUIS SPENCER    Relationship: Emergency Contact    Best call back number: [phone]     Requested Prescriptions:   Requested Prescriptions     Pending Prescriptions Disp Refills    HYDROcodone-acetaminophen (NORCO) 5-325 MG per tablet 240 tablet 0     Sig: Take 2 tablets by mouth Every 6 (Six) Hours As Needed for Moderate Pain.    ALPRAZolam (Xanax) 1 MG tablet 90 tablet 0     Sig: Take 1 tablet by mouth 3 (Three) Times a Day As Needed for Anxiety.        Pharmacy where request should be sent: [id number] - [place], [place] - [address]  AT [facility] [address] - [phone]  - [phone] FX     Last office visit with prescribing clinician: 3/27/2024   Last telemedicine visit with prescribing clinician: Visit date not found   Next office visit with prescribing clinician: 5/28/2024     Additional details provided by patient: COMPLETELY OUT    Does the patient have less than a 3 day supply:  [x] Yes  [] No    Would you like a call back once the refill request has been completed: [] Yes [] No    If the office needs to give you a call back, can they leave a voicemail: [] Yes [] No    Sandra Mondragon Rep   05/20/24 11:31 EDT

## 2024-05-21 ENCOUNTER — TELEPHONE (OUTPATIENT)
Dept: FAMILY MEDICINE CLINIC | Facility: CLINIC | Age: 69
End: 2024-05-21
Payer: MEDICARE

## 2024-05-21 NOTE — TELEPHONE ENCOUNTER
Caller: LOUIS SPENCER    Relationship: Emergency Contact    Best call back number: [phone]     What is the best time to reach you: ANYTIME     Who are you requesting to speak with (clinical staff, provider,  specific staff member): CLINICAL     What was the call regarding: PATIENT SPOUSE IS CALLING TO CHECK THE STATUS OF A MEDICATION FOR ALPRAZolam (Xanax) 1 MG tablet , STATED PHARMACY SENT BACK FOR APPROVAL.

## 2024-05-28 ENCOUNTER — OFFICE VISIT (OUTPATIENT)
Dept: CARDIOLOGY | Facility: CLINIC | Age: 69
End: 2024-05-28
Payer: MEDICARE

## 2024-05-28 ENCOUNTER — OFFICE VISIT (OUTPATIENT)
Dept: FAMILY MEDICINE CLINIC | Facility: CLINIC | Age: 69
End: 2024-05-28
Payer: MEDICARE

## 2024-05-28 VITALS
BODY MASS INDEX: 27.04 KG/M2 | HEIGHT: 73 IN | WEIGHT: 204 LBS | SYSTOLIC BLOOD PRESSURE: 108 MMHG | DIASTOLIC BLOOD PRESSURE: 76 MMHG | HEART RATE: 70 BPM

## 2024-05-28 VITALS
SYSTOLIC BLOOD PRESSURE: 102 MMHG | HEIGHT: 73 IN | HEART RATE: 102 BPM | OXYGEN SATURATION: 93 % | BODY MASS INDEX: 27.09 KG/M2 | TEMPERATURE: 98 F | DIASTOLIC BLOOD PRESSURE: 65 MMHG | WEIGHT: 204.4 LBS

## 2024-05-28 DIAGNOSIS — F17.200 SMOKER: ICD-10-CM

## 2024-05-28 DIAGNOSIS — L03.119 CELLULITIS AND ABSCESS OF LOWER EXTREMITY: ICD-10-CM

## 2024-05-28 DIAGNOSIS — E87.6 HYPOKALEMIA: ICD-10-CM

## 2024-05-28 DIAGNOSIS — E78.5 HYPERLIPIDEMIA LDL GOAL <70: ICD-10-CM

## 2024-05-28 DIAGNOSIS — F33.1 MODERATE EPISODE OF RECURRENT MAJOR DEPRESSIVE DISORDER: ICD-10-CM

## 2024-05-28 DIAGNOSIS — R60.1 GENERALIZED EDEMA: ICD-10-CM

## 2024-05-28 DIAGNOSIS — I50.23 ACUTE ON CHRONIC SYSTOLIC CONGESTIVE HEART FAILURE: ICD-10-CM

## 2024-05-28 DIAGNOSIS — Z79.899 ENCOUNTER FOR LONG-TERM (CURRENT) USE OF HIGH-RISK MEDICATION: ICD-10-CM

## 2024-05-28 DIAGNOSIS — F41.9 ANXIETY: ICD-10-CM

## 2024-05-28 DIAGNOSIS — I50.22 CHRONIC HFREF (HEART FAILURE WITH REDUCED EJECTION FRACTION): Primary | ICD-10-CM

## 2024-05-28 DIAGNOSIS — G89.29 CHRONIC LEFT-SIDED LOW BACK PAIN WITH LEFT-SIDED SCIATICA: ICD-10-CM

## 2024-05-28 DIAGNOSIS — S32.020S COMPRESSION FRACTURE OF L2 VERTEBRA, SEQUELA: ICD-10-CM

## 2024-05-28 DIAGNOSIS — I25.10 CAD, MULTIPLE VESSEL: ICD-10-CM

## 2024-05-28 DIAGNOSIS — I50.22 CHRONIC SYSTOLIC CONGESTIVE HEART FAILURE: ICD-10-CM

## 2024-05-28 DIAGNOSIS — R42 VERTIGO: ICD-10-CM

## 2024-05-28 DIAGNOSIS — R22.43 LOCALIZED SWELLING OF BOTH FEET: ICD-10-CM

## 2024-05-28 DIAGNOSIS — L02.419 CELLULITIS AND ABSCESS OF LOWER EXTREMITY: ICD-10-CM

## 2024-05-28 DIAGNOSIS — L97.511: Primary | ICD-10-CM

## 2024-05-28 DIAGNOSIS — M54.42 CHRONIC LEFT-SIDED LOW BACK PAIN WITH LEFT-SIDED SCIATICA: ICD-10-CM

## 2024-05-28 DIAGNOSIS — L97.521: Primary | ICD-10-CM

## 2024-05-28 PROBLEM — I50.9 CHF (CONGESTIVE HEART FAILURE): Status: RESOLVED | Noted: 2024-02-18 | Resolved: 2024-05-28

## 2024-05-28 PROCEDURE — 1159F MED LIST DOCD IN RCRD: CPT | Performed by: INTERNAL MEDICINE

## 2024-05-28 PROCEDURE — 1160F RVW MEDS BY RX/DR IN RCRD: CPT | Performed by: INTERNAL MEDICINE

## 2024-05-28 PROCEDURE — 99214 OFFICE O/P EST MOD 30 MIN: CPT | Performed by: INTERNAL MEDICINE

## 2024-05-28 PROCEDURE — 99214 OFFICE O/P EST MOD 30 MIN: CPT | Performed by: NURSE PRACTITIONER

## 2024-05-28 PROCEDURE — 1125F AMNT PAIN NOTED PAIN PRSNT: CPT | Performed by: NURSE PRACTITIONER

## 2024-05-28 RX ORDER — METOPROLOL SUCCINATE 25 MG/1
37.5 TABLET, EXTENDED RELEASE ORAL NIGHTLY
Qty: 135 TABLET | Refills: 3 | Status: SHIPPED | OUTPATIENT
Start: 2024-05-28

## 2024-05-28 RX ORDER — GABAPENTIN 300 MG/1
300 CAPSULE ORAL 3 TIMES DAILY
Qty: 90 CAPSULE | Refills: 5 | Status: SHIPPED | OUTPATIENT
Start: 2024-05-28

## 2024-05-28 RX ORDER — DIGOXIN 125 MCG
125 TABLET ORAL DAILY
Qty: 90 TABLET | Refills: 3 | Status: SHIPPED | OUTPATIENT
Start: 2024-05-28

## 2024-05-28 RX ORDER — CEPHALEXIN 500 MG/1
500 CAPSULE ORAL 3 TIMES DAILY
Qty: 30 CAPSULE | Refills: 0 | Status: SHIPPED | OUTPATIENT
Start: 2024-05-28 | End: 2024-06-07

## 2024-05-28 RX ORDER — ATORVASTATIN CALCIUM 80 MG/1
80 TABLET, FILM COATED ORAL DAILY
Qty: 90 TABLET | Refills: 3 | Status: SHIPPED | OUTPATIENT
Start: 2024-05-28

## 2024-05-28 RX ORDER — METOPROLOL SUCCINATE 25 MG/1
25 TABLET, EXTENDED RELEASE ORAL NIGHTLY
Qty: 90 TABLET | Refills: 3 | Status: SHIPPED | OUTPATIENT
Start: 2024-05-28 | End: 2024-05-28

## 2024-05-28 RX ORDER — VERICIGUAT 10 MG/1
1 TABLET, FILM COATED ORAL DAILY
Qty: 90 TABLET | Refills: 3 | Status: SHIPPED | OUTPATIENT
Start: 2024-05-28

## 2024-05-28 RX ORDER — FUROSEMIDE 40 MG/1
40 TABLET ORAL 2 TIMES DAILY
Qty: 60 TABLET | Refills: 0 | Status: SHIPPED | OUTPATIENT
Start: 2024-05-28 | End: 2024-05-28 | Stop reason: ALTCHOICE

## 2024-05-28 RX ORDER — DULOXETIN HYDROCHLORIDE 60 MG/1
60 CAPSULE, DELAYED RELEASE ORAL DAILY
Qty: 90 CAPSULE | Refills: 3 | Status: SHIPPED | OUTPATIENT
Start: 2024-05-28

## 2024-05-28 RX ORDER — FUROSEMIDE 10 MG/ML
80 INJECTION INTRAMUSCULAR; INTRAVENOUS ONCE
Status: DISCONTINUED | OUTPATIENT
Start: 2024-05-28 | End: 2024-05-28

## 2024-05-28 RX ORDER — SPIRONOLACTONE 100 MG/1
50 TABLET, FILM COATED ORAL DAILY
Qty: 30 TABLET | Refills: 0 | Status: SHIPPED | OUTPATIENT
Start: 2024-05-28

## 2024-05-28 RX ORDER — CEPHALEXIN 500 MG/1
500 CAPSULE ORAL 3 TIMES DAILY
Qty: 30 CAPSULE | Refills: 0 | Status: SHIPPED | OUTPATIENT
Start: 2024-05-28 | End: 2024-05-28

## 2024-05-28 RX ORDER — MECLIZINE HYDROCHLORIDE 25 MG/1
25 TABLET ORAL 3 TIMES DAILY PRN
Qty: 90 TABLET | Refills: 3 | Status: SHIPPED | OUTPATIENT
Start: 2024-05-28

## 2024-05-28 RX ORDER — BUMETANIDE 2 MG/1
2 TABLET ORAL 2 TIMES DAILY
Qty: 180 TABLET | Refills: 3 | Status: SHIPPED | OUTPATIENT
Start: 2024-05-28

## 2024-05-28 NOTE — ASSESSMENT & PLAN NOTE
Potassium was low at last check.  He did not get his labs prior to this visit.  We have attempted to draw labs today but were unsuccessful.  He will go get labs over the next couple days and we will recheck his potassium.

## 2024-05-28 NOTE — ASSESSMENT & PLAN NOTE
Patient has a history of CAD with PCI to the LAD. He does need a staged PCI to the circumflex but he is considered too high risk.  Continue to treat medically.

## 2024-05-28 NOTE — ASSESSMENT & PLAN NOTE
Bradley has smoked cigarettes for multiple years.  He has attempted to cut back but is not ready to abstain from cigarettes completely.  We have discussed the risks of continuing to use tobacco products.  Bradley is not ready to abstain from smoking at this time.  We have encouraged him to cut back and he intends on quitting.  Smoking cessation counseling attempted for approximately 3 minutes.

## 2024-05-28 NOTE — ASSESSMENT & PLAN NOTE
Mr. Huerta has heart failure with reduced ejection fraction who has not been doing well over the past couple weeks.  He has been out of Verquvo 10 mg for approximately 1 month and has not taken the rest of his medications as prescribed.  He is short of air and has bilateral lower extremity edema with weeping of the legs and feet.  I will make the following changes to his heart failure medications.    1.  Discontinue Lasix and start Bumex 2 mg twice daily.  2.  Increase metoprolol 100 mg take 1-1/2 tablets at night.  3.  Do a heart rate blood pressure and daily weight log and bring it to next appointment.  4.  Do blood work in 1 to 2 days and 1 to 2 days prior to next visit.

## 2024-05-28 NOTE — PROGRESS NOTES
Chief Complaint  Anxiety (2mo f/u)    Subjective          Bradley Crane is a 69 y.o. male who presents to [facility]    History of Present Illness    Has gained 24 pounds since last visit, last time didn't have enough to have paracentesis. Bradley hasn't been taking showers or bathing because he's afraid he will fall, Tasha has placed a mat in the tub so he won't fall. She states he doesn't want to do anything. He counted the steps and doesn't like the number 13 which is the number of steps.  Has moved to [place] in [place].     He's been soaking feet and applying cream to them.  Feet are swelling but he's not sitting with legs raised up.Bilateral feet swelling with open areas on feet. Soaking feet twice a day.    PHQ-2 Total Score:     PHQ-9 Total Score:        Review of Systems       Medical History: has a past medical history of Ascites, CAD (coronary artery disease), CHF (congestive heart failure), Cirrhosis, Hypertension, and MI (myocardial infarction).     Surgical History: has a past surgical history that includes Cardiac surgery; Cardiac catheterization (N/A, 09/05/2023); and Cardiac defibrillator placement.     Family History: family history is not on file.     Social History: reports that he has been smoking cigarettes. He does not have any smokeless tobacco history on file. He reports that he does not currently use alcohol. He reports that he does not use drugs.    Allergies: Patient has no known allergies.      Health Maintenance Due   Topic Date Due    COLORECTAL CANCER SCREENING  Never done    ZOSTER VACCINE (1 of 2) Never done    Hepatitis B (1 of 3 - Risk 3-dose series) Never done    RSV Vaccine - Adults (1 - 1-dose 60+ series) Never done    ANNUAL WELLNESS VISIT  Never done    COVID-19 Vaccine (1 - 2023-24 season) Never done            Current Outpatient Medications:     albuterol sulfate  (90 Base) MCG/ACT inhaler, Inhale 2 puffs Every 4 (Four) Hours As  Needed for Wheezing., Disp: 18 g, Rfl: 1    ALPRAZolam (Xanax) 1 MG tablet, Take 1 tablet by mouth 3 (Three) Times a Day As Needed for Anxiety., Disp: 90 tablet, Rfl: 0    aspirin 81 MG EC tablet, Take 1 tablet by mouth Daily., Disp: , Rfl:     atorvastatin (LIPITOR) 80 MG tablet, Take 1 tablet by mouth Daily., Disp: 90 tablet, Rfl: 3    Budeson-Glycopyrrol-Formoterol (Breztri Aerosphere) 160-9-4.8 MCG/ACT aerosol inhaler, Inhale 2 puffs 2 (Two) Times a Day., Disp: 30 each, Rfl: 5    cephalexin (Keflex) 500 MG capsule, Take 1 capsule by mouth 3 (Three) Times a Day for 10 days., Disp: 30 capsule, Rfl: 0    digoxin (LANOXIN) 125 MCG tablet, Take 1 tablet by mouth Daily., Disp: 90 tablet, Rfl: 3    DULoxetine (CYMBALTA) 60 MG capsule, Take 1 capsule by mouth Daily., Disp: 90 capsule, Rfl: 3    empagliflozin (Jardiance) 25 MG tablet tablet, Take 1 tablet by mouth Daily., Disp: 30 tablet, Rfl: 11    furosemide (Lasix) 40 MG tablet, Take 1 tablet by mouth 2 (Two) Times a Day for 30 days., Disp: 60 tablet, Rfl: 0    gabapentin (NEURONTIN) 300 MG capsule, Take 1 capsule by mouth 3 (Three) Times a Day., Disp: 90 capsule, Rfl: 5    HYDROcodone-acetaminophen (NORCO) 5-325 MG per tablet, Take 2 tablets by mouth Every 6 (Six) Hours As Needed for Moderate Pain., Disp: 240 tablet, Rfl: 0    ipratropium (ATROVENT HFA) 17 MCG/ACT inhaler, Inhale 2 puffs 2 (Two) Times a Day., Disp: 12.9 g, Rfl: 11    meclizine (ANTIVERT) 25 MG tablet, Take 1 tablet by mouth 3 (Three) Times a Day As Needed for Dizziness., Disp: 90 tablet, Rfl: 3    metoprolol succinate XL (TOPROL-XL) 25 MG 24 hr tablet, Take 1 tablet by mouth Every Night., Disp: 90 tablet, Rfl: 3    midodrine (PROAMATINE) 10 MG tablet, Take 1 tablet by mouth 3 (Three) Times a Day Before Meals., Disp: 30 tablet, Rfl: 0    spironolactone (ALDACTONE) 100 MG tablet, Take 0.5 tablets by mouth Daily., Disp: 30 tablet, Rfl: 0    ticagrelor (BRILINTA) 90 MG tablet tablet, Take 1 tablet by  "mouth 2 (Two) Times a Day., Disp: 60 tablet, Rfl: 3    Vericiguat (Verquvo) 10 MG tablet, Take 1 tablet by mouth Daily., Disp: 90 tablet, Rfl: 3    silver sulfadiazine (SILVADENE, SSD) 1 % cream, Apply 1 Application topically to the appropriate area as directed 2 (Two) Times a Day., Disp: 400 g, Rfl: 1        There is no immunization history on file for this patient.      Objective       Vitals:    24 1031   BP: 102/65   BP Location: Right arm   Patient Position: Sitting   Cuff Size: Adult   Pulse: 102   Temp: 98 °F (36.7 °C)   TempSrc: Temporal   SpO2: 93%   Weight: 92.7 kg (204 lb 6.4 oz)   Height: 185.4 cm (73\")   PainSc:   8   PainLoc: Generalized      Body mass index is 26.97 kg/m².   Wt Readings from Last 3 Encounters:   24 92.7 kg (204 lb 6.4 oz)   24 81.6 kg (180 lb)   24 81.6 kg (180 lb)      BP Readings from Last 3 Encounters:   24 102/65   24 (!) 87/65   24 103/69                Physical Exam  Vitals reviewed.   Constitutional:       Appearance: Normal appearance.   HENT:      Head: Normocephalic and atraumatic.   Cardiovascular:      Rate and Rhythm: Normal rate and regular rhythm.      Pulses: Normal pulses.           Posterior tibial pulses are 1+ on the right side and 1+ on the left side.      Heart sounds: Normal heart sounds.      Comments: Bilateral feet skin has peeled off on top of foot and on soles of feet.    Pulmonary:      Effort: Pulmonary effort is normal.      Breath sounds: Normal breath sounds. Examination of the right-middle field reveals decreased breath sounds. Examination of the left-middle field reveals decreased breath sounds. Examination of the right-lower field reveals decreased breath sounds. Examination of the left-lower field reveals decreased breath sounds.   Abdominal:      General: Bowel sounds are normal. There is distension.   Musculoskeletal:      Right lower le+ Pitting Edema present.      Left lower le+ Pitting Edema " present.   Skin:     General: Skin is warm and dry.   Neurological:      Mental Status: He is alert and oriented to person, place, and time.   Psychiatric:         Mood and Affect: Mood normal.         Behavior: Behavior normal.         Thought Content: Thought content normal.         Judgment: Judgment normal.             Result Review :       Common labs          2/21/2024    03:16 3/7/2024    10:43 4/18/2024    07:56   Common Labs   Glucose 94  80  104    BUN 23  17  16    Creatinine 1.08  1.02  0.80    Sodium 136  137  139    Potassium 3.4  3.6  3.2    Chloride 98  99  100    Calcium 9.0  8.8  8.9    Albumin   3.7    Total Bilirubin   1.7    Alkaline Phosphatase   166    AST (SGOT)   15    ALT (SGPT)   7    WBC   5.28    Hemoglobin   11.8    Hematocrit   35.4    Platelets  225  241                       Assessment and Plan        Diagnoses and all orders for this visit:    1. Ischemic ulcer of both feet limited to breakdown of skin (Primary)  Comments:  Continue warm water soaks a 30 mintues 2-3 times a day then apply silvadine and elevate.  Start oral Kelefx today  Orders:  -     Discontinue: cephalexin (Keflex) 500 MG capsule; Take 1 capsule by mouth 3 (Three) Times a Day for 10 days.  Dispense: 30 capsule; Refill: 0  -     Ambulatory Referral to Home Health  -     silver sulfadiazine (SILVADENE, SSD) 1 % cream; Apply 1 Application topically to the appropriate area as directed 2 (Two) Times a Day.  Dispense: 400 g; Refill: 1  -     cephalexin (Keflex) 500 MG capsule; Take 1 capsule by mouth 3 (Three) Times a Day for 10 days.  Dispense: 30 capsule; Refill: 0    2. Cellulitis and abscess of lower extremity  Comments:  Advised to go to the ER to be evaluated ut he declined to go.    3. Generalized edema    4. Localized swelling of both feet    5. Chronic systolic congestive heart failure  -     Vericiguat (Verquvo) 10 MG tablet; Take 1 tablet by mouth Daily.  Dispense: 90 tablet; Refill: 3          Follow Up      Return in about 3 months (around 8/28/2024).    Patient was given instructions and counseling regarding his condition or for health maintenance advice. Please see specific information pulled into the AVS if appropriate. Patient understands the importance of having any ordered tests to be performed in a timely fashion.    I spent 30 minutes caring for Bradley on this date of service. This time includes time spent by me in the following activities: preparing for the visit, reviewing tests, obtaining and/or reviewing a separately obtained history, ordering medications, tests, or procedures, referring and communicating with other health care professionals, documenting information in the medical record, independently interpreting results and communicating that information with the patient/family/caregiver, and care coordination      SRIKANTH Myers

## 2024-05-28 NOTE — PATIENT INSTRUCTIONS
1.  Discontinue Lasix and start Bumex 2 mg twice daily.  2.  Increase metoprolol 100 mg take 1-1/2 tablets at night.  3.  Do a heart rate blood pressure and daily weight log and bring it to next appointment.  4.  Do blood work in 1 to 2 days and 1 to 2 days prior to next visit.

## 2024-05-28 NOTE — PROGRESS NOTES
Office Visit    Chief Complaint  Chronic systolic congestive heart failure    Subjective            Bradley Crane presents to [facility]  History of Present Illness  Bradley is a 69 years old gentleman with coronary artery disease, ischemic heart disease with ischemic cardiomyopathy who is in the office for follow-up.  His feet are swollen and weeping.  He is short of air with minimal activity.  Bradley did not have his labs done prior to the visit today.  We Attempted to draw labs today and provide Mr. Crane IV Lasix, but we were unable to gain access on her first attempt.  Bradley reports that he was feeling dizzy and will not permit a second attempt.  He will have labs done in the next 2 days and change Lasix to Bumex.  He denies any chest pain or syncopal episodes.  Per his significant other that is present with him today, Bradley has not taking medications as prescribed over the past week or so.  He has completely been out of Verquvo for about 1 month.  She could not find his list in pill planner was still full.  They plan on getting back on track.      Past Medical History:   Diagnosis Date    Ascites     CAD (coronary artery disease)     CHF (congestive heart failure)     Cirrhosis     Hypertension     MI (myocardial infarction)        No Known Allergies     Past Surgical History:   Procedure Laterality Date    CARDIAC CATHETERIZATION N/A 09/05/2023    Procedure: Right and LHC with possible coronary intevention;  Surgeon: Merly Ji MD;  Location: [facility] CATH INVASIVE LOCATION;  Service: Cardiology;  Laterality: N/A;    CARDIAC DEFIBRILLATOR PLACEMENT      CARDIAC SURGERY          Social History     Tobacco Use    Smoking status: Every Day     Current packs/day: 0.50     Types: Cigarettes   Vaping Use    Vaping status: Never Used   Substance Use Topics    Alcohol use: Not Currently    Drug use: Never       History reviewed. No pertinent family history.     Prior to Admission  medications    Medication Sig Start Date End Date Taking? Authorizing Provider   albuterol sulfate  (90 Base) MCG/ACT inhaler Inhale 2 puffs Every 4 (Four) Hours As Needed for Wheezing. 2/26/24  Yes Rosa Isela Kim APRN   ALPRAZolam (Xanax) 1 MG tablet Take 1 tablet by mouth 3 (Three) Times a Day As Needed for Anxiety. 5/20/24  Yes Rosa Isela Kim APRN   aspirin 81 MG EC tablet Take 1 tablet by mouth Daily.   Yes Provider, MD Joshua   atorvastatin (LIPITOR) 80 MG tablet Take 1 tablet by mouth Daily. 5/28/24  Yes Rosa Isela Kim APRN   Budeson-Glycopyrrol-Formoterol (Breztri Aerosphere) 160-9-4.8 MCG/ACT aerosol inhaler Inhale 2 puffs 2 (Two) Times a Day. 5/3/24  Yes Rosa Isela Kim APRN   cephalexin (Keflex) 500 MG capsule Take 1 capsule by mouth 3 (Three) Times a Day for 10 days. 5/28/24 6/7/24 Yes Rosa Isela Kim APRN   digoxin (LANOXIN) 125 MCG tablet Take 1 tablet by mouth Daily. 5/28/24  Yes Rosa Isela Kim APRN   DULoxetine (CYMBALTA) 60 MG capsule Take 1 capsule by mouth Daily. 5/28/24  Yes Rosa Isela Kim APRN   empagliflozin (Jardiance) 25 MG tablet tablet Take 1 tablet by mouth Daily. 1/12/24  Yes Christy Zuniga MD   furosemide (Lasix) 40 MG tablet Take 1 tablet by mouth 2 (Two) Times a Day for 30 days. 5/28/24 6/27/24 Yes Rosa Isela Kim APRN   gabapentin (NEURONTIN) 300 MG capsule Take 1 capsule by mouth 3 (Three) Times a Day. 5/28/24  Yes Rosa Isela Kim APRN   HYDROcodone-acetaminophen (NORCO) 5-325 MG per tablet Take 2 tablets by mouth Every 6 (Six) Hours As Needed for Moderate Pain. 5/20/24  Yes Rosa Isela Kim APRN   ipratropium (ATROVENT HFA) 17 MCG/ACT inhaler Inhale 2 puffs 2 (Two) Times a Day. 3/27/24  Yes Rosa Isela Kim APRN   meclizine (ANTIVERT) 25 MG tablet Take 1 tablet by mouth 3 (Three) Times a Day As Needed for Dizziness. 5/28/24  Yes Rosa Isela Kim APRN   metoprolol succinate XL (TOPROL-XL) 25 MG 24 hr tablet Take 1 tablet  by mouth Every Night. 5/28/24  Yes Rosa Isela Kim APRN   midodrine (PROAMATINE) 10 MG tablet Take 1 tablet by mouth 3 (Three) Times a Day Before Meals. 2/22/24  Yes Gilberto Medel,    silver sulfadiazine (SILVADENE, SSD) 1 % cream Apply 1 Application topically to the appropriate area as directed 2 (Two) Times a Day. 5/28/24  Yes Rosa Isela Kim APRN   spironolactone (ALDACTONE) 100 MG tablet Take 0.5 tablets by mouth Daily. 5/28/24  Yes Rosa Isela Kim APRN   ticagrelor (BRILINTA) 90 MG tablet tablet Take 1 tablet by mouth 2 (Two) Times a Day. 1/19/24  Yes Rosa Isela Kim APRN   Vericiguat (Verquvo) 10 MG tablet Take 1 tablet by mouth Daily. 5/28/24  Yes Rosa Isela Kim APRN   atorvastatin (LIPITOR) 80 MG tablet Take 1 tablet by mouth Daily. 10/19/23 5/28/24  Angel Roy MD   cephalexin (Keflex) 500 MG capsule Take 1 capsule by mouth 3 (Three) Times a Day for 10 days. 5/28/24 5/28/24  Rosa Isela Kim APRN   digoxin (LANOXIN) 125 MCG tablet Take 1 tablet by mouth Daily. 1/22/24 5/28/24  Rosa Isela Kim APRN   DULoxetine (CYMBALTA) 60 MG capsule Take 1 capsule by mouth Daily. 3/27/24 5/28/24  Rosa Isela Kim APRN   furosemide (Lasix) 40 MG tablet Take 1 tablet by mouth 2 (Two) Times a Day for 30 days. 2/22/24 5/28/24  Gilberto Medel,    gabapentin (NEURONTIN) 300 MG capsule Take 1 capsule by mouth 3 (Three) Times a Day. 3/27/24 5/28/24  Rosa Isela Kim APRN   meclizine (ANTIVERT) 25 MG tablet Take 1 tablet by mouth 3 (Three) Times a Day As Needed for Dizziness. 3/12/24 5/28/24  Rosa Isela Kim APRN   metoprolol succinate XL (TOPROL-XL) 25 MG 24 hr tablet Take 1 tablet by mouth Every Night. 4/17/24 5/28/24  Beulah Dong APRN   spironolactone (ALDACTONE) 100 MG tablet Take 0.5 tablets by mouth Daily. 2/23/24 5/28/24  Gilberto Medel DO   Vericiguat (Verquvo) 10 MG tablet Take 1 tablet by mouth Daily. 12/27/23 5/28/24  Christy Zuniga MD        Review of Systems  "  Constitutional:  Negative for fatigue.   Respiratory:  Positive for cough. Negative for shortness of breath.    Cardiovascular:  Positive for palpitations and leg swelling. Negative for chest pain.   Neurological:  Positive for dizziness.        Symptom Course: Worsened    Weight Trend: Stable     Objective     /76   Pulse 70   Ht 185.4 cm (73\")   Wt 92.5 kg (204 lb)   BMI 26.91 kg/m²       Physical Exam  Constitutional:       General: He is awake.      Appearance: Normal appearance.   Neck:      Thyroid: No thyromegaly.      Vascular: No carotid bruit or JVD.   Cardiovascular:      Rate and Rhythm: Normal rate and regular rhythm.      Chest Wall: PMI is not displaced.      Pulses: Normal pulses.      Heart sounds: Normal heart sounds, S1 normal and S2 normal. No murmur heard.     No friction rub. No gallop. No S3 or S4 sounds.   Pulmonary:      Effort: Pulmonary effort is normal.      Breath sounds: Normal breath sounds and air entry. No wheezing, rhonchi or rales.   Abdominal:      General: Bowel sounds are normal.      Palpations: Abdomen is soft. There is no mass.      Tenderness: There is no abdominal tenderness.   Musculoskeletal:      Cervical back: Neck supple.      Right lower le+ Edema present.      Left lower le+ Edema present.   Neurological:      Mental Status: He is alert and oriented to person, place, and time.   Psychiatric:         Mood and Affect: Mood normal.         Behavior: Behavior is cooperative.           Result Review :                      Lab Results   Component Value Date    PROBNP 6,424.0 (H) 2024    PROBNP 13,716.0 (H) 2024    PROBNP 6,436.0 (H) 2024     CMP          2024    03:16 3/7/2024    10:43 2024    07:56   CMP   Glucose 94  80  104    BUN 23  17  16    Creatinine 1.08  1.02  0.80    EGFR 75.7  81.1  97.6    Sodium 136  137  139    Potassium 3.4  3.6  3.2    Chloride 98  99  100    Calcium 9.0  8.8  8.9    Total Protein   7.0  " "  Albumin   3.7    Globulin   3.3    Total Bilirubin   1.7    Alkaline Phosphatase   166    AST (SGOT)   15    ALT (SGPT)   7    Albumin/Globulin Ratio   1.1    BUN/Creatinine Ratio 21.3  16.7  20.0    Anion Gap 14.6  12.2  11.4      CBC w/diff          2/19/2024    17:17 3/7/2024    10:43 4/18/2024    07:56   CBC w/Diff   WBC 3.94   5.28    RBC 3.49   3.68    Hemoglobin 11.1   11.8    Hematocrit 33.3   35.4    MCV 95.4   96.2    MCH 31.8   32.1    MCHC 33.3   33.3    RDW 16.0   15.3    Platelets 239  225  241    Neutrophil Rel % 46.4   64.3    Immature Granulocyte Rel % 0.3   0.2    Lymphocyte Rel % 25.4   21.6    Monocyte Rel % 25.6   10.8    Eosinophil Rel % 0.5   2.3    Basophil Rel % 1.8   0.8       Lipid Panel          9/6/2023    04:34 1/25/2024    09:22   Lipid Panel   Total Cholesterol 96  82    Triglycerides 44  48    HDL Cholesterol 43  39    VLDL Cholesterol 12  13    LDL Cholesterol  41  30    LDL/HDL Ratio 1.03  0.86       Lab Results   Component Value Date    TSH 2.960 10/25/2023    TSH 2.040 08/29/2023      Lab Results   Component Value Date    FREET4 1.70 10/25/2023      No results found for: \"DDIMERQUANT\"  Magnesium   Date Value Ref Range Status   04/18/2024 1.7 1.6 - 2.4 mg/dL Final      Digoxin   Date Value Ref Range Status   04/18/2024 0.60 0.60 - 1.20 ng/mL Final      A1C Last 3 Results          9/6/2023    04:34   HGBA1C Last 3 Results   Hemoglobin A1C 5.70           Results for orders placed during the hospital encounter of 02/18/24    Adult Transthoracic Echo Complete W/ Cont if Necessary Per Protocol    Interpretation Summary    Left ventricular ejection fraction appears to be less than 20%.    The left ventricular cavity is mildly dilated.    Left ventricular diastolic function was indeterminate.    Moderately reduced right ventricular systolic function noted.    The right ventricular cavity is mildly dilated.    The left atrial cavity is mild to moderately dilated.    The right atrial " cavity is mild to moderately  dilated.    Aortic valve is very sclerotic.  The max/mean pressure gradient is only 25/12 mmHg.  This could be a low flow/low gradient due to depressed ejection fraction.  Could consider dobutamine echo.    Mild to moderate mitral and tricuspid regurgitation.    Pleural effusion is noted.        Assessment and Plan        Diagnoses and all orders for this visit:    1. Chronic HFrEF (heart failure with reduced ejection fraction) (Primary)  Assessment & Plan:  Mr. Huerta has heart failure with reduced ejection fraction who has not been doing well over the past couple weeks.  He has been out of Verquvo 10 mg for approximately 1 month and has not taken the rest of his medications as prescribed.  He is short of air and has bilateral lower extremity edema with weeping of the legs and feet.  I will make the following changes to his heart failure medications.    1.  Discontinue Lasix and start Bumex 2 mg twice daily.  2.  Increase metoprolol 100 mg take 1-1/2 tablets at night.  3.  Do a heart rate blood pressure and daily weight log and bring it to next appointment.  4.  Do blood work in 1 to 2 days and 1 to 2 days prior to next visit.    Orders:  -     Cancel: CBC & Differential  -     Cancel: Comprehensive Metabolic Panel  -     Cancel: proBNP  -     Discontinue: furosemide (LASIX) injection 80 mg  -     Cancel: Digoxin Level  -     Cancel: Magnesium  -     CBC & Differential; Future  -     Comprehensive Metabolic Panel; Future  -     Digoxin Level; Future  -     proBNP; Future  -     Magnesium; Future    2. CAD, multiple vessel  Assessment & Plan:  Patient has a history of CAD with PCI to the LAD. He does need a staged PCI to the circumflex but he is considered too high risk.  Continue to treat medically.    Orders:  -     Cancel: CBC & Differential  -     Cancel: Comprehensive Metabolic Panel  -     Cancel: proBNP  -     Discontinue: furosemide (LASIX) injection 80 mg  -     Cancel: Digoxin  Level  -     Cancel: Magnesium  -     CBC & Differential; Future  -     Comprehensive Metabolic Panel; Future  -     Digoxin Level; Future  -     proBNP; Future  -     Magnesium; Future    3. Hyperlipidemia LDL goal <70  Assessment & Plan:  Lipids are well-controlled with the current dose of atorvastatin.  I will continue the same.      4. Hypokalemia  Assessment & Plan:  Potassium was low at last check.  He did not get his labs prior to this visit.  We have attempted to draw labs today but were unsuccessful.  He will go get labs over the next couple days and we will recheck his potassium.      5. Smoker  Assessment & Plan:  Bradley has smoked cigarettes for multiple years.  He has attempted to cut back but is not ready to abstain from cigarettes completely.  We have discussed the risks of continuing to use tobacco products.  Bradley is not ready to abstain from smoking at this time.  We have encouraged him to cut back but intends on quitting.  Smoking cessation counseling attempted for approximately 3 minutes.      6. Chronic systolic congestive heart failure  -     metoprolol succinate XL (TOPROL-XL) 25 MG 24 hr tablet; Take 1.5 tablets by mouth Every Night.  Dispense: 135 tablet; Refill: 3    Other orders  -     bumetanide (BUMEX) 2 MG tablet; Take 1 tablet by mouth 2 (Two) Times a Day.  Dispense: 180 tablet; Refill: 3            Follow Up     Return for 3 weeks with Rosa KENT.    Patient was given instructions and counseling regarding his condition or for health maintenance advice. Please see specific information pulled into the AVS if appropriate.     Christy Zuniga MD  05/28/24 12:01 EDT

## 2024-06-17 ENCOUNTER — TRANSCRIBE ORDERS (OUTPATIENT)
Dept: ADMINISTRATIVE | Facility: HOSPITAL | Age: 69
End: 2024-06-17
Payer: MEDICARE

## 2024-06-17 DIAGNOSIS — M54.41 CHRONIC BILATERAL LOW BACK PAIN WITH RIGHT-SIDED SCIATICA: ICD-10-CM

## 2024-06-17 DIAGNOSIS — G89.29 CHRONIC BILATERAL LOW BACK PAIN WITH RIGHT-SIDED SCIATICA: ICD-10-CM

## 2024-06-17 DIAGNOSIS — K70.31 ASCITES DUE TO ALCOHOLIC CIRRHOSIS: Primary | ICD-10-CM

## 2024-06-17 DIAGNOSIS — S32.020S COMPRESSION FRACTURE OF L2 VERTEBRA, SEQUELA: ICD-10-CM

## 2024-06-17 DIAGNOSIS — F41.9 ANXIETY: ICD-10-CM

## 2024-06-17 RX ORDER — ALPRAZOLAM 1 MG/1
1 TABLET ORAL 3 TIMES DAILY PRN
Qty: 90 TABLET | Refills: 0 | Status: SHIPPED | OUTPATIENT
Start: 2024-06-17

## 2024-06-17 RX ORDER — HYDROCODONE BITARTRATE AND ACETAMINOPHEN 5; 325 MG/1; MG/1
2 TABLET ORAL EVERY 6 HOURS PRN
Qty: 240 TABLET | Refills: 0 | Status: SHIPPED | OUTPATIENT
Start: 2024-06-17

## 2024-06-20 ENCOUNTER — HOSPITAL ENCOUNTER (OUTPATIENT)
Dept: INTERVENTIONAL RADIOLOGY/VASCULAR | Facility: HOSPITAL | Age: 69
Discharge: HOME | End: 2024-06-20
Payer: MEDICARE

## 2024-06-20 ENCOUNTER — LAB (OUTPATIENT)
Dept: LAB | Facility: HOSPITAL | Age: 69
End: 2024-06-20
Payer: MEDICARE

## 2024-06-20 VITALS
SYSTOLIC BLOOD PRESSURE: 102 MMHG | DIASTOLIC BLOOD PRESSURE: 71 MMHG | OXYGEN SATURATION: 97 % | HEART RATE: 84 BPM | RESPIRATION RATE: 16 BRPM

## 2024-06-20 DIAGNOSIS — K70.31 ASCITES DUE TO ALCOHOLIC CIRRHOSIS: ICD-10-CM

## 2024-06-20 LAB
ALBUMIN FLD-MCNC: 1.3 G/DL
AMYLASE FLD-CCNC: 14 U/L
APPEARANCE FLD: CLEAR
APTT PPP: 34.1 SECONDS (ref 24.2–34.2)
COLOR FLD: YELLOW
GLUCOSE FLD-MCNC: 103 MG/DL
INR PPP: 1.22 (ref 0.86–1.15)
LDH FLD-CCNC: 65 U/L
LYMPHOCYTES NFR FLD MANUAL: 28 %
MACROPHAGE FLUID: 47 %
MESOTHL CELL NFR FLD MANUAL: 6 %
MONOCYTES NFR FLD: 11 %
NEUTROPHILS NFR FLD MANUAL: 8 %
NUC CELL # FLD: 443 /MM3
PLATELETS (CITRATED) BY AUTOMATED COUNT: 210 10*3/MM3 (ref 140–450)
PROT FLD-MCNC: 2.3 G/DL
PROTHROMBIN TIME: 15.6 SECONDS (ref 11.8–14.9)
RBC # FLD AUTO: <2000 /MM3

## 2024-06-20 PROCEDURE — 82150 ASSAY OF AMYLASE: CPT | Performed by: NURSE PRACTITIONER

## 2024-06-20 PROCEDURE — 87075 CULTR BACTERIA EXCEPT BLOOD: CPT | Performed by: NURSE PRACTITIONER

## 2024-06-20 PROCEDURE — 85610 PROTHROMBIN TIME: CPT | Performed by: NURSE PRACTITIONER

## 2024-06-20 PROCEDURE — 87015 SPECIMEN INFECT AGNT CONCNTJ: CPT | Performed by: NURSE PRACTITIONER

## 2024-06-20 PROCEDURE — 87070 CULTURE OTHR SPECIMN AEROBIC: CPT | Performed by: NURSE PRACTITIONER

## 2024-06-20 PROCEDURE — C1729 CATH, DRAINAGE: HCPCS

## 2024-06-20 PROCEDURE — 82042 OTHER SOURCE ALBUMIN QUAN EA: CPT | Performed by: NURSE PRACTITIONER

## 2024-06-20 PROCEDURE — 87205 SMEAR GRAM STAIN: CPT | Performed by: NURSE PRACTITIONER

## 2024-06-20 PROCEDURE — 89051 BODY FLUID CELL COUNT: CPT | Performed by: NURSE PRACTITIONER

## 2024-06-20 PROCEDURE — 85049 AUTOMATED PLATELET COUNT: CPT | Performed by: NURSE PRACTITIONER

## 2024-06-20 PROCEDURE — 76942 ECHO GUIDE FOR BIOPSY: CPT

## 2024-06-20 PROCEDURE — 83615 LACTATE (LD) (LDH) ENZYME: CPT | Performed by: NURSE PRACTITIONER

## 2024-06-20 PROCEDURE — 82945 GLUCOSE OTHER FLUID: CPT | Performed by: NURSE PRACTITIONER

## 2024-06-20 PROCEDURE — 84157 ASSAY OF PROTEIN OTHER: CPT | Performed by: NURSE PRACTITIONER

## 2024-06-20 PROCEDURE — 85730 THROMBOPLASTIN TIME PARTIAL: CPT | Performed by: NURSE PRACTITIONER

## 2024-06-20 PROCEDURE — 87102 FUNGUS ISOLATION CULTURE: CPT | Performed by: NURSE PRACTITIONER

## 2024-06-20 RX ORDER — LIDOCAINE HYDROCHLORIDE 20 MG/ML
20 INJECTION, SOLUTION INFILTRATION; PERINEURAL ONCE
Status: COMPLETED | OUTPATIENT
Start: 2024-06-20 | End: 2024-06-20

## 2024-06-20 RX ADMIN — SODIUM BICARBONATE 1 MEQ: 84 INJECTION, SOLUTION INTRAVENOUS at 12:30

## 2024-06-20 RX ADMIN — LIDOCAINE HYDROCHLORIDE 20 ML: 20 INJECTION, SOLUTION INFILTRATION; PERINEURAL at 12:30

## 2024-06-21 LAB — Lab: NORMAL

## 2024-06-23 LAB
BACTERIA FLD CULT: NORMAL
BACTERIA SPEC ANAEROBE CULT: NORMAL
GRAM STN SPEC: NORMAL

## 2024-06-24 LAB
CYTO UR: NORMAL
LAB AP CASE REPORT: NORMAL
LAB AP CLINICAL INFORMATION: NORMAL
PATH REPORT.FINAL DX SPEC: NORMAL
PATH REPORT.GROSS SPEC: NORMAL

## 2024-06-25 LAB
B T CELL RESULT: NORMAL
BACTERIA SPEC ANAEROBE CULT: NORMAL

## 2024-06-27 LAB
CCV RESULT: NORMAL
FUNGUS WND CULT: NORMAL

## 2024-07-01 RX ORDER — FUROSEMIDE 40 MG/1
40 TABLET ORAL 2 TIMES DAILY
Qty: 60 TABLET | Refills: 11 | Status: SHIPPED | OUTPATIENT
Start: 2024-07-01

## 2024-07-03 ENCOUNTER — TELEPHONE (OUTPATIENT)
Dept: GASTROENTEROLOGY | Facility: CLINIC | Age: 69
End: 2024-07-03

## 2024-07-03 ENCOUNTER — TELEPHONE (OUTPATIENT)
Dept: FAMILY MEDICINE CLINIC | Facility: CLINIC | Age: 69
End: 2024-07-03
Payer: MEDICARE

## 2024-07-03 DIAGNOSIS — K70.31 ASCITES DUE TO ALCOHOLIC CIRRHOSIS: Primary | ICD-10-CM

## 2024-07-03 DIAGNOSIS — R42 VERTIGO: ICD-10-CM

## 2024-07-03 DIAGNOSIS — I50.9 ACUTE ON CHRONIC CONGESTIVE HEART FAILURE, UNSPECIFIED HEART FAILURE TYPE: ICD-10-CM

## 2024-07-03 DIAGNOSIS — R60.1 GENERALIZED EDEMA: ICD-10-CM

## 2024-07-03 DIAGNOSIS — R22.43 LOCALIZED SWELLING OF BOTH FEET: ICD-10-CM

## 2024-07-03 RX ORDER — MECLIZINE HYDROCHLORIDE 25 MG/1
25 TABLET ORAL 3 TIMES DAILY PRN
Qty: 90 TABLET | Refills: 3 | Status: SHIPPED | OUTPATIENT
Start: 2024-07-03

## 2024-07-03 NOTE — TELEPHONE ENCOUNTER
Caller: LOUIS SPENCER    Relationship: Emergency Contact    Best call back number: [phone]     What was the call regarding: PATIENT'S WIFE STATES THAT THE PATIENT'S DIZZY PILLS WERE SENT TO A PHARMACY IN [place] AND THEY NEED THEM TO GO TO   [address] [phone] [facility] [phone]       SHE STATES THAT SHE DOES NOT KNOW THE NAME OF THE MEDICATION BUT THAT BLAS WOULD KNOW.

## 2024-07-03 NOTE — TELEPHONE ENCOUNTER
I contacted Bradley Crane 1955 regarding the appointment no show with SRIKANTH Wick on 07/03/24@10:00. Patient is aware that there is a 24-hour cancellation policy and understands that a no-show letter will be mailed to them at the address on file.The patient declined to reschedule at this time.

## 2024-07-04 LAB — FUNGUS WND CULT: NORMAL

## 2024-07-09 ENCOUNTER — REFERRAL TRIAGE (OUTPATIENT)
Dept: CASE MANAGEMENT | Facility: OTHER | Age: 69
End: 2024-07-09
Payer: MEDICARE

## 2024-07-09 ENCOUNTER — OFFICE VISIT (OUTPATIENT)
Dept: FAMILY MEDICINE CLINIC | Facility: CLINIC | Age: 69
End: 2024-07-09
Payer: MEDICARE

## 2024-07-09 VITALS
DIASTOLIC BLOOD PRESSURE: 74 MMHG | WEIGHT: 162.8 LBS | OXYGEN SATURATION: 91 % | BODY MASS INDEX: 21.57 KG/M2 | TEMPERATURE: 97.8 F | HEIGHT: 73 IN | SYSTOLIC BLOOD PRESSURE: 115 MMHG | HEART RATE: 76 BPM

## 2024-07-09 DIAGNOSIS — K70.31 ASCITES DUE TO ALCOHOLIC CIRRHOSIS: ICD-10-CM

## 2024-07-09 DIAGNOSIS — J44.9 COPD MIXED TYPE: Primary | ICD-10-CM

## 2024-07-09 DIAGNOSIS — R06.02 SHORTNESS OF BREATH: ICD-10-CM

## 2024-07-09 DIAGNOSIS — I50.22 CHRONIC HFREF (HEART FAILURE WITH REDUCED EJECTION FRACTION): ICD-10-CM

## 2024-07-09 PROCEDURE — 1160F RVW MEDS BY RX/DR IN RCRD: CPT | Performed by: NURSE PRACTITIONER

## 2024-07-09 PROCEDURE — 1159F MED LIST DOCD IN RCRD: CPT | Performed by: NURSE PRACTITIONER

## 2024-07-09 PROCEDURE — 99214 OFFICE O/P EST MOD 30 MIN: CPT | Performed by: NURSE PRACTITIONER

## 2024-07-09 PROCEDURE — 1125F AMNT PAIN NOTED PAIN PRSNT: CPT | Performed by: NURSE PRACTITIONER

## 2024-07-09 RX ORDER — IPRATROPIUM BROMIDE AND ALBUTEROL SULFATE 2.5; .5 MG/3ML; MG/3ML
3 SOLUTION RESPIRATORY (INHALATION) EVERY 4 HOURS PRN
Qty: 360 ML | Refills: 1 | Status: SHIPPED | OUTPATIENT
Start: 2024-07-09

## 2024-07-09 RX ORDER — MIDODRINE HYDROCHLORIDE 5 MG/1
5 TABLET ORAL
COMMUNITY
Start: 2024-07-01

## 2024-07-09 RX ORDER — IPRATROPIUM BROMIDE AND ALBUTEROL SULFATE 2.5; .5 MG/3ML; MG/3ML
3 SOLUTION RESPIRATORY (INHALATION)
Status: SHIPPED | OUTPATIENT
Start: 2024-07-09

## 2024-07-09 NOTE — PROGRESS NOTES
Chief Complaint  Hospital Follow Up Visit    Subjective          Bradley Crane is a 69 y.o. male who presents to [facility]    History of Present Illness    Bradley is here today for hospital follow up.  States pain medication doesn't work anymore.  Dizzy pills don't work anymore.  Feels like he might collapse when going from sitting to standing.  Has to stay in car when goes to they go to the store.  Unable to walk very far without getting weak and short of air.  Ex wife states while he was in [place] he had a paracentesis and his right lung drained.  Down 40 pounds since February.  Feels like he is getting stronger.     PHQ-2 Total Score:     PHQ-9 Total Score:          Review of Systems       Medical History: has a past medical history of Ascites, CAD (coronary artery disease), CHF (congestive heart failure), Cirrhosis, Hypertension, and MI (myocardial infarction).     Surgical History: has a past surgical history that includes Cardiac surgery; Cardiac catheterization (N/A, 09/05/2023); and Cardiac defibrillator placement.     Family History: family history is not on file.     Social History: reports that he has been smoking cigarettes. He has never been exposed to tobacco smoke. He does not have any smokeless tobacco history on file. He reports that he does not currently use alcohol. He reports that he does not use drugs.    Allergies: Patient has no known allergies.      Health Maintenance Due   Topic Date Due    COLORECTAL CANCER SCREENING  Never done    ZOSTER VACCINE (1 of 2) Never done    Hepatitis B (1 of 3 - Risk 3-dose series) Never done    ANNUAL WELLNESS VISIT  Never done    COVID-19 Vaccine (1 - 2023-24 season) Never done            Current Outpatient Medications:     albuterol sulfate  (90 Base) MCG/ACT inhaler, Inhale 2 puffs Every 4 (Four) Hours As Needed for Wheezing., Disp: 18 g, Rfl: 1    ALPRAZolam (Xanax) 1 MG tablet, Take 1 tablet by mouth 3 (Three) Times  a Day As Needed for Anxiety., Disp: 90 tablet, Rfl: 0    aspirin 81 MG EC tablet, Take 1 tablet by mouth Daily., Disp: , Rfl:     atorvastatin (LIPITOR) 80 MG tablet, Take 1 tablet by mouth Daily., Disp: 90 tablet, Rfl: 3    Budeson-Glycopyrrol-Formoterol (Breztri Aerosphere) 160-9-4.8 MCG/ACT aerosol inhaler, Inhale 2 puffs 2 (Two) Times a Day., Disp: 30 each, Rfl: 5    bumetanide (BUMEX) 2 MG tablet, Take 1 tablet by mouth 2 (Two) Times a Day., Disp: 180 tablet, Rfl: 3    digoxin (LANOXIN) 125 MCG tablet, Take 1 tablet by mouth Daily., Disp: 90 tablet, Rfl: 3    DULoxetine (CYMBALTA) 60 MG capsule, Take 1 capsule by mouth Daily., Disp: 90 capsule, Rfl: 3    empagliflozin (Jardiance) 25 MG tablet tablet, Take 1 tablet by mouth Daily., Disp: 30 tablet, Rfl: 11    furosemide (LASIX) 40 MG tablet, TAKE 1 TABLET TWICE DAILY, Disp: 60 tablet, Rfl: 11    gabapentin (NEURONTIN) 300 MG capsule, Take 1 capsule by mouth 3 (Three) Times a Day., Disp: 90 capsule, Rfl: 5    HYDROcodone-acetaminophen (NORCO) 5-325 MG per tablet, Take 2 tablets by mouth Every 6 (Six) Hours As Needed for Moderate Pain., Disp: 240 tablet, Rfl: 0    ipratropium (ATROVENT HFA) 17 MCG/ACT inhaler, Inhale 2 puffs 2 (Two) Times a Day., Disp: 12.9 g, Rfl: 11    meclizine (ANTIVERT) 25 MG tablet, Take 1 tablet by mouth 3 (Three) Times a Day As Needed for Dizziness., Disp: 90 tablet, Rfl: 3    metoprolol succinate XL (TOPROL-XL) 25 MG 24 hr tablet, Take 1.5 tablets by mouth Every Night., Disp: 135 tablet, Rfl: 3    midodrine (PROAMATINE) 5 MG tablet, Take 1 tablet by mouth 3 (Three) Times a Day Before Meals., Disp: , Rfl:     silver sulfadiazine (SILVADENE, SSD) 1 % cream, Apply 1 Application topically to the appropriate area as directed 2 (Two) Times a Day., Disp: 400 g, Rfl: 1    spironolactone (ALDACTONE) 100 MG tablet, Take 0.5 tablets by mouth Daily., Disp: 30 tablet, Rfl: 0    ticagrelor (BRILINTA) 90 MG tablet tablet, Take 1 tablet by mouth 2 (Two)  "Times a Day., Disp: 60 tablet, Rfl: 3    Vericiguat (Verquvo) 10 MG tablet, Take 1 tablet by mouth Daily., Disp: 90 tablet, Rfl: 3    ipratropium-albuterol (DUO-NEB) 0.5-2.5 mg/3 ml nebulizer, Take 3 mL by nebulization Every 4 (Four) Hours As Needed for Wheezing., Disp: 360 mL, Rfl: 1    Current Facility-Administered Medications:     ipratropium-albuterol (DUO-NEB) nebulizer solution 3 mL, 3 mL, Nebulization, 4x Daily - RT, Money, SRIKANTH Taylor        There is no immunization history on file for this patient.      Objective       Vitals:    07/09/24 1118   BP: 115/74   BP Location: Left arm   Patient Position: Sitting   Cuff Size: Adult   Pulse: 76   Temp: 97.8 °F (36.6 °C)   TempSrc: Temporal   SpO2: 91%   Weight: 73.8 kg (162 lb 12.8 oz)   Height: 185.4 cm (73\")   PainSc:   8   PainLoc: Back      Body mass index is 21.48 kg/m².   Wt Readings from Last 3 Encounters:   07/09/24 73.8 kg (162 lb 12.8 oz)   05/28/24 92.5 kg (204 lb)   05/28/24 92.7 kg (204 lb 6.4 oz)      BP Readings from Last 3 Encounters:   07/09/24 115/74   06/20/24 102/71   05/28/24 108/76        BMI is within normal parameters. No other follow-up for BMI required.       Physical Exam  Vitals reviewed.   Constitutional:       Appearance: Normal appearance.   Cardiovascular:      Rate and Rhythm: Normal rate and regular rhythm.      Pulses: Normal pulses.      Heart sounds: Normal heart sounds.   Pulmonary:      Effort: Pulmonary effort is normal.      Breath sounds: Decreased breath sounds present.      Comments: Improved after Duoneb neb treatment  Skin:     General: Skin is warm and dry.   Neurological:      Mental Status: He is alert and oriented to person, place, and time.   Psychiatric:         Mood and Affect: Mood normal.         Behavior: Behavior normal.         Thought Content: Thought content normal.         Judgment: Judgment normal.             Result Review :       Common labs          3/7/2024    10:43 4/18/2024    07:56 " 6/20/2024    11:54   Common Labs   Glucose 80  104     BUN 17  16     Creatinine 1.02  0.80     Sodium 137  139     Potassium 3.6  3.2     Chloride 99  100     Calcium 8.8  8.9     Albumin  3.7     Total Bilirubin  1.7     Alkaline Phosphatase  166     AST (SGOT)  15     ALT (SGPT)  7     WBC  5.28     Hemoglobin  11.8     Hematocrit  35.4     Platelets 225  241  210                       Assessment and Plan        Diagnoses and all orders for this visit:    1. COPD mixed type (Primary)  -     ipratropium-albuterol (DUO-NEB) 0.5-2.5 mg/3 ml nebulizer; Take 3 mL by nebulization Every 4 (Four) Hours As Needed for Wheezing.  Dispense: 360 mL; Refill: 1  -     Nebulizer Treatment; Future  -     ipratropium-albuterol (DUO-NEB) nebulizer solution 3 mL  -     Home Nebulizer  -     Home Nebulizer Accessories  -     Ambulatory Referral to Chronic Care Management Disease Education, Medication Adherence, Care Coordination, Preventative Care, Caregiving/Support, Advanced Care Planning, High Risk for ED/Readmission  -     Motorized Wheelchair    2. Shortness of breath  -     Ambulatory Referral to Chronic Care Management Disease Education, Medication Adherence, Care Coordination, Preventative Care, Caregiving/Support, Advanced Care Planning, High Risk for ED/Readmission  -     Motorized Wheelchair    3. Ascites due to alcoholic cirrhosis  -     Ambulatory Referral to Chronic Care Management Disease Education, Medication Adherence, Care Coordination, Preventative Care, Caregiving/Support, Advanced Care Planning, High Risk for ED/Readmission  -     Motorized Wheelchair    4. Chronic HFrEF (heart failure with reduced ejection fraction)  -     Ambulatory Referral to Chronic Care Management Disease Education, Medication Adherence, Care Coordination, Preventative Care, Caregiving/Support, Advanced Care Planning, High Risk for ED/Readmission  -     Motorized Wheelchair    Filled out form today for handicap parking.  Advised him to get  up and walk around every 1-2 hours, not to just sit all day.  Home health has been contacted again to see him.  Advised again to quit smoking   Use nebulizer with Duo Neb four times a day.    Follow Up     Return in about 3 months (around 10/9/2024).    Patient was given instructions and counseling regarding his condition or for health maintenance advice. Please see specific information pulled into the AVS if appropriate. Patient understands the importance of having any ordered tests to be performed in a timely fashion.      I spent 25 minutes caring for Bradley on this date of service. This time includes time spent by me in the following activities: preparing for the visit, reviewing tests, performing a medically appropriate examination and/or evaluation, counseling and educating the patient/family/caregiver, referring and communicating with other health care professionals, documenting information in the medical record, ordering medications, ordering test(s), and ordering procedure(s)      SRIKANTH Myers

## 2024-07-11 LAB — FUNGUS WND CULT: NORMAL

## 2024-07-12 ENCOUNTER — TELEPHONE (OUTPATIENT)
Dept: CASE MANAGEMENT | Facility: OTHER | Age: 69
End: 2024-07-12
Payer: MEDICARE

## 2024-07-15 ENCOUNTER — PATIENT OUTREACH (OUTPATIENT)
Dept: CASE MANAGEMENT | Facility: OTHER | Age: 69
End: 2024-07-15
Payer: MEDICARE

## 2024-07-15 DIAGNOSIS — I25.10 CAD, MULTIPLE VESSEL: ICD-10-CM

## 2024-07-15 DIAGNOSIS — I50.22 CHRONIC HFREF (HEART FAILURE WITH REDUCED EJECTION FRACTION): Primary | ICD-10-CM

## 2024-07-15 DIAGNOSIS — K70.31 ASCITES DUE TO ALCOHOLIC CIRRHOSIS: ICD-10-CM

## 2024-07-15 DIAGNOSIS — J44.9 CHRONIC OBSTRUCTIVE PULMONARY DISEASE, UNSPECIFIED COPD TYPE: ICD-10-CM

## 2024-07-15 NOTE — OUTREACH NOTE
Patient Outreach    Spoke with Mr Crane regarding power wheel chair order. He becomes short of air with least exertion. Generalized weakness. He is unable to maneuver manual wheelchair due to his poor physical condition. Wife unable to push wheelchair. Has dx ascites due to alcoholic cirrhosis that requires frequent paracentesis. He agrees to [facility] referral for power wheelchair evaluation. Order will be faxed once PCP has completed paperwork.          Names and Relationships of Patient/Support Persons: Contact: Bradley Crane; Relationship: Self  Contact: Bradley Crane; Relationship: Self -         Education Documentation  No documentation found.        Marcy LANZA  Ambulatory Case Management    7/15/2024, 15:00 EDT

## 2024-07-16 ENCOUNTER — PATIENT OUTREACH (OUTPATIENT)
Dept: CASE MANAGEMENT | Facility: OTHER | Age: 69
End: 2024-07-16
Payer: MEDICARE

## 2024-07-16 DIAGNOSIS — J44.9 CHRONIC OBSTRUCTIVE PULMONARY DISEASE, UNSPECIFIED COPD TYPE: ICD-10-CM

## 2024-07-16 DIAGNOSIS — I50.22 CHRONIC HFREF (HEART FAILURE WITH REDUCED EJECTION FRACTION): Primary | ICD-10-CM

## 2024-07-16 DIAGNOSIS — K70.31 ASCITES DUE TO ALCOHOLIC CIRRHOSIS: ICD-10-CM

## 2024-07-16 DIAGNOSIS — I25.10 CAD, MULTIPLE VESSEL: ICD-10-CM

## 2024-07-16 NOTE — OUTREACH NOTE
Care Coordination    Office notes and power wheel chair order completed/signed by PCP. This was faxed to [facility] Fernanda Celestin at [phone]    Names and Relationships of Patient/Support Persons: Contact: [facility] and PCP; Relationship: Other -         Education Documentation  No documentation found.        Marcy LANZA  Ambulatory Case Management    7/16/2024, 08:25 EDT

## 2024-07-18 DIAGNOSIS — M54.41 CHRONIC BILATERAL LOW BACK PAIN WITH RIGHT-SIDED SCIATICA: ICD-10-CM

## 2024-07-18 DIAGNOSIS — F41.9 ANXIETY: ICD-10-CM

## 2024-07-18 DIAGNOSIS — G89.29 CHRONIC BILATERAL LOW BACK PAIN WITH RIGHT-SIDED SCIATICA: ICD-10-CM

## 2024-07-18 DIAGNOSIS — S32.020S COMPRESSION FRACTURE OF L2 VERTEBRA, SEQUELA: ICD-10-CM

## 2024-07-18 LAB — FUNGUS WND CULT: NORMAL

## 2024-07-18 NOTE — TELEPHONE ENCOUNTER
Caller: Bradley Crane    Relationship: Self    Best call back number: [phone]     Requested Prescriptions:   Requested Prescriptions     Pending Prescriptions Disp Refills    ALPRAZolam (Xanax) 1 MG tablet 90 tablet 0     Sig: Take 1 tablet by mouth 3 (Three) Times a Day As Needed for Anxiety.    HYDROcodone-acetaminophen (NORCO) 5-325 MG per tablet 240 tablet 0     Sig: Take 2 tablets by mouth Every 6 (Six) Hours As Needed for Moderate Pain.        Pharmacy where request should be sent: [address] [phone] [facility] [phone]      Last office visit with prescribing clinician: 7/9/2024   Last telemedicine visit with prescribing clinician: Visit date not found   Next office visit with prescribing clinician: 8/28/2024     Additional details provided by patient:     Does the patient have less than a 3 day supply:  [x] Yes  [] No    Would you like a call back once the refill request has been completed: [x] Yes [] No    If the office needs to give you a call back, can they leave a voicemail: [x] Yes [] No    Sandra Mondragon Rep   07/18/24 09:57 EDT         
LOV: 7/9/24  NOV: 8/28/24  UDS: 2/22/24  
Oriented - self; Oriented - place; Oriented - time

## 2024-07-19 DIAGNOSIS — F41.9 ANXIETY: ICD-10-CM

## 2024-07-21 RX ORDER — HYDROCODONE BITARTRATE AND ACETAMINOPHEN 5; 325 MG/1; MG/1
2 TABLET ORAL EVERY 6 HOURS PRN
Qty: 240 TABLET | Refills: 0 | OUTPATIENT
Start: 2024-07-21

## 2024-07-21 RX ORDER — ALPRAZOLAM 1 MG/1
1 TABLET ORAL 3 TIMES DAILY PRN
Qty: 90 TABLET | Refills: 0 | OUTPATIENT
Start: 2024-07-21

## 2024-07-22 DIAGNOSIS — F41.9 ANXIETY: ICD-10-CM

## 2024-07-22 DIAGNOSIS — G89.29 CHRONIC BILATERAL LOW BACK PAIN WITH RIGHT-SIDED SCIATICA: ICD-10-CM

## 2024-07-22 DIAGNOSIS — S32.020S COMPRESSION FRACTURE OF L2 VERTEBRA, SEQUELA: ICD-10-CM

## 2024-07-22 DIAGNOSIS — M54.41 CHRONIC BILATERAL LOW BACK PAIN WITH RIGHT-SIDED SCIATICA: ICD-10-CM

## 2024-07-22 RX ORDER — SPIRONOLACTONE 100 MG/1
50 TABLET, FILM COATED ORAL DAILY
Qty: 30 TABLET | Refills: 5 | Status: SHIPPED | OUTPATIENT
Start: 2024-07-22

## 2024-07-22 RX ORDER — ALPRAZOLAM 1 MG/1
1 TABLET ORAL 3 TIMES DAILY PRN
Qty: 90 TABLET | Refills: 0 | OUTPATIENT
Start: 2024-07-22

## 2024-07-22 RX ORDER — HYDROCODONE BITARTRATE AND ACETAMINOPHEN 5; 325 MG/1; MG/1
2 TABLET ORAL EVERY 6 HOURS PRN
Qty: 240 TABLET | Refills: 0 | OUTPATIENT
Start: 2024-07-22

## 2024-07-22 RX ORDER — ALPRAZOLAM 1 MG/1
1 TABLET ORAL 3 TIMES DAILY PRN
Qty: 90 TABLET | Refills: 0 | Status: SHIPPED | OUTPATIENT
Start: 2024-07-22

## 2024-07-22 RX ORDER — HYDROCODONE BITARTRATE AND ACETAMINOPHEN 5; 325 MG/1; MG/1
2 TABLET ORAL EVERY 6 HOURS PRN
Qty: 240 TABLET | Refills: 0 | Status: SHIPPED | OUTPATIENT
Start: 2024-07-22 | End: 2024-07-23 | Stop reason: SDUPTHER

## 2024-07-22 NOTE — TELEPHONE ENCOUNTER
Caller: LOUIS SPENCER    Relationship: Emergency Contact    Best call back number: [phone]     Requested Prescriptions:   Requested Prescriptions     Pending Prescriptions Disp Refills    HYDROcodone-acetaminophen (NORCO) 5-325 MG per tablet 240 tablet 0     Sig: Take 2 tablets by mouth Every 6 (Six) Hours As Needed for Moderate Pain.    ALPRAZolam (XANAX) 1 MG tablet 90 tablet 0     Sig: Take 1 tablet by mouth 3 (Three) Times a Day As Needed. for anxiety        Pharmacy where request should be sent: [address] [phone] [facility] [phone]      Last office visit with prescribing clinician: 7/9/2024   Last telemedicine visit with prescribing clinician: Visit date not found   Next office visit with prescribing clinician: 8/28/2024     Additional details provided by patient: PATIENT STATES THAT THE PHARMACY NEVER RECEIVED THE MEDICATION    Does the patient have less than a 3 day supply:  [x] Yes  [] No    Would you like a call back once the refill request has been completed: [] Yes [] No    If the office needs to give you a call back, can they leave a voicemail: [] Yes [] No    Sandra Rene Rep   07/22/24 13:45 EDT

## 2024-07-23 DIAGNOSIS — S32.020S COMPRESSION FRACTURE OF L2 VERTEBRA, SEQUELA: ICD-10-CM

## 2024-07-23 DIAGNOSIS — M54.41 CHRONIC BILATERAL LOW BACK PAIN WITH RIGHT-SIDED SCIATICA: ICD-10-CM

## 2024-07-23 DIAGNOSIS — G89.29 CHRONIC BILATERAL LOW BACK PAIN WITH RIGHT-SIDED SCIATICA: ICD-10-CM

## 2024-07-23 RX ORDER — HYDROCODONE BITARTRATE AND ACETAMINOPHEN 5; 325 MG/1; MG/1
2 TABLET ORAL EVERY 6 HOURS PRN
Qty: 240 TABLET | Refills: 0 | Status: SHIPPED | OUTPATIENT
Start: 2024-07-23

## 2024-07-23 RX ORDER — HYDROCODONE BITARTRATE AND ACETAMINOPHEN 5; 325 MG/1; MG/1
2 TABLET ORAL EVERY 6 HOURS PRN
Qty: 240 TABLET | Refills: 0 | OUTPATIENT
Start: 2024-07-23

## 2024-07-23 NOTE — TELEPHONE ENCOUNTER
Patient wife requested medication be sent to [facility], states it was sent to [facility] mail order instead. They need it ASAP since he is out.

## 2024-07-25 ENCOUNTER — PATIENT OUTREACH (OUTPATIENT)
Dept: CASE MANAGEMENT | Facility: OTHER | Age: 69
End: 2024-07-25
Payer: MEDICARE

## 2024-07-25 DIAGNOSIS — I50.22 CHRONIC HFREF (HEART FAILURE WITH REDUCED EJECTION FRACTION): Primary | ICD-10-CM

## 2024-07-25 DIAGNOSIS — J44.9 CHRONIC OBSTRUCTIVE PULMONARY DISEASE, UNSPECIFIED COPD TYPE: ICD-10-CM

## 2024-07-25 DIAGNOSIS — I25.10 CAD, MULTIPLE VESSEL: ICD-10-CM

## 2024-07-25 DIAGNOSIS — K70.31 ASCITES DUE TO ALCOHOLIC CIRRHOSIS: ICD-10-CM

## 2024-07-25 NOTE — OUTREACH NOTE
Care Coordination    I have sent secure email to Fernanda Celestin of [facility] inquiring about the status of power wheelchair evaluation.       330 pm---received email from Fernanda Celestin. She has been trying to contact pt but no answer and not returning phone calls. I called pt number and left message on voicemail to return my call. Plan to advise [facility] trying to contact them and to find out which Home health agency is coming to the home.    Names and Relationships of Patient/Support Persons: Contact: Fernanda Celestin--[facility]; Relationship:  -         Education Documentation  No documentation found.        Marcy LANZA  Ambulatory Case Management    7/25/2024, 11:04 EDT

## 2024-07-26 ENCOUNTER — PATIENT OUTREACH (OUTPATIENT)
Dept: CASE MANAGEMENT | Facility: OTHER | Age: 69
End: 2024-07-26
Payer: MEDICARE

## 2024-07-26 DIAGNOSIS — J44.9 CHRONIC OBSTRUCTIVE PULMONARY DISEASE, UNSPECIFIED COPD TYPE: ICD-10-CM

## 2024-07-26 DIAGNOSIS — K70.31 ASCITES DUE TO ALCOHOLIC CIRRHOSIS: ICD-10-CM

## 2024-07-26 DIAGNOSIS — I25.10 CAD, MULTIPLE VESSEL: ICD-10-CM

## 2024-07-26 DIAGNOSIS — I50.22 CHRONIC HFREF (HEART FAILURE WITH REDUCED EJECTION FRACTION): Primary | ICD-10-CM

## 2024-07-26 NOTE — OUTREACH NOTE
Care Coordination    I called [facility] who had called the office and accepted pt on 7/10/24. They do not have any referral on file for this pt and they do not cover the [place] area. New referral has been placed to [facility] agency today. I called Mr Crane and made him aware that home health and [facility] will be contacting him.    Care Coordination    Email sent to Fernanda Celestin--[facility] informing of new home health referral and contact with Mr Craen      Names and Relationships of Patient/Support Persons: Contact: Bradley Crane; Relationship: Self -         Education Documentation  No documentation found.        Marcy LANZA  Ambulatory Case Management    7/26/2024, 09:00 EDT

## 2024-07-31 ENCOUNTER — OUTSIDE FACILITY SERVICE (OUTPATIENT)
Dept: FAMILY MEDICINE CLINIC | Facility: CLINIC | Age: 69
End: 2024-07-31
Payer: MEDICARE

## 2024-08-02 ENCOUNTER — TELEPHONE (OUTPATIENT)
Dept: FAMILY MEDICINE CLINIC | Facility: CLINIC | Age: 69
End: 2024-08-02
Payer: MEDICARE

## 2024-08-02 NOTE — TELEPHONE ENCOUNTER
Shelby Yoo from Home Health called and stated they will be discharging the patient from home health because he is aggressive. Patient refuses to take his medication and has thrown it at the nurse as well as being verbally aggressive.

## 2024-08-14 ENCOUNTER — PATIENT OUTREACH (OUTPATIENT)
Dept: CASE MANAGEMENT | Facility: OTHER | Age: 69
End: 2024-08-14
Payer: MEDICARE

## 2024-08-14 DIAGNOSIS — I50.22 CHRONIC HFREF (HEART FAILURE WITH REDUCED EJECTION FRACTION): Primary | ICD-10-CM

## 2024-08-14 DIAGNOSIS — I25.10 CAD, MULTIPLE VESSEL: ICD-10-CM

## 2024-08-14 DIAGNOSIS — J44.9 CHRONIC OBSTRUCTIVE PULMONARY DISEASE, UNSPECIFIED COPD TYPE: ICD-10-CM

## 2024-08-14 DIAGNOSIS — K70.31 ASCITES DUE TO ALCOHOLIC CIRRHOSIS: ICD-10-CM

## 2024-08-14 NOTE — OUTREACH NOTE
"Patient Outreach        The following message was received by office from [facility]:             8/2/24 12:33 PM  Note      Shelby Naila from [facility] called and stated they will be discharging the patient from home health because he is aggressive. Patient refuses to take his medication and has thrown it at the nurse as well as being verbally aggressive.                 Had following contact with Fernanda Celestin of [facility]:        Date Wed, 14 Aug 2024 13:39:01 +0000  From Fernanda Celestin <[email or web address]>  To \"Marcy Houser (CAROLEE)\" <[email or web address]>  Subject Re: secure email       was unable to complete the eval and he has already been discharged from , so we are trying to figure out where he can have his evaluation done in [place]. If we cannot get it done in [place], I might suggest to the family to see if we can schedule the eval on the day of his next appointment in your office, and do it at the outpatient clinic in [facility].     Fernanda Celestin    [facility]   Mobility     [address] [place] [address]    [phone] Office   Fax [phone]    [email or web address]   [facility]          I am available to assist [facility] as needed.     I called Mr Crane to follow up but he did not want any further contact. I told him I was available if needed.       PCP is aware.              Names and Relationships of Patient/Support Persons: Contact: Fernanda Celestin at [facility]; Relationship:  -         Education Documentation  No documentation found.        Marcy LANZA  Ambulatory Case Management    8/14/2024, 11:48 EDT  "

## 2024-08-20 ENCOUNTER — TELEPHONE (OUTPATIENT)
Dept: FAMILY MEDICINE CLINIC | Facility: CLINIC | Age: 69
End: 2024-08-20
Payer: MEDICARE

## 2024-08-20 DIAGNOSIS — M54.41 CHRONIC BILATERAL LOW BACK PAIN WITH RIGHT-SIDED SCIATICA: ICD-10-CM

## 2024-08-20 DIAGNOSIS — S32.020S COMPRESSION FRACTURE OF L2 VERTEBRA, SEQUELA: ICD-10-CM

## 2024-08-20 DIAGNOSIS — G89.29 CHRONIC BILATERAL LOW BACK PAIN WITH RIGHT-SIDED SCIATICA: ICD-10-CM

## 2024-08-20 DIAGNOSIS — F41.9 ANXIETY: ICD-10-CM

## 2024-08-20 RX ORDER — HYDROCODONE BITARTRATE AND ACETAMINOPHEN 5; 325 MG/1; MG/1
2 TABLET ORAL EVERY 6 HOURS PRN
Qty: 240 TABLET | Refills: 0 | Status: SHIPPED | OUTPATIENT
Start: 2024-08-20

## 2024-08-20 RX ORDER — ALPRAZOLAM 1 MG/1
1 TABLET ORAL 3 TIMES DAILY PRN
Qty: 90 TABLET | Refills: 0 | Status: SHIPPED | OUTPATIENT
Start: 2024-08-20

## 2024-08-20 NOTE — TELEPHONE ENCOUNTER
Caller: LOUIS SPENCER    Relationship: Emergency Contact    Best call back number: [phone]     Requested Prescriptions:   Requested Prescriptions     Pending Prescriptions Disp Refills    ALPRAZolam (XANAX) 1 MG tablet 90 tablet 0     Sig: Take 1 tablet by mouth 3 (Three) Times a Day As Needed. for anxiety    HYDROcodone-acetaminophen (NORCO) 5-325 MG per tablet 240 tablet 0     Sig: Take 2 tablets by mouth Every 6 (Six) Hours As Needed for Moderate Pain.        Pharmacy where request should be sent: [address] [phone] [facility] [phone] [facility]     Last office visit with prescribing clinician: 7/9/2024   Last telemedicine visit with prescribing clinician: Visit date not found   Next office visit with prescribing clinician: 8/28/2024     Does the patient have less than a 3 day supply:  [x] Yes  [] No    Sandra Woods   08/20/24 08:47 EDT

## 2024-08-20 NOTE — TELEPHONE ENCOUNTER
I called and spoke with pt and he would like a copy of his med list to make sure he has all the mediations he is taking.pt thanked and verbalized understanding. Mailing out med list for pt.

## 2024-08-20 NOTE — TELEPHONE ENCOUNTER
Ivana called with [facility] pharmacy stating that there is an interaction with pt xanax  and the hydrocodone. They are waning to know if he has tried another medications in the past and that is why we are doing the two together. Please advise

## 2024-08-20 NOTE — TELEPHONE ENCOUNTER
Caller: JOHANNALOUIS    Relationship: Emergency Contact    Best call back number: [phone]     What form or medical record are you requesting: COMPLETE LIST OF CURRENT MEDICATIONS.     Who is requesting this form or medical record from you: SELF AND SPOUSE    How would you like to receive the form or medical records (pick-up, mail, fax): MAIL:  [phone] [facility] [address]     Timeframe paperwork needed: ASAP    Additional notes: LOUIS STATES THE PATIENT KEEPS THROWING OUT BOTTLES OF HIS MEDICATIONS SO WHEN THEY NEED REFILLS THEY AREN'T SURE WHICH ONES HE NEEDS. LOUIS STATES THEY WOULD JUST LIKE A LIST OF THE MEDICATIONS SO THEY CAN KEEP TRACK MORE EASILY.

## 2024-08-21 NOTE — TELEPHONE ENCOUNTER
Called pt pharmacy and let them know what adriel stated below they thanked and verbalized understanding.

## 2024-08-28 ENCOUNTER — LAB (OUTPATIENT)
Dept: LAB | Facility: HOSPITAL | Age: 69
End: 2024-08-28
Payer: MEDICARE

## 2024-08-28 ENCOUNTER — OFFICE VISIT (OUTPATIENT)
Dept: FAMILY MEDICINE CLINIC | Facility: CLINIC | Age: 69
End: 2024-08-28
Payer: MEDICARE

## 2024-08-28 VITALS
SYSTOLIC BLOOD PRESSURE: 119 MMHG | OXYGEN SATURATION: 93 % | BODY MASS INDEX: 22.19 KG/M2 | WEIGHT: 167.4 LBS | DIASTOLIC BLOOD PRESSURE: 78 MMHG | TEMPERATURE: 98.2 F | HEART RATE: 83 BPM | HEIGHT: 73 IN

## 2024-08-28 DIAGNOSIS — R42 VERTIGO: ICD-10-CM

## 2024-08-28 DIAGNOSIS — K70.31 ASCITES DUE TO ALCOHOLIC CIRRHOSIS: ICD-10-CM

## 2024-08-28 DIAGNOSIS — R73.03 PREDIABETES: ICD-10-CM

## 2024-08-28 DIAGNOSIS — I87.2 VENOUS INSUFFICIENCY OF BOTH LOWER EXTREMITIES: ICD-10-CM

## 2024-08-28 DIAGNOSIS — E78.5 HYPERLIPIDEMIA LDL GOAL <70: Primary | ICD-10-CM

## 2024-08-28 DIAGNOSIS — F17.200 SMOKER: ICD-10-CM

## 2024-08-28 DIAGNOSIS — J44.9 COPD MIXED TYPE: ICD-10-CM

## 2024-08-28 DIAGNOSIS — J43.0 UNILATERAL EMPHYSEMA: ICD-10-CM

## 2024-08-28 LAB
ALBUMIN SERPL-MCNC: 3.3 G/DL (ref 3.5–5.2)
ALBUMIN/GLOB SERPL: 1 G/DL
ALP SERPL-CCNC: 106 U/L (ref 39–117)
ALT SERPL W P-5'-P-CCNC: 6 U/L (ref 1–41)
AMMONIA BLD-SCNC: 17 UMOL/L (ref 16–60)
ANION GAP SERPL CALCULATED.3IONS-SCNC: 12.1 MMOL/L (ref 5–15)
AST SERPL-CCNC: 25 U/L (ref 1–40)
BILIRUB SERPL-MCNC: 1.3 MG/DL (ref 0–1.2)
BUN SERPL-MCNC: 17 MG/DL (ref 8–23)
BUN/CREAT SERPL: 18.7 (ref 7–25)
CALCIUM SPEC-SCNC: 9.3 MG/DL (ref 8.6–10.5)
CHLORIDE SERPL-SCNC: 100 MMOL/L (ref 98–107)
CHOLEST SERPL-MCNC: 137 MG/DL (ref 0–200)
CO2 SERPL-SCNC: 24.9 MMOL/L (ref 22–29)
CREAT SERPL-MCNC: 0.91 MG/DL (ref 0.76–1.27)
DEPRECATED RDW RBC AUTO: 47.2 FL (ref 37–54)
EGFRCR SERPLBLD CKD-EPI 2021: 92.4 ML/MIN/1.73
ERYTHROCYTE [DISTWIDTH] IN BLOOD BY AUTOMATED COUNT: 13.5 % (ref 12.3–15.4)
GLOBULIN UR ELPH-MCNC: 3.4 GM/DL
GLUCOSE SERPL-MCNC: 90 MG/DL (ref 65–99)
HBA1C MFR BLD: 5.7 % (ref 4.8–5.6)
HCT VFR BLD AUTO: 40.2 % (ref 37.5–51)
HDLC SERPL-MCNC: 45 MG/DL (ref 40–60)
HGB BLD-MCNC: 13.6 G/DL (ref 13–17.7)
LDLC SERPL CALC-MCNC: 75 MG/DL (ref 0–100)
LDLC/HDLC SERPL: 1.66 {RATIO}
LIPASE SERPL-CCNC: 8 U/L (ref 13–60)
MAGNESIUM SERPL-MCNC: 1.8 MG/DL (ref 1.6–2.4)
MCH RBC QN AUTO: 32.3 PG (ref 26.6–33)
MCHC RBC AUTO-ENTMCNC: 33.8 G/DL (ref 31.5–35.7)
MCV RBC AUTO: 95.5 FL (ref 79–97)
PLATELET # BLD AUTO: 395 10*3/MM3 (ref 140–450)
PMV BLD AUTO: 8.9 FL (ref 6–12)
POTASSIUM SERPL-SCNC: 4.3 MMOL/L (ref 3.5–5.2)
PROT SERPL-MCNC: 6.7 G/DL (ref 6–8.5)
RBC # BLD AUTO: 4.21 10*6/MM3 (ref 4.14–5.8)
SODIUM SERPL-SCNC: 137 MMOL/L (ref 136–145)
TRIGL SERPL-MCNC: 87 MG/DL (ref 0–150)
VLDLC SERPL-MCNC: 17 MG/DL (ref 5–40)
WBC NRBC COR # BLD AUTO: 5.14 10*3/MM3 (ref 3.4–10.8)

## 2024-08-28 PROCEDURE — 80061 LIPID PANEL: CPT | Performed by: NURSE PRACTITIONER

## 2024-08-28 PROCEDURE — 85027 COMPLETE CBC AUTOMATED: CPT | Performed by: NURSE PRACTITIONER

## 2024-08-28 PROCEDURE — 83690 ASSAY OF LIPASE: CPT | Performed by: NURSE PRACTITIONER

## 2024-08-28 PROCEDURE — 36415 COLL VENOUS BLD VENIPUNCTURE: CPT

## 2024-08-28 PROCEDURE — 82140 ASSAY OF AMMONIA: CPT

## 2024-08-28 PROCEDURE — 83735 ASSAY OF MAGNESIUM: CPT | Performed by: NURSE PRACTITIONER

## 2024-08-28 PROCEDURE — 1125F AMNT PAIN NOTED PAIN PRSNT: CPT | Performed by: NURSE PRACTITIONER

## 2024-08-28 PROCEDURE — 80053 COMPREHEN METABOLIC PANEL: CPT | Performed by: NURSE PRACTITIONER

## 2024-08-28 PROCEDURE — 99214 OFFICE O/P EST MOD 30 MIN: CPT | Performed by: NURSE PRACTITIONER

## 2024-08-28 PROCEDURE — 83036 HEMOGLOBIN GLYCOSYLATED A1C: CPT | Performed by: NURSE PRACTITIONER

## 2024-08-28 RX ORDER — ALBUMIN (HUMAN) 12.5 G/50ML
62.5 SOLUTION INTRAVENOUS ONCE
OUTPATIENT
Start: 2024-08-28 | End: 2024-08-28

## 2024-08-28 RX ORDER — BUDESONIDE, GLYCOPYRROLATE, AND FORMOTEROL FUMARATE 160; 9; 4.8 UG/1; UG/1; UG/1
2 AEROSOL, METERED RESPIRATORY (INHALATION) 2 TIMES DAILY
Qty: 90 EACH | Refills: 3 | Status: SHIPPED | OUTPATIENT
Start: 2024-08-28

## 2024-08-28 RX ORDER — ALBUMIN (HUMAN) 12.5 G/50ML
100 SOLUTION INTRAVENOUS ONCE
OUTPATIENT
Start: 2024-08-28 | End: 2024-08-28

## 2024-08-28 RX ORDER — ALBUMIN (HUMAN) 12.5 G/50ML
75 SOLUTION INTRAVENOUS ONCE
OUTPATIENT
Start: 2024-08-28 | End: 2024-08-28

## 2024-08-28 RX ORDER — ALBUTEROL SULFATE 90 UG/1
2 AEROSOL, METERED RESPIRATORY (INHALATION) EVERY 4 HOURS PRN
Qty: 18 G | Refills: 1 | Status: SHIPPED | OUTPATIENT
Start: 2024-08-28

## 2024-08-28 RX ORDER — MECLIZINE HYDROCHLORIDE 50 MG/1
50 TABLET ORAL 3 TIMES DAILY PRN
Qty: 270 TABLET | Refills: 3 | Status: SHIPPED | OUTPATIENT
Start: 2024-08-28

## 2024-08-28 RX ORDER — ALBUMIN (HUMAN) 12.5 G/50ML
25 SOLUTION INTRAVENOUS ONCE
OUTPATIENT
Start: 2024-08-28 | End: 2024-08-28

## 2024-08-28 RX ORDER — ALBUMIN (HUMAN) 12.5 G/50ML
50 SOLUTION INTRAVENOUS ONCE
OUTPATIENT
Start: 2024-08-28 | End: 2024-08-28

## 2024-08-28 RX ORDER — IPRATROPIUM BROMIDE AND ALBUTEROL SULFATE 2.5; .5 MG/3ML; MG/3ML
3 SOLUTION RESPIRATORY (INHALATION) EVERY 4 HOURS PRN
Qty: 360 ML | Refills: 1 | Status: SHIPPED | OUTPATIENT
Start: 2024-08-28

## 2024-08-28 RX ORDER — ALBUMIN (HUMAN) 12.5 G/50ML
37.5 SOLUTION INTRAVENOUS ONCE
OUTPATIENT
Start: 2024-08-28 | End: 2024-08-28

## 2024-08-28 RX ORDER — MECLIZINE HYDROCHLORIDE 50 MG/1
50 TABLET ORAL 3 TIMES DAILY PRN
Qty: 90 TABLET | Refills: 1 | Status: SHIPPED | OUTPATIENT
Start: 2024-08-28 | End: 2024-08-28 | Stop reason: SDUPTHER

## 2024-08-28 RX ORDER — ALBUMIN (HUMAN) 12.5 G/50ML
82.5 SOLUTION INTRAVENOUS ONCE
OUTPATIENT
Start: 2024-08-28 | End: 2024-08-28

## 2024-08-28 RX ORDER — SODIUM CHLORIDE 9 MG/ML
20 INJECTION, SOLUTION INTRAVENOUS ONCE
OUTPATIENT
Start: 2024-08-28

## 2024-08-28 NOTE — PROGRESS NOTES
Chief Complaint  COPD (Follow up) and Dizziness    Subjective          Bradley Crane is a 69 y.o. male who presents to [facility]    COPD  He complains of shortness of breath.   Dizziness    He states he is out of his nebulizer medication.  Using it 2-3 times a day.  States it helps him the most.  Ran out of Breztri.    He is currently out of his dizzy medication.  Still complains of dizziness, meclizine 25 mg helps some.    PHQ-2 Total Score:     PHQ-9 Total Score:          Review of Systems   Respiratory:  Positive for shortness of breath.    Neurological:  Positive for dizziness.          Medical History: has a past medical history of Ascites, CAD (coronary artery disease), CHF (congestive heart failure), Cirrhosis, Hypertension, and MI (myocardial infarction).     Surgical History: has a past surgical history that includes Cardiac surgery; Cardiac catheterization (N/A, 09/05/2023); and Cardiac defibrillator placement.     Family History: family history is not on file.     Social History: reports that he has been smoking cigarettes. He started smoking about 51 years ago. He has a 25.8 pack-year smoking history. He has never been exposed to tobacco smoke. He has never used smokeless tobacco. He reports that he does not currently use alcohol. He reports that he does not use drugs.    Allergies: Patient has no known allergies.      Health Maintenance Due   Topic Date Due    COLORECTAL CANCER SCREENING  Never done    ZOSTER VACCINE (1 of 2) Never done    Hepatitis B (1 of 3 - Risk 3-dose series) Never done    ANNUAL WELLNESS VISIT  Never done    COVID-19 Vaccine (1 - 2023-24 season) Never done    INFLUENZA VACCINE  08/01/2024            Current Outpatient Medications:     albuterol sulfate  (90 Base) MCG/ACT inhaler, Inhale 2 puffs Every 4 (Four) Hours As Needed for Wheezing., Disp: 18 g, Rfl: 1    ALPRAZolam (XANAX) 1 MG tablet, Take 1 tablet by mouth 3 (Three) Times a Day As  Needed for Anxiety. for anxiety, Disp: 90 tablet, Rfl: 0    atorvastatin (LIPITOR) 80 MG tablet, Take 1 tablet by mouth Daily., Disp: 90 tablet, Rfl: 3    Budeson-Glycopyrrol-Formoterol (Breztri Aerosphere) 160-9-4.8 MCG/ACT aerosol inhaler, Inhale 2 puffs 2 (Two) Times a Day., Disp: 90 each, Rfl: 3    digoxin (LANOXIN) 125 MCG tablet, Take 1 tablet by mouth Daily., Disp: 90 tablet, Rfl: 3    DULoxetine (CYMBALTA) 60 MG capsule, Take 1 capsule by mouth Daily., Disp: 90 capsule, Rfl: 3    empagliflozin (Jardiance) 25 MG tablet tablet, Take 1 tablet by mouth Daily., Disp: 30 tablet, Rfl: 11    furosemide (LASIX) 40 MG tablet, TAKE 1 TABLET TWICE DAILY, Disp: 60 tablet, Rfl: 11    gabapentin (NEURONTIN) 300 MG capsule, Take 1 capsule by mouth 3 (Three) Times a Day., Disp: 90 capsule, Rfl: 5    HYDROcodone-acetaminophen (NORCO) 5-325 MG per tablet, Take 2 tablets by mouth Every 6 (Six) Hours As Needed for Moderate Pain., Disp: 240 tablet, Rfl: 0    ipratropium-albuterol (DUO-NEB) 0.5-2.5 mg/3 ml nebulizer, Take 3 mL by nebulization Every 4 (Four) Hours As Needed for Wheezing., Disp: 360 mL, Rfl: 1    meclizine (ANTIVERT) 50 MG tablet, Take 1 tablet by mouth 3 (Three) Times a Day As Needed for Dizziness., Disp: 270 tablet, Rfl: 3    metoprolol succinate XL (TOPROL-XL) 25 MG 24 hr tablet, Take 1.5 tablets by mouth Every Night., Disp: 135 tablet, Rfl: 3    midodrine (PROAMATINE) 5 MG tablet, Take 1 tablet by mouth 3 (Three) Times a Day Before Meals., Disp: , Rfl:     silver sulfadiazine (SILVADENE, SSD) 1 % cream, Apply 1 Application topically to the appropriate area as directed 2 (Two) Times a Day., Disp: 400 g, Rfl: 1    spironolactone (ALDACTONE) 100 MG tablet, TAKE 1/2 TABLET EVERY DAY, Disp: 30 tablet, Rfl: 5    ticagrelor (BRILINTA) 90 MG tablet tablet, Take 1 tablet by mouth 2 (Two) Times a Day., Disp: 60 tablet, Rfl: 3    Vericiguat (Verquvo) 10 MG tablet, Take 1 tablet by mouth Daily., Disp: 90 tablet, Rfl:  "3    Current Facility-Administered Medications:     ipratropium-albuterol (DUO-NEB) nebulizer solution 3 mL, 3 mL, Nebulization, 4x Daily - RT, Money, SRIKANTH Taylor        There is no immunization history on file for this patient.      Objective       Vitals:    08/28/24 1042   BP: 119/78   Pulse: 83   Temp: 98.2 °F (36.8 °C)   TempSrc: Temporal   SpO2: 93%   Weight: 75.9 kg (167 lb 6.4 oz)   Height: 185.4 cm (72.99\")      Body mass index is 22.09 kg/m².   Wt Readings from Last 3 Encounters:   08/28/24 75.9 kg (167 lb 6.4 oz)   07/09/24 73.8 kg (162 lb 12.8 oz)   05/28/24 92.5 kg (204 lb)      BP Readings from Last 3 Encounters:   08/28/24 119/78   07/09/24 115/74   06/20/24 102/71        BMI is within normal parameters. No other follow-up for BMI required.       Physical Exam  Vitals reviewed.   Constitutional:       Appearance: Normal appearance. He is ill-appearing.   HENT:      Head: Normocephalic and atraumatic.   Eyes:      Conjunctiva/sclera: Conjunctivae normal.      Pupils: Pupils are equal, round, and reactive to light.   Cardiovascular:      Rate and Rhythm: Normal rate and regular rhythm.      Pulses: Normal pulses.      Heart sounds: Normal heart sounds.   Pulmonary:      Effort: Pulmonary effort is normal.      Breath sounds: Normal breath sounds. Examination of the right-lower field reveals decreased breath sounds. Examination of the left-lower field reveals decreased breath sounds.   Abdominal:      General: Abdomen is protuberant. Bowel sounds are normal. There is distension.      Palpations: Abdomen is soft.   Musculoskeletal:      Cervical back: Normal range of motion.   Skin:     General: Skin is warm and dry.      Coloration: Skin is jaundiced.      Findings: Abrasion, bruising and ecchymosis present.      Comments: Bilateral lower extremities discolored, thin skin, multiple open abrasions.     Neurological:      Mental Status: He is alert and oriented to person, place, and time. "   Psychiatric:         Mood and Affect: Mood normal.         Behavior: Behavior normal.         Thought Content: Thought content normal.         Judgment: Judgment normal.             Result Review :       Common labs          3/7/2024    10:43 4/18/2024    07:56 6/20/2024    11:54   Common Labs   Glucose 80  104     BUN 17  16     Creatinine 1.02  0.80     Sodium 137  139     Potassium 3.6  3.2     Chloride 99  100     Calcium 8.8  8.9     Albumin  3.7     Total Bilirubin  1.7     Alkaline Phosphatase  166     AST (SGOT)  15     ALT (SGPT)  7     WBC  5.28     Hemoglobin  11.8     Hematocrit  35.4     Platelets 225  241  210                       Assessment and Plan        Diagnoses and all orders for this visit:    1. Hyperlipidemia LDL goal <70 (Primary)  -     Lipid Panel    2. Vertigo  Comments:  Managed on meclizine  Orders:  -     Discontinue: meclizine (ANTIVERT) 50 MG tablet; Take 1 tablet by mouth 3 (Three) Times a Day As Needed for Dizziness.  Dispense: 90 tablet; Refill: 1  -     meclizine (ANTIVERT) 50 MG tablet; Take 1 tablet by mouth 3 (Three) Times a Day As Needed for Dizziness.  Dispense: 270 tablet; Refill: 3    3. Smoker  -     albuterol sulfate  (90 Base) MCG/ACT inhaler; Inhale 2 puffs Every 4 (Four) Hours As Needed for Wheezing.  Dispense: 18 g; Refill: 1    4. COPD mixed type  -     albuterol sulfate  (90 Base) MCG/ACT inhaler; Inhale 2 puffs Every 4 (Four) Hours As Needed for Wheezing.  Dispense: 18 g; Refill: 1  -     ipratropium-albuterol (DUO-NEB) 0.5-2.5 mg/3 ml nebulizer; Take 3 mL by nebulization Every 4 (Four) Hours As Needed for Wheezing.  Dispense: 360 mL; Refill: 1    5. Ascites due to alcoholic cirrhosis  -     CBC (No Diff)  -     Comprehensive Metabolic Panel  -     Ammonia; Future  -     Lipase  -     Magnesium    6. Prediabetes  -     Hemoglobin A1c    7. Venous insufficiency of both lower extremities  Comments:  Advised to wear compression socks, keep any open  wounds clean, apply antiseptic as needed.    8. Unilateral emphysema  -     Budeson-Glycopyrrol-Formoterol (Breztri Aerosphere) 160-9-4.8 MCG/ACT aerosol inhaler; Inhale 2 puffs 2 (Two) Times a Day.  Dispense: 90 each; Refill: 3    Other orders  -     sodium chloride 0.9 % infusion  -     lactated ringers bolus 250 mL  -     albumin human 25 % IV SOLN 25 g  -     albumin human 25 % IV SOLN 37.5 g  -     albumin human 25 % IV SOLN 50 g  -     albumin human 25 % IV SOLN 62.5 g  -     albumin human 25 % IV SOLN 75 g  -     albumin human 25 % IV SOLN 87.5 g  -     albumin human 25 % IV SOLN 100 g          Follow Up     Return in about 3 months (around 11/28/2024).    Patient was given instructions and counseling regarding his condition or for health maintenance advice. Please see specific information pulled into the AVS if appropriate. Patient understands the importance of having any ordered tests to be performed in a timely fashion.      I spent 15 minutes caring for Bradley on this date of service. This time includes time spent by me in the following activities: preparing for the visit, reviewing tests, performing a medically appropriate examination and/or evaluation, counseling and educating the patient/family/caregiver, referring and communicating with other health care professionals, documenting information in the medical record, independently interpreting results and communicating that information with the patient/family/caregiver, ordering medications, and ordering test(s)      SRIKANTH Myers

## 2024-09-05 ENCOUNTER — PATIENT OUTREACH (OUTPATIENT)
Dept: CASE MANAGEMENT | Facility: OTHER | Age: 69
End: 2024-09-05
Payer: MEDICARE

## 2024-09-05 NOTE — OUTREACH NOTE
Patient Outreach    I emailed Fernanda Celestin of [facility] regarding PT evaluation for power chair. I received this reply:    We are trying to get the eval done tomorrow, but have been unable to get ahold of them, he had previously confirmed for tomorrow (9/6), but had not set a time, because I was waiting to hear back from the therapist.     Fernanda Celestin    [facility]   Mobility     [address] [place] [address] [phone] Office   Fax [phone]    [email or web address]   [email or web address]      I have tried to call Mr Crane to follow up but unable to make contact.             Education Documentation  No documentation found.        Marcy LANZA  Ambulatory Case Management    9/5/2024, 15:33 EDT

## 2024-09-10 ENCOUNTER — TELEPHONE (OUTPATIENT)
Dept: FAMILY MEDICINE CLINIC | Facility: CLINIC | Age: 69
End: 2024-09-10
Payer: MEDICARE

## 2024-09-10 DIAGNOSIS — R42 VERTIGO: ICD-10-CM

## 2024-09-10 RX ORDER — MECLIZINE HYDROCHLORIDE 50 MG/1
50 TABLET ORAL 3 TIMES DAILY PRN
Qty: 270 TABLET | Refills: 3 | Status: SHIPPED | OUTPATIENT
Start: 2024-09-10 | End: 2024-09-12

## 2024-09-10 NOTE — TELEPHONE ENCOUNTER
Caller: LOUIS SPENCER    Relationship: Emergency Contact    Best call back number: [phone]     Requested Prescriptions: PILLS FOR DIZZINESS 3X DAILY  Requested Prescriptions      No prescriptions requested or ordered in this encounter        Pharmacy where request should be sent: [address] - [phone] [facility] [phone] FX     Last office visit with prescribing clinician: 8/28/2024   Last telemedicine visit with prescribing clinician: Visit date not found   Next office visit with prescribing clinician: Visit date not found     Additional details provided by patient: CALLER STATES THAT MEDICATION WAS NOT CALLED IN FOR PATIENT. CALLER LEAVING FOR [place] IN THE MORNING AND PATIENT IS GOING WITH HER BUT HE HAS NO DIZZINESS MEDICATION. CALLER DOES NOT KNOW NAME OF MEDICATION.    Does the patient have less than a 3 day supply:  [x] Yes  [] No    Would you like a call back once the refill request has been completed: [] Yes [] No    If the office needs to give you a call back, can they leave a voicemail: [] Yes [] No    Sandra Scales Rep   09/10/24 14:14 EDT

## 2024-09-11 ENCOUNTER — PATIENT OUTREACH (OUTPATIENT)
Dept: CASE MANAGEMENT | Facility: OTHER | Age: 69
End: 2024-09-11
Payer: MEDICARE

## 2024-09-11 DIAGNOSIS — I25.10 CAD, MULTIPLE VESSEL: ICD-10-CM

## 2024-09-11 DIAGNOSIS — I50.9 ACUTE ON CHRONIC CONGESTIVE HEART FAILURE, UNSPECIFIED HEART FAILURE TYPE: ICD-10-CM

## 2024-09-11 DIAGNOSIS — I50.22 CHRONIC HFREF (HEART FAILURE WITH REDUCED EJECTION FRACTION): Primary | ICD-10-CM

## 2024-09-11 DIAGNOSIS — K70.31 ASCITES DUE TO ALCOHOLIC CIRRHOSIS: ICD-10-CM

## 2024-09-11 DIAGNOSIS — J44.9 CHRONIC OBSTRUCTIVE PULMONARY DISEASE, UNSPECIFIED COPD TYPE: ICD-10-CM

## 2024-09-11 NOTE — OUTREACH NOTE
Patient Outreach    Rehab Medical following for power chair. No further needs from [facility]    Names and Relationships of Patient/Support Persons: Contact: Bradley Crane; Relationship: Self -         Education Documentation  No documentation found.        Marcy LANZA  Ambulatory Case Management    9/11/2024, 15:42 EDT

## 2024-09-12 RX ORDER — MECLIZINE HYDROCHLORIDE 25 MG/1
25 TABLET ORAL 3 TIMES DAILY PRN
COMMUNITY
Start: 2024-09-10

## 2024-09-19 DIAGNOSIS — F41.9 ANXIETY: ICD-10-CM

## 2024-09-19 DIAGNOSIS — G89.29 CHRONIC BILATERAL LOW BACK PAIN WITH RIGHT-SIDED SCIATICA: ICD-10-CM

## 2024-09-19 DIAGNOSIS — S32.020S COMPRESSION FRACTURE OF L2 VERTEBRA, SEQUELA: ICD-10-CM

## 2024-09-19 DIAGNOSIS — M54.41 CHRONIC BILATERAL LOW BACK PAIN WITH RIGHT-SIDED SCIATICA: ICD-10-CM

## 2024-09-20 DIAGNOSIS — F41.9 ANXIETY: ICD-10-CM

## 2024-09-20 RX ORDER — ALPRAZOLAM 1 MG
1 TABLET ORAL 3 TIMES DAILY PRN
Qty: 90 TABLET | Refills: 0 | OUTPATIENT
Start: 2024-09-20

## 2024-09-20 RX ORDER — HYDROCODONE BITARTRATE AND ACETAMINOPHEN 5; 325 MG/1; MG/1
2 TABLET ORAL EVERY 6 HOURS PRN
Qty: 240 TABLET | Refills: 0 | Status: SHIPPED | OUTPATIENT
Start: 2024-09-20

## 2024-09-20 RX ORDER — ALPRAZOLAM 1 MG
1 TABLET ORAL 3 TIMES DAILY PRN
Qty: 90 TABLET | Refills: 0 | Status: SHIPPED | OUTPATIENT
Start: 2024-09-20

## 2024-09-20 RX ORDER — MIDODRINE HYDROCHLORIDE 5 MG/1
5 TABLET ORAL
Qty: 90 TABLET | Refills: 1 | Status: SHIPPED | OUTPATIENT
Start: 2024-09-20

## 2024-10-10 ENCOUNTER — TELEPHONE (OUTPATIENT)
Dept: FAMILY MEDICINE CLINIC | Facility: CLINIC | Age: 69
End: 2024-10-10
Payer: MEDICARE

## 2024-10-18 DIAGNOSIS — S32.020S COMPRESSION FRACTURE OF L2 VERTEBRA, SEQUELA: ICD-10-CM

## 2024-10-18 DIAGNOSIS — F41.9 ANXIETY: ICD-10-CM

## 2024-10-18 DIAGNOSIS — G89.29 CHRONIC BILATERAL LOW BACK PAIN WITH RIGHT-SIDED SCIATICA: ICD-10-CM

## 2024-10-18 DIAGNOSIS — M54.41 CHRONIC BILATERAL LOW BACK PAIN WITH RIGHT-SIDED SCIATICA: ICD-10-CM

## 2024-10-18 NOTE — TELEPHONE ENCOUNTER
Caller: LOUIS SPENCER    Relationship: Emergency Contact    Best call back number: [phone]    Requested Prescriptions:   Requested Prescriptions     Pending Prescriptions Disp Refills    HYDROcodone-acetaminophen (NORCO) 5-325 MG per tablet 240 tablet 0     Sig: Take 2 tablets by mouth Every 6 (Six) Hours As Needed for Moderate Pain.    ALPRAZolam (XANAX) 1 MG tablet 90 tablet 0     Sig: Take 1 tablet by mouth 3 (Three) Times a Day As Needed for Anxiety. for anxiety        Pharmacy where request should be sent: [address] [phone] [facility] [phone]      Last office visit with prescribing clinician: 8/28/2024   Last telemedicine visit with prescribing clinician: Visit date not found   Next office visit with prescribing clinician: 11/14/2024     Does the patient have less than a 3 day supply:  [x] Yes  [] No    Would you like a call back once the refill request has been completed: [] Yes [] No    If the office needs to give you a call back, can they leave a voicemail: [] Yes [] No    Sandra Bravo Rep   10/18/24 11:45 EDT

## 2024-10-21 RX ORDER — ALPRAZOLAM 1 MG
1 TABLET ORAL 3 TIMES DAILY PRN
Qty: 90 TABLET | Refills: 0 | Status: SHIPPED | OUTPATIENT
Start: 2024-10-21

## 2024-10-21 RX ORDER — HYDROCODONE BITARTRATE AND ACETAMINOPHEN 5; 325 MG/1; MG/1
2 TABLET ORAL EVERY 6 HOURS PRN
Qty: 240 TABLET | Refills: 0 | Status: SHIPPED | OUTPATIENT
Start: 2024-10-21

## 2024-11-13 ENCOUNTER — TELEPHONE (OUTPATIENT)
Dept: FAMILY MEDICINE CLINIC | Facility: CLINIC | Age: 69
End: 2024-11-13
Payer: MEDICARE

## 2024-11-13 NOTE — TELEPHONE ENCOUNTER
ATTEMPTED TO CALL PATIENT REGARDING 11/14 APPT, BLAS OUT DUE TO SURGERY. PATIENT DID NOT ANSWER, LEFT VM FOR PATIENT TO CALL BACK.

## 2024-11-20 DIAGNOSIS — M54.41 CHRONIC BILATERAL LOW BACK PAIN WITH RIGHT-SIDED SCIATICA: ICD-10-CM

## 2024-11-20 DIAGNOSIS — G89.29 CHRONIC BILATERAL LOW BACK PAIN WITH RIGHT-SIDED SCIATICA: ICD-10-CM

## 2024-11-20 DIAGNOSIS — S32.020S COMPRESSION FRACTURE OF L2 VERTEBRA, SEQUELA: ICD-10-CM

## 2024-11-20 RX ORDER — HYDROCODONE BITARTRATE AND ACETAMINOPHEN 5; 325 MG/1; MG/1
2 TABLET ORAL EVERY 6 HOURS PRN
Qty: 240 TABLET | Refills: 0 | Status: SHIPPED | OUTPATIENT
Start: 2024-11-20 | End: 2024-11-21 | Stop reason: SDUPTHER

## 2024-11-20 RX ORDER — ATORVASTATIN CALCIUM 80 MG/1
80 TABLET, FILM COATED ORAL DAILY
Qty: 90 TABLET | Refills: 3 | Status: SHIPPED | OUTPATIENT
Start: 2024-11-20

## 2024-11-20 NOTE — TELEPHONE ENCOUNTER
Caller: LOUIS SPENCER    Relationship: Emergency Contact    Best call back number: [phone]    Requested Prescriptions:   Requested Prescriptions     Pending Prescriptions Disp Refills    HYDROcodone-acetaminophen (NORCO) 5-325 MG per tablet 240 tablet 0     Sig: Take 2 tablets by mouth Every 6 (Six) Hours As Needed for Moderate Pain.    atorvastatin (LIPITOR) 80 MG tablet 90 tablet 3     Sig: Take 1 tablet by mouth Daily.        Pharmacy where request should be sent: [address] [phone] [facility] [phone]      Last office visit with prescribing clinician: 8/28/2024   Last telemedicine visit with prescribing clinician: Visit date not found   Next office visit with prescribing clinician: Visit date not found     Additional details provided by patient: OUT OF MEDICATION    Does the patient have less than a 3 day supply:  [x] Yes  [] No    Would you like a call back once the refill request has been completed: [] Yes [] No    If the office needs to give you a call back, can they leave a voicemail: [] Yes [] No    Sandra Rosa Rep   11/20/24 11:37 EST

## 2024-11-21 DIAGNOSIS — G89.29 CHRONIC BILATERAL LOW BACK PAIN WITH RIGHT-SIDED SCIATICA: ICD-10-CM

## 2024-11-21 DIAGNOSIS — S32.020S COMPRESSION FRACTURE OF L2 VERTEBRA, SEQUELA: ICD-10-CM

## 2024-11-21 DIAGNOSIS — M54.41 CHRONIC BILATERAL LOW BACK PAIN WITH RIGHT-SIDED SCIATICA: ICD-10-CM

## 2024-11-21 RX ORDER — HYDROCODONE BITARTRATE AND ACETAMINOPHEN 5; 325 MG/1; MG/1
2 TABLET ORAL EVERY 6 HOURS PRN
Qty: 240 TABLET | Refills: 0 | Status: SHIPPED | OUTPATIENT
Start: 2024-11-21

## 2024-11-25 ENCOUNTER — TELEPHONE (OUTPATIENT)
Dept: FAMILY MEDICINE CLINIC | Facility: CLINIC | Age: 69
End: 2024-11-25
Payer: MEDICARE

## 2024-11-25 NOTE — TELEPHONE ENCOUNTER
Caller: [facility] pharmacy     Who are you requesting to speak with (clinical staff, provider,  specific staff member): Rosa Isela Kim      What was the call regarding:     Pharmacy rep called to let us know they received prescription for pt for   HYDROcodone-acetaminophen (NORCO) 5-325 MG per tablet   They don't have anymore and they have reached their controlled substance limit for the month and won't get in anymore until December.   Pharmacy said they will not be able to fill the prescription at this time.

## 2025-02-12 ENCOUNTER — TELEPHONE (OUTPATIENT)
Dept: FAMILY MEDICINE CLINIC | Facility: CLINIC | Age: 70
End: 2025-02-12
Payer: MEDICARE

## 2025-02-12 NOTE — TELEPHONE ENCOUNTER
Caller: [facility]    Relationship: Other    Best call back number: [phone]- SHARBLIE    What form or medical record are you requesting: [facility] [employer] [facility] (PLEASE SEE MEDIA FROM 11.21.2024)     Who is requesting this form or medical record from you: [facility]    How would you like to receive the form or medical records (pick-up, mail, fax): FAX  If fax, what is the fax number: [phone]    Timeframe paperwork needed: ASAP    Additional notes: CALLER REQUESTING STATUS UPDATE ON FORMS THAT WERE FAXED IN NOVEMBER 2024 AND AGAIN 1.22.2025.        CONTACT NUMBER ABOVE WITH A STATUS UPDATE.
